# Patient Record
Sex: FEMALE | Race: WHITE
[De-identification: names, ages, dates, MRNs, and addresses within clinical notes are randomized per-mention and may not be internally consistent; named-entity substitution may affect disease eponyms.]

---

## 2018-08-15 ENCOUNTER — HOSPITAL ENCOUNTER (OUTPATIENT)
Dept: HOSPITAL 94 - SSTAY O | Age: 63
Discharge: HOME | End: 2018-08-15
Attending: RADIOLOGY
Payer: MEDICARE

## 2018-08-15 VITALS — BODY MASS INDEX: 28.94 KG/M2 | HEIGHT: 68 IN | WEIGHT: 190.92 LBS

## 2018-08-15 VITALS — SYSTOLIC BLOOD PRESSURE: 154 MMHG | DIASTOLIC BLOOD PRESSURE: 89 MMHG

## 2018-08-15 VITALS — SYSTOLIC BLOOD PRESSURE: 152 MMHG | DIASTOLIC BLOOD PRESSURE: 88 MMHG

## 2018-08-15 VITALS — DIASTOLIC BLOOD PRESSURE: 87 MMHG | SYSTOLIC BLOOD PRESSURE: 151 MMHG
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
## 2018-08-15 DIAGNOSIS — R79.1: ICD-10-CM

## 2018-08-15 DIAGNOSIS — Z23: ICD-10-CM

## 2018-08-15 DIAGNOSIS — Z88.8: ICD-10-CM

## 2018-08-15 DIAGNOSIS — Z88.2: ICD-10-CM

## 2018-08-15 DIAGNOSIS — Z79.4: ICD-10-CM

## 2018-08-15 DIAGNOSIS — N18.9: ICD-10-CM

## 2018-08-15 DIAGNOSIS — Z88.6: ICD-10-CM

## 2018-08-15 DIAGNOSIS — Z88.5: ICD-10-CM
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
## 2018-08-15 DIAGNOSIS — Z79.899: ICD-10-CM

## 2018-08-15 DIAGNOSIS — T82.898A: Primary | ICD-10-CM

## 2018-08-15 LAB
BASOPHILS # BLD AUTO: 0 X10'3 (ref 0–0.2)
BASOPHILS NFR BLD AUTO: 0.2 % (ref 0–1)
EOSINOPHIL # BLD AUTO: 0.1 X10'3 (ref 0–0.9)
EOSINOPHIL NFR BLD AUTO: 1.1 % (ref 0–6)
ERYTHROCYTE [DISTWIDTH] IN BLOOD BY AUTOMATED COUNT: 15.9 % (ref 11.5–14.5)
HCT VFR BLD AUTO: 38 % (ref 35–45)
HGB BLD-MCNC: 13.1 G/DL (ref 12–16)
INR PPP: 0.9 INR
LYMPHOCYTES # BLD AUTO: 1.5 X10'3 (ref 1.1–4.8)
LYMPHOCYTES NFR BLD AUTO: 23 % (ref 21–51)
MCH RBC QN AUTO: 30.8 PG (ref 27–31)
MCHC RBC AUTO-ENTMCNC: 34.6 % (ref 33–36.5)
MCV RBC AUTO: 88.9 FL (ref 78–98)
MONOCYTES # BLD AUTO: 0.4 X10'3 (ref 0–0.9)
MONOCYTES NFR BLD AUTO: 5.9 % (ref 2–12)
NEUTROPHILS # BLD AUTO: 4.5 X10'3 (ref 1.8–7.7)
NEUTROPHILS NFR BLD AUTO: 69.8 % (ref 42–75)
PLATELET # BLD AUTO: 118 X10'3 (ref 140–440)
PMV BLD AUTO: 9.2 FL (ref 7.4–10.4)
PROTHROMBIN TIME: 9.8 SECONDS (ref 9–12)
RBC # BLD AUTO: 4.27 X10'6 (ref 4.2–5.6)
WBC # BLD AUTO: 6.4 X10'3 (ref 4.5–11)

## 2018-08-15 PROCEDURE — 36415 COLL VENOUS BLD VENIPUNCTURE: CPT

## 2018-08-15 PROCEDURE — 90732 PPSV23 VACC 2 YRS+ SUBQ/IM: CPT

## 2018-08-15 PROCEDURE — 85610 PROTHROMBIN TIME: CPT

## 2018-08-15 PROCEDURE — 85025 COMPLETE CBC W/AUTO DIFF WBC: CPT

## 2018-08-15 PROCEDURE — 36581 REPLACE TUNNELED CV CATH: CPT

## 2018-08-15 PROCEDURE — 77001 FLUOROGUIDE FOR VEIN DEVICE: CPT

## 2018-12-13 ENCOUNTER — HOSPITAL ENCOUNTER (OUTPATIENT)
Dept: HOSPITAL 94 - SSTAY O | Age: 63
Discharge: HOME | End: 2018-12-13
Attending: RADIOLOGY
Payer: MEDICARE

## 2018-12-13 VITALS — SYSTOLIC BLOOD PRESSURE: 180 MMHG | DIASTOLIC BLOOD PRESSURE: 89 MMHG

## 2018-12-13 VITALS — SYSTOLIC BLOOD PRESSURE: 177 MMHG | DIASTOLIC BLOOD PRESSURE: 88 MMHG

## 2018-12-13 VITALS — WEIGHT: 189.82 LBS | BODY MASS INDEX: 28.77 KG/M2 | HEIGHT: 68 IN

## 2018-12-13 VITALS — SYSTOLIC BLOOD PRESSURE: 178 MMHG | DIASTOLIC BLOOD PRESSURE: 85 MMHG

## 2018-12-13 VITALS — DIASTOLIC BLOOD PRESSURE: 95 MMHG | SYSTOLIC BLOOD PRESSURE: 123 MMHG

## 2018-12-13 DIAGNOSIS — Z98.890: ICD-10-CM

## 2018-12-13 DIAGNOSIS — Z88.8: ICD-10-CM

## 2018-12-13 DIAGNOSIS — E11.22: ICD-10-CM
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
## 2018-12-13 DIAGNOSIS — Z80.9: ICD-10-CM

## 2018-12-13 DIAGNOSIS — Z79.84: ICD-10-CM

## 2018-12-13 DIAGNOSIS — Z79.82: ICD-10-CM

## 2018-12-13 DIAGNOSIS — H91.8X3: ICD-10-CM

## 2018-12-13 DIAGNOSIS — Z90.710: ICD-10-CM

## 2018-12-13 DIAGNOSIS — Z85.3: ICD-10-CM

## 2018-12-13 DIAGNOSIS — Z88.6: ICD-10-CM

## 2018-12-13 DIAGNOSIS — I25.10: ICD-10-CM

## 2018-12-13 DIAGNOSIS — T82.898A: Primary | ICD-10-CM

## 2018-12-13 DIAGNOSIS — Z79.4: ICD-10-CM

## 2018-12-13 DIAGNOSIS — E78.5: ICD-10-CM

## 2018-12-13 DIAGNOSIS — N18.9: ICD-10-CM

## 2018-12-13 DIAGNOSIS — I12.9: ICD-10-CM

## 2018-12-13 DIAGNOSIS — Z79.899: ICD-10-CM

## 2018-12-13 DIAGNOSIS — M19.90: ICD-10-CM

## 2018-12-13 DIAGNOSIS — K21.9: ICD-10-CM

## 2018-12-13 DIAGNOSIS — Z88.2: ICD-10-CM

## 2018-12-13 DIAGNOSIS — Z79.891: ICD-10-CM

## 2018-12-13 DIAGNOSIS — Z88.5: ICD-10-CM

## 2018-12-13 DIAGNOSIS — Z95.5: ICD-10-CM

## 2018-12-13 DIAGNOSIS — Z83.3: ICD-10-CM

## 2018-12-13 DIAGNOSIS — Z82.49: ICD-10-CM

## 2018-12-13 PROCEDURE — 36581 REPLACE TUNNELED CV CATH: CPT

## 2021-02-18 ENCOUNTER — HOSPITAL ENCOUNTER (OUTPATIENT)
Dept: HOSPITAL 94 - LAB | Age: 66
Discharge: HOME | End: 2021-02-18
Attending: GENERAL ACUTE CARE HOSPITAL
Payer: MEDICARE

## 2021-02-18 DIAGNOSIS — Z00.00: Primary | ICD-10-CM

## 2022-05-05 ENCOUNTER — HOSPITAL ENCOUNTER (INPATIENT)
Facility: MEDICAL CENTER | Age: 67
LOS: 14 days | DRG: 682 | End: 2022-05-19
Attending: EMERGENCY MEDICINE | Admitting: STUDENT IN AN ORGANIZED HEALTH CARE EDUCATION/TRAINING PROGRAM
Payer: MEDICARE

## 2022-05-05 ENCOUNTER — APPOINTMENT (OUTPATIENT)
Dept: RADIOLOGY | Facility: MEDICAL CENTER | Age: 67
DRG: 682 | End: 2022-05-05
Attending: EMERGENCY MEDICINE
Payer: MEDICARE

## 2022-05-05 ENCOUNTER — APPOINTMENT (OUTPATIENT)
Dept: RADIOLOGY | Facility: MEDICAL CENTER | Age: 67
DRG: 682 | End: 2022-05-05
Attending: STUDENT IN AN ORGANIZED HEALTH CARE EDUCATION/TRAINING PROGRAM
Payer: MEDICARE

## 2022-05-05 ENCOUNTER — APPOINTMENT (OUTPATIENT)
Dept: CARDIOLOGY | Facility: MEDICAL CENTER | Age: 67
DRG: 682 | End: 2022-05-05
Attending: STUDENT IN AN ORGANIZED HEALTH CARE EDUCATION/TRAINING PROGRAM
Payer: MEDICARE

## 2022-05-05 DIAGNOSIS — R45.851 DEPRESSION WITH SUICIDAL IDEATION: ICD-10-CM

## 2022-05-05 DIAGNOSIS — I10 PRIMARY HYPERTENSION: ICD-10-CM

## 2022-05-05 DIAGNOSIS — N18.6 ESRD (END STAGE RENAL DISEASE) (HCC): ICD-10-CM

## 2022-05-05 DIAGNOSIS — N18.6 STAGE 5 CHRONIC KIDNEY DISEASE ON CHRONIC DIALYSIS (HCC): ICD-10-CM

## 2022-05-05 DIAGNOSIS — E83.51 HYPOCALCEMIA: ICD-10-CM

## 2022-05-05 DIAGNOSIS — N19 UREMIA: ICD-10-CM

## 2022-05-05 DIAGNOSIS — R53.1 GENERALIZED WEAKNESS: ICD-10-CM

## 2022-05-05 DIAGNOSIS — J96.01 ACUTE RESPIRATORY FAILURE WITH HYPOXIA (HCC): ICD-10-CM

## 2022-05-05 DIAGNOSIS — J90 PLEURAL EFFUSION: ICD-10-CM

## 2022-05-05 DIAGNOSIS — D64.9 ANEMIA, UNSPECIFIED TYPE: Primary | ICD-10-CM

## 2022-05-05 DIAGNOSIS — F32.A DEPRESSION WITH SUICIDAL IDEATION: ICD-10-CM

## 2022-05-05 DIAGNOSIS — Z99.2 STAGE 5 CHRONIC KIDNEY DISEASE ON CHRONIC DIALYSIS (HCC): ICD-10-CM

## 2022-05-05 PROBLEM — J18.9 PNEUMONIA DUE TO INFECTIOUS ORGANISM: Status: ACTIVE | Noted: 2022-05-05

## 2022-05-05 PROBLEM — D61.818 PANCYTOPENIA (HCC): Status: ACTIVE | Noted: 2022-05-05

## 2022-05-05 LAB
ABO + RH BLD: NORMAL
ABO GROUP BLD: NORMAL
ALBUMIN SERPL BCP-MCNC: 2.9 G/DL (ref 3.2–4.9)
ALBUMIN/GLOB SERPL: 1 G/DL
ALP SERPL-CCNC: 59 U/L (ref 30–99)
ALT SERPL-CCNC: 16 U/L (ref 2–50)
ANION GAP SERPL CALC-SCNC: 26 MMOL/L (ref 7–16)
ANISOCYTOSIS BLD QL SMEAR: ABNORMAL
ANISOCYTOSIS BLD QL SMEAR: ABNORMAL
APPEARANCE FLD: CLEAR
APPEARANCE UR: CLEAR
APTT PPP: 30.5 SEC (ref 24.7–36)
AST SERPL-CCNC: 13 U/L (ref 12–45)
BACTERIA #/AREA URNS HPF: NEGATIVE /HPF
BARCODED ABORH UBTYP: 5100
BARCODED ABORH UBTYP: 5100
BARCODED ABORH UBTYP: 9500
BARCODED PRD CODE UBPRD: NORMAL
BARCODED UNIT NUM UBUNT: NORMAL
BASOPHILS # BLD AUTO: 0.3 % (ref 0–1.8)
BASOPHILS # BLD AUTO: 1.8 % (ref 0–1.8)
BASOPHILS # BLD: 0.01 K/UL (ref 0–0.12)
BASOPHILS # BLD: 0.08 K/UL (ref 0–0.12)
BASOPHILS NFR FLD: 1 %
BILIRUB SERPL-MCNC: 0.2 MG/DL (ref 0.1–1.5)
BILIRUB UR QL STRIP.AUTO: NEGATIVE
BLD GP AB SCN SERPL QL: NORMAL
BODY FLD TYPE: NORMAL
BUN SERPL-MCNC: 135 MG/DL (ref 8–22)
BURR CELLS BLD QL SMEAR: NORMAL
CALCIUM SERPL-MCNC: 7.3 MG/DL (ref 8.5–10.5)
CHLORIDE SERPL-SCNC: 106 MMOL/L (ref 96–112)
CK SERPL-CCNC: 127 U/L (ref 0–154)
CO2 SERPL-SCNC: 11 MMOL/L (ref 20–33)
COLOR FLD: YELLOW
COLOR UR: YELLOW
COMMENT 1642: NORMAL
COMPONENT R 8504R: NORMAL
CREAT SERPL-MCNC: 15.93 MG/DL (ref 0.5–1.4)
CYTOLOGY REG CYTOL: NORMAL
EKG IMPRESSION: NORMAL
EOSINOPHIL # BLD AUTO: 0.07 K/UL (ref 0–0.51)
EOSINOPHIL # BLD AUTO: 0.12 K/UL (ref 0–0.51)
EOSINOPHIL NFR BLD: 1.7 % (ref 0–6.9)
EOSINOPHIL NFR BLD: 3.2 % (ref 0–6.9)
EOSINOPHIL NFR FLD: 28 %
EPI CELLS #/AREA URNS HPF: NEGATIVE /HPF
ERYTHROCYTE [DISTWIDTH] IN BLOOD BY AUTOMATED COUNT: 68.8 FL (ref 35.9–50)
ERYTHROCYTE [DISTWIDTH] IN BLOOD BY AUTOMATED COUNT: 75.5 FL (ref 35.9–50)
GFR SERPLBLD CREATININE-BSD FMLA CKD-EPI: 2 ML/MIN/1.73 M 2
GLOBULIN SER CALC-MCNC: 2.9 G/DL (ref 1.9–3.5)
GLUCOSE FLD-MCNC: 123 MG/DL
GLUCOSE SERPL-MCNC: 124 MG/DL (ref 65–99)
GLUCOSE UR STRIP.AUTO-MCNC: 500 MG/DL
GRAM STN SPEC: NORMAL
HAV IGM SERPL QL IA: NORMAL
HBV CORE IGM SER QL: NORMAL
HBV SURFACE AB SERPL IA-ACNC: 392 MIU/ML (ref 0–10)
HBV SURFACE AG SER QL: NORMAL
HCT VFR BLD AUTO: 17.5 % (ref 37–47)
HCT VFR BLD AUTO: 21.7 % (ref 37–47)
HCT VFR BLD AUTO: 24.6 % (ref 37–47)
HCV AB SER QL: NORMAL
HGB BLD-MCNC: 5.2 G/DL (ref 12–16)
HGB BLD-MCNC: 6.6 G/DL (ref 12–16)
HGB BLD-MCNC: 8 G/DL (ref 12–16)
HISTIOCYTES NFR FLD: 20 %
HYALINE CASTS #/AREA URNS LPF: ABNORMAL /LPF
IMM GRANULOCYTES # BLD AUTO: 0.03 K/UL (ref 0–0.11)
IMM GRANULOCYTES NFR BLD AUTO: 0.8 % (ref 0–0.9)
INR PPP: 1.13 (ref 0.87–1.13)
IRON SATN MFR SERPL: 27 % (ref 15–55)
IRON SERPL-MCNC: 42 UG/DL (ref 40–170)
KETONES UR STRIP.AUTO-MCNC: NEGATIVE MG/DL
LACTATE BLD-SCNC: 0.8 MMOL/L (ref 0.5–2)
LDH FLD L TO P-CCNC: 104 U/L
LDH SERPL L TO P-CCNC: 381 U/L (ref 107–266)
LEUKOCYTE ESTERASE UR QL STRIP.AUTO: NEGATIVE
LIPASE SERPL-CCNC: 79 U/L (ref 11–82)
LV EJECT FRACT  99904: 55
LV EJECT FRACT MOD 4C 99902: 58.61
LYMPHOCYTES # BLD AUTO: 0.15 K/UL (ref 1–4.8)
LYMPHOCYTES # BLD AUTO: 0.32 K/UL (ref 1–4.8)
LYMPHOCYTES NFR BLD: 3.5 % (ref 22–41)
LYMPHOCYTES NFR BLD: 8.5 % (ref 22–41)
LYMPHOCYTES NFR FLD: 12 %
MAGNESIUM SERPL-MCNC: 2.3 MG/DL (ref 1.5–2.5)
MANUAL DIFF BLD: NORMAL
MCH RBC QN AUTO: 28.1 PG (ref 27–33)
MCH RBC QN AUTO: 28.7 PG (ref 27–33)
MCHC RBC AUTO-ENTMCNC: 29.7 G/DL (ref 33.6–35)
MCHC RBC AUTO-ENTMCNC: 30.4 G/DL (ref 33.6–35)
MCV RBC AUTO: 94.3 FL (ref 81.4–97.8)
MCV RBC AUTO: 94.6 FL (ref 81.4–97.8)
MESOTHL CELL NFR FLD: 2 %
METAMYELOCYTES NFR BLD MANUAL: 0.9 %
MICRO URNS: ABNORMAL
MICROCYTES BLD QL SMEAR: ABNORMAL
MICROCYTES BLD QL SMEAR: ABNORMAL
MONOCYTES # BLD AUTO: 0.08 K/UL (ref 0–0.85)
MONOCYTES # BLD AUTO: 0.35 K/UL (ref 0–0.85)
MONOCYTES NFR BLD AUTO: 1.8 % (ref 0–13.4)
MONOCYTES NFR BLD AUTO: 9.3 % (ref 0–13.4)
MONONUC CELLS NFR FLD: 33 %
MORPHOLOGY BLD-IMP: NORMAL
MORPHOLOGY BLD-IMP: NORMAL
NEUTROPHILS # BLD AUTO: 2.94 K/UL (ref 2–7.15)
NEUTROPHILS # BLD AUTO: 3.88 K/UL (ref 2–7.15)
NEUTROPHILS NFR BLD: 77.9 % (ref 44–72)
NEUTROPHILS NFR BLD: 89.4 % (ref 44–72)
NEUTROPHILS NFR FLD: 4 %
NEUTS BAND NFR BLD MANUAL: 0.9 % (ref 0–10)
NITRITE UR QL STRIP.AUTO: NEGATIVE
NRBC # BLD AUTO: 0 K/UL
NRBC # BLD AUTO: 0.02 K/UL
NRBC BLD-RTO: 0 /100 WBC
NRBC BLD-RTO: 0.5 /100 WBC
NT-PROBNP SERPL IA-MCNC: ABNORMAL PG/ML (ref 0–125)
OVALOCYTES BLD QL SMEAR: NORMAL
PH FLD: 7 [PH]
PH UR STRIP.AUTO: 5.5 [PH] (ref 5–8)
PHOSPHATE SERPL-MCNC: 11.8 MG/DL (ref 2.5–4.5)
PLATELET # BLD AUTO: 90 K/UL (ref 164–446)
PLATELET # BLD AUTO: 95 K/UL (ref 164–446)
PLATELET BLD QL SMEAR: NORMAL
PLATELET BLD QL SMEAR: NORMAL
PMV BLD AUTO: 10.8 FL (ref 9–12.9)
PMV BLD AUTO: 10.9 FL (ref 9–12.9)
POIKILOCYTOSIS BLD QL SMEAR: NORMAL
POIKILOCYTOSIS BLD QL SMEAR: NORMAL
POTASSIUM SERPL-SCNC: 4.3 MMOL/L (ref 3.6–5.5)
PROCALCITONIN SERPL-MCNC: 0.7 NG/ML
PRODUCT TYPE UPROD: NORMAL
PROT FLD-MCNC: 1.7 G/DL
PROT SERPL-MCNC: 5.8 G/DL (ref 6–8.2)
PROT UR QL STRIP: >=1000 MG/DL
PROTHROMBIN TIME: 14.2 SEC (ref 12–14.6)
RBC # BLD AUTO: 1.85 M/UL (ref 4.2–5.4)
RBC # BLD AUTO: 2.3 M/UL (ref 4.2–5.4)
RBC # FLD: <2000 CELLS/UL
RBC # URNS HPF: ABNORMAL /HPF
RBC BLD AUTO: PRESENT
RBC BLD AUTO: PRESENT
RBC UR QL AUTO: ABNORMAL
RH BLD: NORMAL
SCCMEC + MECA PNL NOSE NAA+PROBE: NEGATIVE
SCHISTOCYTES BLD QL SMEAR: NORMAL
SIGNIFICANT IND 70042: NORMAL
SITE SITE: NORMAL
SODIUM SERPL-SCNC: 143 MMOL/L (ref 135–145)
SOURCE SOURCE: NORMAL
SP GR UR STRIP.AUTO: 1.02
TIBC SERPL-MCNC: 154 UG/DL (ref 250–450)
TROPONIN T SERPL-MCNC: 67 NG/L (ref 6–19)
UIBC SERPL-MCNC: 112 UG/DL (ref 110–370)
UNIT STATUS USTAT: NORMAL
UROBILINOGEN UR STRIP.AUTO-MCNC: 0.2 MG/DL
WBC # BLD AUTO: 3.8 K/UL (ref 4.8–10.8)
WBC # BLD AUTO: 4.3 K/UL (ref 4.8–10.8)
WBC # FLD: 85 CELLS/UL
WBC #/AREA URNS HPF: ABNORMAL /HPF

## 2022-05-05 PROCEDURE — 96374 THER/PROPH/DIAG INJ IV PUSH: CPT

## 2022-05-05 PROCEDURE — 86706 HEP B SURFACE ANTIBODY: CPT

## 2022-05-05 PROCEDURE — 86923 COMPATIBILITY TEST ELECTRIC: CPT | Mod: 91

## 2022-05-05 PROCEDURE — 86900 BLOOD TYPING SEROLOGIC ABO: CPT

## 2022-05-05 PROCEDURE — 94760 N-INVAS EAR/PLS OXIMETRY 1: CPT

## 2022-05-05 PROCEDURE — 82945 GLUCOSE OTHER FLUID: CPT

## 2022-05-05 PROCEDURE — A9270 NON-COVERED ITEM OR SERVICE: HCPCS | Performed by: STUDENT IN AN ORGANIZED HEALTH CARE EDUCATION/TRAINING PROGRAM

## 2022-05-05 PROCEDURE — 86480 TB TEST CELL IMMUN MEASURE: CPT

## 2022-05-05 PROCEDURE — 83540 ASSAY OF IRON: CPT

## 2022-05-05 PROCEDURE — 88112 CYTOPATH CELL ENHANCE TECH: CPT

## 2022-05-05 PROCEDURE — P9016 RBC LEUKOCYTES REDUCED: HCPCS

## 2022-05-05 PROCEDURE — 93306 TTE W/DOPPLER COMPLETE: CPT | Mod: 26 | Performed by: INTERNAL MEDICINE

## 2022-05-05 PROCEDURE — 93005 ELECTROCARDIOGRAM TRACING: CPT

## 2022-05-05 PROCEDURE — 87641 MR-STAPH DNA AMP PROBE: CPT

## 2022-05-05 PROCEDURE — 84100 ASSAY OF PHOSPHORUS: CPT

## 2022-05-05 PROCEDURE — 30233N1 TRANSFUSION OF NONAUTOLOGOUS RED BLOOD CELLS INTO PERIPHERAL VEIN, PERCUTANEOUS APPROACH: ICD-10-PCS | Performed by: EMERGENCY MEDICINE

## 2022-05-05 PROCEDURE — 84157 ASSAY OF PROTEIN OTHER: CPT

## 2022-05-05 PROCEDURE — 80053 COMPREHEN METABOLIC PANEL: CPT

## 2022-05-05 PROCEDURE — 36430 TRANSFUSION BLD/BLD COMPNT: CPT

## 2022-05-05 PROCEDURE — 86850 RBC ANTIBODY SCREEN: CPT

## 2022-05-05 PROCEDURE — 86901 BLOOD TYPING SEROLOGIC RH(D): CPT

## 2022-05-05 PROCEDURE — 85610 PROTHROMBIN TIME: CPT

## 2022-05-05 PROCEDURE — 83690 ASSAY OF LIPASE: CPT

## 2022-05-05 PROCEDURE — 99285 EMERGENCY DEPT VISIT HI MDM: CPT

## 2022-05-05 PROCEDURE — 93005 ELECTROCARDIOGRAM TRACING: CPT | Performed by: EMERGENCY MEDICINE

## 2022-05-05 PROCEDURE — 96375 TX/PRO/DX INJ NEW DRUG ADDON: CPT

## 2022-05-05 PROCEDURE — 87205 SMEAR GRAM STAIN: CPT

## 2022-05-05 PROCEDURE — 83605 ASSAY OF LACTIC ACID: CPT

## 2022-05-05 PROCEDURE — 87070 CULTURE OTHR SPECIMN AEROBIC: CPT

## 2022-05-05 PROCEDURE — 99223 1ST HOSP IP/OBS HIGH 75: CPT | Mod: AI | Performed by: STUDENT IN AN ORGANIZED HEALTH CARE EDUCATION/TRAINING PROGRAM

## 2022-05-05 PROCEDURE — 85730 THROMBOPLASTIN TIME PARTIAL: CPT

## 2022-05-05 PROCEDURE — 88305 TISSUE EXAM BY PATHOLOGIST: CPT

## 2022-05-05 PROCEDURE — 93306 TTE W/DOPPLER COMPLETE: CPT

## 2022-05-05 PROCEDURE — 90935 HEMODIALYSIS ONE EVALUATION: CPT

## 2022-05-05 PROCEDURE — 700111 HCHG RX REV CODE 636 W/ 250 OVERRIDE (IP): Performed by: EMERGENCY MEDICINE

## 2022-05-05 PROCEDURE — 32555 ASPIRATE PLEURA W/ IMAGING: CPT | Mod: RT

## 2022-05-05 PROCEDURE — 84145 PROCALCITONIN (PCT): CPT

## 2022-05-05 PROCEDURE — 80074 ACUTE HEPATITIS PANEL: CPT

## 2022-05-05 PROCEDURE — 770001 HCHG ROOM/CARE - MED/SURG/GYN PRIV*

## 2022-05-05 PROCEDURE — 87040 BLOOD CULTURE FOR BACTERIA: CPT | Mod: 91

## 2022-05-05 PROCEDURE — 85018 HEMOGLOBIN: CPT

## 2022-05-05 PROCEDURE — 36415 COLL VENOUS BLD VENIPUNCTURE: CPT

## 2022-05-05 PROCEDURE — 700102 HCHG RX REV CODE 250 W/ 637 OVERRIDE(OP): Performed by: STUDENT IN AN ORGANIZED HEALTH CARE EDUCATION/TRAINING PROGRAM

## 2022-05-05 PROCEDURE — 71045 X-RAY EXAM CHEST 1 VIEW: CPT

## 2022-05-05 PROCEDURE — 85007 BL SMEAR W/DIFF WBC COUNT: CPT

## 2022-05-05 PROCEDURE — 83735 ASSAY OF MAGNESIUM: CPT

## 2022-05-05 PROCEDURE — 85025 COMPLETE CBC W/AUTO DIFF WBC: CPT

## 2022-05-05 PROCEDURE — 700111 HCHG RX REV CODE 636 W/ 250 OVERRIDE (IP): Performed by: STUDENT IN AN ORGANIZED HEALTH CARE EDUCATION/TRAINING PROGRAM

## 2022-05-05 PROCEDURE — 83880 ASSAY OF NATRIURETIC PEPTIDE: CPT

## 2022-05-05 PROCEDURE — 81001 URINALYSIS AUTO W/SCOPE: CPT

## 2022-05-05 PROCEDURE — 82550 ASSAY OF CK (CPK): CPT

## 2022-05-05 PROCEDURE — 700101 HCHG RX REV CODE 250: Performed by: STUDENT IN AN ORGANIZED HEALTH CARE EDUCATION/TRAINING PROGRAM

## 2022-05-05 PROCEDURE — 84484 ASSAY OF TROPONIN QUANT: CPT

## 2022-05-05 PROCEDURE — 700117 HCHG RX CONTRAST REV CODE 255: Performed by: EMERGENCY MEDICINE

## 2022-05-05 PROCEDURE — 0W993ZZ DRAINAGE OF RIGHT PLEURAL CAVITY, PERCUTANEOUS APPROACH: ICD-10-PCS | Performed by: RADIOLOGY

## 2022-05-05 PROCEDURE — 83550 IRON BINDING TEST: CPT

## 2022-05-05 PROCEDURE — 83986 ASSAY PH BODY FLUID NOS: CPT

## 2022-05-05 PROCEDURE — 71260 CT THORAX DX C+: CPT | Mod: MG

## 2022-05-05 PROCEDURE — 87015 SPECIMEN INFECT AGNT CONCNTJ: CPT

## 2022-05-05 PROCEDURE — 83615 LACTATE (LD) (LDH) ENZYME: CPT | Mod: 91

## 2022-05-05 PROCEDURE — 85014 HEMATOCRIT: CPT

## 2022-05-05 PROCEDURE — 5A1D70Z PERFORMANCE OF URINARY FILTRATION, INTERMITTENT, LESS THAN 6 HOURS PER DAY: ICD-10-PCS | Performed by: STUDENT IN AN ORGANIZED HEALTH CARE EDUCATION/TRAINING PROGRAM

## 2022-05-05 PROCEDURE — 89051 BODY FLUID CELL COUNT: CPT

## 2022-05-05 RX ORDER — CALCIUM ACETATE 667 MG/1
667 TABLET ORAL
Status: DISCONTINUED | OUTPATIENT
Start: 2022-05-05 | End: 2022-05-19 | Stop reason: HOSPADM

## 2022-05-05 RX ORDER — CALCIUM ACETATE 667 MG/1
2001 CAPSULE ORAL EVERY EVENING
Status: ON HOLD | COMMUNITY
End: 2022-06-19

## 2022-05-05 RX ORDER — AMOXICILLIN 250 MG
2 CAPSULE ORAL 2 TIMES DAILY
Status: DISCONTINUED | OUTPATIENT
Start: 2022-05-05 | End: 2022-05-07

## 2022-05-05 RX ORDER — IPRATROPIUM BROMIDE AND ALBUTEROL SULFATE 2.5; .5 MG/3ML; MG/3ML
3 SOLUTION RESPIRATORY (INHALATION)
Status: DISCONTINUED | OUTPATIENT
Start: 2022-05-05 | End: 2022-05-19 | Stop reason: HOSPADM

## 2022-05-05 RX ORDER — INSULIN LISPRO 100 [IU]/ML
14 INJECTION, SOLUTION SUBCUTANEOUS DAILY
COMMUNITY
End: 2022-06-13

## 2022-05-05 RX ORDER — FUROSEMIDE 10 MG/ML
40 INJECTION INTRAMUSCULAR; INTRAVENOUS
Status: DISCONTINUED | OUTPATIENT
Start: 2022-05-05 | End: 2022-05-07

## 2022-05-05 RX ORDER — BISACODYL 10 MG
10 SUPPOSITORY, RECTAL RECTAL
Status: DISCONTINUED | OUTPATIENT
Start: 2022-05-05 | End: 2022-05-07

## 2022-05-05 RX ORDER — PIOGLITAZONEHYDROCHLORIDE 45 MG/1
45 TABLET ORAL DAILY
Status: ON HOLD | COMMUNITY
End: 2022-06-28

## 2022-05-05 RX ORDER — ACETAMINOPHEN 325 MG/1
650 TABLET ORAL EVERY 6 HOURS PRN
Status: DISCONTINUED | OUTPATIENT
Start: 2022-05-05 | End: 2022-05-07

## 2022-05-05 RX ORDER — IBUPROFEN 600 MG/1
600 TABLET ORAL 3 TIMES DAILY PRN
Status: ON HOLD | COMMUNITY
End: 2022-05-14

## 2022-05-05 RX ORDER — SODIUM BICARBONATE 650 MG/1
650 TABLET ORAL 3 TIMES DAILY
Status: DISCONTINUED | OUTPATIENT
Start: 2022-05-05 | End: 2022-05-07

## 2022-05-05 RX ORDER — AMLODIPINE BESYLATE 5 MG/1
5 TABLET ORAL DAILY
Status: ON HOLD | COMMUNITY
End: 2022-05-14 | Stop reason: SDUPTHER

## 2022-05-05 RX ORDER — AZITHROMYCIN 250 MG/1
500 TABLET, FILM COATED ORAL DAILY
Status: DISCONTINUED | OUTPATIENT
Start: 2022-05-05 | End: 2022-05-07

## 2022-05-05 RX ORDER — OMEPRAZOLE 20 MG/1
20 CAPSULE, DELAYED RELEASE ORAL DAILY
COMMUNITY
End: 2022-06-13

## 2022-05-05 RX ORDER — HYDRALAZINE HYDROCHLORIDE 25 MG/1
25 TABLET, FILM COATED ORAL 2 TIMES DAILY
Status: ON HOLD | COMMUNITY
End: 2022-06-28

## 2022-05-05 RX ORDER — METOPROLOL SUCCINATE 100 MG/1
100 TABLET, EXTENDED RELEASE ORAL DAILY
COMMUNITY
End: 2022-06-13

## 2022-05-05 RX ORDER — HYDRALAZINE HYDROCHLORIDE 20 MG/ML
10 INJECTION INTRAMUSCULAR; INTRAVENOUS EVERY 4 HOURS PRN
Status: DISCONTINUED | OUTPATIENT
Start: 2022-05-05 | End: 2022-05-19 | Stop reason: HOSPADM

## 2022-05-05 RX ORDER — ONDANSETRON 2 MG/ML
4 INJECTION INTRAMUSCULAR; INTRAVENOUS EVERY 4 HOURS PRN
Status: DISCONTINUED | OUTPATIENT
Start: 2022-05-05 | End: 2022-05-08

## 2022-05-05 RX ORDER — GABAPENTIN 300 MG/1
300 CAPSULE ORAL EVERY EVENING
COMMUNITY

## 2022-05-05 RX ORDER — CALCIUM GLUCONATE 20 MG/ML
1 INJECTION, SOLUTION INTRAVENOUS ONCE
Status: COMPLETED | OUTPATIENT
Start: 2022-05-05 | End: 2022-05-05

## 2022-05-05 RX ORDER — DILTIAZEM HYDROCHLORIDE 240 MG/1
240 CAPSULE, EXTENDED RELEASE ORAL DAILY
Status: ON HOLD | COMMUNITY
End: 2022-05-19

## 2022-05-05 RX ORDER — POLYETHYLENE GLYCOL 3350 17 G/17G
1 POWDER, FOR SOLUTION ORAL
Status: DISCONTINUED | OUTPATIENT
Start: 2022-05-05 | End: 2022-05-07

## 2022-05-05 RX ORDER — HYDRALAZINE HYDROCHLORIDE 25 MG/1
25 TABLET, FILM COATED ORAL 2 TIMES DAILY
Status: DISCONTINUED | OUTPATIENT
Start: 2022-05-05 | End: 2022-05-10

## 2022-05-05 RX ORDER — AMLODIPINE BESYLATE 5 MG/1
5 TABLET ORAL DAILY
Status: DISCONTINUED | OUTPATIENT
Start: 2022-05-05 | End: 2022-05-06

## 2022-05-05 RX ORDER — ATORVASTATIN CALCIUM 80 MG/1
80 TABLET, FILM COATED ORAL EVERY EVENING
Status: ON HOLD | COMMUNITY
End: 2022-06-28

## 2022-05-05 RX ORDER — ONDANSETRON 4 MG/1
4 TABLET, ORALLY DISINTEGRATING ORAL EVERY 4 HOURS PRN
Status: DISCONTINUED | OUTPATIENT
Start: 2022-05-05 | End: 2022-05-08

## 2022-05-05 RX ORDER — LINAGLIPTIN 5 MG/1
5 TABLET, FILM COATED ORAL DAILY
COMMUNITY
End: 2022-06-13

## 2022-05-05 RX ORDER — SEVELAMER CARBONATE 800 MG/1
800 TABLET, FILM COATED ORAL
Status: DISCONTINUED | OUTPATIENT
Start: 2022-05-05 | End: 2022-05-05

## 2022-05-05 RX ORDER — SEVELAMER HYDROCHLORIDE 800 MG/1
1600 TABLET, FILM COATED ORAL
Status: ON HOLD | COMMUNITY
End: 2022-06-19

## 2022-05-05 RX ORDER — OMEPRAZOLE 20 MG/1
20 CAPSULE, DELAYED RELEASE ORAL DAILY
Status: DISCONTINUED | OUTPATIENT
Start: 2022-05-05 | End: 2022-05-07

## 2022-05-05 RX ORDER — INSULIN GLARGINE 300 U/ML
45 INJECTION, SOLUTION SUBCUTANEOUS
COMMUNITY
End: 2022-06-13

## 2022-05-05 RX ORDER — ATORVASTATIN CALCIUM 80 MG/1
80 TABLET, FILM COATED ORAL DAILY
Status: DISCONTINUED | OUTPATIENT
Start: 2022-05-05 | End: 2022-05-19 | Stop reason: HOSPADM

## 2022-05-05 RX ADMIN — ONDANSETRON 4 MG: 2 INJECTION INTRAMUSCULAR; INTRAVENOUS at 21:56

## 2022-05-05 RX ADMIN — CALCIUM GLUCONATE 1 G: 20 INJECTION, SOLUTION INTRAVENOUS at 02:26

## 2022-05-05 RX ADMIN — SODIUM BICARBONATE 650 MG: 650 TABLET ORAL at 06:19

## 2022-05-05 RX ADMIN — IOHEXOL 100 ML: 350 INJECTION, SOLUTION INTRAVENOUS at 03:20

## 2022-05-05 RX ADMIN — AZITHROMYCIN DIHYDRATE 500 MG: 250 TABLET, FILM COATED ORAL at 06:19

## 2022-05-05 RX ADMIN — HYDRALAZINE HYDROCHLORIDE 10 MG: 20 INJECTION INTRAMUSCULAR; INTRAVENOUS at 19:44

## 2022-05-05 RX ADMIN — CEFTRIAXONE SODIUM 2 G: 10 INJECTION, POWDER, FOR SOLUTION INTRAVENOUS at 06:21

## 2022-05-05 RX ADMIN — FUROSEMIDE 40 MG: 10 INJECTION, SOLUTION INTRAMUSCULAR; INTRAVENOUS at 06:27

## 2022-05-05 RX ADMIN — FUROSEMIDE 40 MG: 10 INJECTION, SOLUTION INTRAMUSCULAR; INTRAVENOUS at 18:05

## 2022-05-05 ASSESSMENT — ENCOUNTER SYMPTOMS
PHOTOPHOBIA: 0
SHORTNESS OF BREATH: 0
SPUTUM PRODUCTION: 0
ORTHOPNEA: 0
FALLS: 1
SPEECH CHANGE: 0
ABDOMINAL PAIN: 0
NERVOUS/ANXIOUS: 0
WEAKNESS: 1
HALLUCINATIONS: 0
CHILLS: 0
VOMITING: 0
GASTROINTESTINAL NEGATIVE: 1
PALPITATIONS: 0
EYE PAIN: 0
FOCAL WEAKNESS: 0
COUGH: 0
NAUSEA: 0
FEVER: 0
SEIZURES: 0
SENSORY CHANGE: 0
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
## 2022-05-05 NOTE — ASSESSMENT & PLAN NOTE
Patient with large right and small left pleural effusions as well as suspected diagnosis of pneumonia.  Thoracentesis performed in the ER, unclear the output however fluid studies correlate with transudate  Oxygen per guidelines, wean as able  RT consult, continuous pulse ox, incentive spirometry, pulmonary toilet  Procalcitonin elevated, this is skewed by patient's ESRD   echo noted LVEF of 55%, mild LVH,mild calcified mitral stenosis with a gradient of 5mmhg, moderate pulmonary hypertension.  CT chest abdomen pelvis with contrast shows large right pleural effusion and small left pleural effusion, bilateral atelectasis  EKG shows normal sinus rhythm with LVH, prolonged QTC, no STEMI or ST depressions  DuoNebs as needed  never smoked tobacco  Lasix ordered initially as she was fluid overloaded, discontinued at this time.  Resolved patient is currently on room air
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
## 2022-05-05 NOTE — ASSESSMENT & PLAN NOTE
Secondary to missing 3 to 4 weeks of dialysis  Moderate to large right pleural effusion and small left pleural effusion, thoracentesis performed  Causing acute respiratory failure -which has resolved

## 2022-05-05 NOTE — ED NOTES
Pt unable to sign transfusion consent form. Pt gave RN verbal understanding of need, but RN unable to consent for her due to orientation status. ERP contacted for consent

## 2022-05-05 NOTE — ASSESSMENT & PLAN NOTE
Unclear if true pneumonia vs compressive atelectasis only, she is pancytopenic and was hypothermic, will cover with antibiotics empirically while checking procalcitonin.  Check MRSA nares  Procalcitonin elevated, this can be skewed by ESRD  On C3 and azithromycin.  See acute respiratory failure problem for additional plan.

## 2022-05-05 NOTE — ASSESSMENT & PLAN NOTE
ESRD last dialyzed over 3 weeks ago, right upper extremity HD access prior complications.  No reports of hemorrhage, no abdominal pain or tenderness.  Received 2 PRBC transfusions for hemoglobin 5.3 in the ED.  Transfuse PRBC if hemoglobin less than 7.  Iron panel performed, start patient on oral iron supplementation

## 2022-05-05 NOTE — ED NOTES
Pt in dialysis for approx 2 more hours per dmitry Waite RN. Attempting to call report to receiving RN.

## 2022-05-05 NOTE — PROGRESS NOTES
4 Eyes Skin Assessment Completed by Jackie RN and Celestino RN.    Head WDL  Ears WDL  Nose WDL  Mouth WDL  Neck WDL  Breast/Chest WDL  Shoulder Blades Bruising  Spine Bruising  (R) Arm/Elbow/Hand Bruising  (L) Arm/Elbow/Hand Bruising  Abdomen Scab  Groin WDL  Scrotum/Coccyx/Buttocks Redness, Discoloration and Scab  (R) Leg Edema  (L) Leg Edema and knot behind the knee  (R) Heel/Foot/Toe Edema  (L) Heel/Foot/Toe Edema          Devices In Places Nasal Cannula      Interventions In Place Pillows    Possible Skin Injury Yes    Pictures Uploaded Into Epic Yes  Wound Consult Placed Yes  RN Wound Prevention Protocol Ordered Yes
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
## 2022-05-05 NOTE — PROGRESS NOTES
Pt arrived on unit form dialysis, very lethargic and not easy to arouse. Skin assessment done and pt cleaned up. On 4L NC with purewick in place. Admission assessment attempted but unable to complete. Will try again later.

## 2022-05-05 NOTE — ED NOTES
Assist RN:  Pt to CT scan via myra with CT Tech at this time. Pt is intermittently agitated and yelling out at staff demanding water and calling CT Tech stupid for not giving it to her.

## 2022-05-05 NOTE — ED NOTES
"Med rec complete list of allergies and medications from [facility].   Pt unable to review anything with me. She said she\" hasn't taken anything in 2 weeks\" and her pharmacy is \"[facility] in [place]\".   Pt unable to tell me what [facility] pharmacy is in.      "
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
## 2022-05-05 NOTE — DISCHARGE PLANNING
Outpatient Dialysis Placement Notification    Tentative Schedule: [facility] 2:45pm     Received notification for outpatient dialysis placement from Dr. Benito.    Met/Spoke with patient and confirmed demographics and insurance information.  Provided patient with dialysis education materials and list of HD centers, referral initiated to:    [facility]    [address] [place] [id number]    Pending medical and financial clearance.    Ching Lees- Senior Dialysis Coordinator # [phone]  [employer]

## 2022-05-06 ENCOUNTER — APPOINTMENT (OUTPATIENT)
Dept: RADIOLOGY | Facility: MEDICAL CENTER | Age: 67
DRG: 682 | End: 2022-05-06
Payer: MEDICARE

## 2022-05-06 PROBLEM — G93.40 ACUTE ENCEPHALOPATHY: Status: ACTIVE | Noted: 2022-05-06

## 2022-05-06 PROBLEM — N19 UREMIA: Status: ACTIVE | Noted: 2022-05-06

## 2022-05-06 PROBLEM — T74.91XA DOMESTIC ABUSE OF ADULT: Status: ACTIVE | Noted: 2022-05-06

## 2022-05-06 LAB
ALBUMIN SERPL BCP-MCNC: 2.5 G/DL (ref 3.2–4.9)
ALBUMIN/GLOB SERPL: 0.8 G/DL
ALP SERPL-CCNC: 59 U/L (ref 30–99)
ALT SERPL-CCNC: 13 U/L (ref 2–50)
ANION GAP SERPL CALC-SCNC: 21 MMOL/L (ref 7–16)
AST SERPL-CCNC: 14 U/L (ref 12–45)
BASOPHILS # BLD AUTO: 0.2 % (ref 0–1.8)
BASOPHILS # BLD: 0.01 K/UL (ref 0–0.12)
BILIRUB SERPL-MCNC: 0.3 MG/DL (ref 0.1–1.5)
BUN SERPL-MCNC: 68 MG/DL (ref 8–22)
CALCIUM SERPL-MCNC: 7.3 MG/DL (ref 8.5–10.5)
CHLORIDE SERPL-SCNC: 101 MMOL/L (ref 96–112)
CO2 SERPL-SCNC: 17 MMOL/L (ref 20–33)
CREAT SERPL-MCNC: 8.93 MG/DL (ref 0.5–1.4)
EOSINOPHIL # BLD AUTO: 0.05 K/UL (ref 0–0.51)
EOSINOPHIL NFR BLD: 1.1 % (ref 0–6.9)
ERYTHROCYTE [DISTWIDTH] IN BLOOD BY AUTOMATED COUNT: 72.1 FL (ref 35.9–50)
GAMMA INTERFERON BACKGROUND BLD IA-ACNC: 0.1 IU/ML
GFR SERPLBLD CREATININE-BSD FMLA CKD-EPI: 4 ML/MIN/1.73 M 2
GLOBULIN SER CALC-MCNC: 3.3 G/DL (ref 1.9–3.5)
GLUCOSE SERPL-MCNC: 109 MG/DL (ref 65–99)
HCT VFR BLD AUTO: 25.1 % (ref 37–47)
HGB BLD-MCNC: 7.7 G/DL (ref 12–16)
IMM GRANULOCYTES # BLD AUTO: 0.05 K/UL (ref 0–0.11)
IMM GRANULOCYTES NFR BLD AUTO: 1.1 % (ref 0–0.9)
LYMPHOCYTES # BLD AUTO: 0.15 K/UL (ref 1–4.8)
LYMPHOCYTES NFR BLD: 3.4 % (ref 22–41)
M TB IFN-G BLD-IMP: NEGATIVE
M TB IFN-G CD4+ BCKGRND COR BLD-ACNC: 0 IU/ML
MAGNESIUM SERPL-MCNC: 1.9 MG/DL (ref 1.5–2.5)
MCH RBC QN AUTO: 29.8 PG (ref 27–33)
MCHC RBC AUTO-ENTMCNC: 30.7 G/DL (ref 33.6–35)
MCV RBC AUTO: 97.3 FL (ref 81.4–97.8)
MITOGEN IGNF BCKGRD COR BLD-ACNC: 4.41 IU/ML
MONOCYTES # BLD AUTO: 0.38 K/UL (ref 0–0.85)
MONOCYTES NFR BLD AUTO: 8.6 % (ref 0–13.4)
NEUTROPHILS # BLD AUTO: 3.76 K/UL (ref 2–7.15)
NEUTROPHILS NFR BLD: 85.6 % (ref 44–72)
NRBC # BLD AUTO: 0 K/UL
NRBC BLD-RTO: 0 /100 WBC
PHOSPHATE SERPL-MCNC: 6.6 MG/DL (ref 2.5–4.5)
PLATELET # BLD AUTO: 101 K/UL (ref 164–446)
PMV BLD AUTO: 10.4 FL (ref 9–12.9)
POTASSIUM SERPL-SCNC: 3.8 MMOL/L (ref 3.6–5.5)
PROT SERPL-MCNC: 5.8 G/DL (ref 6–8.2)
QFT TB2 - NIL TBQ2: 0 IU/ML
RBC # BLD AUTO: 2.58 M/UL (ref 4.2–5.4)
SODIUM SERPL-SCNC: 139 MMOL/L (ref 135–145)
TROPONIN T SERPL-MCNC: 74 NG/L (ref 6–19)
WBC # BLD AUTO: 4.4 K/UL (ref 4.8–10.8)

## 2022-05-06 PROCEDURE — 97162 PT EVAL MOD COMPLEX 30 MIN: CPT

## 2022-05-06 PROCEDURE — A9270 NON-COVERED ITEM OR SERVICE: HCPCS

## 2022-05-06 PROCEDURE — 80053 COMPREHEN METABOLIC PANEL: CPT

## 2022-05-06 PROCEDURE — 700111 HCHG RX REV CODE 636 W/ 250 OVERRIDE (IP): Performed by: STUDENT IN AN ORGANIZED HEALTH CARE EDUCATION/TRAINING PROGRAM

## 2022-05-06 PROCEDURE — A9270 NON-COVERED ITEM OR SERVICE: HCPCS | Performed by: GENERAL PRACTICE

## 2022-05-06 PROCEDURE — 700102 HCHG RX REV CODE 250 W/ 637 OVERRIDE(OP): Performed by: GENERAL PRACTICE

## 2022-05-06 PROCEDURE — 92610 EVALUATE SWALLOWING FUNCTION: CPT

## 2022-05-06 PROCEDURE — 97166 OT EVAL MOD COMPLEX 45 MIN: CPT

## 2022-05-06 PROCEDURE — 700102 HCHG RX REV CODE 250 W/ 637 OVERRIDE(OP): Performed by: INTERNAL MEDICINE

## 2022-05-06 PROCEDURE — 90935 HEMODIALYSIS ONE EVALUATION: CPT

## 2022-05-06 PROCEDURE — 84100 ASSAY OF PHOSPHORUS: CPT

## 2022-05-06 PROCEDURE — 700111 HCHG RX REV CODE 636 W/ 250 OVERRIDE (IP)

## 2022-05-06 PROCEDURE — 770001 HCHG ROOM/CARE - MED/SURG/GYN PRIV*

## 2022-05-06 PROCEDURE — 71045 X-RAY EXAM CHEST 1 VIEW: CPT

## 2022-05-06 PROCEDURE — 36415 COLL VENOUS BLD VENIPUNCTURE: CPT

## 2022-05-06 PROCEDURE — 700102 HCHG RX REV CODE 250 W/ 637 OVERRIDE(OP): Performed by: STUDENT IN AN ORGANIZED HEALTH CARE EDUCATION/TRAINING PROGRAM

## 2022-05-06 PROCEDURE — 83735 ASSAY OF MAGNESIUM: CPT

## 2022-05-06 PROCEDURE — A9270 NON-COVERED ITEM OR SERVICE: HCPCS | Performed by: STUDENT IN AN ORGANIZED HEALTH CARE EDUCATION/TRAINING PROGRAM

## 2022-05-06 PROCEDURE — A9270 NON-COVERED ITEM OR SERVICE: HCPCS | Performed by: INTERNAL MEDICINE

## 2022-05-06 PROCEDURE — 51798 US URINE CAPACITY MEASURE: CPT

## 2022-05-06 PROCEDURE — 99233 SBSQ HOSP IP/OBS HIGH 50: CPT | Performed by: GENERAL PRACTICE

## 2022-05-06 PROCEDURE — 84484 ASSAY OF TROPONIN QUANT: CPT

## 2022-05-06 PROCEDURE — 700102 HCHG RX REV CODE 250 W/ 637 OVERRIDE(OP)

## 2022-05-06 PROCEDURE — 85025 COMPLETE CBC W/AUTO DIFF WBC: CPT

## 2022-05-06 RX ORDER — FERROUS SULFATE 325(65) MG
325 TABLET ORAL
Status: DISCONTINUED | OUTPATIENT
Start: 2022-05-07 | End: 2022-05-13

## 2022-05-06 RX ORDER — CHOLECALCIFEROL (VITAMIN D3) 125 MCG
5 CAPSULE ORAL NIGHTLY
Status: DISCONTINUED | OUTPATIENT
Start: 2022-05-06 | End: 2022-05-19 | Stop reason: HOSPADM

## 2022-05-06 RX ORDER — LORAZEPAM 1 MG/1
1 TABLET ORAL
Status: DISCONTINUED | OUTPATIENT
Start: 2022-05-06 | End: 2022-05-06

## 2022-05-06 RX ORDER — AMLODIPINE BESYLATE 10 MG/1
10 TABLET ORAL DAILY
Status: DISCONTINUED | OUTPATIENT
Start: 2022-05-07 | End: 2022-05-06

## 2022-05-06 RX ORDER — METOPROLOL SUCCINATE 25 MG/1
100 TABLET, EXTENDED RELEASE ORAL DAILY
Status: DISCONTINUED | OUTPATIENT
Start: 2022-05-06 | End: 2022-05-14

## 2022-05-06 RX ORDER — LORAZEPAM 2 MG/ML
0.5 INJECTION INTRAMUSCULAR
Status: COMPLETED | OUTPATIENT
Start: 2022-05-06 | End: 2022-05-06

## 2022-05-06 RX ORDER — AMLODIPINE BESYLATE 5 MG/1
5 TABLET ORAL DAILY
Status: DISCONTINUED | OUTPATIENT
Start: 2022-05-07 | End: 2022-05-09

## 2022-05-06 RX ADMIN — LORAZEPAM 0.5 MG: 2 INJECTION INTRAMUSCULAR; INTRAVENOUS at 05:16

## 2022-05-06 RX ADMIN — HYDRALAZINE HYDROCHLORIDE 10 MG: 20 INJECTION INTRAMUSCULAR; INTRAVENOUS at 08:57

## 2022-05-06 RX ADMIN — CEFTRIAXONE SODIUM 2 G: 10 INJECTION, POWDER, FOR SOLUTION INTRAVENOUS at 04:11

## 2022-05-06 RX ADMIN — FUROSEMIDE 40 MG: 10 INJECTION, SOLUTION INTRAMUSCULAR; INTRAVENOUS at 04:11

## 2022-05-06 RX ADMIN — FUROSEMIDE 40 MG: 10 INJECTION, SOLUTION INTRAMUSCULAR; INTRAVENOUS at 20:21

## 2022-05-06 RX ADMIN — METOPROLOL SUCCINATE 100 MG: 25 TABLET, EXTENDED RELEASE ORAL at 20:22

## 2022-05-06 RX ADMIN — SODIUM BICARBONATE 650 MG: 650 TABLET ORAL at 20:22

## 2022-05-06 RX ADMIN — Medication 5 MG: at 21:38

## 2022-05-06 RX ADMIN — HYDRALAZINE HYDROCHLORIDE 25 MG: 25 TABLET, FILM COATED ORAL at 20:22

## 2022-05-06 RX ADMIN — ACETAMINOPHEN 650 MG: 325 TABLET, FILM COATED ORAL at 21:38

## 2022-05-06 ASSESSMENT — COGNITIVE AND FUNCTIONAL STATUS - GENERAL
DRESSING REGULAR LOWER BODY CLOTHING: A LOT
CLIMB 3 TO 5 STEPS WITH RAILING: TOTAL
SUGGESTED CMS G CODE MODIFIER MOBILITY: CM
CLIMB 3 TO 5 STEPS WITH RAILING: TOTAL
DRESSING REGULAR LOWER BODY CLOTHING: TOTAL
MOBILITY SCORE: 7
DAILY ACTIVITIY SCORE: 9
STANDING UP FROM CHAIR USING ARMS: TOTAL
DRESSING REGULAR UPPER BODY CLOTHING: A LOT
SUGGESTED CMS G CODE MODIFIER DAILY ACTIVITY: CL
MOVING FROM LYING ON BACK TO SITTING ON SIDE OF FLAT BED: UNABLE
EATING MEALS: A LOT
HELP NEEDED FOR BATHING: TOTAL
MOVING FROM LYING ON BACK TO SITTING ON SIDE OF FLAT BED: UNABLE
PERSONAL GROOMING: A LOT
DRESSING REGULAR UPPER BODY CLOTHING: A LOT
DAILY ACTIVITIY SCORE: 12
EATING MEALS: A LOT
TOILETING: TOTAL
SUGGESTED CMS G CODE MODIFIER MOBILITY: CM
HELP NEEDED FOR BATHING: A LOT
SUGGESTED CMS G CODE MODIFIER DAILY ACTIVITY: CL
WALKING IN HOSPITAL ROOM: TOTAL
MOBILITY SCORE: 8
WALKING IN HOSPITAL ROOM: TOTAL
MOVING TO AND FROM BED TO CHAIR: UNABLE
TOILETING: A LOT
STANDING UP FROM CHAIR USING ARMS: TOTAL
MOVING TO AND FROM BED TO CHAIR: A LOT
PERSONAL GROOMING: A LOT
TURNING FROM BACK TO SIDE WHILE IN FLAT BAD: A LOT
TURNING FROM BACK TO SIDE WHILE IN FLAT BAD: A LOT

## 2022-05-06 ASSESSMENT — ACTIVITIES OF DAILY LIVING (ADL): TOILETING: REQUIRES ASSIST

## 2022-05-06 ASSESSMENT — PATIENT HEALTH QUESTIONNAIRE - PHQ9
SUM OF ALL RESPONSES TO PHQ9 QUESTIONS 1 AND 2: 0
1. LITTLE INTEREST OR PLEASURE IN DOING THINGS: NOT AT ALL
2. FEELING DOWN, DEPRESSED, IRRITABLE, OR HOPELESS: NOT AT ALL

## 2022-05-06 ASSESSMENT — FIBROSIS 4 INDEX: FIB4 SCORE: 2.58

## 2022-05-06 ASSESSMENT — PAIN DESCRIPTION - PAIN TYPE: TYPE: ACUTE PAIN

## 2022-05-06 ASSESSMENT — GAIT ASSESSMENTS: GAIT LEVEL OF ASSIST: UNABLE TO PARTICIPATE
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
## 2022-05-06 NOTE — PROGRESS NOTES
"Pt became very restless in room.  This RN went into room to check on pt and pt starting yelling at this RN \"I need to go to the bathroom\".  Educated pt that she will have to use bedpan d/t her lethargy.  Went to go grab a bedpan and when this RN returned, pt start vomiting clear liquid.  Pt's HOB elevated and received oral suctioning.  Pt received n/v medication per MAR.  O2 sats dropped to 86% on RA so pt was placed on 2L nasal cannula and pt returned to 93-94%.   "

## 2022-05-06 NOTE — CARE PLAN
The patient is Watcher - Medium risk of patient condition declining or worsening    Shift Goals  Clinical Goals: monitor BP and mental status  Patient Goals: CARIE  Family Goals: CARIE    Progress made toward(s) clinical / shift goals:    Problem: Skin Integrity  Goal: Skin integrity is maintained or improved  Outcome: Progressing     Problem: Fall Risk  Goal: Patient will remain free from falls  Outcome: Progressing       Patient is not progressing towards the following goals:      Problem: Knowledge Deficit - Standard  Goal: Patient and family/care givers will demonstrate understanding of plan of care, disease process/condition, diagnostic tests and medications  Outcome: Not Progressing  Note: Pt is hypertensive.  Pt is AO to self and place.
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
## 2022-05-06 NOTE — PROGRESS NOTES
Hemodialysis ordered by Dr. Benito. Pt came after thoracentesis. Very lethargic. Unable to sign dialysis consent. Dr. Benito signed and authorized for pt. Treatment started at 1108 and ended at 1408. Pt stable, vss, no c/o post tx. Net UF 1.5 L. Report to ALICIA Donnelly RN. Rt ua avg dsg cdi.

## 2022-05-06 NOTE — ASSESSMENT & PLAN NOTE
Likely secondary to uremia  Refrain from any sedatives  Head CT negative for any acute findings  Ammonia negative  Mentation slightly improving
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
## 2022-05-07 PROBLEM — F32.A DEPRESSION WITH SUICIDAL IDEATION: Status: ACTIVE | Noted: 2022-05-07

## 2022-05-07 PROBLEM — R45.851 DEPRESSION WITH SUICIDAL IDEATION: Status: ACTIVE | Noted: 2022-05-07

## 2022-05-07 LAB
25(OH)D3 SERPL-MCNC: 11 NG/ML (ref 30–100)
ANION GAP SERPL CALC-SCNC: 24 MMOL/L (ref 7–16)
BUN SERPL-MCNC: 29 MG/DL (ref 8–22)
CALCIUM SERPL-MCNC: 8.5 MG/DL (ref 8.5–10.5)
CHLORIDE SERPL-SCNC: 95 MMOL/L (ref 96–112)
CO2 SERPL-SCNC: 19 MMOL/L (ref 20–33)
CREAT SERPL-MCNC: 3.59 MG/DL (ref 0.5–1.4)
ERYTHROCYTE [DISTWIDTH] IN BLOOD BY AUTOMATED COUNT: 65.4 FL (ref 35.9–50)
GFR SERPLBLD CREATININE-BSD FMLA CKD-EPI: 13 ML/MIN/1.73 M 2
GLUCOSE SERPL-MCNC: 230 MG/DL (ref 65–99)
HCT VFR BLD AUTO: 25 % (ref 37–47)
HGB BLD-MCNC: 8 G/DL (ref 12–16)
MCH RBC QN AUTO: 29.3 PG (ref 27–33)
MCHC RBC AUTO-ENTMCNC: 32 G/DL (ref 33.6–35)
MCV RBC AUTO: 91.6 FL (ref 81.4–97.8)
PLATELET # BLD AUTO: 119 K/UL (ref 164–446)
PMV BLD AUTO: 10.4 FL (ref 9–12.9)
POTASSIUM SERPL-SCNC: 4.1 MMOL/L (ref 3.6–5.5)
RBC # BLD AUTO: 2.73 M/UL (ref 4.2–5.4)
SODIUM SERPL-SCNC: 138 MMOL/L (ref 135–145)
WBC # BLD AUTO: 6.3 K/UL (ref 4.8–10.8)

## 2022-05-07 PROCEDURE — 770001 HCHG ROOM/CARE - MED/SURG/GYN PRIV*

## 2022-05-07 PROCEDURE — 700111 HCHG RX REV CODE 636 W/ 250 OVERRIDE (IP): Performed by: STUDENT IN AN ORGANIZED HEALTH CARE EDUCATION/TRAINING PROGRAM

## 2022-05-07 PROCEDURE — 700111 HCHG RX REV CODE 636 W/ 250 OVERRIDE (IP): Performed by: GENERAL PRACTICE

## 2022-05-07 PROCEDURE — A9270 NON-COVERED ITEM OR SERVICE: HCPCS | Performed by: STUDENT IN AN ORGANIZED HEALTH CARE EDUCATION/TRAINING PROGRAM

## 2022-05-07 PROCEDURE — 90935 HEMODIALYSIS ONE EVALUATION: CPT

## 2022-05-07 PROCEDURE — 83970 ASSAY OF PARATHORMONE: CPT

## 2022-05-07 PROCEDURE — A9270 NON-COVERED ITEM OR SERVICE: HCPCS | Performed by: INTERNAL MEDICINE

## 2022-05-07 PROCEDURE — 85027 COMPLETE CBC AUTOMATED: CPT

## 2022-05-07 PROCEDURE — 36415 COLL VENOUS BLD VENIPUNCTURE: CPT

## 2022-05-07 PROCEDURE — 80048 BASIC METABOLIC PNL TOTAL CA: CPT

## 2022-05-07 PROCEDURE — 700102 HCHG RX REV CODE 250 W/ 637 OVERRIDE(OP): Performed by: GENERAL PRACTICE

## 2022-05-07 PROCEDURE — C9113 INJ PANTOPRAZOLE SODIUM, VIA: HCPCS | Performed by: GENERAL PRACTICE

## 2022-05-07 PROCEDURE — 99232 SBSQ HOSP IP/OBS MODERATE 35: CPT | Performed by: GENERAL PRACTICE

## 2022-05-07 PROCEDURE — 700102 HCHG RX REV CODE 250 W/ 637 OVERRIDE(OP): Performed by: STUDENT IN AN ORGANIZED HEALTH CARE EDUCATION/TRAINING PROGRAM

## 2022-05-07 PROCEDURE — A9270 NON-COVERED ITEM OR SERVICE: HCPCS

## 2022-05-07 PROCEDURE — 82306 VITAMIN D 25 HYDROXY: CPT

## 2022-05-07 PROCEDURE — 700102 HCHG RX REV CODE 250 W/ 637 OVERRIDE(OP): Performed by: INTERNAL MEDICINE

## 2022-05-07 PROCEDURE — 700102 HCHG RX REV CODE 250 W/ 637 OVERRIDE(OP)

## 2022-05-07 PROCEDURE — 97602 WOUND(S) CARE NON-SELECTIVE: CPT

## 2022-05-07 PROCEDURE — A9270 NON-COVERED ITEM OR SERVICE: HCPCS | Performed by: GENERAL PRACTICE

## 2022-05-07 RX ORDER — FUROSEMIDE 10 MG/ML
40 INJECTION INTRAMUSCULAR; INTRAVENOUS
Status: DISCONTINUED | OUTPATIENT
Start: 2022-05-08 | End: 2022-05-07

## 2022-05-07 RX ORDER — QUETIAPINE FUMARATE 25 MG/1
25 TABLET, FILM COATED ORAL 2 TIMES DAILY PRN
Status: DISCONTINUED | OUTPATIENT
Start: 2022-05-07 | End: 2022-05-19 | Stop reason: HOSPADM

## 2022-05-07 RX ORDER — POLYETHYLENE GLYCOL 3350 17 G/17G
1 POWDER, FOR SOLUTION ORAL
Status: DISCONTINUED | OUTPATIENT
Start: 2022-05-07 | End: 2022-05-19 | Stop reason: HOSPADM

## 2022-05-07 RX ORDER — PANTOPRAZOLE SODIUM 40 MG/10ML
40 INJECTION, POWDER, LYOPHILIZED, FOR SOLUTION INTRAVENOUS 2 TIMES DAILY
Status: DISCONTINUED | OUTPATIENT
Start: 2022-05-07 | End: 2022-05-09

## 2022-05-07 RX ORDER — ACETAMINOPHEN 325 MG/1
650 TABLET ORAL EVERY 6 HOURS PRN
Status: DISCONTINUED | OUTPATIENT
Start: 2022-05-07 | End: 2022-05-19 | Stop reason: HOSPADM

## 2022-05-07 RX ORDER — AMOXICILLIN 250 MG
2 CAPSULE ORAL 2 TIMES DAILY PRN
Status: DISCONTINUED | OUTPATIENT
Start: 2022-05-07 | End: 2022-05-19 | Stop reason: HOSPADM

## 2022-05-07 RX ORDER — CALCITRIOL 0.25 UG/1
0.25 CAPSULE, LIQUID FILLED ORAL DAILY
Status: DISCONTINUED | OUTPATIENT
Start: 2022-05-07 | End: 2022-05-13

## 2022-05-07 RX ORDER — FUROSEMIDE 40 MG/1
80 TABLET ORAL
Status: DISCONTINUED | OUTPATIENT
Start: 2022-05-07 | End: 2022-05-07

## 2022-05-07 RX ORDER — BISACODYL 10 MG
10 SUPPOSITORY, RECTAL RECTAL
Status: DISCONTINUED | OUTPATIENT
Start: 2022-05-07 | End: 2022-05-19 | Stop reason: HOSPADM

## 2022-05-07 RX ADMIN — PANTOPRAZOLE SODIUM 40 MG: 40 INJECTION, POWDER, FOR SOLUTION INTRAVENOUS at 17:46

## 2022-05-07 RX ADMIN — ONDANSETRON 4 MG: 2 INJECTION INTRAMUSCULAR; INTRAVENOUS at 18:12

## 2022-05-07 RX ADMIN — ONDANSETRON 4 MG: 2 INJECTION INTRAMUSCULAR; INTRAVENOUS at 23:10

## 2022-05-07 RX ADMIN — CEFTRIAXONE SODIUM 2 G: 10 INJECTION, POWDER, FOR SOLUTION INTRAVENOUS at 05:15

## 2022-05-07 RX ADMIN — FUROSEMIDE 40 MG: 10 INJECTION, SOLUTION INTRAMUSCULAR; INTRAVENOUS at 05:15

## 2022-05-07 ASSESSMENT — PAIN DESCRIPTION - PAIN TYPE: TYPE: ACUTE PAIN

## 2022-05-07 NOTE — PROGRESS NOTES
HD tx ordered by Dr. Benito today. HD tx performed between 0838 until 1140. Pt is alert, however confused, restless and kept on screaming the entire tx. Applied restraint on the right arm per MD order due to risk of infiltration. BP elevated, challenged UF by 0.5L, not in acute distress at this time. Seen by Dr. Benito during tx. Pt reported restless legs/ cramping, UF turned off per MD. Net UF: 2L. RUE AVF with good bruit and thrill, no signs of infection at this time and no active bleeding. Pt left the unit stable. Post HD tx report given to primary RN Nessa.
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
## 2022-05-07 NOTE — PROGRESS NOTES
"Hemodialysis treatment ordered today per Dr Benito x 3 hours. Treatment initiated at 1543, ended at 1843.  Pt confused, restless and hypertensive , SBP : 190's , HR :90's O2 Sat :92 on RA.    Patient restless and screaming during HD Tx, SBP of 150-160's,  Hospitalist and primary RN rounded at bedside, carrillo cath inserted by  Primary RN at bedside.Pt denies any pain, constantly stating \" I want to go home, I can't do this today\", Pt repositioned and reassured for comfort. Pt desating to 88% on RA, placed on 2L via NC for O2 support. Primary Rn notified, O2 sat : % on 2L NC , Pt keeps removing pulse ox from her hand. Pt finished 3hr HD, vomited clear watery secretion post Tx., VSS. RN notified. ; see e-flow sheet for details.     Net UF 2,500 mL.     Needles removed from access site. Dressings applied and sites held x 10 minutes; verified no bleeding. Positive bruit/thrill post tx. Staff RN to monitor AVF for breakthrough bleeding. Should breakthrough bleeding occur, staff RN to apply pressure to access sites until bleeding resolved. Notify Dialysis and Nephrologist for follow-up.    Report given to Primary RN.     "
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
## 2022-05-07 NOTE — CARE PLAN
The patient is Watcher - Medium risk of patient condition declining or worsening    Shift Goals  Clinical Goals: monitor BP  Patient Goals: sleep  Family Goals: n/a    Progress made toward(s) clinical / shift goals:    Problem: Skin Integrity  Goal: Skin integrity is maintained or improved  5/7/2022 0307 by Freddie Han R.N.  Outcome: Progressing  5/7/2022 0301 by Freddie Han R.N.  Outcome: Progressing     Problem: Fall Risk  Goal: Patient will remain free from falls  5/7/2022 0307 by Freddie Han R.N.  Outcome: Progressing  5/7/2022 0301 by WAILDA MustafaN.  Outcome: Progressing     Problem: Pain - Standard  Goal: Alleviation of pain or a reduction in pain to the patient’s comfort goal  5/7/2022 0307 by Freddie Han R.N.  Outcome: Progressing  5/7/2022 0301 by Freddie Han R.N.  Outcome: Progressing       Patient is not progressing towards the following goals:

## 2022-05-07 NOTE — ASSESSMENT & PLAN NOTE
Patient's depressed and with tearful affect.  She reports she no longer wants dialysis, she understands if she stops dialysis that she would die, she stated maybe that is what she wants. Nursing staff at bedside during this conversation. Psychiatry was consulted, patient placed on a legal hold.  Currently, legal hold discontinued. Patient denies SI, continue Effexor. Psych signed off

## 2022-05-07 NOTE — PROGRESS NOTES
Pt taken to dialysis around 1500. At 1520, Dialysis nurse called to say pt would not sit still and requested medication to calm her down. MD went down to examine pt and pt very restless unable to urinate again and MD called to request bladder scan and carrillo placement if over 300. Bladder scan showed 310 and carrillo placed down in dialysis. No complications.
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
## 2022-05-08 ENCOUNTER — APPOINTMENT (OUTPATIENT)
Dept: RADIOLOGY | Facility: MEDICAL CENTER | Age: 67
DRG: 682 | End: 2022-05-08
Attending: GENERAL PRACTICE
Payer: MEDICARE

## 2022-05-08 LAB
EKG IMPRESSION: NORMAL
ERYTHROCYTE [DISTWIDTH] IN BLOOD BY AUTOMATED COUNT: 66.5 FL (ref 35.9–50)
HCT VFR BLD AUTO: 22 % (ref 37–47)
HGB BLD-MCNC: 6.9 G/DL (ref 12–16)
MCH RBC QN AUTO: 29.2 PG (ref 27–33)
MCHC RBC AUTO-ENTMCNC: 31.4 G/DL (ref 33.6–35)
MCV RBC AUTO: 93.2 FL (ref 81.4–97.8)
PLATELET # BLD AUTO: 126 K/UL (ref 164–446)
PMV BLD AUTO: 10.8 FL (ref 9–12.9)
PROCALCITONIN SERPL-MCNC: 2.71 NG/ML
RBC # BLD AUTO: 2.36 M/UL (ref 4.2–5.4)
WBC # BLD AUTO: 5.5 K/UL (ref 4.8–10.8)

## 2022-05-08 PROCEDURE — P9016 RBC LEUKOCYTES REDUCED: HCPCS

## 2022-05-08 PROCEDURE — 770001 HCHG ROOM/CARE - MED/SURG/GYN PRIV*

## 2022-05-08 PROCEDURE — 700111 HCHG RX REV CODE 636 W/ 250 OVERRIDE (IP): Performed by: GENERAL PRACTICE

## 2022-05-08 PROCEDURE — 99233 SBSQ HOSP IP/OBS HIGH 50: CPT | Performed by: GENERAL PRACTICE

## 2022-05-08 PROCEDURE — A9270 NON-COVERED ITEM OR SERVICE: HCPCS | Performed by: GENERAL PRACTICE

## 2022-05-08 PROCEDURE — 86923 COMPATIBILITY TEST ELECTRIC: CPT

## 2022-05-08 PROCEDURE — 85027 COMPLETE CBC AUTOMATED: CPT

## 2022-05-08 PROCEDURE — 700102 HCHG RX REV CODE 250 W/ 637 OVERRIDE(OP): Performed by: INTERNAL MEDICINE

## 2022-05-08 PROCEDURE — A9270 NON-COVERED ITEM OR SERVICE: HCPCS | Performed by: INTERNAL MEDICINE

## 2022-05-08 PROCEDURE — 700102 HCHG RX REV CODE 250 W/ 637 OVERRIDE(OP): Performed by: GENERAL PRACTICE

## 2022-05-08 PROCEDURE — 93010 ELECTROCARDIOGRAM REPORT: CPT | Performed by: INTERNAL MEDICINE

## 2022-05-08 PROCEDURE — C9113 INJ PANTOPRAZOLE SODIUM, VIA: HCPCS | Performed by: GENERAL PRACTICE

## 2022-05-08 PROCEDURE — 36430 TRANSFUSION BLD/BLD COMPNT: CPT

## 2022-05-08 PROCEDURE — 84145 PROCALCITONIN (PCT): CPT

## 2022-05-08 PROCEDURE — 36415 COLL VENOUS BLD VENIPUNCTURE: CPT

## 2022-05-08 PROCEDURE — 93005 ELECTROCARDIOGRAM TRACING: CPT

## 2022-05-08 PROCEDURE — 70450 CT HEAD/BRAIN W/O DYE: CPT | Mod: ME

## 2022-05-08 RX ORDER — ERGOCALCIFEROL 1.25 MG/1
50000 CAPSULE ORAL
Status: DISCONTINUED | OUTPATIENT
Start: 2022-05-08 | End: 2022-05-19 | Stop reason: HOSPADM

## 2022-05-08 RX ORDER — PROCHLORPERAZINE EDISYLATE 5 MG/ML
10 INJECTION INTRAMUSCULAR; INTRAVENOUS EVERY 6 HOURS PRN
Status: DISCONTINUED | OUTPATIENT
Start: 2022-05-08 | End: 2022-05-19 | Stop reason: HOSPADM

## 2022-05-08 RX ADMIN — PANTOPRAZOLE SODIUM 40 MG: 40 INJECTION, POWDER, FOR SOLUTION INTRAVENOUS at 04:56

## 2022-05-08 RX ADMIN — PANTOPRAZOLE SODIUM 40 MG: 40 INJECTION, POWDER, FOR SOLUTION INTRAVENOUS at 17:54

## 2022-05-08 RX ADMIN — QUETIAPINE FUMARATE 25 MG: 25 TABLET ORAL at 00:54

## 2022-05-08 RX ADMIN — Medication 667 MG: at 16:33

## 2022-05-08 RX ADMIN — QUETIAPINE FUMARATE 25 MG: 25 TABLET ORAL at 16:33

## 2022-05-08 ASSESSMENT — PAIN DESCRIPTION - PAIN TYPE
TYPE: ACUTE PAIN
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
## 2022-05-08 NOTE — PROGRESS NOTES
Per nursing concern, RN to hold oral medications this AM due to swallowing/ aspiration concerns, pts nausea/ active vomiting, disorientation, and inability to follow commands.     Notified ghanshyam Perez to hold.

## 2022-05-08 NOTE — PROGRESS NOTES
[facility] -  CONSULT NOTE               Author: Jose Benito M.D.    Date & Time: 5/8/2022  11:18 AM   Consult reason: ESRD missed dialysis    HPI:  67 years old female with medical history notable for insulin-dependent type 2 diabetes, hypertension, end-stage renal disease due to diabetes has been on hemodialysis for 4 years, presenting to hospital with generalized weakness, transferred to the [facility] for higher level of care due to anemia requiring transfusions and also significant chemistry abnormalities.  Patient is unable to give history due to significant drowsiness and weakness.  She states that she she has been noncompliant with all of her medications for about 3 weeks also last dialysis about 3 weeks back.  Now she is suffering from nausea, green-brown vomiting and dyspnea.  Denies any chest pain.    In the emergency department patient was hypertensive, 97% on room air, drowsy, cxr pertinent for bilateral pleural effusion, CT thorax abdomen and pelvis with contrast was obtained which showed bilateral pleural effusion without any significant abnormalities otherwise. Creatinine was significant 15, patient still urinates, K normal, corrected ca 8.1, phos 11, bun 130s. Patient underwent thoracentesis which is showing transudate.    Nephrology was consulted due to missed dialysis and ESRD.      REVIEW OF SYSTEMS:    10 point ROS reviewed and is as per HPI or otherwise negative    DAILY NEPHROLOGY PROGRESS:    5/6 - Patient is vomiting at bedside, still volume overloaded. Alert and oriented however does not understand why she is vomiting despite we spoke yesterday about missed dialysis. She states she is urinating, however no IO in chart.  5/7 - Confused.  Complains of leg pain.  Seen on HD  5/8 - Somnolent.  Sleeping in bed.  Had HD yesterday.      PMH/PSH/SH/FH: Reviewed, see HPI  CURRENT MEDICATIONS: Reviewed    VS:  BP (!) 176/59 Comment: rn notified  Pulse 92   Temp 37.1 °C (98.7  "°F) (Temporal)   Resp 16   Ht 1.651 m (5' 5\")   Wt 84.3 kg (185 lb 13.6 oz)   SpO2 92%   BMI 30.93 kg/m²   Physical Exam  Constitutional:       General: She is not in acute distress.     Appearance: She is ill-appearing.   HENT:      Head: Normocephalic and atraumatic.      Nose: No rhinorrhea.      Mouth/Throat:      Pharynx: No posterior oropharyngeal erythema.   Eyes:      General: No scleral icterus.  Cardiovascular:      Rate and Rhythm: Normal rate and regular rhythm.      Heart sounds: No murmur heard.    No friction rub.   Pulmonary:      Effort: No respiratory distress.      Breath sounds: No wheezing, rhonchi or rales.      Comments: Decreased breath sounds  Abdominal:      General: There is no distension.      Tenderness: There is no abdominal tenderness. There is no guarding or rebound.      Comments: Vomiting green-brown fluid at bedside   Musculoskeletal:      Right lower leg: Edema present.      Left lower leg: Edema present.   Skin:     General: Skin is dry.      Coloration: Skin is pale. Skin is not jaundiced.   Neurological:      Mental Status: She is oriented to person, place, and time.      Motor: Weakness (generalized) present.      Comments: asterixis         Fluids:  In: 500 [Dialysis:500]  Out: 2900     LABS:  Recent Labs     05/06/22  0030 05/07/22  1943   SODIUM 139 138   POTASSIUM 3.8 4.1   CHLORIDE 101 95*   CO2 17* 19*   GLUCOSE 109* 230*   BUN 68* 29*   CREATININE 8.93* 3.59*   CALCIUM 7.3* 8.5       IMPRESSION:  · Uremia with pleural effusion and serositis with nausea  · Pleural effusion due to volume overload due to missed dialysis, transudate lights criteria  · Anemia of chronic kidney disease  · Anemia requiring transfusions  · ESRD, due to type 2 diabetes, on HD right upper extremity, last 4 years  · HAGMA due to uremic toxins  · Proteinuria  · Vomiting  · Hyperphosphatemia, severe  · Thrombocytopenia  · Hypertension, due to volume overload and essential " hypertension  · Procalcitonin elevation due to renal failure vs ongoing infection (potential CAP)  · Type 2 diabetes mellitus.  · Hypocalcemia      PLAN:  -HD qTTS  -Can increase to amlodipine 10mg PO daily if persistently hypertensive  -HD placement per SW  -Continue with PhosLo  - Vitamin D 11,   - Calcitriol  - On psych hold    We will continue to follow   Thank you for your consult.

## 2022-05-08 NOTE — CARE PLAN
The patient is Stable - Low risk of patient condition declining or worsening    Shift Goals  Clinical Goals: Safety; Legal hold  Patient Goals: CARIE  Family Goals: N/A    Progress made toward(s) clinical / shift goals:    Problem: Knowledge Deficit - Standard  Goal: Patient and family/care givers will demonstrate understanding of plan of care, disease process/condition, diagnostic tests and medications  Outcome: Progressing     Problem: Skin Integrity  Goal: Skin integrity is maintained or improved  Outcome: Progressing     Problem: Fall Risk  Goal: Patient will remain free from falls  Outcome: Progressing       Patient is not progressing towards the following goals:

## 2022-05-08 NOTE — CONSULTS
BRIEF PSYCHIATRIC CONSULT NOTE:tried twice at two different visits to talk with her in the morning and again in the afternoon. She is very heavily asleep and despite calling her name loudly and tapping her hands and feet, she did not stir. Will try again tommorrow.
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
## 2022-05-08 NOTE — WOUND TEAM
[facility]  Inpatient Services  Wound and Skin Care Brief Evaluation    Admission Date: 5/5/2022     Last order of IP CONSULT TO WOUND CARE was found on 5/5/2022 from Hospital Encounter on 5/4/2022     HPI, PMH, SH: Reviewed    Chief Complaint   Patient presents with   • Weakness     Pt transfer from outside facility with c/o weakness and multiple recent falls. Multiple bruises noted to posterior trunk in different stages of healing   • Abnormal Labs     Pt evaluated at [facility] and found to have hgb 5.3 and hematocrit 16%     Diagnosis: Anemia requiring transfusions [D64.9]    Unit where seen by Wound Team: S141/00     Wound consult placed regarding sacrum & BLE. Chart and images reviewed. This discussed with bedside RN Jackie. This RN in to assess patient. Pt lethargic and vomiting. Sacrum with blanching hyperpigmented discoloration, no open wounds. Barrier paste applied. Noted that there is scattered bruising and healing abrasions along patient's back, sacrum, and R hip. Left knee with squishy mobile mass, resembling lipoma. R calf with firm, non-mobile mass, no open wounds to either. Defer to MD for if further workup for masses is needed. No pressure injuries or advanced wound care needs identified. Wound consult completed. No further follow up unless indicated and consulted.       RSKIN:   CURRENTLY IN PLACE (X), APPLIED THIS VISIT (A), ORDERED (O):   Q shift Pacheco:  X  Q shift pressure point assessments:  X    Surface/Positioning   Pressure redistribution mattress     X       Low Airloss          Bariatric foam      Bariatric JENNIFER     Waffle cushion        Waffle Overlay          Reposition q 2 hours    X  TAPs Turning system     Z Eladio Pillow     Offloading/Redistribution NA  Sacral Mepilex (Silicone dressing)     Heel Mepilex (Silicone dressing)         Heel float boots (Prevalon boot)             Float Heels off Bed with Pillows           Respiratory NA - on RA  Silicone O2 tubing          Gray Foam Ear protectors     Cannula fixation Device (Tender )          High flow offloading Clip    Elastic head band offloading device      Anchorfast                                                         Trach with Optifoam split foam             Containment/Moisture Prevention     Rectal tube or BMS    Purwick/Condom Cath        [place] Catheter    Barrier wipes         X  Barrier paste     A  Antifungal tx      Interdry        Mobilization Not assessed this visit      Up to chair        Ambulate      PT/OT      Nutrition       Dietician        Diabetes Education      PO  X RN reports poor PO intake   TF     TPN     NPO   # days

## 2022-05-08 NOTE — CARE PLAN
The patient is Watcher - Medium risk of patient condition declining or worsening    Shift Goals  Clinical Goals: Safety  Patient Goals: CARIE    Progress made toward(s) clinical / shift goals:      Problem: Knowledge Deficit - Standard  Goal: Patient and family/care givers will demonstrate understanding of plan of care, disease process/condition, diagnostic tests and medications  5/8/2022 0240 by Mary Ellen Rojo RPHILL.  Outcome: Progressing  Note: Educated patient on POC, pain management, medication administration, ambulation, safety, diet, rest, usage of call light, interventions in place to maintain/ improve skin integrity, reorientation, carrillo care, dressing maintenance, telesitter. Will continue to educate patient on POC accordingly.     Problem: Provide Safe Environment  Goal: Suicide environmental safety, protocols, policies, and practices will be implemented  Outcome: Progressing  Flowsheets (Taken 5/8/2022 0249)  Safety Interventions:   Patient Wearing Hospital Clothing   Personal Clothing / Belongings Removed (Comment: Storage Location)   Potentially Dangerous Items Removed from room   Plastic Utensils / Paper Ware Ordered   Provided Safety Education     Problem: Fall Risk  Goal: Patient will remain free from falls  5/8/2022 0249 by Mary Ellen Rojo RPHILL.  Outcome: Progressing  Note: Appropriate fall precautions in place.

## 2022-05-08 NOTE — THERAPY
Missed Therapy     Patient Name: Liberty Bolton  Age:  67 y.o., Sex:  female  Medical Record #: [id number]  Today's Date: 5/8/2022    Discussed missed therapy with nursing.        05/08/22 1035   Treatment Variance   Reason For Missed Therapy Medical - Patient not Able To Participate   Total Treatment Time   SLP Time Spent Yes   Interdisciplinary Plan of Care Collaboration   IDT Collaboration with  Nursing   Collaboration Comments Repeat CSE orders received. Pt with reported nausea/vomiting with any PO overnight. Attempted to see patient this AM without success. 10 minutes spent at bedside trying to wake patient without success. RN notified. SLP to return later if pt is more appropriate and as schedule allows.   Session Information   Date / Session Number 5/8 note only; 5/6: 1 (1/3 - 5/12)   Priority 4  (3x/wk. falls, missing HD, pancytopenia, AMS. PU4/MT2 1:1. trial up as LUIS improves, NEEDS REASSESS/ NPO)

## 2022-05-08 NOTE — THERAPY
Missed Therapy     Patient Name: Liberty Bolton  Age:  67 y.o., Sex:  female  Medical Record #: [id number]  Today's Date: 5/8/2022    Discussed missed therapy with Nursing.        05/08/22 2188   Treatment Variance   Reason For Missed Therapy Non-Medical - Patient Refused   Interdisciplinary Plan of Care Collaboration   IDT Collaboration with  Nursing   Collaboration Comments Second attempt made today to see patient for bedside PO trials to reassess diet toleration vs aspiration risk. Pt now alert, however refusing to participate, moaning and retreating from SLP. Pt not recommended for PO at this time. ST will return later date to reassess PO diet toleration.   Session Information   Date / Session Number 5/8 note only x2; 5/6: 1 (1/3 - 5/12)   Priority 4  (3x/wk. falls, missing HD, pancytopenia, AMS. PU4/MT2 1:1. trial up as LUIS improves, NEEDS REASSESS/ NPO)

## 2022-05-08 NOTE — PROGRESS NOTES
Pt confused this AM, yelling out and trying to get out of bed and refusing meds. Pt then began throwing up multiple times in the afternoon around 1600, Dr made aware and protonix and zofran given. Pt becoming more lethargic around 1700 and on call provider made aware for night shift. Series of events passed on to night shift nurse.

## 2022-05-09 LAB
ALBUMIN SERPL BCP-MCNC: 2.6 G/DL (ref 3.2–4.9)
ALBUMIN/GLOB SERPL: 0.9 G/DL
ALP SERPL-CCNC: 51 U/L (ref 30–99)
ALT SERPL-CCNC: 17 U/L (ref 2–50)
AMMONIA PLAS-SCNC: 15 UMOL/L (ref 11–45)
ANION GAP SERPL CALC-SCNC: 15 MMOL/L (ref 7–16)
AST SERPL-CCNC: 30 U/L (ref 12–45)
BILIRUB SERPL-MCNC: 0.3 MG/DL (ref 0.1–1.5)
BUN SERPL-MCNC: 53 MG/DL (ref 8–22)
CALCIUM SERPL-MCNC: 8.3 MG/DL (ref 8.5–10.5)
CALCIUM SERPL-MCNC: 8.5 MG/DL (ref 8.5–10.5)
CHLORIDE SERPL-SCNC: 100 MMOL/L (ref 96–112)
CO2 SERPL-SCNC: 26 MMOL/L (ref 20–33)
CREAT SERPL-MCNC: 5.61 MG/DL (ref 0.5–1.4)
ERYTHROCYTE [DISTWIDTH] IN BLOOD BY AUTOMATED COUNT: 58.5 FL (ref 35.9–50)
GFR SERPLBLD CREATININE-BSD FMLA CKD-EPI: 8 ML/MIN/1.73 M 2
GLOBULIN SER CALC-MCNC: 2.9 G/DL (ref 1.9–3.5)
GLUCOSE SERPL-MCNC: 101 MG/DL (ref 65–99)
HCT VFR BLD AUTO: 22.1 % (ref 37–47)
HGB BLD-MCNC: 7.3 G/DL (ref 12–16)
MCH RBC QN AUTO: 29.3 PG (ref 27–33)
MCHC RBC AUTO-ENTMCNC: 33 G/DL (ref 33.6–35)
MCV RBC AUTO: 88.8 FL (ref 81.4–97.8)
PLATELET # BLD AUTO: 134 K/UL (ref 164–446)
PMV BLD AUTO: 10.2 FL (ref 9–12.9)
POTASSIUM SERPL-SCNC: 3.3 MMOL/L (ref 3.6–5.5)
PROT SERPL-MCNC: 5.5 G/DL (ref 6–8.2)
PTH-INTACT SERPL-MCNC: 164 PG/ML (ref 14–72)
RBC # BLD AUTO: 2.49 M/UL (ref 4.2–5.4)
SODIUM SERPL-SCNC: 141 MMOL/L (ref 135–145)
WBC # BLD AUTO: 5 K/UL (ref 4.8–10.8)

## 2022-05-09 PROCEDURE — 700102 HCHG RX REV CODE 250 W/ 637 OVERRIDE(OP): Performed by: GENERAL PRACTICE

## 2022-05-09 PROCEDURE — A9270 NON-COVERED ITEM OR SERVICE: HCPCS

## 2022-05-09 PROCEDURE — 92526 ORAL FUNCTION THERAPY: CPT

## 2022-05-09 PROCEDURE — 700111 HCHG RX REV CODE 636 W/ 250 OVERRIDE (IP): Performed by: STUDENT IN AN ORGANIZED HEALTH CARE EDUCATION/TRAINING PROGRAM

## 2022-05-09 PROCEDURE — A9270 NON-COVERED ITEM OR SERVICE: HCPCS | Performed by: STUDENT IN AN ORGANIZED HEALTH CARE EDUCATION/TRAINING PROGRAM

## 2022-05-09 PROCEDURE — A9270 NON-COVERED ITEM OR SERVICE: HCPCS | Performed by: GENERAL PRACTICE

## 2022-05-09 PROCEDURE — 80053 COMPREHEN METABOLIC PANEL: CPT

## 2022-05-09 PROCEDURE — 700111 HCHG RX REV CODE 636 W/ 250 OVERRIDE (IP): Performed by: GENERAL PRACTICE

## 2022-05-09 PROCEDURE — 85027 COMPLETE CBC AUTOMATED: CPT

## 2022-05-09 PROCEDURE — A9270 NON-COVERED ITEM OR SERVICE: HCPCS | Performed by: INTERNAL MEDICINE

## 2022-05-09 PROCEDURE — 82140 ASSAY OF AMMONIA: CPT

## 2022-05-09 PROCEDURE — 700102 HCHG RX REV CODE 250 W/ 637 OVERRIDE(OP)

## 2022-05-09 PROCEDURE — 700102 HCHG RX REV CODE 250 W/ 637 OVERRIDE(OP): Performed by: INTERNAL MEDICINE

## 2022-05-09 PROCEDURE — 700102 HCHG RX REV CODE 250 W/ 637 OVERRIDE(OP): Performed by: STUDENT IN AN ORGANIZED HEALTH CARE EDUCATION/TRAINING PROGRAM

## 2022-05-09 PROCEDURE — 770001 HCHG ROOM/CARE - MED/SURG/GYN PRIV*

## 2022-05-09 PROCEDURE — 99232 SBSQ HOSP IP/OBS MODERATE 35: CPT | Performed by: GENERAL PRACTICE

## 2022-05-09 PROCEDURE — 36415 COLL VENOUS BLD VENIPUNCTURE: CPT

## 2022-05-09 PROCEDURE — C9113 INJ PANTOPRAZOLE SODIUM, VIA: HCPCS | Performed by: GENERAL PRACTICE

## 2022-05-09 RX ORDER — VENLAFAXINE HYDROCHLORIDE 37.5 MG/1
37.5 CAPSULE, EXTENDED RELEASE ORAL
Status: DISCONTINUED | OUTPATIENT
Start: 2022-05-10 | End: 2022-05-14

## 2022-05-09 RX ORDER — AMLODIPINE BESYLATE 5 MG/1
10 TABLET ORAL DAILY
Status: DISCONTINUED | OUTPATIENT
Start: 2022-05-10 | End: 2022-05-19 | Stop reason: HOSPADM

## 2022-05-09 RX ORDER — OMEPRAZOLE 20 MG/1
20 CAPSULE, DELAYED RELEASE ORAL 2 TIMES DAILY
Status: DISCONTINUED | OUTPATIENT
Start: 2022-05-09 | End: 2022-05-19 | Stop reason: HOSPADM

## 2022-05-09 RX ORDER — AMLODIPINE BESYLATE 5 MG/1
5 TABLET ORAL ONCE
Status: COMPLETED | OUTPATIENT
Start: 2022-05-09 | End: 2022-05-09

## 2022-05-09 RX ADMIN — METOPROLOL SUCCINATE 100 MG: 25 TABLET, EXTENDED RELEASE ORAL at 05:30

## 2022-05-09 RX ADMIN — Medication 5 MG: at 20:38

## 2022-05-09 RX ADMIN — FERROUS SULFATE TAB 325 MG (65 MG ELEMENTAL FE) 325 MG: 325 (65 FE) TAB at 08:23

## 2022-05-09 RX ADMIN — Medication 667 MG: at 11:33

## 2022-05-09 RX ADMIN — OMEPRAZOLE 20 MG: 20 CAPSULE, DELAYED RELEASE ORAL at 17:31

## 2022-05-09 RX ADMIN — Medication 667 MG: at 08:23

## 2022-05-09 RX ADMIN — CALCITRIOL CAPSULES 0.25 MCG 0.25 MCG: 0.25 CAPSULE ORAL at 05:30

## 2022-05-09 RX ADMIN — AMLODIPINE BESYLATE 5 MG: 5 TABLET ORAL at 05:30

## 2022-05-09 RX ADMIN — PANTOPRAZOLE SODIUM 40 MG: 40 INJECTION, POWDER, FOR SOLUTION INTRAVENOUS at 05:30

## 2022-05-09 RX ADMIN — ACETAMINOPHEN 650 MG: 325 TABLET, FILM COATED ORAL at 20:38

## 2022-05-09 RX ADMIN — HYDRALAZINE HYDROCHLORIDE 25 MG: 25 TABLET, FILM COATED ORAL at 05:30

## 2022-05-09 RX ADMIN — HYDRALAZINE HYDROCHLORIDE 25 MG: 25 TABLET, FILM COATED ORAL at 17:30

## 2022-05-09 RX ADMIN — AMLODIPINE BESYLATE 5 MG: 5 TABLET ORAL at 08:23

## 2022-05-09 RX ADMIN — ATORVASTATIN CALCIUM 80 MG: 80 TABLET, FILM COATED ORAL at 05:30

## 2022-05-09 RX ADMIN — Medication 667 MG: at 17:30

## 2022-05-09 RX ADMIN — HYDRALAZINE HYDROCHLORIDE 10 MG: 20 INJECTION INTRAMUSCULAR; INTRAVENOUS at 20:38

## 2022-05-09 ASSESSMENT — PAIN DESCRIPTION - PAIN TYPE: TYPE: ACUTE PAIN

## 2022-05-09 NOTE — CARE PLAN
Problem: Skin Integrity  Goal: Skin integrity is maintained or improved  Outcome: Progressing   Pt is moving around in bed independently but not redirectable   Problem: Provide Safe Environment  Goal: Suicide environmental safety, protocols, policies, and practices will be implemented  Outcome: Progressing  Pt has telesitter for safety   The patient is Watcher - Medium risk of patient condition declining or worsening    Shift Goals  Clinical Goals: safety and clarfying LH  Patient Goals: CARIE  Family Goals: Penny communication    Progress made toward(s) clinical / shift goals:  Progressing slowly    Patient is not progressing towards the following goals:

## 2022-05-09 NOTE — CARE PLAN
The patient is Stable - Low risk of patient condition declining or worsening    Shift Goals  Clinical Goals: Safety; Legal hold  Patient Goals: CARIE  Family Goals: N/A    Progress made toward(s) clinical / shift goals:    Problem: Knowledge Deficit - Standard  Goal: Patient and family/care givers will demonstrate understanding of plan of care, disease process/condition, diagnostic tests and medications  Outcome: Progressing     Problem: Skin Integrity  Goal: Skin integrity is maintained or improved  Outcome: Progressing     Problem: Fall Risk  Goal: Patient will remain free from falls  Outcome: Progressing     Problem: Pain - Standard  Goal: Alleviation of pain or a reduction in pain to the patient’s comfort goal  Outcome: Progressing     Problem: Provide Safe Environment  Goal: Suicide environmental safety, protocols, policies, and practices will be implemented  Outcome: Progressing     Problem: Psychosocial  Goal: Patient's ability to identify and develop effective coping behaviors will improve  Outcome: Progressing  Goal: Patient's ability to identify and utilize available support systems will improve  Outcome: Progressing       Patient is not progressing towards the following goals:
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
## 2022-05-09 NOTE — THERAPY
"Speech Language Pathology  Daily Treatment     Patient Name: Liberty Bolton  Age:  67 y.o., Sex:  female  Medical Record #: [id number]  Today's Date: 5/9/2022     Precautions  Precautions: Fall Risk,Swallow Precautions ( See Comments)    Assessment    Patient was seen for a swallowing reassessment this morning after she was made NPO per nursing judgment due to concerns of coughing/choking and vomiting with PO intake over the weekend. Pt is now more alert and cooperative than she was yesterday, though still lethargic and reporting she is not hungry. PO trials of ice chips, thins via tsp/cup, mildly thick liquids via tsp/cup, liquidized with crushed pills (RN present) and pureed solids administered. Reflexive coughing occurred w/ intake of thins via cup. No other s/sx of aspiration occurred with PO intake. Diet re-initiation is appropriate.     Recommendations:  Restart diet of Pureed Solids (PU4) and Mildly Thick Liquids (MT2)   -1:1 feeding/supervision  -Crush pills in applesauce or pudding  -oral care after meals  -Hold PO with any increased lethargy/change in status    Plan    Continue current treatment plan.    Discharge Recommendations: Recommend post-acute placement for additional speech therapy services prior to discharge home       Objective       05/09/22 0837   Dysphagia    Positioning / Behavior Modification Modulate Rate or Bite Size   Other Treatments PO trials ice chips, thins via tsp/cup, MT2 via tsp/cup, LQ3, PU4   Diet / Liquid Recommendation Puree (4);Mildly Thick (2) - (Nectar Thick)   Recommended Route of Medication Administration   Medication Administration  Crush all Medications in Puree   Patient / Family Goals   Patient / Family Goal #1 \"More.\" (water)   Goal #1 Outcome Progressing slower than expected   Short Term Goals   Short Term Goal # 1 Patient will consume meals of pureed solids and mildly thick liquids with no s/sx of aspiration given direct supervision/feeding A.   Goal Outcome # 1 " Progressing slower than expected

## 2022-05-09 NOTE — PROGRESS NOTES
[facility] -  CONSULT NOTE               Author: ASHLY Cardoza    Date & Time: 5/9/2022  10:22 AM   Consult reason: ESRD missed dialysis    HPI:  67 years old female with medical history notable for insulin-dependent type 2 diabetes, hypertension, end-stage renal disease due to diabetes has been on hemodialysis for 4 years, presenting to hospital with generalized weakness, transferred to the [facility] for higher level of care due to anemia requiring transfusions and also significant chemistry abnormalities.  Patient is unable to give history due to significant drowsiness and weakness.  She states that she she has been noncompliant with all of her medications for about 3 weeks also last dialysis about 3 weeks back.  Now she is suffering from nausea, green-brown vomiting and dyspnea.  Denies any chest pain.    In the emergency department patient was hypertensive, 97% on room air, drowsy, cxr pertinent for bilateral pleural effusion, CT thorax abdomen and pelvis with contrast was obtained which showed bilateral pleural effusion without any significant abnormalities otherwise. Creatinine was significant 15, patient still urinates, K normal, corrected ca 8.1, phos 11, bun 130s. Patient underwent thoracentesis which is showing transudate.    Nephrology was consulted due to missed dialysis and ESRD.      REVIEW OF SYSTEMS:    10 point ROS reviewed and is as per HPI or otherwise negative    DAILY NEPHROLOGY PROGRESS:    5/6 - Patient is vomiting at bedside, still volume overloaded. Alert and oriented however does not understand why she is vomiting despite we spoke yesterday about missed dialysis. She states she is urinating, however no IO in chart.  5/7 - Confused.  Complains of leg pain.  Seen on HD  5/8 - Somnolent.  Sleeping in bed.  Had HD yesterday.  5/9 - VSS [id number].  RA.  No complaints. Restless and moaning.       PMH/PSH/SH/FH: Reviewed, see HPI  CURRENT  "MEDICATIONS: Reviewed    VS:  BP (!) 165/57 Comment: RN NOTIFIED   Pulse 85   Temp 36.2 °C (97.2 °F) (Temporal)   Resp 18   Ht 1.651 m (5' 5\")   Wt 84.3 kg (185 lb 13.6 oz)   SpO2 93%   BMI 30.93 kg/m²   Physical Exam  Constitutional:       General: She is not in acute distress.     Appearance: She is ill-appearing.   HENT:      Head: Normocephalic and atraumatic.      Nose: No rhinorrhea.      Mouth/Throat:      Pharynx: No posterior oropharyngeal erythema.   Eyes:      General: No scleral icterus.  Cardiovascular:      Rate and Rhythm: Normal rate and regular rhythm.      Heart sounds: No murmur heard.    No friction rub.   Pulmonary:      Effort: Pulmonary effort is normal. No respiratory distress.      Breath sounds: No wheezing, rhonchi or rales.      Comments: Decreased breath sounds  Abdominal:      General: There is no distension.      Tenderness: There is no abdominal tenderness. There is no guarding or rebound.   Musculoskeletal:      Right lower leg: Edema present.      Left lower leg: Edema present.      Comments: trace   Skin:     General: Skin is dry.      Coloration: Skin is pale. Skin is not jaundiced.   Neurological:      Motor: Weakness (generalized) present.   Psychiatric:      Comments: Withdrawn           Fluids:  In: 393.8 [Blood:393.8]  Out: 750     LABS:  Recent Labs     05/07/22 1943 05/09/22  0053   SODIUM 138 141   POTASSIUM 4.1 3.3*   CHLORIDE 95* 100   CO2 19* 26   GLUCOSE 230* 101*   BUN 29* 53*   CREATININE 3.59* 5.61*   CALCIUM 8.5 8.3*       IMPRESSION:  · Uremia with pleural effusion and serositis with nausea  · Pleural effusion due to volume overload due to missed dialysis, transudate lights criteria  · Anemia of chronic kidney disease  · Anemia requiring transfusions  · ESRD, due to type 2 diabetes, on HD right upper extremity, last 4 years  · HAGMA due to uremic toxins  · Proteinuria  · Vomiting  · Hyperphosphatemia, severe  · Thrombocytopenia  · Hypertension, due to " volume overload and essential hypertension  -Amlodipine increased 5/8  · Procalcitonin elevation due to renal failure vs ongoing infection (potential CAP)  · Type 2 diabetes mellitus.  · Hypocalcemia  - Vitamin D 11,   - Calcitriol      PLAN:  -HD qTTS, next tx (TUE)  -Continue amlodipine 10mg PO daily   -HD placement per SW  -Continue with PhosLo  - On psych hold    We will continue to follow   Thank you for your consult.
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
## 2022-05-09 NOTE — CONSULTS
BRIEF PSYCHIATRIC CONSULT NOTE: patient seen mulitple times and was obtunded and unable to participate.  Notes reflect this. We are continuing to try to interview her. Consult is active, new order dc'd

## 2022-05-10 LAB
ANION GAP SERPL CALC-SCNC: 16 MMOL/L (ref 7–16)
BACTERIA BLD CULT: NORMAL
BACTERIA BLD CULT: NORMAL
BACTERIA FLD AEROBE CULT: NORMAL
BUN SERPL-MCNC: 60 MG/DL (ref 8–22)
CALCIUM SERPL-MCNC: 8.5 MG/DL (ref 8.5–10.5)
CHLORIDE SERPL-SCNC: 101 MMOL/L (ref 96–112)
CO2 SERPL-SCNC: 25 MMOL/L (ref 20–33)
CREAT SERPL-MCNC: 6.92 MG/DL (ref 0.5–1.4)
ERYTHROCYTE [DISTWIDTH] IN BLOOD BY AUTOMATED COUNT: 58.5 FL (ref 35.9–50)
GFR SERPLBLD CREATININE-BSD FMLA CKD-EPI: 6 ML/MIN/1.73 M 2
GLUCOSE SERPL-MCNC: 158 MG/DL (ref 65–99)
GRAM STN SPEC: NORMAL
HCT VFR BLD AUTO: 24.8 % (ref 37–47)
HGB BLD-MCNC: 8.3 G/DL (ref 12–16)
MCH RBC QN AUTO: 30 PG (ref 27–33)
MCHC RBC AUTO-ENTMCNC: 33.5 G/DL (ref 33.6–35)
MCV RBC AUTO: 89.5 FL (ref 81.4–97.8)
PLATELET # BLD AUTO: 153 K/UL (ref 164–446)
PMV BLD AUTO: 10 FL (ref 9–12.9)
POTASSIUM SERPL-SCNC: 3.7 MMOL/L (ref 3.6–5.5)
RBC # BLD AUTO: 2.77 M/UL (ref 4.2–5.4)
SIGNIFICANT IND 70042: NORMAL
SITE SITE: NORMAL
SODIUM SERPL-SCNC: 142 MMOL/L (ref 135–145)
SOURCE SOURCE: NORMAL
WBC # BLD AUTO: 6.2 K/UL (ref 4.8–10.8)

## 2022-05-10 PROCEDURE — 36415 COLL VENOUS BLD VENIPUNCTURE: CPT

## 2022-05-10 PROCEDURE — A9270 NON-COVERED ITEM OR SERVICE: HCPCS

## 2022-05-10 PROCEDURE — 90935 HEMODIALYSIS ONE EVALUATION: CPT

## 2022-05-10 PROCEDURE — 85027 COMPLETE CBC AUTOMATED: CPT

## 2022-05-10 PROCEDURE — 700111 HCHG RX REV CODE 636 W/ 250 OVERRIDE (IP): Performed by: STUDENT IN AN ORGANIZED HEALTH CARE EDUCATION/TRAINING PROGRAM

## 2022-05-10 PROCEDURE — 700102 HCHG RX REV CODE 250 W/ 637 OVERRIDE(OP)

## 2022-05-10 PROCEDURE — A9270 NON-COVERED ITEM OR SERVICE: HCPCS | Performed by: GENERAL PRACTICE

## 2022-05-10 PROCEDURE — 700102 HCHG RX REV CODE 250 W/ 637 OVERRIDE(OP): Performed by: STUDENT IN AN ORGANIZED HEALTH CARE EDUCATION/TRAINING PROGRAM

## 2022-05-10 PROCEDURE — A9270 NON-COVERED ITEM OR SERVICE: HCPCS | Performed by: STUDENT IN AN ORGANIZED HEALTH CARE EDUCATION/TRAINING PROGRAM

## 2022-05-10 PROCEDURE — 99232 SBSQ HOSP IP/OBS MODERATE 35: CPT | Performed by: STUDENT IN AN ORGANIZED HEALTH CARE EDUCATION/TRAINING PROGRAM

## 2022-05-10 PROCEDURE — A9270 NON-COVERED ITEM OR SERVICE: HCPCS | Performed by: INTERNAL MEDICINE

## 2022-05-10 PROCEDURE — 700111 HCHG RX REV CODE 636 W/ 250 OVERRIDE (IP): Performed by: GENERAL PRACTICE

## 2022-05-10 PROCEDURE — 770001 HCHG ROOM/CARE - MED/SURG/GYN PRIV*

## 2022-05-10 PROCEDURE — 700102 HCHG RX REV CODE 250 W/ 637 OVERRIDE(OP): Performed by: GENERAL PRACTICE

## 2022-05-10 PROCEDURE — 700102 HCHG RX REV CODE 250 W/ 637 OVERRIDE(OP): Performed by: INTERNAL MEDICINE

## 2022-05-10 PROCEDURE — 80048 BASIC METABOLIC PNL TOTAL CA: CPT

## 2022-05-10 RX ORDER — BACLOFEN 10 MG/1
10 TABLET ORAL 2 TIMES DAILY PRN
Status: DISCONTINUED | OUTPATIENT
Start: 2022-05-10 | End: 2022-05-19 | Stop reason: HOSPADM

## 2022-05-10 RX ORDER — LOSARTAN POTASSIUM 50 MG/1
25 TABLET ORAL
Status: DISCONTINUED | OUTPATIENT
Start: 2022-05-10 | End: 2022-05-10

## 2022-05-10 RX ORDER — MORPHINE SULFATE 4 MG/ML
2 INJECTION INTRAVENOUS EVERY 4 HOURS PRN
Status: DISCONTINUED | OUTPATIENT
Start: 2022-05-10 | End: 2022-05-11

## 2022-05-10 RX ORDER — HYDRALAZINE HYDROCHLORIDE 25 MG/1
25 TABLET, FILM COATED ORAL EVERY 8 HOURS
Status: DISCONTINUED | OUTPATIENT
Start: 2022-05-10 | End: 2022-05-19 | Stop reason: HOSPADM

## 2022-05-10 RX ADMIN — Medication 667 MG: at 18:22

## 2022-05-10 RX ADMIN — VENLAFAXINE HYDROCHLORIDE 37.5 MG: 37.5 CAPSULE, EXTENDED RELEASE ORAL at 07:39

## 2022-05-10 RX ADMIN — PROCHLORPERAZINE EDISYLATE 10 MG: 5 INJECTION INTRAMUSCULAR; INTRAVENOUS at 19:49

## 2022-05-10 RX ADMIN — FERROUS SULFATE TAB 325 MG (65 MG ELEMENTAL FE) 325 MG: 325 (65 FE) TAB at 07:39

## 2022-05-10 RX ADMIN — MORPHINE SULFATE 2 MG: 4 INJECTION INTRAVENOUS at 23:42

## 2022-05-10 RX ADMIN — Medication 5 MG: at 20:20

## 2022-05-10 RX ADMIN — OMEPRAZOLE 20 MG: 20 CAPSULE, DELAYED RELEASE ORAL at 18:22

## 2022-05-10 RX ADMIN — ACETAMINOPHEN 650 MG: 325 TABLET, FILM COATED ORAL at 04:04

## 2022-05-10 RX ADMIN — HYDRALAZINE HYDROCHLORIDE 10 MG: 20 INJECTION INTRAMUSCULAR; INTRAVENOUS at 08:15

## 2022-05-10 RX ADMIN — QUETIAPINE FUMARATE 25 MG: 25 TABLET ORAL at 04:04

## 2022-05-10 RX ADMIN — Medication 667 MG: at 07:39

## 2022-05-10 RX ADMIN — HYDRALAZINE HYDROCHLORIDE 25 MG: 25 TABLET, FILM COATED ORAL at 20:20

## 2022-05-10 RX ADMIN — MORPHINE SULFATE 2 MG: 4 INJECTION INTRAVENOUS at 12:00

## 2022-05-10 RX ADMIN — BACLOFEN 10 MG: 10 TABLET ORAL at 23:41

## 2022-05-10 RX ADMIN — HYDRALAZINE HYDROCHLORIDE 25 MG: 25 TABLET, FILM COATED ORAL at 15:14

## 2022-05-10 RX ADMIN — MORPHINE SULFATE 2 MG: 4 INJECTION INTRAVENOUS at 19:42

## 2022-05-10 ASSESSMENT — PAIN DESCRIPTION - PAIN TYPE
TYPE: ACUTE PAIN

## 2022-05-10 NOTE — PROGRESS NOTES
[facility] -  CONSULT NOTE               Author: ASHLY Cardoza    Date & Time: 5/10/2022  8:43 AM   Consult reason: ESRD missed dialysis    HPI:  67 years old female with medical history notable for insulin-dependent type 2 diabetes, hypertension, end-stage renal disease due to diabetes has been on hemodialysis for 4 years, presenting to hospital with generalized weakness, transferred to the [facility] for higher level of care due to anemia requiring transfusions and also significant chemistry abnormalities.  Patient is unable to give history due to significant drowsiness and weakness.  She states that she she has been noncompliant with all of her medications for about 3 weeks also last dialysis about 3 weeks back.  Now she is suffering from nausea, green-brown vomiting and dyspnea.  Denies any chest pain.    In the emergency department patient was hypertensive, 97% on room air, drowsy, cxr pertinent for bilateral pleural effusion, CT thorax abdomen and pelvis with contrast was obtained which showed bilateral pleural effusion without any significant abnormalities otherwise. Creatinine was significant 15, patient still urinates, K normal, corrected ca 8.1, phos 11, bun 130s. Patient underwent thoracentesis which is showing transudate.    Nephrology was consulted due to missed dialysis and ESRD.      REVIEW OF SYSTEMS:    10 point ROS reviewed and is as per HPI or otherwise negative    DAILY NEPHROLOGY PROGRESS:    5/6 - Patient is vomiting at bedside, still volume overloaded. Alert and oriented however does not understand why she is vomiting despite we spoke yesterday about missed dialysis. She states she is urinating, however no IO in chart.  5/7 - Confused.  Complains of leg pain.  Seen on HD  5/8 - Somnolent.  Sleeping in bed.  Had HD yesterday.  5/9 - VSS [id number].  RA.  No complaints. Restless and moaning.   5/10 - RA, interactive but confused, restless.  VSS RA BP  "labile, no hypotension.      PMH/PSH/SH/FH: Reviewed, see HPI  CURRENT MEDICATIONS: Reviewed    VS:  BP (!) 184/95 Comment: Rn notified   Pulse 79   Temp 35.8 °C (96.5 °F) (Temporal)   Resp (!) 22   Ht 1.651 m (5' 5\")   Wt 84.3 kg (185 lb 13.6 oz)   SpO2 99%   BMI 30.93 kg/m²   Physical Exam  Constitutional:       General: She is not in acute distress.     Appearance: She is ill-appearing.   HENT:      Head: Normocephalic and atraumatic.      Nose: No rhinorrhea.      Mouth/Throat:      Pharynx: No posterior oropharyngeal erythema.   Eyes:      General: No scleral icterus.  Cardiovascular:      Rate and Rhythm: Normal rate and regular rhythm.      Heart sounds: No murmur heard.    No friction rub.   Pulmonary:      Effort: Pulmonary effort is normal. No respiratory distress.      Breath sounds: No wheezing, rhonchi or rales.      Comments: Decreased breath sounds  Abdominal:      General: There is no distension.      Tenderness: There is no abdominal tenderness. There is no guarding or rebound.   Musculoskeletal:      Right lower leg: Edema present.      Left lower leg: Edema present.      Comments: trace   Skin:     General: Skin is dry.      Coloration: Skin is pale. Skin is not jaundiced.   Neurological:      Motor: Weakness (generalized) present.   Psychiatric:      Comments: Withdrawn           Fluids:  In: -   Out: 200     LABS:  Recent Labs     05/07/22  1943 05/09/22  0053 05/10/22  0033   SODIUM 138 141 142   POTASSIUM 4.1 3.3* 3.7   CHLORIDE 95* 100 101   CO2 19* 26 25   GLUCOSE 230* 101* 158*   BUN 29* 53* 60*   CREATININE 3.59* 5.61* 6.92*   CALCIUM 8.5  8.5 8.3* 8.5       IMPRESSION:  · Uremia with pleural effusion and serositis with nausea  · Pleural effusion due to volume overload due to missed dialysis, transudate lights criteria  · Anemia of chronic kidney disease  · Anemia requiring transfusions  · ESRD, due to type 2 diabetes, on HD right upper extremity, last 4 years  · HAGMA due to " uremic toxins  · Proteinuria  · Vomiting  · Hyperphosphatemia, severe  · Thrombocytopenia  · Hypertension, due to volume overload and essential hypertension  -Amlodipine increased 5/8  · Procalcitonin elevation due to renal failure vs ongoing infection (potential CAP)  · Type 2 diabetes mellitus.  · Hypocalcemia  - Vitamin D 11,   - Calcitriol      PLAN:  -HD qTTS, next tx today (TUE)  -Continue amlodipine 10mg PO daily   -HD placement per SW  -Continue with PhosLo when eating  - On psych hold    We will continue to follow   Thank you for your consult.

## 2022-05-10 NOTE — PROGRESS NOTES
Delayed from transport for 1 hr 15 minutes prior to start. Hd treatment ordered by Dr Zuluaga  started at 1130 and ended at 1302 with net UF of 1.08 Liter and unable to finish treatment due to water issue even with biomed checking it out. Cannulated right upper arm AVF x 2 using 15 gauge needles without problem. Gave report to Primary RN Sheri. See flow sheet for details.

## 2022-05-10 NOTE — CARE PLAN
The patient is Stable - Low risk of patient condition declining or worsening    Shift Goals  Clinical Goals: safety and clarfying LH  Patient Goals: CARIE  Family Goals:     Progress made toward(s) clinical / shift goals:    Problem: Skin Integrity  Goal: Skin integrity is maintained or improved  Outcome: Progressing     Problem: Fall Risk  Goal: Patient will remain free from falls  Outcome: Progressing     Problem: Pain - Standard  Goal: Alleviation of pain or a reduction in pain to the patient’s comfort goal  Outcome: Progressing       Patient is not progressing towards the following goals:      Problem: Knowledge Deficit - Standard  Goal: Patient and family/care givers will demonstrate understanding of plan of care, disease process/condition, diagnostic tests and medications  Outcome: Not Progressing

## 2022-05-10 NOTE — CARE PLAN
The patient is Stable - Low risk of patient condition declining or worsening    Shift Goals  Clinical Goals: safety and clarfying LH  Patient Goals: CARIE  Family Goals: Penny communication    Progress made toward(s) clinical / shift goals:    Problem: Knowledge Deficit - Standard  Goal: Patient and family/care givers will demonstrate understanding of plan of care, disease process/condition, diagnostic tests and medications  Outcome: Progressing     Problem: Skin Integrity  Goal: Skin integrity is maintained or improved  Outcome: Progressing     Problem: Fall Risk  Goal: Patient will remain free from falls  Outcome: Progressing     Problem: Pain - Standard  Goal: Alleviation of pain or a reduction in pain to the patient’s comfort goal  Outcome: Progressing     Problem: Provide Safe Environment  Goal: Suicide environmental safety, protocols, policies, and practices will be implemented  Outcome: Progressing     Problem: Psychosocial  Goal: Patient's ability to identify and develop effective coping behaviors will improve  Outcome: Progressing  Goal: Patient's ability to identify and utilize available support systems will improve  Outcome: Progressing       Patient is not progressing towards the following goals:
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
## 2022-05-11 PROCEDURE — 700111 HCHG RX REV CODE 636 W/ 250 OVERRIDE (IP): Performed by: STUDENT IN AN ORGANIZED HEALTH CARE EDUCATION/TRAINING PROGRAM

## 2022-05-11 PROCEDURE — 770001 HCHG ROOM/CARE - MED/SURG/GYN PRIV*

## 2022-05-11 PROCEDURE — 700102 HCHG RX REV CODE 250 W/ 637 OVERRIDE(OP): Performed by: STUDENT IN AN ORGANIZED HEALTH CARE EDUCATION/TRAINING PROGRAM

## 2022-05-11 PROCEDURE — 90935 HEMODIALYSIS ONE EVALUATION: CPT

## 2022-05-11 PROCEDURE — 700102 HCHG RX REV CODE 250 W/ 637 OVERRIDE(OP): Performed by: GENERAL PRACTICE

## 2022-05-11 PROCEDURE — 99232 SBSQ HOSP IP/OBS MODERATE 35: CPT | Performed by: STUDENT IN AN ORGANIZED HEALTH CARE EDUCATION/TRAINING PROGRAM

## 2022-05-11 PROCEDURE — A9270 NON-COVERED ITEM OR SERVICE: HCPCS | Performed by: GENERAL PRACTICE

## 2022-05-11 PROCEDURE — 51798 US URINE CAPACITY MEASURE: CPT

## 2022-05-11 PROCEDURE — A9270 NON-COVERED ITEM OR SERVICE: HCPCS | Performed by: INTERNAL MEDICINE

## 2022-05-11 PROCEDURE — A9270 NON-COVERED ITEM OR SERVICE: HCPCS

## 2022-05-11 PROCEDURE — 700102 HCHG RX REV CODE 250 W/ 637 OVERRIDE(OP): Performed by: INTERNAL MEDICINE

## 2022-05-11 PROCEDURE — A9270 NON-COVERED ITEM OR SERVICE: HCPCS | Performed by: STUDENT IN AN ORGANIZED HEALTH CARE EDUCATION/TRAINING PROGRAM

## 2022-05-11 PROCEDURE — 700102 HCHG RX REV CODE 250 W/ 637 OVERRIDE(OP)

## 2022-05-11 RX ORDER — OXYCODONE HYDROCHLORIDE 5 MG/1
5 TABLET ORAL EVERY 6 HOURS PRN
Status: DISCONTINUED | OUTPATIENT
Start: 2022-05-11 | End: 2022-05-19 | Stop reason: HOSPADM

## 2022-05-11 RX ORDER — MORPHINE SULFATE 4 MG/ML
2 INJECTION INTRAVENOUS EVERY 6 HOURS PRN
Status: DISCONTINUED | OUTPATIENT
Start: 2022-05-11 | End: 2022-05-19 | Stop reason: HOSPADM

## 2022-05-11 RX ADMIN — Medication 667 MG: at 16:14

## 2022-05-11 RX ADMIN — Medication 5 MG: at 20:43

## 2022-05-11 RX ADMIN — OXYCODONE 5 MG: 5 TABLET ORAL at 20:43

## 2022-05-11 RX ADMIN — CALCITRIOL CAPSULES 0.25 MCG 0.25 MCG: 0.25 CAPSULE ORAL at 05:42

## 2022-05-11 RX ADMIN — Medication 667 MG: at 05:42

## 2022-05-11 RX ADMIN — HYDRALAZINE HYDROCHLORIDE 25 MG: 25 TABLET, FILM COATED ORAL at 20:43

## 2022-05-11 RX ADMIN — MORPHINE SULFATE 2 MG: 4 INJECTION INTRAVENOUS at 13:00

## 2022-05-11 RX ADMIN — FERROUS SULFATE TAB 325 MG (65 MG ELEMENTAL FE) 325 MG: 325 (65 FE) TAB at 07:44

## 2022-05-11 RX ADMIN — AMLODIPINE BESYLATE 10 MG: 5 TABLET ORAL at 05:42

## 2022-05-11 RX ADMIN — OMEPRAZOLE 20 MG: 20 CAPSULE, DELAYED RELEASE ORAL at 05:42

## 2022-05-11 RX ADMIN — METOPROLOL SUCCINATE 100 MG: 25 TABLET, EXTENDED RELEASE ORAL at 05:41

## 2022-05-11 RX ADMIN — ATORVASTATIN CALCIUM 80 MG: 80 TABLET, FILM COATED ORAL at 05:46

## 2022-05-11 RX ADMIN — VENLAFAXINE HYDROCHLORIDE 37.5 MG: 37.5 CAPSULE, EXTENDED RELEASE ORAL at 07:44

## 2022-05-11 RX ADMIN — HYDRALAZINE HYDROCHLORIDE 25 MG: 25 TABLET, FILM COATED ORAL at 16:15

## 2022-05-11 RX ADMIN — HYDRALAZINE HYDROCHLORIDE 25 MG: 25 TABLET, FILM COATED ORAL at 05:42

## 2022-05-11 RX ADMIN — OMEPRAZOLE 20 MG: 20 CAPSULE, DELAYED RELEASE ORAL at 16:14

## 2022-05-11 RX ADMIN — MORPHINE SULFATE 2 MG: 4 INJECTION INTRAVENOUS at 06:10

## 2022-05-11 ASSESSMENT — PAIN DESCRIPTION - PAIN TYPE
TYPE: ACUTE PAIN

## 2022-05-11 ASSESSMENT — PATIENT HEALTH QUESTIONNAIRE - PHQ9
2. FEELING DOWN, DEPRESSED, IRRITABLE, OR HOPELESS: NOT AT ALL
SUM OF ALL RESPONSES TO PHQ9 QUESTIONS 1 AND 2: 0
1. LITTLE INTEREST OR PLEASURE IN DOING THINGS: NOT AT ALL

## 2022-05-11 NOTE — CARE PLAN
The patient is Stable - Low risk of patient condition declining or worsening    Shift Goals  Clinical Goals: pain control, falls prevention, patient safety  Patient Goals: pain control  Family Goals: CARIE      Problem: Pain - Standard  Goal: Alleviation of pain or a reduction in pain to the patient’s comfort goal  Outcome: Not Progressing  Note: Pt back and forth on pain presence, states no pain and severe pain within same conversation, nonverbal language does not match severe pain. Pain med given earlier, too soon for next administration, MD aware.       Problem: Knowledge Deficit - Standard  Goal: Patient and family/care givers will demonstrate understanding of plan of care, disease process/condition, diagnostic tests and medications  Outcome: Progressing  Note: Pt's mentation improving, continual re-oreiantation     Problem: Skin Integrity  Goal: Skin integrity is maintained or improved  Outcome: Progressing  Note: Pt's skin assessed, mepilex, pillows for support of turning, Offloading, Q2 hour turns in place and discussed with interdisciplinary team.        Problem: Fall Risk  Goal: Patient will remain free from falls  Outcome: Progressing  Note: Discussed patient mobility status with interdisciplinary team, fall precautions in place, pt fall prevention education done, pt educated to call for assistance when needed.

## 2022-05-11 NOTE — DISCHARGE PLANNING
Anticipated Discharge Disposition: SNF Placement        Action: CM presented patient Dialysis welcome letter and spoke with patient in regards to SNF placement in which she is okay with but when CM was providing SNF choices, patient fell asleep and woke up but couldn't remember the SNF choices discussed. Patient did state, call my son, he can help you. CM attempted to called Steve via his spouse cell phone to obtain SNF choices, but was only able to leave VM. CM will follow up.        Barriers to Discharge: SNF choice, SNF acceptance        Plan: Hospital Care Management will continue to follow and assist with discharge planning needs.

## 2022-05-11 NOTE — PROGRESS NOTES
[facility] Progress Note     HD today x 3 hours per Dr. MARIANGEL Burnham. Tx initiated at 1053 and ended at 1353    Pt A/O x 2, drowsy. (-) SOB (-) chest pain (-) bleeding (+) bipedal edema +2. With RUE AVF (+) bruit and thrill (+) consent for HD tx      NET UF: 2000 ml    Patient tolerated tx. C/o back pain and leg pain during the HD tx. PCN gave due pain medication. All blood was returned aseptically. HD needles removed from RUE AVF. Dry gauze applied and changed without bleeding issue.(+) Bruit and thrill post tx.See eflow sheets for further details.      Report given to CHRYSTAL Simon RN

## 2022-05-11 NOTE — DISCHARGE PLANNING
Outpatient Dialysis Placement Confirmation    Patient has been placed and confirmed at:    [facility]   [address] [place] [address]    Ph# [phone]    Schedule: Monday, Wednesday, Friday   Time: 2:45pm     Patient can start on 05/13 and needs to be there by 2:00pm  on your first day.    Follow up call made to Damian PORRAS and relayed outpatient dialysis placement confirmation. Follow up message to Dr. Zuluaga to notify of placement. Provided patient dialysis schedule welcome letter faxed to Damian at [phone].    Ching Lees- Senior Dialysis Coordinator Ph# [phone]  [facility]

## 2022-05-11 NOTE — PROGRESS NOTES
Pt c/o feeling as though she needed to void and not being able to do so. Bladder scan result: 232ml. Paging APRN hospitalist on call Makayla Emerson for updates/orders.

## 2022-05-11 NOTE — PROGRESS NOTES
[facility] -  CONSULT NOTE               Author: ASHLY Cardoza    Date & Time: 5/11/2022  9:50 AM   Consult reason: ESRD missed dialysis    HPI:  67 years old female with medical history notable for insulin-dependent type 2 diabetes, hypertension, end-stage renal disease due to diabetes has been on hemodialysis for 4 years, presenting to hospital with generalized weakness, transferred to the [facility] for higher level of care due to anemia requiring transfusions and also significant chemistry abnormalities.  Patient is unable to give history due to significant drowsiness and weakness.  She states that she she has been noncompliant with all of her medications for about 3 weeks also last dialysis about 3 weeks back.  Now she is suffering from nausea, green-brown vomiting and dyspnea.  Denies any chest pain.    In the emergency department patient was hypertensive, 97% on room air, drowsy, cxr pertinent for bilateral pleural effusion, CT thorax abdomen and pelvis with contrast was obtained which showed bilateral pleural effusion without any significant abnormalities otherwise. Creatinine was significant 15, patient still urinates, K normal, corrected ca 8.1, phos 11, bun 130s. Patient underwent thoracentesis which is showing transudate.    Nephrology was consulted due to missed dialysis and ESRD.      REVIEW OF SYSTEMS:    10 point ROS reviewed and is as per HPI or otherwise negative    DAILY NEPHROLOGY PROGRESS:    5/6 - Patient is vomiting at bedside, still volume overloaded. Alert and oriented however does not understand why she is vomiting despite we spoke yesterday about missed dialysis. She states she is urinating, however no IO in chart.  5/7 - Confused.  Complains of leg pain.  Seen on HD  5/8 - Somnolent.  Sleeping in bed.  Had HD yesterday.  5/9 - VSS [id number].  RA.  No complaints. Restless and moaning.   5/10 - RA, interactive but confused, restless.  VSS RA BP  "labile, no hypotension.  5/11 - iHD yesterday, net UF 1.08L.  VSS -160.  RA. No complaints.  Psych at bedside doing eval. More calm and interactive today.         PMH/PSH/SH/FH: Reviewed, see HPI  CURRENT MEDICATIONS: Reviewed    VS:  BP (!) 128/92   Pulse 68   Temp 35.8 °C (96.5 °F) (Temporal)   Resp 18   Ht 1.651 m (5' 5\")   Wt 84.3 kg (185 lb 13.6 oz)   SpO2 91%   BMI 30.93 kg/m²   Physical Exam  Constitutional:       General: She is not in acute distress.     Appearance: She is ill-appearing.   HENT:      Head: Normocephalic and atraumatic.      Nose: No rhinorrhea.      Mouth/Throat:      Pharynx: No posterior oropharyngeal erythema.   Eyes:      General: No scleral icterus.  Cardiovascular:      Rate and Rhythm: Normal rate and regular rhythm.      Heart sounds: No murmur heard.    No friction rub.   Pulmonary:      Effort: Pulmonary effort is normal. No respiratory distress.   Abdominal:      General: There is no distension.      Tenderness: There is no abdominal tenderness. There is no guarding or rebound.   Musculoskeletal:      Right lower leg: Edema present.      Left lower leg: Edema present.      Comments: trace   Skin:     General: Skin is dry.      Coloration: Skin is pale. Skin is not jaundiced.   Neurological:      Motor: Weakness (generalized) present.   Psychiatric:      Comments:            Fluids:  In: 60 [P.O.:60]  Out: -     LABS:  Recent Labs     05/09/22  0053 05/10/22  0033   SODIUM 141 142   POTASSIUM 3.3* 3.7   CHLORIDE 100 101   CO2 26 25   GLUCOSE 101* 158*   BUN 53* 60*   CREATININE 5.61* 6.92*   CALCIUM 8.3* 8.5       IMPRESSION:  · Uremia with pleural effusion and serositis with nausea  · Pleural effusion due to volume overload due to missed dialysis, transudate lights criteria  · Anemia of chronic kidney disease  · Anemia requiring transfusions  · ESRD, due to type 2 diabetes, on HD right upper extremity, last 4 years  · HAGMA due to uremic " toxins  · Proteinuria  · Vomiting  · Hyperphosphatemia, severe  · Thrombocytopenia  · Hypertension, due to volume overload and essential hypertension  -Amlodipine increased 5/8  · Procalcitonin elevation due to renal failure vs ongoing infection (potential CAP)  · Type 2 diabetes mellitus.  · Hypocalcemia  - Vitamin D 11,   - Calcitriol      PLAN:  -HD qTTS, next tx tomorrow (THUR)  -Continue amlodipine 10mg PO daily   -HD placement per SW  -Continue with PhosLo when eating  - On psych hold    We will continue to follow   Thank you for your consult.
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
## 2022-05-11 NOTE — PROGRESS NOTES
Pt claimed to have voided earlier this evening. Per CNA Tonett, pt has not voided nor needed a linen change this shift. Bladder scan = 188ml. Per order, can straight cath pt if result is over 300ml. Monitoring continues.
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
## 2022-05-11 NOTE — CARE PLAN
The patient is Watcher - Medium risk of patient condition declining or worsening    Shift Goals  Clinical Goals: pain control, falls prevention, patient safety, monitor blood pressure  Patient Goals: pain control  Family Goals: CARIE    Progress made toward(s) clinical / shift goals:  Patient is tolerating scheduled blood pressure medication and prn pain medication. Telesitter remains in room for patient safety due to impulsiveness and current level of confusion. Bed alarm in place and active. Patient frequently changes position.    Problem: Knowledge Deficit - Standard  Goal: Patient and family/care givers will demonstrate understanding of plan of care, disease process/condition, diagnostic tests and medications  Outcome: Progressing     Problem: Skin Integrity  Goal: Skin integrity is maintained or improved  Outcome: Progressing     Problem: Fall Risk  Goal: Patient will remain free from falls  Outcome: Progressing     Problem: Pain - Standard  Goal: Alleviation of pain or a reduction in pain to the patient’s comfort goal  Outcome: Progressing       Patient is not progressing towards the following goals:

## 2022-05-11 NOTE — THERAPY
Missed Therapy     Patient Name: Liberty Bolton  Age:  67 y.o., Sex:  female  Medical Record #: [id number]  Today's Date: 5/11/2022 05/11/22 1422   Interdisciplinary Plan of Care Collaboration   Collaboration Comments Attempted therapy follow up session 2x both attempts pt continued to be in dialysis. Will follow up as time allows.

## 2022-05-12 LAB
25(OH)D3 SERPL-MCNC: 11 NG/ML (ref 30–100)
ALBUMIN SERPL BCP-MCNC: 2.9 G/DL (ref 3.2–4.9)
ALBUMIN/GLOB SERPL: 0.8 G/DL
ALP SERPL-CCNC: 66 U/L (ref 30–99)
ALT SERPL-CCNC: 21 U/L (ref 2–50)
ANION GAP SERPL CALC-SCNC: 14 MMOL/L (ref 7–16)
AST SERPL-CCNC: 21 U/L (ref 12–45)
BILIRUB SERPL-MCNC: 0.4 MG/DL (ref 0.1–1.5)
BUN SERPL-MCNC: 26 MG/DL (ref 8–22)
CALCIUM SERPL-MCNC: 8.6 MG/DL (ref 8.5–10.5)
CHLORIDE SERPL-SCNC: 100 MMOL/L (ref 96–112)
CO2 SERPL-SCNC: 25 MMOL/L (ref 20–33)
CREAT SERPL-MCNC: 4.55 MG/DL (ref 0.5–1.4)
ERYTHROCYTE [DISTWIDTH] IN BLOOD BY AUTOMATED COUNT: 59.6 FL (ref 35.9–50)
GFR SERPLBLD CREATININE-BSD FMLA CKD-EPI: 10 ML/MIN/1.73 M 2
GLOBULIN SER CALC-MCNC: 3.8 G/DL (ref 1.9–3.5)
GLUCOSE SERPL-MCNC: 133 MG/DL (ref 65–99)
HCT VFR BLD AUTO: 28.5 % (ref 37–47)
HGB BLD-MCNC: 9.1 G/DL (ref 12–16)
MCH RBC QN AUTO: 29.7 PG (ref 27–33)
MCHC RBC AUTO-ENTMCNC: 31.9 G/DL (ref 33.6–35)
MCV RBC AUTO: 93.1 FL (ref 81.4–97.8)
PLATELET # BLD AUTO: 159 K/UL (ref 164–446)
PMV BLD AUTO: 10.6 FL (ref 9–12.9)
POTASSIUM SERPL-SCNC: 4.2 MMOL/L (ref 3.6–5.5)
PROT SERPL-MCNC: 6.7 G/DL (ref 6–8.2)
RBC # BLD AUTO: 3.06 M/UL (ref 4.2–5.4)
SODIUM SERPL-SCNC: 139 MMOL/L (ref 135–145)
WBC # BLD AUTO: 4.8 K/UL (ref 4.8–10.8)

## 2022-05-12 PROCEDURE — 92526 ORAL FUNCTION THERAPY: CPT

## 2022-05-12 PROCEDURE — 700102 HCHG RX REV CODE 250 W/ 637 OVERRIDE(OP): Performed by: STUDENT IN AN ORGANIZED HEALTH CARE EDUCATION/TRAINING PROGRAM

## 2022-05-12 PROCEDURE — 80053 COMPREHEN METABOLIC PANEL: CPT

## 2022-05-12 PROCEDURE — A9270 NON-COVERED ITEM OR SERVICE: HCPCS

## 2022-05-12 PROCEDURE — 36415 COLL VENOUS BLD VENIPUNCTURE: CPT

## 2022-05-12 PROCEDURE — 82306 VITAMIN D 25 HYDROXY: CPT

## 2022-05-12 PROCEDURE — 97530 THERAPEUTIC ACTIVITIES: CPT

## 2022-05-12 PROCEDURE — 700102 HCHG RX REV CODE 250 W/ 637 OVERRIDE(OP): Performed by: GENERAL PRACTICE

## 2022-05-12 PROCEDURE — A9270 NON-COVERED ITEM OR SERVICE: HCPCS | Performed by: STUDENT IN AN ORGANIZED HEALTH CARE EDUCATION/TRAINING PROGRAM

## 2022-05-12 PROCEDURE — 700102 HCHG RX REV CODE 250 W/ 637 OVERRIDE(OP)

## 2022-05-12 PROCEDURE — 85027 COMPLETE CBC AUTOMATED: CPT

## 2022-05-12 PROCEDURE — 99232 SBSQ HOSP IP/OBS MODERATE 35: CPT | Performed by: STUDENT IN AN ORGANIZED HEALTH CARE EDUCATION/TRAINING PROGRAM

## 2022-05-12 PROCEDURE — 700102 HCHG RX REV CODE 250 W/ 637 OVERRIDE(OP): Performed by: INTERNAL MEDICINE

## 2022-05-12 PROCEDURE — A9270 NON-COVERED ITEM OR SERVICE: HCPCS | Performed by: GENERAL PRACTICE

## 2022-05-12 PROCEDURE — A9270 NON-COVERED ITEM OR SERVICE: HCPCS | Performed by: INTERNAL MEDICINE

## 2022-05-12 PROCEDURE — 90935 HEMODIALYSIS ONE EVALUATION: CPT

## 2022-05-12 PROCEDURE — 770001 HCHG ROOM/CARE - MED/SURG/GYN PRIV*

## 2022-05-12 RX ADMIN — Medication 5 MG: at 19:36

## 2022-05-12 RX ADMIN — Medication 667 MG: at 07:57

## 2022-05-12 RX ADMIN — OMEPRAZOLE 20 MG: 20 CAPSULE, DELAYED RELEASE ORAL at 04:27

## 2022-05-12 RX ADMIN — VENLAFAXINE HYDROCHLORIDE 37.5 MG: 37.5 CAPSULE, EXTENDED RELEASE ORAL at 07:57

## 2022-05-12 RX ADMIN — ATORVASTATIN CALCIUM 80 MG: 80 TABLET, FILM COATED ORAL at 04:27

## 2022-05-12 RX ADMIN — BACLOFEN 10 MG: 10 TABLET ORAL at 19:36

## 2022-05-12 RX ADMIN — HYDRALAZINE HYDROCHLORIDE 25 MG: 25 TABLET, FILM COATED ORAL at 20:02

## 2022-05-12 RX ADMIN — HYDRALAZINE HYDROCHLORIDE 25 MG: 25 TABLET, FILM COATED ORAL at 04:27

## 2022-05-12 RX ADMIN — FERROUS SULFATE TAB 325 MG (65 MG ELEMENTAL FE) 325 MG: 325 (65 FE) TAB at 07:57

## 2022-05-12 RX ADMIN — OXYCODONE 5 MG: 5 TABLET ORAL at 04:27

## 2022-05-12 RX ADMIN — CALCITRIOL CAPSULES 0.25 MCG 0.25 MCG: 0.25 CAPSULE ORAL at 04:27

## 2022-05-12 RX ADMIN — METOPROLOL SUCCINATE 100 MG: 25 TABLET, EXTENDED RELEASE ORAL at 04:27

## 2022-05-12 RX ADMIN — AMLODIPINE BESYLATE 10 MG: 5 TABLET ORAL at 04:27

## 2022-05-12 ASSESSMENT — COGNITIVE AND FUNCTIONAL STATUS - GENERAL
DRESSING REGULAR UPPER BODY CLOTHING: A LOT
SUGGESTED CMS G CODE MODIFIER MOBILITY: CM
SUGGESTED CMS G CODE MODIFIER DAILY ACTIVITY: CK
MOVING FROM LYING ON BACK TO SITTING ON SIDE OF FLAT BED: UNABLE
TURNING FROM BACK TO SIDE WHILE IN FLAT BAD: A LOT
PERSONAL GROOMING: A LITTLE
STANDING UP FROM CHAIR USING ARMS: TOTAL
CLIMB 3 TO 5 STEPS WITH RAILING: TOTAL
WALKING IN HOSPITAL ROOM: TOTAL
DRESSING REGULAR LOWER BODY CLOTHING: A LOT
MOVING TO AND FROM BED TO CHAIR: UNABLE
TOILETING: A LOT
HELP NEEDED FOR BATHING: A LOT
DAILY ACTIVITIY SCORE: 15
MOBILITY SCORE: 7

## 2022-05-12 ASSESSMENT — GAIT ASSESSMENTS
GAIT LEVEL OF ASSIST: MODERATE ASSIST
DISTANCE (FEET): 20

## 2022-05-12 NOTE — DISCHARGE PLANNING
Received Choice form at [phone]  Agency/Facility Name: [facility]  Referral sent per Choice form @ [phone]
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
## 2022-05-12 NOTE — THERAPY
Occupational Therapy  Daily Treatment     Patient Name: Liberty Bolton  Age:  67 y.o., Sex:  female  Medical Record #: [id number]  Today's Date: 5/12/2022     Precautions  Precautions: Fall Risk, Swallow Precautions ( See Comments)    Assessment     Pt currently limited by decreased functional mobility, activity tolerance, strength, AROM, coordination, balance, and pain which are affecting pt's ability to complete ADLs/IADLs at baseline. Pt would benefit from OT services in the acute care setting to maximize functional recovery.      Plan    Continue current treatment plan.       Discharge Recommendations: Recommend post-acute placement for additional occupational therapy services prior to discharge home         05/12/22 1113   Activities of Daily Living   Grooming Minimal Assist   Upper Body Dressing Minimal Assist   Lower Body Dressing Moderate Assist   Toileting Moderate Assist   Functional Mobility   Sit to Stand Moderate Assist   Bed, Chair, Wheelchair Transfer Moderate Assist   Short Term Goals   Short Term Goal # 1 min A with UB dressing

## 2022-05-12 NOTE — DISCHARGE PLANNING
Agency/Facility Name: [facility]  Spoke To: Prince  Outcome: Pending review.    Agency/Facility Name: [facility]  Spoke To: Admission  Outcome: Checking for referral and will call back.    [facility]  Outcome: Left message, awaiting call back.    Agency/Facility Name: [facility]  Spoke To: Beatriz  Outcome: Declined due to no beds for a while.

## 2022-05-12 NOTE — DISCHARGE PLANNING
Anticipated Discharge Disposition:  SNF Placement        Action: CM was able to speak with patients son, Steve @ [phone] who gave consent for SNF blanket referral to determine who may be able to accept her. Patient was staying with son in March ans was walking and alert and oriented, but inform son that she wanted to return with [facility] in April, in which when she started to decline. Steve wants to help his mom but states this is a cycle for her and she keeps going back to [facility]. CM will follow up on SNF referrals.        Barriers to Discharge: SNF acceptance, HD chair in [place] if patient accept at a SNF in [place], medical clearance        Plan: Hospital Care Management will continue to follow and assist with discharge planning needs.

## 2022-05-12 NOTE — CARE PLAN
The patient is Stable - Low risk of patient condition declining or worsening    Shift Goals  Clinical Goals: pain control, falls prevention, patient safety  Patient Goals: pain control  Family Goals: CARIE    Progress made toward(s) clinical / shift goals:    Problem: Knowledge Deficit - Standard  Goal: Patient and family/care givers will demonstrate understanding of plan of care, disease process/condition, diagnostic tests and medications  Outcome: Progressing     Problem: Skin Integrity  Goal: Skin integrity is maintained or improved  Outcome: Progressing     Problem: Fall Risk  Goal: Patient will remain free from falls  Outcome: Progressing     Problem: Pain - Standard  Goal: Alleviation of pain or a reduction in pain to the patient’s comfort goal  Outcome: Progressing     Problem: Provide Safe Environment  Goal: Suicide environmental safety, protocols, policies, and practices will be implemented  Outcome: Progressing     Problem: Psychosocial  Goal: Patient's ability to identify and develop effective coping behaviors will improve  Outcome: Progressing  Goal: Patient's ability to identify and utilize available support systems will improve  Outcome: Progressing       Patient is not progressing towards the following goals:

## 2022-05-12 NOTE — THERAPY
"Speech Language Pathology  Daily Treatment     Patient Name: Liberty Bolton  Age:  67 y.o., Sex:  female  Medical Record #: [id number]  Today's Date: 5/12/2022     Precautions  Precautions: Fall Risk, Swallow Precautions ( See Comments)    Assessment    Patient was seen for dysphagia tx with breakfast meal of pureed solids along with tx trials of thins via cup/straw and soft/bite sized solids. Patient was A&Ox2, confused to month (\"March\") and circumstance. Patient was slow to process and initiate, needing constant verbal/tactile cues to direct attn to task of eating/drinking. Pt dropped a cup while holding it, needing 1:1 A with drinking and eating as she did not initiate picking up a utensil. With max encouragement, pt consumed ~1 oz thins and several bites of food before declining to continue, stating \"I'm too tired for this shit right now.\" No s/sx of aspiration occurred with intake of thins or soft solids, though pt did have one cough following intake of cream of wheat. Mastication was slow but functional for solids. Diet advancement is indicated.    Recommendations:  Upgrade diet to Minced and Moist Solids (MM5) and Thin Liquids (TN0)   -Direct supervision/1:1 feeding A for meal  -Float pills in puree  -Oral care after meals    Plan    Continue current treatment plan.    Discharge Recommendations: Recommend post-acute placement for additional speech therapy services prior to discharge home       Objective       05/12/22 0828   Dysphagia    Positioning / Behavior Modification Modulate Rate or Bite Size;Self Monitoring   Other Treatments meal tx PU4/MT2; tx trials TN0, SB6   Diet / Liquid Recommendation Minced & Moist (5) - (Dysphagia II);Thin (0)   Recommended Route of Medication Administration   Medication Administration  Float Whole with Puree   Patient / Family Goals   Patient / Family Goal #1 \"More.\" (water)   Goal #1 Outcome Progressing as expected   Short Term Goals   Short Term Goal # 1 Patient will consume " meals of pureed solids and mildly thick liquids with no s/sx of aspiration given direct supervision/feeding A.   Goal Outcome # 1 Goal met, new goal added   Short Term Goal # 1 B  Patient will consume meals of minced/moist solids and thin liquids with no s/sx of aspiration given direct S/feeding A.

## 2022-05-12 NOTE — PROGRESS NOTES
Contacted MD at 1530 regarding bladder scan of 340mL, ordered to straight cath pt once and continue bladder scans, if above 350mL next time to place indwelling carrillo. Straight cathed approximately 350mL of urine.

## 2022-05-12 NOTE — PROGRESS NOTES
[facility] -  CONSULT NOTE               Author: ASHLY Cardoza    Date & Time: 5/12/2022  9:06 AM   Consult reason: ESRD missed dialysis    HPI:  67 years old female with medical history notable for insulin-dependent type 2 diabetes, hypertension, end-stage renal disease due to diabetes has been on hemodialysis for 4 years, presenting to hospital with generalized weakness, transferred to the [facility] for higher level of care due to anemia requiring transfusions and also significant chemistry abnormalities.  Patient is unable to give history due to significant drowsiness and weakness.  She states that she she has been noncompliant with all of her medications for about 3 weeks also last dialysis about 3 weeks back.  Now she is suffering from nausea, green-brown vomiting and dyspnea.  Denies any chest pain.    In the emergency department patient was hypertensive, 97% on room air, drowsy, cxr pertinent for bilateral pleural effusion, CT thorax abdomen and pelvis with contrast was obtained which showed bilateral pleural effusion without any significant abnormalities otherwise. Creatinine was significant 15, patient still urinates, K normal, corrected ca 8.1, phos 11, bun 130s. Patient underwent thoracentesis which is showing transudate.    Nephrology was consulted due to missed dialysis and ESRD.      REVIEW OF SYSTEMS:    10 point ROS reviewed and is as per HPI or otherwise negative    DAILY NEPHROLOGY PROGRESS:    5/6 - Patient is vomiting at bedside, still volume overloaded. Alert and oriented however does not understand why she is vomiting despite we spoke yesterday about missed dialysis. She states she is urinating, however no IO in chart.  5/7 - Confused.  Complains of leg pain.  Seen on HD  5/8 - Somnolent.  Sleeping in bed.  Had HD yesterday.  5/9 - VSS [id number].  RA.  No complaints. Restless and moaning.   5/10 - RA, interactive but confused, restless.  VSS RA BP  "labile, no hypotension.  5/11 - iHD yesterday, net UF 1.08L.  VSS -160.  RA. No complaints.  Psych at bedside doing eval. More calm and interactive today.   5/12 - Pt resting quietly.  VSS but BP labile 120-160s.  Psych signed off yesterday.          PMH/PSH/SH/FH: Reviewed, see HPI  CURRENT MEDICATIONS: Reviewed    VS:  /88   Pulse 73   Temp 36.1 °C (96.9 °F) (Temporal)   Resp 16   Ht 1.651 m (5' 5\")   Wt 84.3 kg (185 lb 13.6 oz)   SpO2 95%   BMI 30.93 kg/m²   Physical Exam  Vitals and nursing note reviewed.   Constitutional:       Appearance: Normal appearance.   HENT:      Head: Atraumatic.   Eyes:      General: No scleral icterus.  Cardiovascular:      Rate and Rhythm: Normal rate.      Comments: + edema  Pulmonary:      Effort: Pulmonary effort is normal. No respiratory distress.   Abdominal:      General: There is no distension.   Musculoskeletal:         General: No deformity.   Skin:     Findings: No rash.   Neurological:      Mental Status: She is alert.   Psychiatric:         Behavior: Behavior normal.         Fluids:  In: 500 [Dialysis:500]  Out: 2850     LABS:  Recent Labs     05/10/22  0033 05/12/22  0238   SODIUM 142 139   POTASSIUM 3.7 4.2   CHLORIDE 101 100   CO2 25 25   GLUCOSE 158* 133*   BUN 60* 26*   CREATININE 6.92* 4.55*   CALCIUM 8.5 8.6       IMPRESSION:  · Uremia with pleural effusion and serositis with nausea  · Pleural effusion due to volume overload due to missed dialysis, transudate lights criteria  · Anemia of chronic kidney disease  · Anemia requiring transfusions  · ESRD, due to type 2 diabetes, on HD right upper extremity, last 4 years  · HAGMA due to uremic toxins  · Proteinuria  · Vomiting  · Hyperphosphatemia, severe  · Thrombocytopenia  · Hypertension, due to volume overload and essential hypertension  -Amlodipine increased 5/8  · Procalcitonin elevation due to renal failure vs ongoing infection (potential CAP)  · Type 2 diabetes mellitus.  · Hypocalcemia  - " Vitamin D 11,   - Calcitriol      PLAN:  -HD qTTS, next tx today (THUR)  -Continue amlodipine 10mg PO daily   -HD placement per SW  -Continue with PhosLo     We will continue to follow   Thank you for your consult.

## 2022-05-12 NOTE — DIETARY
"Nutrition services: Day 7 of admit.  Liberty Bolton is a 67 y.o. female with admitting DX of anemia requiring transfusions.    RD screened pt for PO intake upon length of stay.  Pt is currently on a L5/L0 diet (minced and moist solids with thin liquids) with 1:1 feeding assistance.  Per ADL flow sheet, PO highly variable (0-100% of meals).  Visited with pt at bedside. Pt appeared adequately nourished.  Pt reported a good appetite. Discussed snack and supplement options with pt. Pt did not answer as to whether or not she was interested.  Nutrition Rep to continue to visit pt to go over menu options.    Assessment:  Height: 165.1 cm (5' 5\")  Weight: 84.3 kg (185 lb 13.6 oz)  Body mass index is 30.93 kg/m²., BMI classification: obese class I  Diet/Intake: L5/L0    Malnutrition Risk: No new risk identified.    Recommendations/Plan:  1. Supplements/snacks per pt preference.  2. Encourage intake of meals, snacks and supplements.  3. Document intake of all meals, snacks and supplements as % taken in ADLs to provide interdisciplinary communication across all shifts.   4. Monitor weight.  5. Nutrition rep will continue to see patient for ongoing meal and snack preferences.     RD will continue to follow per dept guidelines.          "
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
## 2022-05-13 LAB
ANION GAP SERPL CALC-SCNC: 11 MMOL/L (ref 7–16)
BUN SERPL-MCNC: 16 MG/DL (ref 8–22)
CALCIUM SERPL-MCNC: 8.9 MG/DL (ref 8.5–10.5)
CHLORIDE SERPL-SCNC: 95 MMOL/L (ref 96–112)
CO2 SERPL-SCNC: 28 MMOL/L (ref 20–33)
CREAT SERPL-MCNC: 3.24 MG/DL (ref 0.5–1.4)
GFR SERPLBLD CREATININE-BSD FMLA CKD-EPI: 15 ML/MIN/1.73 M 2
GLUCOSE SERPL-MCNC: 128 MG/DL (ref 65–99)
POTASSIUM SERPL-SCNC: 4 MMOL/L (ref 3.6–5.5)
SODIUM SERPL-SCNC: 134 MMOL/L (ref 135–145)

## 2022-05-13 PROCEDURE — 700102 HCHG RX REV CODE 250 W/ 637 OVERRIDE(OP): Performed by: STUDENT IN AN ORGANIZED HEALTH CARE EDUCATION/TRAINING PROGRAM

## 2022-05-13 PROCEDURE — 700102 HCHG RX REV CODE 250 W/ 637 OVERRIDE(OP): Performed by: GENERAL PRACTICE

## 2022-05-13 PROCEDURE — A9270 NON-COVERED ITEM OR SERVICE: HCPCS | Performed by: INTERNAL MEDICINE

## 2022-05-13 PROCEDURE — 700102 HCHG RX REV CODE 250 W/ 637 OVERRIDE(OP): Performed by: INTERNAL MEDICINE

## 2022-05-13 PROCEDURE — A9270 NON-COVERED ITEM OR SERVICE: HCPCS | Performed by: STUDENT IN AN ORGANIZED HEALTH CARE EDUCATION/TRAINING PROGRAM

## 2022-05-13 PROCEDURE — 36415 COLL VENOUS BLD VENIPUNCTURE: CPT

## 2022-05-13 PROCEDURE — 99232 SBSQ HOSP IP/OBS MODERATE 35: CPT | Performed by: STUDENT IN AN ORGANIZED HEALTH CARE EDUCATION/TRAINING PROGRAM

## 2022-05-13 PROCEDURE — 700102 HCHG RX REV CODE 250 W/ 637 OVERRIDE(OP)

## 2022-05-13 PROCEDURE — 80048 BASIC METABOLIC PNL TOTAL CA: CPT

## 2022-05-13 PROCEDURE — A9270 NON-COVERED ITEM OR SERVICE: HCPCS | Performed by: NURSE PRACTITIONER

## 2022-05-13 PROCEDURE — A9270 NON-COVERED ITEM OR SERVICE: HCPCS | Performed by: GENERAL PRACTICE

## 2022-05-13 PROCEDURE — A9270 NON-COVERED ITEM OR SERVICE: HCPCS

## 2022-05-13 PROCEDURE — 700102 HCHG RX REV CODE 250 W/ 637 OVERRIDE(OP): Performed by: NURSE PRACTITIONER

## 2022-05-13 PROCEDURE — 770001 HCHG ROOM/CARE - MED/SURG/GYN PRIV*

## 2022-05-13 RX ORDER — FERROUS SULFATE 7.5 MG/0.5
60 SYRINGE (EA) ORAL
Status: DISCONTINUED | OUTPATIENT
Start: 2022-05-13 | End: 2022-05-13

## 2022-05-13 RX ORDER — CALCITRIOL 1 UG/ML
0.25 SOLUTION ORAL DAILY
Status: DISCONTINUED | OUTPATIENT
Start: 2022-05-13 | End: 2022-05-19 | Stop reason: HOSPADM

## 2022-05-13 RX ADMIN — HYDRALAZINE HYDROCHLORIDE 25 MG: 25 TABLET, FILM COATED ORAL at 05:12

## 2022-05-13 RX ADMIN — AMLODIPINE BESYLATE 10 MG: 5 TABLET ORAL at 05:12

## 2022-05-13 RX ADMIN — ATORVASTATIN CALCIUM 80 MG: 80 TABLET, FILM COATED ORAL at 05:12

## 2022-05-13 RX ADMIN — CALCITRIOL 0.25 MCG: 1 SOLUTION ORAL at 07:30

## 2022-05-13 RX ADMIN — FERROUS SULFATE TAB 325 MG (65 MG ELEMENTAL FE) 325 MG: 325 (65 FE) TAB at 06:41

## 2022-05-13 RX ADMIN — Medication 667 MG: at 17:23

## 2022-05-13 RX ADMIN — Medication 667 MG: at 06:41

## 2022-05-13 RX ADMIN — Medication 5 MG: at 20:53

## 2022-05-13 RX ADMIN — OMEPRAZOLE 20 MG: 20 CAPSULE, DELAYED RELEASE ORAL at 17:23

## 2022-05-13 RX ADMIN — HYDRALAZINE HYDROCHLORIDE 25 MG: 25 TABLET, FILM COATED ORAL at 14:33

## 2022-05-13 RX ADMIN — HYDRALAZINE HYDROCHLORIDE 25 MG: 25 TABLET, FILM COATED ORAL at 20:53

## 2022-05-13 RX ADMIN — MINERAL SUPPLEMENT IRON 300 MG / 5 ML STRENGTH LIQUID 100 PER BOX UNFLAVORED 300 MG: at 08:55
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
## 2022-05-13 NOTE — PROGRESS NOTES
[facility] -  CONSULT NOTE               Author: ASHLY Moreno, CNN-NP   Date & Time: 5/13/2022  9:47 AM   Consult reason: ESRD missed dialysis    HPI:  67 years old female with medical history notable for insulin-dependent type 2 diabetes, hypertension, end-stage renal disease due to diabetes has been on hemodialysis for 4 years, presenting to hospital with generalized weakness, transferred to the [facility] for higher level of care due to anemia requiring transfusions and also significant chemistry abnormalities.  Patient is unable to give history due to significant drowsiness and weakness.  She states that she she has been noncompliant with all of her medications for about 3 weeks also last dialysis about 3 weeks back.  Now she is suffering from nausea, green-brown vomiting and dyspnea.  Denies any chest pain.    In the emergency department patient was hypertensive, 97% on room air, drowsy, cxr pertinent for bilateral pleural effusion, CT thorax abdomen and pelvis with contrast was obtained which showed bilateral pleural effusion without any significant abnormalities otherwise. Creatinine was significant 15, patient still urinates, K normal, corrected ca 8.1, phos 11, bun 130s. Patient underwent thoracentesis which is showing transudate.    Nephrology was consulted due to missed dialysis and ESRD.      REVIEW OF SYSTEMS:    10 point ROS reviewed and is as per HPI or otherwise negative    DAILY NEPHROLOGY PROGRESS:    5/6 - Patient is vomiting at bedside, still volume overloaded. Alert and oriented however does not understand why she is vomiting despite we spoke yesterday about missed dialysis. She states she is urinating, however no IO in chart.  5/7 - Confused.  Complains of leg pain.  Seen on HD  5/8 - Somnolent.  Sleeping in bed.  Had HD yesterday.  5/9 - VSS [id number].  RA.  No complaints. Restless and moaning.   5/10 - RA, interactive but confused, restless.  VSS RA BP  "labile, no hypotension.  5/11 - iHD yesterday, net UF 1.08L.  VSS -160.  RA. No complaints.  Psych at bedside doing eval. More calm and interactive today.   5/12 - Pt resting quietly.  VSS but BP labile 120-160s.  Psych signed off yesterday.  5/13 - laying in bed, alert and oriented but lethargic, tolerated iHD yesterday with UF: 1.6ML          PMH/PSH/SH/FH: Reviewed, see HPI  CURRENT MEDICATIONS: Reviewed    VS:  BP (!) 159/70 Comment: Rn notified   Pulse 79   Temp 35.8 °C (96.5 °F) (Temporal)   Resp 17   Ht 1.651 m (5' 5\")   Wt 84.3 kg (185 lb 13.6 oz)   SpO2 98%   BMI 30.93 kg/m²   Physical Exam  Vitals and nursing note reviewed.   Constitutional:       Appearance: Normal appearance.   HENT:      Head: Atraumatic.   Eyes:      General: No scleral icterus.  Cardiovascular:      Rate and Rhythm: Normal rate.      Comments: + edema  Pulmonary:      Effort: Pulmonary effort is normal. No respiratory distress.   Abdominal:      General: There is no distension.   Musculoskeletal:         General: No deformity.   Skin:     Findings: No rash.   Neurological:      Mental Status: She is alert.   Psychiatric:         Behavior: Behavior normal.         Fluids:  In: 615 [P.O.:15; Dialysis:600]  Out: 2200     LABS:  Recent Labs     05/12/22  0238 05/13/22  0122   SODIUM 139 134*   POTASSIUM 4.2 4.0   CHLORIDE 100 95*   CO2 25 28   GLUCOSE 133* 128*   BUN 26* 16   CREATININE 4.55* 3.24*   CALCIUM 8.6 8.9       IMPRESSION:  · Uremia with pleural effusion and serositis with nausea  · Pleural effusion due to volume overload due to missed dialysis, transudate lights criteria  · Anemia of chronic kidney disease  · Anemia requiring transfusions  · ESRD, due to type 2 diabetes, on HD right upper extremity, last 4 years  · HAGMA due to uremic toxins  · Proteinuria  · Vomiting  · Hyperphosphatemia, severe  · Thrombocytopenia  · Hypertension, due to volume overload and essential hypertension  -Amlodipine increased " 5/8  · Procalcitonin elevation due to renal failure vs ongoing infection (potential CAP)  · Type 2 diabetes mellitus.  · Hypocalcemia  - Vitamin D 11,   - Calcitriol      PLAN:  -HD qTTS, next tx tomorrow (Sat)  -Continue amlodipine 10mg PO daily   -HD placement per SW  -Continue with PhosLo   -Okay to transition to outpt hemodialysis when medically cleared

## 2022-05-13 NOTE — CARE PLAN
The patient is Stable - Low risk of patient condition declining or worsening    Shift Goals  Clinical Goals: Fall prevention  Patient Goals: Rest  Family Goals: CARIE    Progress made toward(s) clinical / shift goals:    Problem: Knowledge Deficit - Standard  Goal: Patient and family/care givers will demonstrate understanding of plan of care, disease process/condition, diagnostic tests and medications  Outcome: Progressing     Problem: Skin Integrity  Goal: Skin integrity is maintained or improved  Outcome: Progressing     Problem: Fall Risk  Goal: Patient will remain free from falls  Outcome: Progressing     Problem: Pain - Standard  Goal: Alleviation of pain or a reduction in pain to the patient’s comfort goal  Outcome: Progressing     Problem: Provide Safe Environment  Goal: Suicide environmental safety, protocols, policies, and practices will be implemented  Outcome: Progressing     Problem: Psychosocial  Goal: Patient's ability to identify and develop effective coping behaviors will improve  Outcome: Progressing  Goal: Patient's ability to identify and utilize available support systems will improve  Outcome: Progressing       Patient is not progressing towards the following goals:

## 2022-05-13 NOTE — DISCHARGE PLANNING
Agency/Facility Name: [facility]  Spoke To: ARTEM Glover Supervisor  Outcome: Asked for referral status.     @1026-   Agency/Facility Name: [facility]  Spoke To: ARTEM Glover Supervisor  Outcome: Belen asked if pt still wants snf. Per recent notes, updated Belen that pt is currently looking at SNF.

## 2022-05-13 NOTE — PROGRESS NOTES
Unable to swallow pill whole with water or mixed in with apple sauce or pudding. Continuously spit out the pill. Unable to crush certain meds. Will continue to monitor. Makayla MERINO notified.

## 2022-05-13 NOTE — PROGRESS NOTES
[facility] Progress Note    Hemodialysis treatment ordered today per Dr Zuluaga x 3 hours. Treatment initiated at 1508, ended at 1808.   Pt A/Ox1, restless, Pt repositioned for comfort, VSS.    Patient restless, intermittent high arterial/venous alarms due to frequent RUE AVF movements, Pt educated about possible AVF access infiltration but not coherent,monitored closely during tx, BP trended down;SBP : 90's at times, UF goal adjusted as BP tolerates, Pt denies any discomfort. ; see e- flow sheet for details.     Net UF 1,600 mL.     Needles removed from access site. Dressings applied and sites held x 10 minutes; verified no bleeding. Positive bruit/thrill post tx. Staff RN to monitor AVF for breakthrough bleeding. Should breakthrough bleeding occur, staff RN to apply pressure to access sites until bleeding resolved. Notify Dialysis and Nephrologist for follow-up.    Report given to Primary RN.

## 2022-05-14 LAB
ALBUMIN SERPL BCP-MCNC: 3 G/DL (ref 3.2–4.9)
ALBUMIN/GLOB SERPL: 0.9 G/DL
ALP SERPL-CCNC: 66 U/L (ref 30–99)
ALT SERPL-CCNC: 15 U/L (ref 2–50)
ANION GAP SERPL CALC-SCNC: 14 MMOL/L (ref 7–16)
AST SERPL-CCNC: 19 U/L (ref 12–45)
BILIRUB SERPL-MCNC: 0.3 MG/DL (ref 0.1–1.5)
BUN SERPL-MCNC: 28 MG/DL (ref 8–22)
CALCIUM SERPL-MCNC: 8.9 MG/DL (ref 8.5–10.5)
CHLORIDE SERPL-SCNC: 96 MMOL/L (ref 96–112)
CO2 SERPL-SCNC: 25 MMOL/L (ref 20–33)
CREAT SERPL-MCNC: 5.21 MG/DL (ref 0.5–1.4)
GFR SERPLBLD CREATININE-BSD FMLA CKD-EPI: 9 ML/MIN/1.73 M 2
GLOBULIN SER CALC-MCNC: 3.2 G/DL (ref 1.9–3.5)
GLUCOSE SERPL-MCNC: 137 MG/DL (ref 65–99)
POTASSIUM SERPL-SCNC: 4.3 MMOL/L (ref 3.6–5.5)
PROT SERPL-MCNC: 6.2 G/DL (ref 6–8.2)
SODIUM SERPL-SCNC: 135 MMOL/L (ref 135–145)

## 2022-05-14 PROCEDURE — 99232 SBSQ HOSP IP/OBS MODERATE 35: CPT | Performed by: STUDENT IN AN ORGANIZED HEALTH CARE EDUCATION/TRAINING PROGRAM

## 2022-05-14 PROCEDURE — A9270 NON-COVERED ITEM OR SERVICE: HCPCS | Performed by: STUDENT IN AN ORGANIZED HEALTH CARE EDUCATION/TRAINING PROGRAM

## 2022-05-14 PROCEDURE — 770001 HCHG ROOM/CARE - MED/SURG/GYN PRIV*

## 2022-05-14 PROCEDURE — 90935 HEMODIALYSIS ONE EVALUATION: CPT

## 2022-05-14 PROCEDURE — 700102 HCHG RX REV CODE 250 W/ 637 OVERRIDE(OP): Performed by: STUDENT IN AN ORGANIZED HEALTH CARE EDUCATION/TRAINING PROGRAM

## 2022-05-14 PROCEDURE — 80053 COMPREHEN METABOLIC PANEL: CPT

## 2022-05-14 PROCEDURE — 700102 HCHG RX REV CODE 250 W/ 637 OVERRIDE(OP): Performed by: GENERAL PRACTICE

## 2022-05-14 PROCEDURE — A9270 NON-COVERED ITEM OR SERVICE: HCPCS

## 2022-05-14 PROCEDURE — A9270 NON-COVERED ITEM OR SERVICE: HCPCS | Performed by: NURSE PRACTITIONER

## 2022-05-14 PROCEDURE — 700102 HCHG RX REV CODE 250 W/ 637 OVERRIDE(OP)

## 2022-05-14 PROCEDURE — A9270 NON-COVERED ITEM OR SERVICE: HCPCS | Performed by: GENERAL PRACTICE

## 2022-05-14 PROCEDURE — 700102 HCHG RX REV CODE 250 W/ 637 OVERRIDE(OP): Performed by: NURSE PRACTITIONER

## 2022-05-14 PROCEDURE — A9270 NON-COVERED ITEM OR SERVICE: HCPCS | Performed by: INTERNAL MEDICINE

## 2022-05-14 PROCEDURE — 36415 COLL VENOUS BLD VENIPUNCTURE: CPT

## 2022-05-14 PROCEDURE — 700102 HCHG RX REV CODE 250 W/ 637 OVERRIDE(OP): Performed by: INTERNAL MEDICINE

## 2022-05-14 RX ORDER — AMLODIPINE BESYLATE 5 MG/1
10 TABLET ORAL DAILY
Qty: 30 TABLET | Status: SHIPPED
Start: 2022-05-14 | End: 2022-06-13

## 2022-05-14 RX ORDER — VENLAFAXINE 75 MG/1
37.5 TABLET ORAL EVERY MORNING
Status: DISCONTINUED | OUTPATIENT
Start: 2022-05-14 | End: 2022-05-19 | Stop reason: HOSPADM

## 2022-05-14 RX ADMIN — Medication 5 MG: at 20:27

## 2022-05-14 RX ADMIN — CALCITRIOL 0.25 MCG: 1 SOLUTION ORAL at 04:43

## 2022-05-14 RX ADMIN — HYDRALAZINE HYDROCHLORIDE 25 MG: 25 TABLET, FILM COATED ORAL at 20:27

## 2022-05-14 RX ADMIN — MINERAL SUPPLEMENT IRON 300 MG / 5 ML STRENGTH LIQUID 100 PER BOX UNFLAVORED 300 MG: at 04:43

## 2022-05-14 RX ADMIN — OMEPRAZOLE 20 MG: 20 CAPSULE, DELAYED RELEASE ORAL at 04:34

## 2022-05-14 RX ADMIN — AMLODIPINE BESYLATE 10 MG: 5 TABLET ORAL at 04:33

## 2022-05-14 RX ADMIN — OMEPRAZOLE 20 MG: 20 CAPSULE, DELAYED RELEASE ORAL at 17:24

## 2022-05-14 RX ADMIN — HYDRALAZINE HYDROCHLORIDE 25 MG: 25 TABLET, FILM COATED ORAL at 04:34

## 2022-05-14 RX ADMIN — VENLAFAXINE 37.5 MG: 75 TABLET ORAL at 13:14

## 2022-05-14 RX ADMIN — QUETIAPINE FUMARATE 25 MG: 25 TABLET ORAL at 08:46

## 2022-05-14 RX ADMIN — METOPROLOL TARTRATE 50 MG: 25 TABLET, FILM COATED ORAL at 17:24

## 2022-05-14 RX ADMIN — Medication 667 MG: at 13:14

## 2022-05-14 RX ADMIN — HYDRALAZINE HYDROCHLORIDE 25 MG: 25 TABLET, FILM COATED ORAL at 13:14

## 2022-05-14 RX ADMIN — Medication 667 MG: at 17:24

## 2022-05-14 RX ADMIN — Medication 667 MG: at 04:34

## 2022-05-14 RX ADMIN — ATORVASTATIN CALCIUM 80 MG: 80 TABLET, FILM COATED ORAL at 04:33

## 2022-05-14 NOTE — PROGRESS NOTES
[facility] -  CONSULT NOTE               Author: Sandeep Zuluaga M.D.   Date & Time: 5/14/2022  9:25 AM   Consult reason: ESRD missed dialysis    HPI:  67 years old female with medical history notable for insulin-dependent type 2 diabetes, hypertension, end-stage renal disease due to diabetes has been on hemodialysis for 4 years, presenting to hospital with generalized weakness, transferred to the [facility] for higher level of care due to anemia requiring transfusions and also significant chemistry abnormalities.  Patient is unable to give history due to significant drowsiness and weakness.  She states that she she has been noncompliant with all of her medications for about 3 weeks also last dialysis about 3 weeks back.  Now she is suffering from nausea, green-brown vomiting and dyspnea.  Denies any chest pain.    In the emergency department patient was hypertensive, 97% on room air, drowsy, cxr pertinent for bilateral pleural effusion, CT thorax abdomen and pelvis with contrast was obtained which showed bilateral pleural effusion without any significant abnormalities otherwise. Creatinine was significant 15, patient still urinates, K normal, corrected ca 8.1, phos 11, bun 130s. Patient underwent thoracentesis which is showing transudate.    Nephrology was consulted due to missed dialysis and ESRD.      REVIEW OF SYSTEMS:    10 point ROS reviewed and is as per HPI or otherwise negative    DAILY NEPHROLOGY PROGRESS:    5/6 - Patient is vomiting at bedside, still volume overloaded. Alert and oriented however does not understand why she is vomiting despite we spoke yesterday about missed dialysis. She states she is urinating, however no IO in chart.  5/7 - Confused.  Complains of leg pain.  Seen on HD  5/8 - Somnolent.  Sleeping in bed.  Had HD yesterday.  5/9 - VSS [id number].  RA.  No complaints. Restless and moaning.   5/10 - RA, interactive but confused, restless.  VSS RA BP labile, no  "hypotension.  5/11 - iHD yesterday, net UF 1.08L.  VSS -160.  RA. No complaints.  Psych at bedside doing eval. More calm and interactive today.   5/12 - Pt resting quietly.  VSS but BP labile 120-160s.  Psych signed off yesterday.  5/13 - laying in bed, alert and oriented but lethargic, tolerated iHD yesterday with UF: 1.6ML    5/14 - For HD today. Mentation is better. Has some bruising/ecchymosis over RUE AVF. AVF still as good bruit and thrill. Awaiting SNF. Psych signed off.    PMH/PSH/SH/FH: Reviewed, see HPI  CURRENT MEDICATIONS: Reviewed    VS:  /58   Pulse 81   Temp 36.5 °C (97.7 °F) (Temporal)   Resp 18   Ht 1.651 m (5' 5\")   Wt 84.3 kg (185 lb 13.6 oz)   SpO2 96%   BMI 30.93 kg/m²   Physical Exam  Vitals and nursing note reviewed.   Constitutional:       Appearance: Normal appearance.   HENT:      Head: Atraumatic.   Eyes:      General: No scleral icterus.  Cardiovascular:      Rate and Rhythm: Normal rate.      Comments: + edema  Pulmonary:      Effort: Pulmonary effort is normal. No respiratory distress.   Abdominal:      General: There is no distension.   Musculoskeletal:         General: No deformity.   Skin:     Findings: No rash.   Neurological:      Mental Status: She is alert.   Psychiatric:         Behavior: Behavior normal.         Fluids:  In: 100 [P.O.:100]  Out: -     LABS:  Recent Labs     05/12/22  0238 05/13/22  0122 05/14/22  0205   SODIUM 139 134* 135   POTASSIUM 4.2 4.0 4.3   CHLORIDE 100 95* 96   CO2 25 28 25   GLUCOSE 133* 128* 137*   BUN 26* 16 28*   CREATININE 4.55* 3.24* 5.21*   CALCIUM 8.6 8.9 8.9       IMPRESSION:  · Uremia with pleural effusion and serositis with nausea  · Pleural effusion due to volume overload due to missed dialysis, transudate lights criteria  · Anemia of chronic kidney disease  · Anemia requiring transfusions  · ESRD, due to type 2 diabetes, on HD right upper extremity, last 4 years  · HAGMA due to uremic " toxins  · Proteinuria  · Vomiting  · Hyperphosphatemia, severe  · Thrombocytopenia  · Hypertension, due to volume overload and essential hypertension  -Amlodipine increased 5/8  · Procalcitonin elevation due to renal failure vs ongoing infection (potential CAP)  · Type 2 diabetes mellitus.  · Hypocalcemia  - Vitamin D 11,   - Calcitriol      PLAN:  -HD qTTS, HD today (SAT)  -Continue binders   -Okay to transition to outpt hemodialysis when medically cleared
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
## 2022-05-14 NOTE — PROGRESS NOTES
Hemodialysis treatment ordered today per Dr Zuluaga x 3 hours. Treatment initiated at 0906, ended at 1206.  Pt drowsy, medicated for agitation prior to HD to prevent AVF access infiltration, VSS, easily arouses to verbal and tactile stimulation.     Patient was stable for the first hour until BP started to trend down, UF goal adjusted, NS bolus given, Dr Zuluaga notified, Pt restless at times during tx, redirect and educated Pt, repositioned for comfort.; see paper flow sheet for details.     Net  mL.     Needles removed from access site. Dressings applied and sites held x 10 minutes; verified no bleeding. Positive bruit/thrill post tx. Staff RN to monitor AVF for breakthrough bleeding. Should breakthrough bleeding occur, staff RN to apply pressure to access sites until bleeding resolved. Notify Dialysis and Nephrologist for follow-up.    Report given to Primary RN.

## 2022-05-14 NOTE — CARE PLAN
The patient is Stable - Low risk of patient condition declining or worsening    Shift Goals  Clinical Goals: Saftey  Patient Goals: Rest  Family Goals: CARIE    Progress made toward(s) clinical / shift goals:    Problem: Knowledge Deficit - Standard  Goal: Patient and family/care givers will demonstrate understanding of plan of care, disease process/condition, diagnostic tests and medications  Outcome: Progressing     Problem: Skin Integrity  Goal: Skin integrity is maintained or improved  Outcome: Progressing     Problem: Fall Risk  Goal: Patient will remain free from falls  Outcome: Progressing     Problem: Pain - Standard  Goal: Alleviation of pain or a reduction in pain to the patient’s comfort goal  Outcome: Progressing     Problem: Provide Safe Environment  Goal: Suicide environmental safety, protocols, policies, and practices will be implemented  Outcome: Progressing     Problem: Psychosocial  Goal: Patient's ability to identify and develop effective coping behaviors will improve  Outcome: Progressing  Goal: Patient's ability to identify and utilize available support systems will improve  Outcome: Progressing       Patient is not progressing towards the following goals:

## 2022-05-14 NOTE — CARE PLAN
The patient is Stable - Low risk of patient condition declining or worsening    Shift Goals  Clinical Goals: safety, dialysis  Patient Goals: rest  Family Goals: CARIE    Progress made toward(s) clinical / shift goals: dialysis today. Bruise on fistula assessed by provider in dialysis, will continue to monitor. Resting in bed. Telesitter in room. Waiting on placement.

## 2022-05-15 LAB
ANION GAP SERPL CALC-SCNC: 13 MMOL/L (ref 7–16)
BUN SERPL-MCNC: 22 MG/DL (ref 8–22)
CALCIUM SERPL-MCNC: 9.1 MG/DL (ref 8.5–10.5)
CHLORIDE SERPL-SCNC: 96 MMOL/L (ref 96–112)
CO2 SERPL-SCNC: 26 MMOL/L (ref 20–33)
CREAT SERPL-MCNC: 4.7 MG/DL (ref 0.5–1.4)
ERYTHROCYTE [DISTWIDTH] IN BLOOD BY AUTOMATED COUNT: 57.9 FL (ref 35.9–50)
GFR SERPLBLD CREATININE-BSD FMLA CKD-EPI: 10 ML/MIN/1.73 M 2
GLUCOSE SERPL-MCNC: 115 MG/DL (ref 65–99)
HCT VFR BLD AUTO: 32.7 % (ref 37–47)
HGB BLD-MCNC: 10.7 G/DL (ref 12–16)
MCH RBC QN AUTO: 30.2 PG (ref 27–33)
MCHC RBC AUTO-ENTMCNC: 32.7 G/DL (ref 33.6–35)
MCV RBC AUTO: 92.4 FL (ref 81.4–97.8)
PLATELET # BLD AUTO: 163 K/UL (ref 164–446)
PMV BLD AUTO: 10.6 FL (ref 9–12.9)
POTASSIUM SERPL-SCNC: 4.1 MMOL/L (ref 3.6–5.5)
RBC # BLD AUTO: 3.54 M/UL (ref 4.2–5.4)
SODIUM SERPL-SCNC: 135 MMOL/L (ref 135–145)
WBC # BLD AUTO: 6.7 K/UL (ref 4.8–10.8)

## 2022-05-15 PROCEDURE — 36415 COLL VENOUS BLD VENIPUNCTURE: CPT

## 2022-05-15 PROCEDURE — A9270 NON-COVERED ITEM OR SERVICE: HCPCS | Performed by: INTERNAL MEDICINE

## 2022-05-15 PROCEDURE — 99232 SBSQ HOSP IP/OBS MODERATE 35: CPT | Performed by: STUDENT IN AN ORGANIZED HEALTH CARE EDUCATION/TRAINING PROGRAM

## 2022-05-15 PROCEDURE — 700102 HCHG RX REV CODE 250 W/ 637 OVERRIDE(OP): Performed by: STUDENT IN AN ORGANIZED HEALTH CARE EDUCATION/TRAINING PROGRAM

## 2022-05-15 PROCEDURE — A9270 NON-COVERED ITEM OR SERVICE: HCPCS | Performed by: NURSE PRACTITIONER

## 2022-05-15 PROCEDURE — 700102 HCHG RX REV CODE 250 W/ 637 OVERRIDE(OP)

## 2022-05-15 PROCEDURE — 80048 BASIC METABOLIC PNL TOTAL CA: CPT

## 2022-05-15 PROCEDURE — A9270 NON-COVERED ITEM OR SERVICE: HCPCS | Performed by: STUDENT IN AN ORGANIZED HEALTH CARE EDUCATION/TRAINING PROGRAM

## 2022-05-15 PROCEDURE — A9270 NON-COVERED ITEM OR SERVICE: HCPCS | Performed by: GENERAL PRACTICE

## 2022-05-15 PROCEDURE — 700102 HCHG RX REV CODE 250 W/ 637 OVERRIDE(OP): Performed by: GENERAL PRACTICE

## 2022-05-15 PROCEDURE — A9270 NON-COVERED ITEM OR SERVICE: HCPCS

## 2022-05-15 PROCEDURE — 770001 HCHG ROOM/CARE - MED/SURG/GYN PRIV*

## 2022-05-15 PROCEDURE — 700102 HCHG RX REV CODE 250 W/ 637 OVERRIDE(OP): Performed by: INTERNAL MEDICINE

## 2022-05-15 PROCEDURE — 85027 COMPLETE CBC AUTOMATED: CPT

## 2022-05-15 PROCEDURE — 700102 HCHG RX REV CODE 250 W/ 637 OVERRIDE(OP): Performed by: NURSE PRACTITIONER

## 2022-05-15 RX ADMIN — OMEPRAZOLE 20 MG: 20 CAPSULE, DELAYED RELEASE ORAL at 16:28

## 2022-05-15 RX ADMIN — Medication 667 MG: at 04:40

## 2022-05-15 RX ADMIN — METOPROLOL TARTRATE 50 MG: 25 TABLET, FILM COATED ORAL at 16:28

## 2022-05-15 RX ADMIN — VENLAFAXINE 37.5 MG: 75 TABLET ORAL at 04:54

## 2022-05-15 RX ADMIN — BACLOFEN 10 MG: 10 TABLET ORAL at 20:33

## 2022-05-15 RX ADMIN — MINERAL SUPPLEMENT IRON 300 MG / 5 ML STRENGTH LIQUID 100 PER BOX UNFLAVORED 300 MG: at 04:40

## 2022-05-15 RX ADMIN — HYDRALAZINE HYDROCHLORIDE 25 MG: 25 TABLET, FILM COATED ORAL at 13:44

## 2022-05-15 RX ADMIN — ERGOCALCIFEROL 50000 UNITS: 1.25 CAPSULE ORAL at 13:44

## 2022-05-15 RX ADMIN — AMLODIPINE BESYLATE 10 MG: 5 TABLET ORAL at 04:40

## 2022-05-15 RX ADMIN — CALCITRIOL 0.25 MCG: 1 SOLUTION ORAL at 04:39

## 2022-05-15 RX ADMIN — Medication 667 MG: at 11:32

## 2022-05-15 RX ADMIN — Medication 5 MG: at 20:33

## 2022-05-15 RX ADMIN — OMEPRAZOLE 20 MG: 20 CAPSULE, DELAYED RELEASE ORAL at 04:40

## 2022-05-15 RX ADMIN — ATORVASTATIN CALCIUM 80 MG: 80 TABLET, FILM COATED ORAL at 04:40

## 2022-05-15 RX ADMIN — Medication 667 MG: at 16:28

## 2022-05-15 NOTE — CARE PLAN
The patient is Stable - Low risk of patient condition declining or worsening    Shift Goals  Clinical Goals: Saftey, Dialysis  Patient Goals: rest  Family Goals: CARIE    Progress made toward(s) clinical / shift goals:    Problem: Knowledge Deficit - Standard  Goal: Patient and family/care givers will demonstrate understanding of plan of care, disease process/condition, diagnostic tests and medications  Outcome: Progressing     Problem: Fall Risk  Goal: Patient will remain free from falls  Outcome: Progressing       Patient is not progressing towards the following goals:
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
## 2022-05-15 NOTE — PROGRESS NOTES
[facility] -  PROGRESS NOTE               Author: Sandeep Zuluaga M.D. Date & Time: 5/15/2022  11:04 AM     HPI:  67 years old female with medical history notable for insulin-dependent type 2 diabetes, hypertension, end-stage renal disease due to diabetes has been on hemodialysis for 4 years, presenting to hospital with generalized weakness, transferred to the [facility] for higher level of care due to anemia requiring transfusions and also significant chemistry abnormalities.  Patient is unable to give history due to significant drowsiness and weakness.  She states that she she has been noncompliant with all of her medications for about 3 weeks also last dialysis about 3 weeks back.  Now she is suffering from nausea, green-brown vomiting and dyspnea.  Denies any chest pain.     In the emergency department patient was hypertensive, 97% on room air, drowsy, cxr pertinent for bilateral pleural effusion, CT thorax abdomen and pelvis with contrast was obtained which showed bilateral pleural effusion without any significant abnormalities otherwise. Creatinine was significant 15, patient still urinates, K normal, corrected ca 8.1, phos 11, bun 130s. Patient underwent thoracentesis which is showing transudate.     Nephrology was consulted due to missed dialysis and ESRD.    DAILY NEPHROLOGY PROGRESS:    5/6 - Patient is vomiting at bedside, still volume overloaded. Alert and oriented however does not understand why she is vomiting despite we spoke yesterday about missed dialysis. She states she is urinating, however no IO in chart.  5/7 - Confused.  Complains of leg pain.  Seen on HD  5/8 - Somnolent.  Sleeping in bed.  Had HD yesterday.  5/9 - VSS [id number].  RA.  No complaints. Restless and moaning.   5/10 - RA, interactive but confused, restless.  VSS RA BP labile, no hypotension.  5/11 - iHD yesterday, net UF 1.08L.  VSS -160.  RA. No complaints.  Psych at bedside doing eval. More calm  "and interactive today.   5/12 - Pt resting quietly.  VSS but BP labile 120-160s.  Psych signed off yesterday.  5/13 - laying in bed, alert and oriented but lethargic, tolerated iHD yesterday with UF: 1.6ML    5/14 - For HD today. Mentation is better. Has some bruising/ecchymosis over RUE AVF. AVF still as good bruit and thrill. Awaiting SNF. Psych signed off.  5/15 - S/p HD yesterday and tolerated well. Awaiting SNF placement    REVIEW OF SYSTEMS:    10 point ROS reviewed and is as per HPI/daily summary or otherwise negative    PMH/PSH/SH/FH:   Reviewed and unchanged since admission note    CURRENT MEDICATIONS:   Reviewed from admission to present day    VS:  BP (!) 147/68 Comment: Rn notified   Pulse 83   Temp 36.3 °C (97.3 °F) (Temporal)   Resp 17   Ht 1.651 m (5' 5\")   Wt 84.3 kg (185 lb 13.6 oz)   SpO2 99%   BMI 30.93 kg/m²     Physical Exam  Vitals and nursing note reviewed.       Constitutional:       Appearance: Normal appearance.   HENT:      Head: Atraumatic.   Eyes:      General: No scleral icterus.  Cardiovascular:      Rate and Rhythm: Normal rate.      Comments: + edema  Pulmonary:      Effort: Pulmonary effort is normal. No respiratory distress.   Abdominal:      General: There is no distension.   Musculoskeletal:         General: No deformity.   Skin:     Findings: No rash.   Neurological:      Mental Status: She is alert.   Psychiatric:         Behavior: Behavior normal.      Fluids:  In: 700 [Dialysis:700]  Out: 842     LABS:  Recent Labs     05/13/22  0122 05/14/22  0205 05/15/22  0015   SODIUM 134* 135 135   POTASSIUM 4.0 4.3 4.1   CHLORIDE 95* 96 96   CO2 28 25 26   GLUCOSE 128* 137* 115*   BUN 16 28* 22   CREATININE 3.24* 5.21* 4.70*   CALCIUM 8.9 8.9 9.1       IMAGING:   All imaging reviewed from admission to present day    IMPRESSION:  · Uremia with pleural effusion and serositis with nausea  · Pleural effusion due to volume overload due to missed dialysis, transudate lights " criteria  · Anemia of chronic kidney disease  · Anemia requiring transfusions  · ESRD, due to type 2 diabetes, on HD right upper extremity, last 4 years  · HAGMA due to uremic toxins  · Proteinuria  · Vomiting  · Hyperphosphatemia, severe  · Thrombocytopenia  · Hypertension, due to volume overload and essential hypertension  -Amlodipine increased 5/8  · Procalcitonin elevation due to renal failure vs ongoing infection (potential CAP)  · Type 2 diabetes mellitus.  · Hypocalcemia  - Vitamin D 11,   - Calcitriol        PLAN:  -No plans for HD today    -Next HD on Tues per TTS  -Continue binders   -Okay to transition to outpt hemodialysis when medically cleared

## 2022-05-15 NOTE — CARE PLAN
The patient is Stable - Low risk of patient condition declining or worsening    Shift Goals  Clinical Goals: encourage oral intake, safety  Patient Goals: rest  Family Goals: CARIE    Progress made toward(s) clinical / shift goals: pt more engaged this morning, able to converse with family via phone. Crush pills in pudding, unable to swallow pills whole.

## 2022-05-16 LAB
ANION GAP SERPL CALC-SCNC: 12 MMOL/L (ref 7–16)
BUN SERPL-MCNC: 32 MG/DL (ref 8–22)
CALCIUM SERPL-MCNC: 8.8 MG/DL (ref 8.5–10.5)
CHLORIDE SERPL-SCNC: 95 MMOL/L (ref 96–112)
CO2 SERPL-SCNC: 28 MMOL/L (ref 20–33)
CREAT SERPL-MCNC: 6.78 MG/DL (ref 0.5–1.4)
GFR SERPLBLD CREATININE-BSD FMLA CKD-EPI: 6 ML/MIN/1.73 M 2
GLUCOSE SERPL-MCNC: 157 MG/DL (ref 65–99)
POTASSIUM SERPL-SCNC: 4 MMOL/L (ref 3.6–5.5)
SODIUM SERPL-SCNC: 135 MMOL/L (ref 135–145)

## 2022-05-16 PROCEDURE — 99232 SBSQ HOSP IP/OBS MODERATE 35: CPT | Performed by: STUDENT IN AN ORGANIZED HEALTH CARE EDUCATION/TRAINING PROGRAM

## 2022-05-16 PROCEDURE — 700102 HCHG RX REV CODE 250 W/ 637 OVERRIDE(OP)

## 2022-05-16 PROCEDURE — A9270 NON-COVERED ITEM OR SERVICE: HCPCS | Performed by: INTERNAL MEDICINE

## 2022-05-16 PROCEDURE — 36415 COLL VENOUS BLD VENIPUNCTURE: CPT

## 2022-05-16 PROCEDURE — 80048 BASIC METABOLIC PNL TOTAL CA: CPT

## 2022-05-16 PROCEDURE — A9270 NON-COVERED ITEM OR SERVICE: HCPCS | Performed by: NURSE PRACTITIONER

## 2022-05-16 PROCEDURE — 700102 HCHG RX REV CODE 250 W/ 637 OVERRIDE(OP): Performed by: GENERAL PRACTICE

## 2022-05-16 PROCEDURE — A9270 NON-COVERED ITEM OR SERVICE: HCPCS

## 2022-05-16 PROCEDURE — 700102 HCHG RX REV CODE 250 W/ 637 OVERRIDE(OP): Performed by: INTERNAL MEDICINE

## 2022-05-16 PROCEDURE — A9270 NON-COVERED ITEM OR SERVICE: HCPCS | Performed by: GENERAL PRACTICE

## 2022-05-16 PROCEDURE — 700102 HCHG RX REV CODE 250 W/ 637 OVERRIDE(OP): Performed by: STUDENT IN AN ORGANIZED HEALTH CARE EDUCATION/TRAINING PROGRAM

## 2022-05-16 PROCEDURE — 700102 HCHG RX REV CODE 250 W/ 637 OVERRIDE(OP): Performed by: NURSE PRACTITIONER

## 2022-05-16 PROCEDURE — 770001 HCHG ROOM/CARE - MED/SURG/GYN PRIV*

## 2022-05-16 PROCEDURE — A9270 NON-COVERED ITEM OR SERVICE: HCPCS | Performed by: STUDENT IN AN ORGANIZED HEALTH CARE EDUCATION/TRAINING PROGRAM

## 2022-05-16 RX ADMIN — MINERAL SUPPLEMENT IRON 300 MG / 5 ML STRENGTH LIQUID 100 PER BOX UNFLAVORED 300 MG: at 04:50

## 2022-05-16 RX ADMIN — QUETIAPINE FUMARATE 25 MG: 25 TABLET ORAL at 09:23

## 2022-05-16 RX ADMIN — Medication 667 MG: at 04:50

## 2022-05-16 RX ADMIN — HYDRALAZINE HYDROCHLORIDE 25 MG: 25 TABLET, FILM COATED ORAL at 20:16

## 2022-05-16 RX ADMIN — METOPROLOL TARTRATE 50 MG: 25 TABLET, FILM COATED ORAL at 16:57

## 2022-05-16 RX ADMIN — Medication 5 MG: at 20:16

## 2022-05-16 RX ADMIN — OMEPRAZOLE 20 MG: 20 CAPSULE, DELAYED RELEASE ORAL at 04:50

## 2022-05-16 RX ADMIN — Medication 667 MG: at 10:29

## 2022-05-16 RX ADMIN — ATORVASTATIN CALCIUM 80 MG: 80 TABLET, FILM COATED ORAL at 04:50

## 2022-05-16 RX ADMIN — CALCITRIOL 0.25 MCG: 1 SOLUTION ORAL at 04:50

## 2022-05-16 RX ADMIN — HYDRALAZINE HYDROCHLORIDE 25 MG: 25 TABLET, FILM COATED ORAL at 14:30

## 2022-05-16 RX ADMIN — VENLAFAXINE 37.5 MG: 75 TABLET ORAL at 04:50

## 2022-05-16 RX ADMIN — Medication 667 MG: at 16:57

## 2022-05-16 RX ADMIN — OMEPRAZOLE 20 MG: 20 CAPSULE, DELAYED RELEASE ORAL at 16:57

## 2022-05-16 ASSESSMENT — PAIN DESCRIPTION - PAIN TYPE: TYPE: ACUTE PAIN
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
## 2022-05-16 NOTE — DISCHARGE PLANNING
Outpatient Dialysis Placement Confirmation    Patient has been placed and confirmed at:    [facility]    [address] [place] [id number]    Ph# [phone]    Schedule: Tuesday, Thursday, Saturday   Time: 06:30am     Patient can start on 05/19 and needs to be there by 6:00am on your first day.    Follow up call made to Polina MARTINEZ and relayed outpatient dialysis placement confirmation. Follow up message to Dr. Rivas to notify of placement. Provided patient dialysis schedule welcome letter.    Ching Lees- Senior Dialysis Coordinator Ph# [phone]  [facility] [employer]

## 2022-05-16 NOTE — PROGRESS NOTES
[facility] -  PROGRESS NOTE               Author: ASHLY Cardoza Date & Time: 5/16/2022  9:25 AM     HPI:  67 years old female with medical history notable for insulin-dependent type 2 diabetes, hypertension, end-stage renal disease due to diabetes has been on hemodialysis for 4 years, presenting to hospital with generalized weakness, transferred to the [facility] for higher level of care due to anemia requiring transfusions and also significant chemistry abnormalities.  Patient is unable to give history due to significant drowsiness and weakness.  She states that she she has been noncompliant with all of her medications for about 3 weeks also last dialysis about 3 weeks back.  Now she is suffering from nausea, green-brown vomiting and dyspnea.  Denies any chest pain.     In the emergency department patient was hypertensive, 97% on room air, drowsy, cxr pertinent for bilateral pleural effusion, CT thorax abdomen and pelvis with contrast was obtained which showed bilateral pleural effusion without any significant abnormalities otherwise. Creatinine was significant 15, patient still urinates, K normal, corrected ca 8.1, phos 11, bun 130s. Patient underwent thoracentesis which is showing transudate.     Nephrology was consulted due to missed dialysis and ESRD.    DAILY NEPHROLOGY PROGRESS:    5/6 - Patient is vomiting at bedside, still volume overloaded. Alert and oriented however does not understand why she is vomiting despite we spoke yesterday about missed dialysis. She states she is urinating, however no IO in chart.  5/7 - Confused.  Complains of leg pain.  Seen on HD  5/8 - Somnolent.  Sleeping in bed.  Had HD yesterday.  5/9 - VSS [id number].  RA.  No complaints. Restless and moaning.   5/10 - RA, interactive but confused, restless.  VSS RA BP labile, no hypotension.  5/11 - iHD yesterday, net UF 1.08L.  VSS -160.  RA. No complaints.  Psych at bedside doing  "eval. More calm and interactive today.   5/12 - Pt resting quietly.  VSS but BP labile 120-160s.  Psych signed off yesterday.  5/13 - laying in bed, alert and oriented but lethargic, tolerated iHD yesterday with UF: 1.6ML    5/14 - For HD today. Mentation is better. Has some bruising/ecchymosis over RUE AVF. AVF still as good bruit and thrill. Awaiting SNF. Psych signed off.  5/15 - S/p HD yesterday and tolerated well. Awaiting SNF placement  5/16 - VSS, no complaints of CP SOB, somewhat withdrawn.  Ecchymosis improving per RN, +B/T proximal to AC    REVIEW OF SYSTEMS:    10 point ROS reviewed and is as per HPI/daily summary or otherwise negative    PMH/PSH/SH/FH:   Reviewed and unchanged since admission note    CURRENT MEDICATIONS:   Reviewed from admission to present day    VS:  /65   Pulse 74   Temp 36.2 °C (97.1 °F) (Temporal)   Resp 18   Ht 1.651 m (5' 5\")   Wt 84.3 kg (185 lb 13.6 oz)   SpO2 96%   BMI 30.93 kg/m²     Physical Exam  Vitals and nursing note reviewed.       Constitutional:       Appearance: Normal appearance.   HENT:      Head: Atraumatic.   Eyes:      General: No scleral icterus.  Cardiovascular:      Rate and Rhythm: Normal rate.      Comments: + trace edema  Pulmonary:      Effort: Pulmonary effort is normal. No respiratory distress.   Abdominal:      General: There is no distension.   Musculoskeletal:         General: No deformity.   Skin:     Findings: No rash. +ecchymosis +B/T RAVF  Neurological:      Mental Status: She is alert.   Psychiatric:         Behavior: Behavior normal.      Fluids:  In: 100 [P.O.:100]  Out: -     LABS:  Recent Labs     05/14/22  0205 05/15/22  0015 05/16/22  0104   SODIUM 135 135 135   POTASSIUM 4.3 4.1 4.0   CHLORIDE 96 96 95*   CO2 25 26 28   GLUCOSE 137* 115* 157*   BUN 28* 22 32*   CREATININE 5.21* 4.70* 6.78*   CALCIUM 8.9 9.1 8.8       IMAGING:   All imaging reviewed from admission to present day    IMPRESSION:  · Uremia with pleural effusion " and serositis with nausea  · Pleural effusion due to volume overload due to missed dialysis, transudate lights criteria  · Anemia of chronic kidney disease  · Anemia requiring transfusions  · ESRD, due to type 2 diabetes, on HD right upper extremity, last 4 years  · HAGMA due to uremic toxins  · Proteinuria  · Vomiting  · Hyperphosphatemia, severe  · Thrombocytopenia  · Hypertension, due to volume overload and essential hypertension  -Amlodipine increased 5/8  · Procalcitonin elevation due to renal failure vs ongoing infection (potential CAP)  · Type 2 diabetes mellitus.  · Hypocalcemia  - Vitamin D 11,   - Calcitriol  - on weekly Ergo        PLAN:  -No plans for HD today    -Next HD on Tues per TTS  -Continue binders   -Okay to transition to outpt hemodialysis when medically cleared

## 2022-05-16 NOTE — CARE PLAN
The patient is Stable - Low risk of patient condition declining or worsening    Shift Goals  Clinical Goals: Saftey  Patient Goals: Rest  Family Goals: CARIE    Progress made toward(s) clinical / shift goals:    Problem: Pain - Standard  Goal: Alleviation of pain or a reduction in pain to the patient’s comfort goal  Outcome: Progressing     Problem: Fall Risk  Goal: Patient will remain free from falls  Outcome: Progressing       Patient is not progressing towards the following goals:

## 2022-05-16 NOTE — DISCHARGE PLANNING
Agency/Facility Name: [facility]  Spoke To: ARTEM Glover Supervisor  Outcome: Asked for referral status.    @1137-  Agency/Facility Name: [facility]  Spoke To: ARTEM Glover Supervisor  Outcome: Belen inquired if pt would still need dialysis. Confirmed with JUAN Mabry that pt will need Dialysis Monday, Wednesday, Friday.     @1008-  Agency/Facility Name: [facility]  Spoke To: Balbina  Outcome: Per Balbina she does not see the referral on her side. Asked DPA to resend it.

## 2022-05-16 NOTE — CARE PLAN
The patient is Stable - Low risk of patient condition declining or worsening    Shift Goals  Clinical Goals: Safety  Patient Goals: Rest  Family Goals: CARIE    Progress made toward(s) clinical / shift goals:    Problem: Pain - Standard  Goal: Alleviation of pain or a reduction in pain to the patient’s comfort goal  Outcome: Progressing     Problem: Fall Risk  Goal: Patient will remain free from falls  Outcome: Progressing       Patient is not progressing towards the following goals:

## 2022-05-17 LAB
ANION GAP SERPL CALC-SCNC: 15 MMOL/L (ref 7–16)
BUN SERPL-MCNC: 42 MG/DL (ref 8–22)
CALCIUM SERPL-MCNC: 9 MG/DL (ref 8.5–10.5)
CHLORIDE SERPL-SCNC: 96 MMOL/L (ref 96–112)
CO2 SERPL-SCNC: 26 MMOL/L (ref 20–33)
CREAT SERPL-MCNC: 8.48 MG/DL (ref 0.5–1.4)
ERYTHROCYTE [DISTWIDTH] IN BLOOD BY AUTOMATED COUNT: 57.1 FL (ref 35.9–50)
GFR SERPLBLD CREATININE-BSD FMLA CKD-EPI: 5 ML/MIN/1.73 M 2
GLUCOSE SERPL-MCNC: 132 MG/DL (ref 65–99)
HCT VFR BLD AUTO: 28.1 % (ref 37–47)
HGB BLD-MCNC: 8.9 G/DL (ref 12–16)
MCH RBC QN AUTO: 29.7 PG (ref 27–33)
MCHC RBC AUTO-ENTMCNC: 31.7 G/DL (ref 33.6–35)
MCV RBC AUTO: 93.7 FL (ref 81.4–97.8)
PLATELET # BLD AUTO: 159 K/UL (ref 164–446)
PMV BLD AUTO: 11 FL (ref 9–12.9)
POTASSIUM SERPL-SCNC: 4.5 MMOL/L (ref 3.6–5.5)
RBC # BLD AUTO: 3 M/UL (ref 4.2–5.4)
SODIUM SERPL-SCNC: 137 MMOL/L (ref 135–145)
WBC # BLD AUTO: 4.2 K/UL (ref 4.8–10.8)

## 2022-05-17 PROCEDURE — 90935 HEMODIALYSIS ONE EVALUATION: CPT

## 2022-05-17 PROCEDURE — 80048 BASIC METABOLIC PNL TOTAL CA: CPT

## 2022-05-17 PROCEDURE — 770001 HCHG ROOM/CARE - MED/SURG/GYN PRIV*

## 2022-05-17 PROCEDURE — 700102 HCHG RX REV CODE 250 W/ 637 OVERRIDE(OP): Performed by: GENERAL PRACTICE

## 2022-05-17 PROCEDURE — 97530 THERAPEUTIC ACTIVITIES: CPT

## 2022-05-17 PROCEDURE — 700111 HCHG RX REV CODE 636 W/ 250 OVERRIDE (IP): Performed by: STUDENT IN AN ORGANIZED HEALTH CARE EDUCATION/TRAINING PROGRAM

## 2022-05-17 PROCEDURE — 700102 HCHG RX REV CODE 250 W/ 637 OVERRIDE(OP): Performed by: STUDENT IN AN ORGANIZED HEALTH CARE EDUCATION/TRAINING PROGRAM

## 2022-05-17 PROCEDURE — 36415 COLL VENOUS BLD VENIPUNCTURE: CPT

## 2022-05-17 PROCEDURE — A9270 NON-COVERED ITEM OR SERVICE: HCPCS | Performed by: GENERAL PRACTICE

## 2022-05-17 PROCEDURE — A9270 NON-COVERED ITEM OR SERVICE: HCPCS | Performed by: STUDENT IN AN ORGANIZED HEALTH CARE EDUCATION/TRAINING PROGRAM

## 2022-05-17 PROCEDURE — 85027 COMPLETE CBC AUTOMATED: CPT

## 2022-05-17 PROCEDURE — 99232 SBSQ HOSP IP/OBS MODERATE 35: CPT | Performed by: STUDENT IN AN ORGANIZED HEALTH CARE EDUCATION/TRAINING PROGRAM

## 2022-05-17 RX ORDER — LORAZEPAM 2 MG/ML
1 INJECTION INTRAMUSCULAR EVERY 6 HOURS PRN
Status: DISCONTINUED | OUTPATIENT
Start: 2022-05-17 | End: 2022-05-17

## 2022-05-17 RX ADMIN — METOPROLOL TARTRATE 50 MG: 25 TABLET, FILM COATED ORAL at 16:36

## 2022-05-17 RX ADMIN — QUETIAPINE FUMARATE 25 MG: 25 TABLET ORAL at 08:37

## 2022-05-17 RX ADMIN — OMEPRAZOLE 20 MG: 20 CAPSULE, DELAYED RELEASE ORAL at 16:35

## 2022-05-17 RX ADMIN — LORAZEPAM 1 MG: 2 INJECTION INTRAMUSCULAR; INTRAVENOUS at 09:50

## 2022-05-17 ASSESSMENT — COGNITIVE AND FUNCTIONAL STATUS - GENERAL
STANDING UP FROM CHAIR USING ARMS: TOTAL
SUGGESTED CMS G CODE MODIFIER MOBILITY: CN
WALKING IN HOSPITAL ROOM: TOTAL
CLIMB 3 TO 5 STEPS WITH RAILING: TOTAL
MOBILITY SCORE: 6
MOVING FROM LYING ON BACK TO SITTING ON SIDE OF FLAT BED: UNABLE
MOVING TO AND FROM BED TO CHAIR: UNABLE
TURNING FROM BACK TO SIDE WHILE IN FLAT BAD: UNABLE

## 2022-05-17 ASSESSMENT — GAIT ASSESSMENTS: GAIT LEVEL OF ASSIST: UNABLE TO PARTICIPATE

## 2022-05-17 NOTE — THERAPY
"Physical Therapy   Daily Treatment     Patient Name: Liberty Bolton  Age:  67 y.o., Sex:  female  Medical Record #: [id number]  Today's Date: 5/17/2022     Precautions  Precautions: Fall Risk;Swallow Precautions ( See Comments)    Assessment    Pt continues to present with lethargy, impaired cognition, safety awareness, sequencing, initiation, balance, generalized weakness, and decreased activity tolerance. Pt required ModA to come to EOB 2/2 impaired sequencing and weakness; unable to sit EOB without Sushil 2/2 L lean and poor balance. Required MaxA x2 for STS blocking BLE, strong posterior lean. Pt less responsive at EOB, difficulty discerning if 2/2 cognition, participation, or BP (see vitals below). Continue to recommend placement, will follow.     Plan    Continue current treatment plan.    DC Equipment Recommendations: Unable to determine at this time  Discharge Recommendations: Recommend post-acute placement for additional physical therapy services prior to discharge home      Subjective    \"Welcome.\"      Objective     05/17/22 1544   Vitals   O2 Delivery Device None - Room Air   Vitals Comments BP supine 145/70, HR 89; seated 131/67, 88; returned supine 142/74, 93. Pt becamed less responsive when seated EOB, however, difficulty discerning if 2/2 cognition, participation, or BP. Difficulty getting true reading as pt unable to keep L arm still   Pain 0 - 10 Group   Therapist Pain Assessment Post Activity Pain Same as Prior to Activity;Nurse Notified;0   Cognition    Cognition / Consciousness X   Speech/ Communication Delayed Responses   Level of Consciousness Responds to voice   Ability To Follow Commands 1 Step  (50% of time with increased processing time)   Safety Awareness Impaired;Impulsive   New Learning Impaired   Attention Impaired   Sequencing Impaired   Initiation Impaired   Comments Continues to present with lethargy. Required increased processing time and multiple cues to complete 1 step commands "   Active ROM Lower Body    Active ROM Lower Body  WDL   Strength Lower Body   Lower Body Strength  X   Gross Strength Generalized Weakness, Equal Bilaterally   Neuro-Muscular Treatments   Neuro-Muscular Treatments Anterior weight shift;Weight Shift Right;Weight Shift Left;Verbal Cuing;Tactile Cuing;Sequencing;Postural Changes;Postural Facilitation   Other Treatments   Other Treatments Provided orthostatic BP attempted. See vitals above   Balance   Sitting Balance (Static) Poor +   Sitting Balance (Dynamic) Poor +   Standing Balance (Static) Trace   Standing Balance (Dynamic) Dependent   Weight Shift Sitting Poor   Weight Shift Standing Absent   Skilled Intervention Verbal Cuing;Tactile Cuing;Sequencing;Postural Facilitation;Facilitation;Compensatory Strategies   Comments strong posterior lean in standing with B HHA   Gait Analysis   Gait Level Of Assist Unable to Participate   Bed Mobility    Supine to Sit Moderate Assist   Sit to Supine Maximal Assist   Scooting Maximal Assist   Rolling Maximum Assist to Lt.   Skilled Intervention Verbal Cuing;Tactile Cuing;Sequencing;Facilitation;Compensatory Strategies   Functional Mobility   Sit to Stand Maximal Assist  (x2 persons)   Bed, Chair, Wheelchair Transfer Unable to Participate   Mobility STS x2   Skilled Intervention Verbal Cuing;Sequencing;Tactile Cuing   Comments Unable to come to full stand, strong posterior lean blocking BLE   How much difficulty does the patient currently have...   Turning over in bed (including adjusting bedclothes, sheets and blankets)? 1   Sitting down on and standing up from a chair with arms (e.g., wheelchair, bedside commode, etc.) 1   Moving from lying on back to sitting on the side of the bed? 1   How much help from another person does the patient currently need...   Moving to and from a bed to a chair (including a wheelchair)? 1   Need to walk in a hospital room? 1   Climbing 3-5 steps with a railing? 1   6 clicks Mobility Score 6    Activity Tolerance   Comments limited 2/2 lethargy, fatigue, weakness   Short Term Goals    Short Term Goal # 1 Pt will be able to sit on EOB for 10 min under supervision within 6 visits to improve tolerance and balance for static sitting   Goal Outcome # 1 goal not met   Short Term Goal # 2 Pt able to perform sit<>stand with minimal assist within 6 visits to reduce caregiver burden   Goal Outcome # 2 Goal not met   Short Term Goal # 3 Pt will perform squat pivot transfer to /c with min A within 6 visits to ensure independent mobility at home.   Goal Outcome # 3 Goal not met   Education Group   Education Provided Role of Physical Therapist   Role of Physical Therapist Patient Response Patient;Acceptance;Explanation;Verbal Demonstration;No Learning Evidence   Anticipated Discharge Equipment and Recommendations   DC Equipment Recommendations Unable to determine at this time   Discharge Recommendations Recommend post-acute placement for additional physical therapy services prior to discharge home   Interdisciplinary Plan of Care Collaboration   IDT Collaboration with  Nursing;Therapy Tech   Patient Position at End of Therapy In Bed;Bed Alarm On;Call Light within Reach;Tray Table within Reach   Collaboration Comments RN present, updated   Session Information   Date / Session Number  5/17 3 (2/3, 5/18)

## 2022-05-17 NOTE — PROGRESS NOTES
Patient refused to go with transport to hemodialysis. Encouraged a female transport member. Patient continues to refuse, Not alert to situation, place, or time. Spoke with HD nurse with concern for patient compliance with fistula, PO Seroquel given, will attempt HD later today. MD Garvin notified.

## 2022-05-17 NOTE — PROGRESS NOTES
3hr HD started @ 1018 and ended @ 1318, tx was delayed due to agitation this morning.Patient was medicated with Ativan prior to HD,she was sedated but easily arousable upon arrival.Net UF zero,unable to pull any fluid due to hypotension,Dr Rivas notified.VSS post HD,patient starting to wake up,RUAAVF + B/T,cannulated below the hematoma/bruised area.Needle was removed and manual pressure applied for 7 minutes then covered with WOODY,CHASTITY.Report given to Nery Henriquez RN.

## 2022-05-17 NOTE — PROGRESS NOTES
Patient refusing to come down to dialysis.Per primary RN patient is confused and agitated.Dr Rivas notified,will follow up.

## 2022-05-17 NOTE — DISCHARGE PLANNING
"[facility]  Spoke To: ARTEM Glover Supervisor  Outcome: Asked for referral status and clarified, per RN Ching GOMEZ's notes, pt's dialysis schedule is Tuesday, Thursday Saturday. Asked if facility would be willing to support that.     @0909-  Agency/Facility Name: [facility]  Outcome: Left voicemail asking for referral status.     @1020-  [facility]  Spoke To: ARTEM Glover Manager  Outcome: Per Priscilla, \"For [place]ce it’s a no for sure. I have already sent the schedule to [facility] so I am waiting for them to talk to the  to see but we are pretty full on dialysis patients at the moment since we have been the I just place really taking any.\"     @1430-  Agency/Facility Name: [facility]  Outcome: Left voicemail asking for referral status.     "

## 2022-05-17 NOTE — CARE PLAN
The patient is Stable - Low risk of patient condition declining or worsening    Shift Goals  Clinical Goals: safety  Patient Goals: Sleep  Family Goals: CARIE    Progress made toward(s) clinical / shift goals:    Problem: Pain - Standard  Goal: Alleviation of pain or a reduction in pain to the patient’s comfort goal  Outcome: Progressing     Problem: Fall Risk  Goal: Patient will remain free from falls  Outcome: Progressing       Patient is not progressing towards the following goals:

## 2022-05-17 NOTE — PROGRESS NOTES
[facility] -  PROGRESS NOTE               Author: ASHLY Cardoza Date & Time: 5/17/2022  9:45 AM     HPI:  67 years old female with medical history notable for insulin-dependent type 2 diabetes, hypertension, end-stage renal disease due to diabetes has been on hemodialysis for 4 years, presenting to hospital with generalized weakness, transferred to the [facility] for higher level of care due to anemia requiring transfusions and also significant chemistry abnormalities.  Patient is unable to give history due to significant drowsiness and weakness.  She states that she she has been noncompliant with all of her medications for about 3 weeks also last dialysis about 3 weeks back.  Now she is suffering from nausea, green-brown vomiting and dyspnea.  Denies any chest pain.     In the emergency department patient was hypertensive, 97% on room air, drowsy, cxr pertinent for bilateral pleural effusion, CT thorax abdomen and pelvis with contrast was obtained which showed bilateral pleural effusion without any significant abnormalities otherwise. Creatinine was significant 15, patient still urinates, K normal, corrected ca 8.1, phos 11, bun 130s. Patient underwent thoracentesis which is showing transudate.     Nephrology was consulted due to missed dialysis and ESRD.    DAILY NEPHROLOGY PROGRESS:    5/6 - Patient is vomiting at bedside, still volume overloaded. Alert and oriented however does not understand why she is vomiting despite we spoke yesterday about missed dialysis. She states she is urinating, however no IO in chart.  5/7 - Confused.  Complains of leg pain.  Seen on HD  5/8 - Somnolent.  Sleeping in bed.  Had HD yesterday.  5/9 - VSS [id number].  RA.  No complaints. Restless and moaning.   5/10 - RA, interactive but confused, restless.  VSS RA BP labile, no hypotension.  5/11 - iHD yesterday, net UF 1.08L.  VSS -160.  RA. No complaints.  Psych at bedside doing  "eval. More calm and interactive today.   5/12 - Pt resting quietly.  VSS but BP labile 120-160s.  Psych signed off yesterday.  5/13 - laying in bed, alert and oriented but lethargic, tolerated iHD yesterday with UF: 1.6ML    5/14 - For HD today. Mentation is better. Has some bruising/ecchymosis over RUE AVF. AVF still as good bruit and thrill. Awaiting SNF. Psych signed off.  5/15 - S/p HD yesterday and tolerated well. Awaiting SNF placement  5/16 - VSS, no complaints of CP SOB, somewhat withdrawn.  Ecchymosis improving per RN, +B/T proximal to AC  5/17 - VSS, patient withdrawn, says she just \"doesn't feel like dialysis right now\".  Is willing to consider it later.  Declines and CP SOB edema.       REVIEW OF SYSTEMS:    10 point ROS reviewed and is as per HPI/daily summary or otherwise negative    PMH/PSH/SH/FH:   Reviewed and unchanged since admission note    CURRENT MEDICATIONS:   Reviewed from admission to present day    VS:  BP (!) 164/77 Comment: RN notified   Pulse 84   Temp 36 °C (96.8 °F) (Temporal)   Resp 18   Ht 1.651 m (5' 5\")   Wt 84.3 kg (185 lb 13.6 oz)   SpO2 97%   BMI 30.93 kg/m²     Physical Exam  Vitals and nursing note reviewed.   Constitutional:       Appearance: Normal appearance.   Eyes:      General: No scleral icterus.  Cardiovascular:      Rate and Rhythm: Normal rate.      Comments: No edema  Pulmonary:      Effort: Pulmonary effort is normal.   Abdominal:      General: There is no distension.      Palpations: Abdomen is soft.   Musculoskeletal:         General: No deformity.   Skin:     Findings: No rash.      Comments: +ecchymosis +B/T RAVF   Neurological:      General: No focal deficit present.      Mental Status: She is alert.   Psychiatric:         Behavior: Behavior normal.      Comments: withdrawn            Fluids:  No intake/output data recorded.    LABS:  Recent Labs     05/15/22  0015 05/16/22  0104 05/17/22  0040   SODIUM 135 135 137   POTASSIUM 4.1 4.0 4.5   CHLORIDE 96 " 95* 96   CO2 26 28 26   GLUCOSE 115* 157* 132*   BUN 22 32* 42*   CREATININE 4.70* 6.78* 8.48*   CALCIUM 9.1 8.8 9.0       IMAGING:   All imaging reviewed from admission to present day    IMPRESSION:  · Uremia with pleural effusion and serositis with nausea  · Pleural effusion due to volume overload due to missed dialysis, transudate lights criteria  · Anemia of chronic kidney disease  · Anemia requiring transfusions  · ESRD, due to type 2 diabetes, on HD right upper extremity, last 4 years  · HAGMA due to uremic toxins  · Proteinuria  · Vomiting  · Hyperphosphatemia, severe  · Thrombocytopenia  · Hypertension, due to volume overload and essential hypertension  -Amlodipine increased 5/8  · Procalcitonin elevation due to renal failure vs ongoing infection (potential CAP)  · Type 2 diabetes mellitus.  · Hypocalcemia  - Vitamin D 11,   - Calcitriol  - on weekly Ergo        PLAN:  -Discussed health implications of missing dialysis including regression of hospital progress.   Patient willing to consider dialysis later today.    -Next HD today (TUES) and TTS prn  -Continue binders   -Okay to transition to outpt hemodialysis when medically cleared
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
## 2022-05-18 LAB
ANION GAP SERPL CALC-SCNC: 13 MMOL/L (ref 7–16)
BASOPHILS # BLD AUTO: 1.4 % (ref 0–1.8)
BASOPHILS # BLD: 0.06 K/UL (ref 0–0.12)
BUN SERPL-MCNC: 23 MG/DL (ref 8–22)
CALCIUM SERPL-MCNC: 9.2 MG/DL (ref 8.5–10.5)
CHLORIDE SERPL-SCNC: 94 MMOL/L (ref 96–112)
CO2 SERPL-SCNC: 27 MMOL/L (ref 20–33)
CREAT SERPL-MCNC: 5.55 MG/DL (ref 0.5–1.4)
EOSINOPHIL # BLD AUTO: 0.06 K/UL (ref 0–0.51)
EOSINOPHIL NFR BLD: 1.4 % (ref 0–6.9)
ERYTHROCYTE [DISTWIDTH] IN BLOOD BY AUTOMATED COUNT: 57.2 FL (ref 35.9–50)
GFR SERPLBLD CREATININE-BSD FMLA CKD-EPI: 8 ML/MIN/1.73 M 2
GLUCOSE SERPL-MCNC: 101 MG/DL (ref 65–99)
HCT VFR BLD AUTO: 29.1 % (ref 37–47)
HGB BLD-MCNC: 9.2 G/DL (ref 12–16)
IMM GRANULOCYTES # BLD AUTO: 0.02 K/UL (ref 0–0.11)
IMM GRANULOCYTES NFR BLD AUTO: 0.5 % (ref 0–0.9)
LYMPHOCYTES # BLD AUTO: 0.78 K/UL (ref 1–4.8)
LYMPHOCYTES NFR BLD: 17.9 % (ref 22–41)
MCH RBC QN AUTO: 29.6 PG (ref 27–33)
MCHC RBC AUTO-ENTMCNC: 31.6 G/DL (ref 33.6–35)
MCV RBC AUTO: 93.6 FL (ref 81.4–97.8)
MONOCYTES # BLD AUTO: 0.58 K/UL (ref 0–0.85)
MONOCYTES NFR BLD AUTO: 13.3 % (ref 0–13.4)
NEUTROPHILS # BLD AUTO: 2.86 K/UL (ref 2–7.15)
NEUTROPHILS NFR BLD: 65.5 % (ref 44–72)
NRBC # BLD AUTO: 0 K/UL
NRBC BLD-RTO: 0 /100 WBC
PLATELET # BLD AUTO: 168 K/UL (ref 164–446)
PMV BLD AUTO: 11.5 FL (ref 9–12.9)
POTASSIUM SERPL-SCNC: 4.5 MMOL/L (ref 3.6–5.5)
RBC # BLD AUTO: 3.11 M/UL (ref 4.2–5.4)
SODIUM SERPL-SCNC: 134 MMOL/L (ref 135–145)
WBC # BLD AUTO: 4.4 K/UL (ref 4.8–10.8)

## 2022-05-18 PROCEDURE — 80048 BASIC METABOLIC PNL TOTAL CA: CPT

## 2022-05-18 PROCEDURE — 99232 SBSQ HOSP IP/OBS MODERATE 35: CPT | Performed by: HOSPITALIST

## 2022-05-18 PROCEDURE — 770001 HCHG ROOM/CARE - MED/SURG/GYN PRIV*

## 2022-05-18 PROCEDURE — 92526 ORAL FUNCTION THERAPY: CPT

## 2022-05-18 PROCEDURE — 36415 COLL VENOUS BLD VENIPUNCTURE: CPT

## 2022-05-18 PROCEDURE — 85025 COMPLETE CBC W/AUTO DIFF WBC: CPT

## 2022-05-18 ASSESSMENT — PATIENT HEALTH QUESTIONNAIRE - PHQ9
1. LITTLE INTEREST OR PLEASURE IN DOING THINGS: NOT AT ALL
SUM OF ALL RESPONSES TO PHQ9 QUESTIONS 1 AND 2: 0
2. FEELING DOWN, DEPRESSED, IRRITABLE, OR HOPELESS: NOT AT ALL

## 2022-05-18 NOTE — PROGRESS NOTES
[facility] -  PROGRESS NOTE               Author: CYNTHIA Ernandez Date & Time: 5/18/2022  8:52 AM     HPI:  67 years old female with medical history notable for insulin-dependent type 2 diabetes, hypertension, end-stage renal disease due to diabetes has been on hemodialysis for 4 years, presenting to hospital with generalized weakness, transferred to the [facility] for higher level of care due to anemia requiring transfusions and also significant chemistry abnormalities.  Patient is unable to give history due to significant drowsiness and weakness.  She states that she she has been noncompliant with all of her medications for about 3 weeks also last dialysis about 3 weeks back.  Now she is suffering from nausea, green-brown vomiting and dyspnea.  Denies any chest pain.     In the emergency department patient was hypertensive, 97% on room air, drowsy, cxr pertinent for bilateral pleural effusion, CT thorax abdomen and pelvis with contrast was obtained which showed bilateral pleural effusion without any significant abnormalities otherwise. Creatinine was significant 15, patient still urinates, K normal, corrected ca 8.1, phos 11, bun 130s. Patient underwent thoracentesis which is showing transudate.     Nephrology was consulted due to missed dialysis and ESRD.    DAILY NEPHROLOGY PROGRESS:    5/6 - Patient is vomiting at bedside, still volume overloaded. Alert and oriented however does not understand why she is vomiting despite we spoke yesterday about missed dialysis. She states she is urinating, however no IO in chart.  5/7 - Confused.  Complains of leg pain.  Seen on HD  5/8 - Somnolent.  Sleeping in bed.  Had HD yesterday.  5/9 - VSS [id number].  RA.  No complaints. Restless and moaning.   5/10 - RA, interactive but confused, restless.  VSS RA BP labile, no hypotension.  5/11 - iHD yesterday, net UF 1.08L.  VSS -160.  RA. No complaints.  Psych at bedside doing eval. More  "calm and interactive today.   5/12 - Pt resting quietly.  VSS but BP labile 120-160s.  Psych signed off yesterday.  5/13 - laying in bed, alert and oriented but lethargic, tolerated iHD yesterday with UF: 1.6ML    5/14 - For HD today. Mentation is better. Has some bruising/ecchymosis over RUE AVF. AVF still as good bruit and thrill. Awaiting SNF. Psych signed off.  5/15 - S/p HD yesterday and tolerated well. Awaiting SNF placement  5/16 - VSS, no complaints of CP SOB, somewhat withdrawn.  Ecchymosis improving per RN, +B/T proximal to AC  5/17 - VSS, patient withdrawn, says she just \"doesn't feel like dialysis right now\".  Is willing to consider it later.  Declines and CP SOB edema.   5/18 - HD yest, 0 UF due to hypotension. Pt does not want to engage in conversation.      REVIEW OF SYSTEMS:    10 point ROS reviewed and is as per HPI/daily summary or otherwise negative    PMH/PSH/SH/FH:   Reviewed and unchanged since admission note    CURRENT MEDICATIONS:   Reviewed from admission to present day    VS:  BP (!) 159/63 Comment: Rn notified   Pulse 91   Temp 36.2 °C (97.1 °F) (Temporal)   Resp 17   Ht 1.651 m (5' 5\")   Wt 84.3 kg (185 lb 13.6 oz)   SpO2 97%   BMI 30.93 kg/m²     Physical Exam  Vitals and nursing note reviewed.   Constitutional:       Appearance: Normal appearance.   Eyes:      General: No scleral icterus.  Cardiovascular:      Rate and Rhythm: Normal rate.      Comments: No edema  Pulmonary:      Effort: Pulmonary effort is normal.   Abdominal:      General: There is no distension.      Palpations: Abdomen is soft.   Musculoskeletal:         General: No deformity.   Skin:     Findings: No rash.      Comments: +ecchymosis +B/T RAVF   Neurological:      General: No focal deficit present.      Mental Status: She is alert.   Psychiatric:         Behavior: Behavior normal.      Comments: withdrawn            Fluids:  In: 800 [Dialysis:800]  Out: 800     LABS:  Recent Labs     05/16/22  0104 " 05/17/22  0040 05/18/22  0057   SODIUM 135 137 134*   POTASSIUM 4.0 4.5 4.5   CHLORIDE 95* 96 94*   CO2 28 26 27   GLUCOSE 157* 132* 101*   BUN 32* 42* 23*   CREATININE 6.78* 8.48* 5.55*   CALCIUM 8.8 9.0 9.2       IMAGING:   All imaging reviewed from admission to present day    IMPRESSION:  · Uremia with pleural effusion and serositis with nausea  · Pleural effusion due to volume overload due to missed dialysis, transudate lights criteria  · Anemia of chronic kidney disease  · Anemia requiring transfusions  · ESRD, due to type 2 diabetes, on HD right upper extremity, last 4 years  · HAGMA due to uremic toxins  · Proteinuria  · Vomiting  · Hyperphosphatemia, severe  · Thrombocytopenia  · Hypertension, due to volume overload and essential hypertension  -Amlodipine increased 5/8  · Procalcitonin elevation due to renal failure vs ongoing infection (potential CAP)  · Type 2 diabetes mellitus.  · Hypocalcemia  - Vitamin D 11,   - Calcitriol  - on weekly Ergo        Recommendations:  -No need for additional iHD today    -Continue iHD q TTS and PRN   -Continue binders  -No dietary protein restrictions  -Hold BP medications on iHD mornings  -Continue IV iron load  -Once IV iron load complete, consider starting JUDITH to goal hgb 10-11  -Okay to transition to outpt hemodialysis when medically cleared   Thank you,

## 2022-05-18 NOTE — CARE PLAN
The patient is Stable - Low risk of patient condition declining or worsening    Shift Goals  Clinical Goals: Participation in POC  Patient Goals: Sleep  Family Goals: CARIE    Progress made toward(s) clinical / shift goals:  see above     Patient is not progressing towards the following goals:

## 2022-05-18 NOTE — DISCHARGE PLANNING
Agency/Facility Name: [facility]   Outcome: Left a voicemail asking for bed availability and a call back.     [phone]  Agency/Facility Name: [facility]  Spoke To: [facility]  Outcome: Per Balbina, pt is declined due to dialysis schedule.     @[phone]  Agency/Facility Name: [facility]  Outcome: Left a voicemail asking for bed availability and a call back.

## 2022-05-18 NOTE — CARE PLAN
The patient is Watcher - Medium risk of patient condition declining or worsening    Shift Goals  Clinical Goals: Encourage participation/complete HD  Patient Goals: I don't want to do HD  Family Goals: CARIE    Progress made toward(s) clinical / shift goals:  Patient continues to refuse care when asked.     Patient is not progressing towards the following goals:      Problem: Fall Risk  Goal: Patient will remain free from falls  Outcome: Not Progressing

## 2022-05-18 NOTE — CARE PLAN
The patient is Stable - Low risk of patient condition declining or worsening    Shift Goals  Clinical Goals: Participation in POC  Patient Goals: Sleep  Family Goals: CARIE    Progress made toward(s) clinical / shift goals:    Problem: Pain - Standard  Goal: Alleviation of pain or a reduction in pain to the patient’s comfort goal  Outcome: Progressing     Problem: Fall Risk  Goal: Patient will remain free from falls  Outcome: Progressing       Patient is not progressing towards the following goals:

## 2022-05-19 VITALS
DIASTOLIC BLOOD PRESSURE: 54 MMHG | RESPIRATION RATE: 18 BRPM | HEIGHT: 65 IN | BODY MASS INDEX: 30.96 KG/M2 | SYSTOLIC BLOOD PRESSURE: 142 MMHG | TEMPERATURE: 96.8 F | HEART RATE: 95 BPM | WEIGHT: 185.85 LBS | OXYGEN SATURATION: 96 %

## 2022-05-19 LAB
ALBUMIN SERPL BCP-MCNC: 3.2 G/DL (ref 3.2–4.9)
ALBUMIN/GLOB SERPL: 1 G/DL
ALP SERPL-CCNC: 73 U/L (ref 30–99)
ALT SERPL-CCNC: 15 U/L (ref 2–50)
ANION GAP SERPL CALC-SCNC: 14 MMOL/L (ref 7–16)
AST SERPL-CCNC: 22 U/L (ref 12–45)
BILIRUB SERPL-MCNC: 0.3 MG/DL (ref 0.1–1.5)
BUN SERPL-MCNC: 35 MG/DL (ref 8–22)
CALCIUM SERPL-MCNC: 8.8 MG/DL (ref 8.5–10.5)
CHLORIDE SERPL-SCNC: 97 MMOL/L (ref 96–112)
CO2 SERPL-SCNC: 25 MMOL/L (ref 20–33)
CREAT SERPL-MCNC: 7.43 MG/DL (ref 0.5–1.4)
ERYTHROCYTE [DISTWIDTH] IN BLOOD BY AUTOMATED COUNT: 57.9 FL (ref 35.9–50)
GFR SERPLBLD CREATININE-BSD FMLA CKD-EPI: 6 ML/MIN/1.73 M 2
GLOBULIN SER CALC-MCNC: 3.2 G/DL (ref 1.9–3.5)
GLUCOSE SERPL-MCNC: 98 MG/DL (ref 65–99)
HCT VFR BLD AUTO: 27 % (ref 37–47)
HGB BLD-MCNC: 8.6 G/DL (ref 12–16)
MCH RBC QN AUTO: 30.2 PG (ref 27–33)
MCHC RBC AUTO-ENTMCNC: 31.9 G/DL (ref 33.6–35)
MCV RBC AUTO: 94.7 FL (ref 81.4–97.8)
PLATELET # BLD AUTO: 144 K/UL (ref 164–446)
PMV BLD AUTO: 11.2 FL (ref 9–12.9)
POTASSIUM SERPL-SCNC: 4.5 MMOL/L (ref 3.6–5.5)
PROT SERPL-MCNC: 6.4 G/DL (ref 6–8.2)
RBC # BLD AUTO: 2.85 M/UL (ref 4.2–5.4)
SODIUM SERPL-SCNC: 136 MMOL/L (ref 135–145)
WBC # BLD AUTO: 4.2 K/UL (ref 4.8–10.8)

## 2022-05-19 PROCEDURE — 36415 COLL VENOUS BLD VENIPUNCTURE: CPT

## 2022-05-19 PROCEDURE — 80053 COMPREHEN METABOLIC PANEL: CPT

## 2022-05-19 PROCEDURE — 85027 COMPLETE CBC AUTOMATED: CPT

## 2022-05-19 PROCEDURE — 99239 HOSP IP/OBS DSCHRG MGMT >30: CPT | Performed by: INTERNAL MEDICINE

## 2022-05-19 PROCEDURE — 92526 ORAL FUNCTION THERAPY: CPT

## 2022-05-19 RX ORDER — METOPROLOL TARTRATE 50 MG/1
50 TABLET, FILM COATED ORAL 2 TIMES DAILY
Qty: 60 TABLET | Status: ON HOLD
Start: 2022-05-19 | End: 2022-05-25

## 2022-05-19 RX ORDER — VENLAFAXINE 37.5 MG/1
37.5 TABLET ORAL EVERY MORNING
Qty: 90 TABLET | Refills: 3 | Status: SHIPPED
Start: 2022-05-20 | End: 2022-05-21

## 2022-05-19 ASSESSMENT — ENCOUNTER SYMPTOMS
DIZZINESS: 0
ABDOMINAL PAIN: 0
DIARRHEA: 0
SEIZURES: 0
COUGH: 0
CONSTIPATION: 0
BLURRED VISION: 0
ORTHOPNEA: 0
EYE DISCHARGE: 0
SINUS PAIN: 0
VOMITING: 0
BACK PAIN: 0
PALPITATIONS: 0
CHILLS: 0
EYE PAIN: 0
HEADACHES: 0
SPUTUM PRODUCTION: 0
NECK PAIN: 0
WEIGHT LOSS: 0
MYALGIAS: 0
CLAUDICATION: 0
NAUSEA: 0
FOCAL WEAKNESS: 0
EYE REDNESS: 0

## 2022-05-19 NOTE — DIETARY
"Nutrition Services: Update   Day 14 of admit.  Liberty Bolton is a 67 y.o. female with admitting DX of Anemia requiring transfusions    Pt's current diet order is Level 5 - minced/moist, Level 0 - thin liquids, 1:1 supervision by nursing: PO intake <25-50% most meals, x 2 at 50-75% earlier in week.  Wt not updated ~2 weeks. RN responded would update wt.  Labs: BUN 35, crea 7.43, GFR 6. ESRD on HD  MAR: phos-lo, calcitriol, ferrous sulfate, ergocalciferol. Note, pt declining many medications per MAR.  Edema: 2+ pitting to BLEs, generalized to BUEs  Last BM: 5/17 per pt    Current clinical picture and MD notes reviewed. Legal hold discontinued 5/11. Seen by SLP for PO trials; pt requiring encouragement to participate.     RD visited pt briefly at bedside. Pt w/ flat affect, minimal agreement w/ proposed snacks. Pt declined supplements, stating \"fattening\"; did not acknowledge RD explanation of benefits of oral nutrition supplements when intake of meals is poor. Pt did agree to Chobani and Greek yogurts w/ meals, tuna salad w/ lunch.     Malnutrition Risk: At risk w/ ongoing poor PO intake    Recommendations/Plan:  1. Provide snacks w/ all meals.   2. Encourage intake of meals and snacks.  3. Document intake of all meals as % taken in ADL's to provide interdisciplinary communication across all shifts.   4. Monitor weight. Please obtain measured weights at least 1x/week.   5. Nutrition rep will continue to see patient for ongoing meal and snack preferences.  6. Obtain supplement order per RD as needed.    RD following            "

## 2022-05-19 NOTE — THERAPY
Speech Language Pathology  Daily Treatment     Patient Name: Liberty Bolton  Age:  67 y.o., Sex:  female  Medical Record #: [id number]  Today's Date: 5/19/2022       Precautions: Fall Risk, Swallow Precautions ( See Comments)    Assessment  Pt was seen upright in fontenot's position. Pt and  regulated trials of thin liquids by cup, ice cream and diced peaches. Appropriate oral acceptance and sufficient containment. Mildly prolonged mastication of the peaches. Presumed slowed oral manipulation and lingual motion. Complete oral clearance. Cough noted x1 following a bite of the peaches when the pt attempted to talk while eating. No overt signs of aspiration noted otherwise. Voice was dry pre and post oral intake.    Clinical Impressions  The pt presents with suspected baseline oropharyngeal swallow function.  confirmed that pt consumes soft, cut and ground foods at home d/t lack of dentition. Pt appears safe to continue with a modified diet at this time.       Recommendations  1.  Minced & Moist solids (MM5), Thin liquids (TN0)  2.  Swallowing Instructions & Precautions:   Supervision: 1:1 feeding with constant supervision  Positioning: Fully upright and midline during oral intake  Medication: Whole with puree  Strategies: Small bites/sips, Alternate bites and sips, Slow rate of intake, No straws  Oral Care: Q4h  3.  SLP to follow for dysphagia management at a distance given that current consistency is most similar to baseline diet. Therefore, will decreased tx frequency while in house.    Results and recs d/w pt,  and RN. Thank you.      Plan  Treatment plan modified to 1 time per week until therapy goals are met for the following treatments:  Dysphagia Training.  Discharge Recommendations: Recommend post-acute placement for additional speech therapy services prior to discharge home    Subjective  RN cleared the pt for speech tx, no acute changes reported. Pt was received awake and alert. She required  "encouragement to participate, however was more willing once her  arrived. Pt was pleasant and cooperative. Telesitter monitoring in progress.        05/19/22 1153   Charge Group   SLP Swallowing Dysfunction Treatment Swallowing Dysfunction Treatment   Total Treatment Time   SLP Time Spent Yes   SLP Swallowing Dysfunction Treatment (Mins) 21   SLP Total Time Spent 21   Precautions   Precautions Fall Risk;Swallow Precautions ( See Comments)   Vitals   O2 Delivery Device None - Room Air   Pain 0 - 10 Group   Therapist Pain Assessment Post Activity Pain Same as Prior to Activity   Patient / Family Goals   Patient / Family Goal #1 \"More.\" (water)   Goal #1 Outcome Progressing as expected   Short Term Goals   Short Term Goal # 1 B  Patient will consume meals of minced/moist solids and thin liquids with no s/sx of aspiration given direct S/feeding A.   Goal Outcome  # 1 B Progressing as expected     "

## 2022-05-19 NOTE — PROGRESS NOTES
Went over discharge paper work, called report to Shad at [facility] patient discharged via W/C with [facility].

## 2022-05-19 NOTE — PROGRESS NOTES
[facility] Progress Note      Pt scheduled for HD tx,  and good to go but Pt refused tx per primary RN, Marilee Lau, APRN rounded at bedside, Pt was constantly refusing dialysis today, okayed to reschedule HD tx tomorrow 05/20/2022.
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
## 2022-05-19 NOTE — THERAPY
Speech Language Pathology  Daily Treatment     Patient Name: Liberty Bolton  Age:  67 y.o., Sex:  female  Medical Record #: [id number]  Today's Date: 5/18/2022     Precautions  Precautions: Fall Risk, Swallow Precautions ( See Comments)    Assessment  The pt was seen while in semi-fontenot's position (pt did not want HOB inclined). SLP administered trials of thin liquids by cup/straw and diced peaches. Pt required encouragement to accept PO trials. Appropriate oral acceptance and sufficient containment. Mastication time of 20-38 sec of the peaches. Presumed slowed oral manipulation and lingual motion. Complete oral clearance. Coughing noted with thin liquids by straw. Voice was dry pre and post oral intake. Pt declined further PO trials and tx session was terminated.       Recommendations  1.  Minced & Moist solids (MM5), Thin liquids (TN0)  2.  Swallowing Instructions & Precautions:   Supervision: 1:1 feeding with constant supervision  Positioning: Fully upright and midline during oral intake  Medication: Whole with puree, As tolerated  Strategies: Small bites/sips, Alternate bites and sips, Slow rate of intake, No straws  Oral Care: Q6h  3.  SLP to follow for dysphagia management.    Results and recs d/w pt and RN. Thank you.      Plan  Continue current treatment plan.  Discharge Recommendations: Recommend post-acute placement for additional speech therapy services prior to discharge home    Subjective  RN cleared the pt for speech tx, no acute changes reported. RN indicated that pt was refusing most oral intake, as well as some medications. Pt was received awake and alert. She required encouragement to participate. Telesitter monitoring in progress.        05/18/22 8665   Charge Group   SLP Swallowing Dysfunction Treatment Swallowing Dysfunction Treatment   Total Treatment Time   SLP Time Spent Yes   SLP Swallowing Dysfunction Treatment (Mins) 16   SLP Total Time Spent 16   Vitals   O2 Delivery Device None - Room Air  "  Pain 0 - 10 Group   Therapist Pain Assessment Post Activity Pain Same as Prior to Activity;Nurse Notified;0   Patient / Family Goals   Patient / Family Goal #1 \"More.\" (water)   Goal #1 Outcome Progressing as expected   Short Term Goals   Short Term Goal # 1 B  Patient will consume meals of minced/moist solids and thin liquids with no s/sx of aspiration given direct S/feeding A.   Goal Outcome  # 1 B Progressing as expected     "
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
## 2022-05-19 NOTE — DISCHARGE INSTRUCTIONS
Discharge Instructions    Discharged to home by medical transportation with escort. Discharged via wheelchair, hospital escort: Yes.  Special equipment needed: Not Applicable    Be sure to schedule a follow-up appointment with your primary care doctor or any specialists as instructed.     Discharge Plan:        I understand that a diet low in cholesterol, fat, and sodium is recommended for good health. Unless I have been given specific instructions below for another diet, I accept this instruction as my diet prescription.   Other diet:     Special Instructions: None    Is patient discharged on Warfarin / Coumadin?   No     Depression / Suicide Risk    As you are discharged from this [facility] facility, it is important to learn how to keep safe from harming yourself.    Recognize the warning signs:  Abrupt changes in personality, positive or negative- including increase in energy   Giving away possessions  Change in eating patterns- significant weight changes-  positive or negative  Change in sleeping patterns- unable to sleep or sleeping all the time   Unwillingness or inability to communicate  Depression  Unusual sadness, discouragement and loneliness  Talk of wanting to die  Neglect of personal appearance   Rebelliousness- reckless behavior  Withdrawal from people/activities they love  Confusion- inability to concentrate     If you or a loved one observes any of these behaviors or has concerns about self-harm, here's what you can do:  Talk about it- your feelings and reasons for harming yourself  Remove any means that you might use to hurt yourself (examples: pills, rope, extension cords, firearm)  Get professional help from the community (Mental Health, Substance Abuse, psychological counseling)  Do not be alone:Call your Safe Contact- someone whom you trust who will be there for you.  Call your local CRISIS HOTLINE 906-9984 or 072-473-4764  Call your local Children's Mobile Crisis Response Team [place] (881) 620-7978 or www.[employer]  Call the toll free National Suicide Prevention Hotlines   National Suicide Prevention Lifeline 106-969-WZCM (7210)  [employer] 800-SUICIDE (415-4637)
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
## 2022-05-19 NOTE — CARE PLAN
Problem: Nutritional:  Goal: Achieve adequate nutritional intake  Description: Patient will consume >50% of meals.  Outcome: Not Met     See RD note.

## 2022-05-19 NOTE — DISCHARGE PLANNING
Case Management Discharge Planning    Admission Date: 5/5/2022  GMLOS: 4.3  ALOS: 14    6-Clicks ADL Score: 15  6-Clicks Mobility Score: 6  PT and/or OT Eval ordered: yes  Post-acute Referrals Ordered: yes  Post-acute Choice Obtained: yes  Has referral(s) been sent to post-acute provider: yes    Anticipated Discharge Dispo: Discharge Disposition: D/T to SNF with Medicare cert in anticipation of skilled care (03)    DME Needed: None    Action(s) Taken: RN CM received call from Priscilla from [facility]. Pt has been accepted and bed us available. Pt will be picked up at 1500 by [email or web address] via w/c. CM spoke with pt, IMM completed at bedside and faxed to [facility]. No other identified needs at this time.     Escalations Completed: None    Medically Clear: Yes    Next Steps: Pending transport to [facility].    Barriers to Discharge: None     patient up for discharge today.    [id number] CM completed transfer packet, given to JUAN Cohen.

## 2022-05-19 NOTE — CARE PLAN
The patient is Stable - Low risk of patient condition declining or worsening    Shift Goals  Clinical Goals: safty  Patient Goals: rest  Family Goals: CARIE    Progress made toward(s) clinical / shift goals:  see above     Patient is not progressing towards the following goals:
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
## 2022-05-21 ENCOUNTER — HOSPITAL ENCOUNTER (INPATIENT)
Facility: MEDICAL CENTER | Age: 67
LOS: 4 days | DRG: 252 | End: 2022-05-25
Attending: EMERGENCY MEDICINE | Admitting: STUDENT IN AN ORGANIZED HEALTH CARE EDUCATION/TRAINING PROGRAM
Payer: MEDICARE

## 2022-05-21 ENCOUNTER — APPOINTMENT (OUTPATIENT)
Dept: RADIOLOGY | Facility: MEDICAL CENTER | Age: 67
DRG: 252 | End: 2022-05-21
Attending: EMERGENCY MEDICINE
Payer: MEDICARE

## 2022-05-21 ENCOUNTER — APPOINTMENT (OUTPATIENT)
Dept: RADIOLOGY | Facility: MEDICAL CENTER | Age: 67
DRG: 252 | End: 2022-05-21
Attending: SURGERY
Payer: MEDICARE

## 2022-05-21 DIAGNOSIS — E87.5 HYPERKALEMIA: ICD-10-CM

## 2022-05-21 DIAGNOSIS — Z78.9 PROBLEM WITH VASCULAR ACCESS: ICD-10-CM

## 2022-05-21 PROBLEM — I10 PRIMARY HYPERTENSION: Chronic | Status: ACTIVE | Noted: 2022-05-05

## 2022-05-21 PROBLEM — Z79.4 TYPE 2 DIABETES MELLITUS, WITH LONG-TERM CURRENT USE OF INSULIN (HCC): Status: ACTIVE | Noted: 2022-05-21

## 2022-05-21 PROBLEM — E11.9 TYPE 2 DIABETES MELLITUS, WITH LONG-TERM CURRENT USE OF INSULIN (HCC): Status: ACTIVE | Noted: 2022-05-21

## 2022-05-21 PROBLEM — R53.1 GENERALIZED WEAKNESS: Chronic | Status: ACTIVE | Noted: 2022-05-05

## 2022-05-21 PROBLEM — T82.590A DIALYSIS AV FISTULA MALFUNCTION (HCC): Status: ACTIVE | Noted: 2022-05-21

## 2022-05-21 PROBLEM — N18.6 ESRD (END STAGE RENAL DISEASE) (HCC): Chronic | Status: ACTIVE | Noted: 2022-05-05

## 2022-05-21 LAB
ALBUMIN SERPL BCP-MCNC: 3.6 G/DL (ref 3.2–4.9)
ALBUMIN/GLOB SERPL: 1 G/DL
ALP SERPL-CCNC: 83 U/L (ref 30–99)
ALT SERPL-CCNC: 17 U/L (ref 2–50)
ANION GAP SERPL CALC-SCNC: 17 MMOL/L (ref 7–16)
AST SERPL-CCNC: 20 U/L (ref 12–45)
BASOPHILS # BLD AUTO: 0.3 % (ref 0–1.8)
BASOPHILS # BLD: 0.02 K/UL (ref 0–0.12)
BILIRUB SERPL-MCNC: 0.4 MG/DL (ref 0.1–1.5)
BUN SERPL-MCNC: 66 MG/DL (ref 8–22)
CALCIUM SERPL-MCNC: 9.4 MG/DL (ref 8.5–10.5)
CHLORIDE SERPL-SCNC: 97 MMOL/L (ref 96–112)
CO2 SERPL-SCNC: 22 MMOL/L (ref 20–33)
CREAT SERPL-MCNC: 9.97 MG/DL (ref 0.5–1.4)
EKG IMPRESSION: NORMAL
EOSINOPHIL # BLD AUTO: 0.04 K/UL (ref 0–0.51)
EOSINOPHIL NFR BLD: 0.7 % (ref 0–6.9)
ERYTHROCYTE [DISTWIDTH] IN BLOOD BY AUTOMATED COUNT: 58.5 FL (ref 35.9–50)
EST. AVERAGE GLUCOSE BLD GHB EST-MCNC: 100 MG/DL
GFR SERPLBLD CREATININE-BSD FMLA CKD-EPI: 4 ML/MIN/1.73 M 2
GLOBULIN SER CALC-MCNC: 3.7 G/DL (ref 1.9–3.5)
GLUCOSE BLD STRIP.AUTO-MCNC: 108 MG/DL (ref 65–99)
GLUCOSE BLD STRIP.AUTO-MCNC: 138 MG/DL (ref 65–99)
GLUCOSE SERPL-MCNC: 133 MG/DL (ref 65–99)
HBA1C MFR BLD: 5.1 % (ref 4–5.6)
HCT VFR BLD AUTO: 31.6 % (ref 37–47)
HGB BLD-MCNC: 9.9 G/DL (ref 12–16)
IMM GRANULOCYTES # BLD AUTO: 0.03 K/UL (ref 0–0.11)
IMM GRANULOCYTES NFR BLD AUTO: 0.5 % (ref 0–0.9)
LYMPHOCYTES # BLD AUTO: 0.46 K/UL (ref 1–4.8)
LYMPHOCYTES NFR BLD: 7.6 % (ref 22–41)
MCH RBC QN AUTO: 30 PG (ref 27–33)
MCHC RBC AUTO-ENTMCNC: 31.3 G/DL (ref 33.6–35)
MCV RBC AUTO: 95.8 FL (ref 81.4–97.8)
MONOCYTES # BLD AUTO: 0.3 K/UL (ref 0–0.85)
MONOCYTES NFR BLD AUTO: 5 % (ref 0–13.4)
NEUTROPHILS # BLD AUTO: 5.18 K/UL (ref 2–7.15)
NEUTROPHILS NFR BLD: 85.9 % (ref 44–72)
NRBC # BLD AUTO: 0 K/UL
NRBC BLD-RTO: 0 /100 WBC
PLATELET # BLD AUTO: 138 K/UL (ref 164–446)
PMV BLD AUTO: 10.9 FL (ref 9–12.9)
POTASSIUM SERPL-SCNC: 6.2 MMOL/L (ref 3.6–5.5)
PROT SERPL-MCNC: 7.3 G/DL (ref 6–8.2)
RBC # BLD AUTO: 3.3 M/UL (ref 4.2–5.4)
SODIUM SERPL-SCNC: 136 MMOL/L (ref 135–145)
WBC # BLD AUTO: 6 K/UL (ref 4.8–10.8)

## 2022-05-21 PROCEDURE — 36415 COLL VENOUS BLD VENIPUNCTURE: CPT

## 2022-05-21 PROCEDURE — 36556 INSERT NON-TUNNEL CV CATH: CPT | Mod: RT | Performed by: SURGERY

## 2022-05-21 PROCEDURE — 99223 1ST HOSP IP/OBS HIGH 75: CPT | Mod: AI | Performed by: STUDENT IN AN ORGANIZED HEALTH CARE EDUCATION/TRAINING PROGRAM

## 2022-05-21 PROCEDURE — 71045 X-RAY EXAM CHEST 1 VIEW: CPT

## 2022-05-21 PROCEDURE — 85025 COMPLETE CBC W/AUTO DIFF WBC: CPT

## 2022-05-21 PROCEDURE — 99285 EMERGENCY DEPT VISIT HI MDM: CPT

## 2022-05-21 PROCEDURE — 93990 DOPPLER FLOW TESTING: CPT | Mod: 26 | Performed by: INTERNAL MEDICINE

## 2022-05-21 PROCEDURE — 80053 COMPREHEN METABOLIC PANEL: CPT

## 2022-05-21 PROCEDURE — 82962 GLUCOSE BLOOD TEST: CPT | Mod: 91

## 2022-05-21 PROCEDURE — 93010 ELECTROCARDIOGRAM REPORT: CPT | Performed by: INTERNAL MEDICINE

## 2022-05-21 PROCEDURE — 770020 HCHG ROOM/CARE - TELE (206)

## 2022-05-21 PROCEDURE — A9270 NON-COVERED ITEM OR SERVICE: HCPCS | Performed by: STUDENT IN AN ORGANIZED HEALTH CARE EDUCATION/TRAINING PROGRAM

## 2022-05-21 PROCEDURE — 700111 HCHG RX REV CODE 636 W/ 250 OVERRIDE (IP): Performed by: INTERNAL MEDICINE

## 2022-05-21 PROCEDURE — 5A1D70Z PERFORMANCE OF URINARY FILTRATION, INTERMITTENT, LESS THAN 6 HOURS PER DAY: ICD-10-PCS | Performed by: INTERNAL MEDICINE

## 2022-05-21 PROCEDURE — 700102 HCHG RX REV CODE 250 W/ 637 OVERRIDE(OP)

## 2022-05-21 PROCEDURE — 700102 HCHG RX REV CODE 250 W/ 637 OVERRIDE(OP): Performed by: STUDENT IN AN ORGANIZED HEALTH CARE EDUCATION/TRAINING PROGRAM

## 2022-05-21 PROCEDURE — 93005 ELECTROCARDIOGRAM TRACING: CPT | Performed by: EMERGENCY MEDICINE

## 2022-05-21 PROCEDURE — 90935 HEMODIALYSIS ONE EVALUATION: CPT

## 2022-05-21 PROCEDURE — 83036 HEMOGLOBIN GLYCOSYLATED A1C: CPT

## 2022-05-21 PROCEDURE — 02HV33Z INSERTION OF INFUSION DEVICE INTO SUPERIOR VENA CAVA, PERCUTANEOUS APPROACH: ICD-10-PCS | Performed by: SURGERY

## 2022-05-21 PROCEDURE — 99221 1ST HOSP IP/OBS SF/LOW 40: CPT | Mod: 25 | Performed by: SURGERY

## 2022-05-21 PROCEDURE — A9270 NON-COVERED ITEM OR SERVICE: HCPCS

## 2022-05-21 PROCEDURE — 93990 DOPPLER FLOW TESTING: CPT

## 2022-05-21 RX ORDER — OMEPRAZOLE 20 MG/1
20 CAPSULE, DELAYED RELEASE ORAL DAILY
Status: DISCONTINUED | OUTPATIENT
Start: 2022-05-22 | End: 2022-05-25 | Stop reason: HOSPADM

## 2022-05-21 RX ORDER — AMOXICILLIN 250 MG
2 CAPSULE ORAL 2 TIMES DAILY
Status: DISCONTINUED | OUTPATIENT
Start: 2022-05-21 | End: 2022-05-25 | Stop reason: HOSPADM

## 2022-05-21 RX ORDER — HEPARIN SODIUM 1000 [USP'U]/ML
2400 INJECTION, SOLUTION INTRAVENOUS; SUBCUTANEOUS
Status: DISCONTINUED | OUTPATIENT
Start: 2022-05-21 | End: 2022-05-24

## 2022-05-21 RX ORDER — ACETAMINOPHEN 325 MG/1
650 TABLET ORAL EVERY 6 HOURS PRN
Status: DISCONTINUED | OUTPATIENT
Start: 2022-05-21 | End: 2022-05-21

## 2022-05-21 RX ORDER — GABAPENTIN 300 MG/1
300 CAPSULE ORAL DAILY
Status: DISCONTINUED | OUTPATIENT
Start: 2022-05-21 | End: 2022-05-25 | Stop reason: HOSPADM

## 2022-05-21 RX ORDER — POLYETHYLENE GLYCOL 3350 17 G/17G
1 POWDER, FOR SOLUTION ORAL
Status: DISCONTINUED | OUTPATIENT
Start: 2022-05-21 | End: 2022-05-25 | Stop reason: HOSPADM

## 2022-05-21 RX ORDER — DEXTROSE MONOHYDRATE 25 G/50ML
25 INJECTION, SOLUTION INTRAVENOUS
Status: DISCONTINUED | OUTPATIENT
Start: 2022-05-21 | End: 2022-05-25 | Stop reason: HOSPADM

## 2022-05-21 RX ORDER — BISACODYL 10 MG
10 SUPPOSITORY, RECTAL RECTAL
Status: DISCONTINUED | OUTPATIENT
Start: 2022-05-21 | End: 2022-05-25 | Stop reason: HOSPADM

## 2022-05-21 RX ORDER — ATORVASTATIN CALCIUM 80 MG/1
80 TABLET, FILM COATED ORAL EVERY EVENING
Status: DISCONTINUED | OUTPATIENT
Start: 2022-05-21 | End: 2022-05-25 | Stop reason: HOSPADM

## 2022-05-21 RX ADMIN — GABAPENTIN 300 MG: 300 CAPSULE ORAL at 18:47

## 2022-05-21 RX ADMIN — HEPARIN SODIUM 2400 UNITS: 1000 INJECTION, SOLUTION INTRAVENOUS; SUBCUTANEOUS at 18:45

## 2022-05-21 RX ADMIN — ATORVASTATIN CALCIUM 80 MG: 80 TABLET, FILM COATED ORAL at 18:14

## 2022-05-21 ASSESSMENT — ENCOUNTER SYMPTOMS
MYALGIAS: 0
FEVER: 0
PALPITATIONS: 0
FOCAL WEAKNESS: 0
SORE THROAT: 0
DOUBLE VISION: 0
ABDOMINAL PAIN: 0
COUGH: 0
HEADACHES: 0
WHEEZING: 0
BLURRED VISION: 0
NAUSEA: 0
NERVOUS/ANXIOUS: 0
SINUS PAIN: 0
WEAKNESS: 1
DIARRHEA: 0
SPUTUM PRODUCTION: 0
CHILLS: 0
CONSTIPATION: 0
SHORTNESS OF BREATH: 0
DIZZINESS: 0
VOMITING: 0

## 2022-05-21 ASSESSMENT — PAIN DESCRIPTION - PAIN TYPE: TYPE: ACUTE PAIN

## 2022-05-21 ASSESSMENT — FIBROSIS 4 INDEX
FIB4 SCORE: 2.64
FIB4 SCORE: 2.36

## 2022-05-21 NOTE — ASSESSMENT & PLAN NOTE
Likely from hyperkalemia  Hopeful to improve with normalization of K after HD   Will monitor   Fall/aspiration precautions

## 2022-05-21 NOTE — CONSULTS
[facility] -  CONSULTATION NOTE               Author: ASHLY Moreno, CNN-NP Date & Time: 5/21/2022  11:55 AM       REASON FOR CONSULTATION:   Inpatient hemodialysis management.    CHIEF COMPLAINT:   Dialysis access not working    HISTORY OF PRESENT ILLNESS:    67 years old female with medical history notable for insulin-dependent type 2 diabetes, hypertension, end-stage renal disease due to diabetes has been on hemodialysis for 4 years, presenting to hospital, she was at her dialysis clinic this am and her AVF was noted to be thrombosed she was brought to the ER via [facility].  She was recently in the hospital discharged to SNF. She had been refusing hemodialysis intermittently.  Her K+ is elevated today so vascular was consulted for temp dialysis catheter so she will receive hemodialysis today and then vascular will take her to the OR tomorrow for thrombectomy of AVF.        No F/C/N/V/CP/SOB.  No melena, hematochezia, hematemesis.  No HA, visual changes, or abdominal pain.    REVIEW OF SYSTEMS:    10 point ROS was performed and is as per HPI or otherwise negative.    PAST MEDICAL HISTORY:   Past Medical History:   Diagnosis Date   • Diabetes (HCC)    • Renal disorder        PAST SURGICAL HISTORY:   History reviewed. No pertinent surgical history.    FAMILY HISTORY:   History reviewed. No pertinent family history.    SOCIAL HISTORY:   Social History     Tobacco Use   Smoking Status Never Smoker   Smokeless Tobacco Never Used     Social History     Substance and Sexual Activity   Alcohol Use Not Currently     Social History     Substance and Sexual Activity   Drug Use Not Currently       HOME MEDICATIONS:   Reviewed and documented in chart.    LABORATORY STUDIES:   Recent Labs     05/19/22  0201 05/21/22  0843   SODIUM 136 136   POTASSIUM 4.5 6.2*   CHLORIDE 97 97   CO2 25 22   GLUCOSE 98 133*   BUN 35* 66*   CREATININE 7.43* 9.97*   CALCIUM 8.8 9.4       ALLERGIES:  Bactrim  "[sulfamethoxazole-trimethoprim], Dilaudid [hydromorphone], Enalapril, and Vicodin hp [hydrocodone-acetaminophen]    VS:  /59   Pulse 70   Temp 36.3 °C (97.4 °F) (Temporal)   Resp 16   Ht 1.651 m (5' 5\")   Wt 83.9 kg (185 lb)   SpO2 96%   BMI 30.79 kg/m²     Physical Exam  Constitutional:       Appearance: Normal appearance. She is normal weight.   HENT:      Head: Normocephalic and atraumatic.      Nose: Nose normal.      Mouth/Throat:      Mouth: Mucous membranes are moist.      Pharynx: Oropharynx is clear.   Eyes:      Extraocular Movements: Extraocular movements intact.      Conjunctiva/sclera: Conjunctivae normal.      Pupils: Pupils are equal, round, and reactive to light.   Cardiovascular:      Rate and Rhythm: Normal rate and regular rhythm.      Comments: R UE AVF + thrombosed   Pulmonary:      Effort: Pulmonary effort is normal.      Breath sounds: Normal breath sounds.   Abdominal:      General: Abdomen is flat. Bowel sounds are normal.      Palpations: Abdomen is soft.   Musculoskeletal:         General: Normal range of motion.      Cervical back: Normal range of motion and neck supple.   Skin:     General: Skin is warm and dry.   Neurological:      General: No focal deficit present.      Mental Status: She is alert. Mental status is at baseline.   Psychiatric:      Comments: withdrawn            FLUID BALANCE:  No intake/output data recorded.    IMAGING:  All imaging reviewed from admission to present day    IMPRESSION:  # ESRD   - iHD q TThS and prn   - via R AVF (thrombosed)   - will have temp CVC placed and iHD today   # HTN, controlled    - Goal BP < 140/90   - Continue BB   - UF with iHD as tolerated   # Anemia of CKD, below target   - Goal Hgb 10-11   - check iron stores   # CKD-MBD   - PTH pen   - Vit D pen   - Ca: 9.4   - PO4 pen  # Thrombosed AVF   - temp CVC today for hyperk+    - thrombectomy with Dr. Mercer 5/22  #Thrombocytopenia   - no heparin with iHD   - " monitor  Hyperkalemia   - RRT today after temp line placement    - low K+ diet   Dyslipidemia   - statin  DMII   - per primary team         PLAN:  - Temp CVC today and then iHD for hyperk+  - Thrombectomy with Dr. Mercer 5/22  - UF as tolerated  - No dietary protein restrictions  - Dose all meds per ESRD  - Check iron stores, PTH, vit D   - Will continue to follow with further recommendations     Thank you for the consultation!

## 2022-05-21 NOTE — CONSULTS
ACUTE CARE VASCULAR SERVICE  PROGRESS NOTE    -------------------------------------------------------------------------------------------------  Date: 5/21/2022    Referring Provider:  Darshan Marcelo M.d.    Consulting Physician: Royal Mercer M.D. [facility]  -------------------------------------------------------------------------------------------------    Reason for consultation: temporary dialysis catheter placement    HPI: This is a 67 y.o. female with ESRD via a RUE AVF which was thrombosed when she went to dialyze today. Patient sent to ER. Patient will need either thrombectomy or new access however the potassium is currently 6.2, too high for anesthesia.  Temporary catheter will be needed at this time to dialyze prior to access operations. When I came to evaluate the patient she was in no acute distress, she is minimally conversant and cannot provide history, sounds like this may be close to her baseline.    Past Medical History:   Diagnosis Date   • Diabetes (HCC)    • Renal disorder        History reviewed. No pertinent surgical history.    Current Facility-Administered Medications   Medication Dose Route Frequency Provider Last Rate Last Admin   • senna-docusate (PERICOLACE or SENOKOT S) 8.6-50 MG per tablet 2 Tablet  2 Tablet Oral BID Darshan Marcelo M.D.        And   • polyethylene glycol/lytes (MIRALAX) PACKET 1 Packet  1 Packet Oral QDAY PRN Darshan Marcelo M.D.        And   • magnesium hydroxide (MILK OF MAGNESIA) suspension 30 mL  30 mL Oral QDAY PRN Darshan Marcelo M.D.        And   • bisacodyl (DULCOLAX) suppository 10 mg  10 mg Rectal QDAY PRN Darshan Marcelo M.D.       • atorvastatin (LIPITOR) tablet 80 mg  80 mg Oral Q EVENING Darshan Marcelo M.D.       • [START ON 5/22/2022] omeprazole (PRILOSEC) capsule 20 mg  20 mg Oral DAILY Darshan Marcelo M.D.       • metoprolol tartrate (LOPRESSOR) tablet 25 mg  25 mg Oral BID Darshan Marcelo M.D.       • insulin regular (HumuLIN R,NovoLIN R) injection  1-6  Units Subcutaneous 4X/DAY ACHS Darshan Marcelo M.D.        And   • dextrose 50% (D50W) injection 25 g  25 g Intravenous Q15 MIN PRN Darshan Marcelo M.D.         Current Outpatient Medications   Medication Sig Dispense Refill   • metoprolol tartrate (LOPRESSOR) 50 MG Tab Take 1 Tablet by mouth 2 times a day. 60 Tablet    • amLODIPine (NORVASC) 5 MG Tab Take 2 Tablets by mouth every day. 30 Tablet    • gabapentin (NEURONTIN) 300 MG Cap Take 300 mg by mouth every day.     • calcium acetate (PHOS-LO) 667 MG Cap Take 667 mg by mouth 3 times a day before meals.     • atorvastatin (LIPITOR) 80 MG tablet Take 80 mg by mouth every day.     • insulin lispro (INSULIN LISPRO) 100 UNIT/ML Solution Pen-injector Inject 14 Units under the skin every day.     • hydrALAZINE (APRESOLINE) 25 MG Tab Take 25 mg by mouth 2 times a day.     • metoprolol SR (TOPROL XL) 100 MG TABLET SR 24 HR Take 100 mg by mouth every day.     • omeprazole (PRILOSEC) 20 MG delayed-release capsule Take 20 mg by mouth every day.     • pioglitazone (ACTOS) 45 MG Tab Take 45 mg by mouth every day.     • sevelamer (RENAGEL) 800 MG Tab Take 800 mg by mouth 3 times a day with meals.     • Insulin Glargine, 2 Unit Dial, (TOUJEO MAX SOLOSTAR) 300 UNIT/ML Solution Pen-injector Inject 45 Units under the skin at bedtime.     • linagliptin (TRADJENTA) 5 MG Tab tablet Take 5 mg by mouth every day.         Social History     Socioeconomic History   • Marital status:      Spouse name: Not on file   • Number of children: Not on file   • Years of education: Not on file   • Highest education level: Not on file   Occupational History   • Not on file   Tobacco Use   • Smoking status: Never Smoker   • Smokeless tobacco: Never Used   Substance and Sexual Activity   • Alcohol use: Not Currently   • Drug use: Not Currently   • Sexual activity: Not on file   Other Topics Concern   • Not on file   Social History Narrative   • Not on file     Social Determinants of Health  "    Financial Resource Strain: Not on file   Food Insecurity: Not on file   Transportation Needs: Not on file   Physical Activity: Not on file   Stress: Not on file   Social Connections: Not on file   Intimate Partner Violence: Not on file   Housing Stability: Not on file       History reviewed. No pertinent family history.    Allergies:  Bactrim [sulfamethoxazole-trimethoprim], Dilaudid [hydromorphone], Enalapril, and Vicodin hp [hydrocodone-acetaminophen]    Review of Systems:  Unable to obtain as the patient is minimally conversant and doesn't provide history    Physical Exam:  /59   Pulse 72   Temp 36.3 °C (97.4 °F) (Temporal)   Resp 13   Ht 1.651 m (5' 5\")   Wt 83.9 kg (185 lb)   SpO2 93%     Constitutional: Somnolent but arousable, no acute distress  HEENT:  Normocephalic and atraumatic, EOMI  Neck:   Supple, no JVD,   Cardiovascular: Regular rate and rhythm,   Pulmonary:  Good air entry bilaterally,   Abdominal:  Soft, non-tender, non-distended  Musculoskeletal: No edema, no tenderness  Neurological:  CN II-XII grossly intact, no focal deficits  Skin:   Skin is warm and dry. No rash noted.  RUE AVF without thrill or bruit    Labs:  Recent Labs     05/19/22  0201 05/21/22  0843   WBC 4.2* 6.0   RBC 2.85* 3.30*   HEMOGLOBIN 8.6* 9.9*   HEMATOCRIT 27.0* 31.6*   MCV 94.7 95.8   MCH 30.2 30.0   MCHC 31.9* 31.3*   RDW 57.9* 58.5*   PLATELETCT 144* 138*   MPV 11.2 10.9     Recent Labs     05/19/22  0201 05/21/22  0843   SODIUM 136 136   POTASSIUM 4.5 6.2*   CHLORIDE 97 97   CO2 25 22   GLUCOSE 98 133*   BUN 35* 66*   CREATININE 7.43* 9.97*   CALCIUM 8.8 9.4         Recent Labs     05/19/22  0201 05/21/22  0843   ASTSGOT 22 20   ALTSGPT 15 17   TBILIRUBIN 0.3 0.4   ALKPHOSPHAT 73 83   GLOBULIN 3.2 3.7*         Assessment: This is a 67 y.o. female in need of an non-tunneled dialysis catheter. Will place today. This will be done under emergent indications as the patient is too disoriented to sign consent " and there is no family/POA available that we know of.    Once K improves, patient will need either thrombectomy or new access creation, which will probably be Monday.  Also would consider a goals-of-care evaluation prior to surgery    Royal Mercer M.D.  [facility]  [phone]

## 2022-05-21 NOTE — PROGRESS NOTES
ACUTE CARE VASCULAR SERVICE  PROGRESS NOTE      Thrombosed AVF  K 6.2  Will place temp cath and plan fistula surgery tomorrow or Monday depending on schedule availability    Royal Mercer MD  [facility]  Voalte preferred. Otherwise text to cell [phone] or call my office [phone]  __________________________________________________________________  Patient:Liberty Bolton   MRN:[id number]   CSN:[id number]

## 2022-05-21 NOTE — PROGRESS NOTES
ACUTE CARE VASCULAR SERVICE  PROGRESS NOTE      Occluded right upper arm AVF.   Last HD Thursday 5/19  K today is 6.2    Patient will need AVF thrombectomy, or new access creation and TDC placement, however the K is currently too high for non-emergent surgery. Goal is 5.5 or less. If potassium can be lowered medically we can plan surgery tomorrow, if dialysis is necessary she will need a temp cath, will await recommendations from nephrology    Royal Mercer MD  [facility]  Voalte preferred. Otherwise text to cell [phone] or call my office [phone]  __________________________________________________________________  Patient:Liberty Bolton   MRN:[id number]   [facility]:[id number]

## 2022-05-21 NOTE — PROGRESS NOTES
Report received from Trice AYALA RN. Updated on POC.  Assumed care of patient upon arrival to unit. Patient currently A & O x 2, disoriented to time and situation; on RA; up without complaints of acute pain. Pt placed on monitor, monitor room notified, SR 81. Patient oriented to unit and to call light system. Call light within reach. Pt educated to fall risk. Fall precautions in place. Pt provided with personal grooming items. Bed locked and in lowest position. All questions answered. No other needs indicated at this time.

## 2022-05-21 NOTE — ASSESSMENT & PLAN NOTE
"ESRD on HD TTS   HD fistula was working well on last HD on Thursday 5/19   Pt went to [facility] dialysis center on 5/21 & found that HD fistula is not working , unable to proceed with HD .  At ER, pt is lethargic. Labs showed K 6.2   & Cr 9.95.   EKG stat ordered.   ERP talked to Vascular surgery & nephrology.   US UE showed \"  Known brachial-cephalic hemodialysis fistula.    The fistula is occluded.    Acute occluded thrombus demonstrate throughout the fistula extending into    distal subclavian vein. The proximal subclavian vein is patent.    Appropriate flow velocities and waveforms throughout the inflow artery. \"  HD today - nephrology on board   Temp HD cath placement per vascular surgery   ? OR in tomorrow  AM vs Monday for thrombectomy & fistula surgery     "

## 2022-05-21 NOTE — ED NOTES
Med rec completed per pt's MAR   Allergies reviewed    Per pt's MAR pt takes Metoprolol Tartrate and Metoprolol Succinate

## 2022-05-21 NOTE — ASSESSMENT & PLAN NOTE
"ESRD on HD TTS   HD fistula was working well on last HD on Thursday 5/19   Pt went to [facility] dialysis center on 5/21 & found that HD fistula is not working , unable to proceed with HD .  At ER, pt is lethargic. Labs showed K 6.2   & Cr 9.95.   EKG stat ordered.   ERP talked to Vascular surgery & nephrology.   US UE showed \"  Known brachial-cephalic hemodialysis fistula.    The fistula is occluded.    Acute occluded thrombus demonstrate throughout the fistula extending into    distal subclavian vein. The proximal subclavian vein is patent.    Appropriate flow velocities and waveforms throughout the inflow artery. \"  HD today - nephrology on board   Temp HD cath placement per vascular surgery   ? OR in tomorrow  AM vs Monday for thrombectomy & fistula surgery   "

## 2022-05-21 NOTE — H&P
"Hospital Medicine History & Physical Note    Date of Service  5/21/2022    Primary Care Physician  No primary care provider on file.    Consultants  nephrology and vascular surgery    Specialist Names: Dr Mercer ( vascular ) , Dr Hickey ( nephro )    Code Status  Full Code    Chief Complaint  Chief Complaint   Patient presents with   • Vascular Access Problem     Pt KAT MONTANO from [facility] dialysis. Staff stated they were having issues with dialysis fistula to R arm. Pt receives dialysis tu, th, sat. Unable to receive dialysis today. Pt denies any complaints.        History of Presenting Illness  Liberty Bolton is a 67 y.o. female with ESRD on HD TTS, DM, HTN  who presented 5/21/2022 with HD fistula malfunction. Pt is a poor historian, cannot provide much HPI. Pt got her HD fistula many years ago at [facility]. HD fistula was working well on last HD on Thursday 5/19 earlier this week. Questionable compliance with her HD. Pt went to [facility] dialysis center today & found that HD fistula is not working , unable to proceed with HD . Pt was then sent to ER. Pt denied chest pain, SOB, palpitation, but c/o generalized weakness. Later on, pt stated that she just wants to rest & declined to participate in further more HPI.   At ER, pt is lethargic. Labs showed K 6.2   & Cr 9.95.   EKG stat ordered.   ERP talked to Vascular surgery & nephrology.   US UE showed \"  Known brachial-cephalic hemodialysis fistula.    The fistula is occluded.    Acute occluded thrombus demonstrate throughout the fistula extending into    distal subclavian vein. The proximal subclavian vein is patent.    Appropriate flow velocities and waveforms throughout the inflow artery. \"    I discussed the plan of care with patient and ERP.    Review of Systems  Review of Systems   Constitutional: Positive for malaise/fatigue. Negative for chills and fever.   HENT: Negative for congestion, ear discharge, ear pain, sinus pain and sore throat.    Eyes: Negative for " blurred vision and double vision.   Respiratory: Negative for cough, sputum production, shortness of breath and wheezing.    Cardiovascular: Negative for chest pain, palpitations and leg swelling.   Gastrointestinal: Negative for abdominal pain, constipation, diarrhea, nausea and vomiting.   Genitourinary: Negative for dysuria, frequency and urgency.   Musculoskeletal: Negative for myalgias.   Neurological: Positive for weakness. Negative for dizziness, focal weakness and headaches.   Psychiatric/Behavioral: The patient is not nervous/anxious.        Past Medical History   has a past medical history of Diabetes (HCC) and Renal disorder.    Surgical History   has no past surgical history on file.     Family History  family history is not on file.   Family history reviewed with patient. There is no family history that is pertinent to the chief complaint.     Social History   reports that she has never smoked. She has never used smokeless tobacco. She reports previous alcohol use. She reports previous drug use.    Allergies  Allergies   Allergen Reactions   • Bactrim [Sulfamethoxazole-Trimethoprim] Swelling     angioedema   • Dilaudid [Hydromorphone] Unspecified     Per [facility]   • Enalapril Anaphylaxis and Swelling     angioedema   • Vicodin Hp [Hydrocodone-Acetaminophen] Unspecified     Per [facility]       Medications  Prior to Admission Medications   Prescriptions Last Dose Informant Patient Reported? Taking?   Insulin Glargine, 2 Unit Dial, (TOUJEO MAX SOLOSTAR) 300 UNIT/ML Solution Pen-injector  Other Facility Yes No   Sig: Inject 45 Units under the skin at bedtime.   amLODIPine (NORVASC) 5 MG Tab   No No   Sig: Take 2 Tablets by mouth every day.   atorvastatin (LIPITOR) 80 MG tablet  Other Facility Yes No   Sig: Take 80 mg by mouth every day.   calcium acetate (PHOS-LO) 667 MG Cap  Other Facility Yes No   Sig: Take 667 mg by mouth 3 times a day before meals.   epoetin amos (EPOGEN/PROCRIT) 13710  UNIT/ML Solution  Other Facility Yes No   Sig: Inject 20,000 Units under the skin one time.   gabapentin (NEURONTIN) 300 MG Cap  Other Facility Yes No   Sig: Take 300 mg by mouth every day.   hydrALAZINE (APRESOLINE) 25 MG Tab  Other Facility Yes No   Sig: Take 25 mg by mouth 2 times a day.   insulin lispro (INSULIN LISPRO) 100 UNIT/ML Solution Pen-injector  Other Facility Yes No   Sig: Inject 14 Units under the skin every day.   linagliptin (TRADJENTA) 5 MG Tab tablet  Other Facility Yes No   Sig: Take 5 mg by mouth every day.   metoprolol SR (TOPROL XL) 100 MG TABLET SR 24 HR  Other Facility Yes No   Sig: Take 100 mg by mouth every day.   metoprolol tartrate (LOPRESSOR) 50 MG Tab   No No   Sig: Take 1 Tablet by mouth 2 times a day.   omeprazole (PRILOSEC) 20 MG delayed-release capsule  Other Facility Yes No   Sig: Take 20 mg by mouth every day.   pioglitazone (ACTOS) 45 MG Tab  Other Facility Yes No   Sig: Take 45 mg by mouth every day.   sevelamer (RENAGEL) 800 MG Tab  Other Facility Yes No   Sig: Take 800 mg by mouth 3 times a day with meals.   venlafaxine (EFFEXOR) 37.5 MG Tab   No No   Sig: Take 1 Tablet by mouth every morning.      Facility-Administered Medications: None       Physical Exam  Temp:  [36.3 °C (97.4 °F)] 36.3 °C (97.4 °F)  Pulse:  [68-71] 69  Resp:  [16-17] 16  BP: (113-147)/(56-60) 113/60  SpO2:  [94 %-96 %] 96 %  Blood Pressure : 113/60   Temperature: 36.3 °C (97.4 °F)   Pulse: 69   Respiration: 16   Pulse Oximetry: 96 %       Physical Exam  Constitutional:       General: She is not in acute distress.  HENT:      Head: Normocephalic and atraumatic.      Nose: Nose normal.      Mouth/Throat:      Mouth: Mucous membranes are moist.      Pharynx: No posterior oropharyngeal erythema.   Eyes:      General: No scleral icterus.     Extraocular Movements: Extraocular movements intact.      Conjunctiva/sclera: Conjunctivae normal.      Pupils: Pupils are equal, round, and reactive to light.    Cardiovascular:      Rate and Rhythm: Normal rate and regular rhythm.      Pulses: Normal pulses.      Heart sounds: Normal heart sounds. No murmur heard.    No gallop.      Comments: RUE AV Fistula   Pulmonary:      Effort: Pulmonary effort is normal.      Breath sounds: Normal breath sounds. No stridor. No wheezing, rhonchi or rales.   Abdominal:      General: Bowel sounds are normal.      Palpations: Abdomen is soft.   Musculoskeletal:         General: No swelling or tenderness.      Cervical back: Normal range of motion and neck supple. No rigidity.   Skin:     General: Skin is warm.   Neurological:      General: No focal deficit present.      Mental Status: She is alert.   Psychiatric:         Mood and Affect: Mood normal.         Behavior: Behavior normal.         Laboratory:  Recent Labs     05/19/22  0201 05/21/22  0843   WBC 4.2* 6.0   RBC 2.85* 3.30*   HEMOGLOBIN 8.6* 9.9*   HEMATOCRIT 27.0* 31.6*   MCV 94.7 95.8   MCH 30.2 30.0   MCHC 31.9* 31.3*   RDW 57.9* 58.5*   PLATELETCT 144* 138*   MPV 11.2 10.9     Recent Labs     05/19/22  0201 05/21/22  0843   SODIUM 136 136   POTASSIUM 4.5 6.2*   CHLORIDE 97 97   CO2 25 22   GLUCOSE 98 133*   BUN 35* 66*   CREATININE 7.43* 9.97*   CALCIUM 8.8 9.4     Recent Labs     05/19/22  0201 05/21/22  0843   ALTSGPT 15 17   ASTSGOT 22 20   ALKPHOSPHAT 73 83   TBILIRUBIN 0.3 0.4   GLUCOSE 98 133*         No results for input(s): NTPROBNP in the last 72 hours.      No results for input(s): TROPONINT in the last 72 hours.    Imaging:  US-HEMODIALYSIS GRAFT DUPLEX COMP UPPER EXTREMITY   Final Result          EKG:  I have personally reviewed the images and compared with prior images.    Assessment/Plan:  Justification for Admission Status  I anticipate this patient will require at least two midnights for appropriate medical management, necessitating inpatient admission because Hyperkalmeia requiring HD, pt will need vascular surgical procedures such as temp HD cath placement  ", thrombectomy , etc. Also needs tele monitoring for hyperkalmeia     * Hyperkalemia- (present on admission)  Assessment & Plan  Due for HD today   Nephrology consulted  Tele   EKG   Will monitor        ESRD (end stage renal disease) (HCC)- (present on admission)  Assessment & Plan  ESRD on HD TTS   HD fistula was working well on last HD on Thursday 5/19   Pt went to [facility] dialysis center on 5/21 & found that HD fistula is not working , unable to proceed with HD .  At ER, pt is lethargic. Labs showed K 6.2   & Cr 9.95.   EKG stat ordered.   ERP talked to Vascular surgery & nephrology.   US UE showed \"  Known brachial-cephalic hemodialysis fistula.    The fistula is occluded.    Acute occluded thrombus demonstrate throughout the fistula extending into    distal subclavian vein. The proximal subclavian vein is patent.    Appropriate flow velocities and waveforms throughout the inflow artery. \"  HD today - nephrology on board   Temp HD cath placement per vascular surgery   ? OR in tomorrow  AM vs Monday for thrombectomy & fistula surgery     Type 2 diabetes mellitus, with long-term current use of insulin (HCC)  Assessment & Plan  A1C   ISS for now   Will monitor glucose levels & make adjustment for better BP control     Dialysis AV fistula malfunction (HCC)  Assessment & Plan  ESRD on HD TTS   HD fistula was working well on last HD on Thursday 5/19   Pt went to [facility] dialysis center on 5/21 & found that HD fistula is not working , unable to proceed with HD .  At ER, pt is lethargic. Labs showed K 6.2   & Cr 9.95.   EKG stat ordered.   ERP talked to Vascular surgery & nephrology.   US UE showed \"  Known brachial-cephalic hemodialysis fistula.    The fistula is occluded.    Acute occluded thrombus demonstrate throughout the fistula extending into    distal subclavian vein. The proximal subclavian vein is patent.    Appropriate flow velocities and waveforms throughout the inflow artery. \"  HD today - nephrology on board "   Temp HD cath placement per vascular surgery   ? OR in tomorrow  AM vs Monday for thrombectomy & fistula surgery       Generalized weakness- (present on admission)  Assessment & Plan  Likely from hyperkalemia  Hopeful to improve with normalization of K after HD   Will monitor   Fall/aspiration precautions      Primary hypertension- (present on admission)  Assessment & Plan  BP soft on admits   Resume home metoprolol in lower dosage with parameters   Hold home amlodipine & hydralazine   Monitor BP   Re-start home BP meds as needed       VTE prophylaxis: SCDs/TEDs and pharmacologic prophylaxis contraindicated due to Possible surgery in AM

## 2022-05-21 NOTE — PROGRESS NOTES
4 Eyes Skin Assessment Completed by James RN and JUAN Riojas.    Head WDL  Ears Redness and Blanching  Nose WDL  Mouth WDL  Neck WDL  Breast/Chest WDL  Shoulder Blades WDL  Spine WDL  (R) Arm/Elbow/Hand Redness, Blanching and Bruising  (L) Arm/Elbow/Hand Redness, Blanching and Bruising  Abdomen WDL  Groin Redness, Blanching and Excoriation  Scrotum/Coccyx/Buttocks Redness, Non-Blanching, Excoriation and Scar  (R) Leg Bruising  (L) Leg Bruising  (R) Heel/Foot/Toe Redness, Blanching and Boggy  (L) Heel/Foot/Toe Redness, Blanching and Boggy   Right lower extremity lump  Lump behind left knee  Generalized bruising and scratches  Right arm fistula  Right HD catheter    Devices In Places Tele Box, Blood Pressure Cuff and Pulse Ox    Interventions In Place Pillows and Heels Loaded W/Pillows    Possible Skin Injury Yes    Pictures Uploaded Into Epic Yes  Wound Consult Placed Yes  RN Wound Prevention Protocol Ordered Yes

## 2022-05-21 NOTE — OP REPORT
ACUTE CARE VASCULAR SERVICE  OPERATIVE NOTE        Date of Service: 2022    Patient:  Liberty Bolton  :   1955  MRN:   [id number]      Preoperative Diagnosis:  - Thrombosed AVF, need for hemodialysis access    Postoperative Diagnosis  - Thrombosed AVF, need for hemodialysis access    Procedure  53555:  Insertion of right subclavian non-tunneled veno-venous dialysis     Catheter used:  15cm Trialysis    Surgeon: Royal Mercer    Blood loss: minimal    Disposition: Patient tolerated procedure well    A time-out was completed verifying correct patient, procedure, site, positioning, and special equipment if applicable. The patient was prepped and draped in sterile fashion. 1% Lidocaine was used to anesthetize the surrounding skin area. The IJ was found to be occluded. The right subclavian vein was accessed percutaneously and the J-wire passed into the vein with ease.  A small puncture incision was made at the insertion site, the tract was dilated, then the catheter was passed over the wire into the vein. Appropriate blood return was obtained. Each lumen of the catheter was evacuated of air and flushed with sterile saline. The catheter was then sutured in place to the skin and a sterile dressing applied.   The patient tolerated the procedure well and there were no complications.    Royal Mercer MD  General and Vascular Surgery  [facility]  [phone]

## 2022-05-21 NOTE — ASSESSMENT & PLAN NOTE
BP soft on admits   Resume home metoprolol in lower dosage with parameters   Hold home amlodipine & hydralazine   Monitor BP   Re-start home BP meds as needed

## 2022-05-21 NOTE — ED PROVIDER NOTES
ED Provider Note    Scribed for Chato Rahman M.D. by Jose Luna. 5/21/2022, 7:57 AM.    Primary care provider: No primary care provider reported.  Means of arrival: EMS  History obtained from: Patient  History limited by: blunted aspect and delayed response to questioning     CHIEF COMPLAINT  Chief Complaint   Patient presents with   • Vascular Access Problem     Pt KAT MONTANO from [facility] dialysis. Staff stated they were having issues with dialysis fistula to R arm. Pt receives dialysis tu, th, sat. Unable to receive dialysis today. Pt denies any complaints.        HPI  Liberty Bolton is a 67 y.o. female who presents to the Emergency Department for acute difficulty with right arm fistula. Patient reports she has had her fistula for several years and reports it worked during the last session of dialysis. She reports living with her . Due to difficulty with her fistula, EMS was called who presented the patient to the ED. Patient reports no other associated symptoms. There are no alleviating or exacerbating factors. She denies associated abdominal pain, shortness of breath, vomiting, nausea, fever, chest pain or right arm pain.     Patient history limited secondary to blunted aspect and delayed response to questioning.     REVIEW OF SYSTEMS  Pertinent positives include difficulty with access to right arm fistula. Pertinent negatives include no abdominal pain, shortness of breath, vomiting, nausea, fever, chest pain or right arm pain.      Patient history limited secondary to blunted aspect and delayed response to questioning.     PAST MEDICAL HISTORY   has a past medical history of Diabetes (HCC) and Renal disorder.    SURGICAL HISTORY  patient denies any surgical history    SOCIAL HISTORY  Social History     Tobacco Use   • Smoking status: Never Smoker   • Smokeless tobacco: Never Used   Substance Use Topics   • Alcohol use: Not Currently   • Drug use: Not Currently      Social History     Substance and Sexual  "Activity   Drug Use Not Currently       FAMILY HISTORY  History reviewed. No pertinent family history.    CURRENT MEDICATIONS  Home Medications     Reviewed by Trice Jay R.N. (Registered Nurse) on 05/21/22 at 0745  Med List Status: Partial   Medication Last Dose Status   amLODIPine (NORVASC) 5 MG Tab  Active   atorvastatin (LIPITOR) 80 MG tablet  Active   calcium acetate (PHOS-LO) 667 MG Cap  Active   epoetin amos (EPOGEN/PROCRIT) 91245 UNIT/ML Solution  Active   gabapentin (NEURONTIN) 300 MG Cap  Active   hydrALAZINE (APRESOLINE) 25 MG Tab  Active   Insulin Glargine, 2 Unit Dial, (TOUJEO MAX SOLOSTAR) 300 UNIT/ML Solution Pen-injector  Active   insulin lispro (INSULIN LISPRO) 100 UNIT/ML Solution Pen-injector  Active   linagliptin (TRADJENTA) 5 MG Tab tablet  Active   metoprolol SR (TOPROL XL) 100 MG TABLET SR 24 HR  Active   metoprolol tartrate (LOPRESSOR) 50 MG Tab  Active   omeprazole (PRILOSEC) 20 MG delayed-release capsule  Active   pioglitazone (ACTOS) 45 MG Tab  Active   sevelamer (RENAGEL) 800 MG Tab  Active   venlafaxine (EFFEXOR) 37.5 MG Tab  Active                ALLERGIES  Allergies   Allergen Reactions   • Bactrim [Sulfamethoxazole-Trimethoprim] Swelling     angioedema   • Dilaudid [Hydromorphone] Unspecified     Per [facility]   • Enalapril Anaphylaxis and Swelling     angioedema   • Vicodin Hp [Hydrocodone-Acetaminophen] Unspecified     Per [facility]       PHYSICAL EXAM  VITAL SIGNS: /60   Pulse 68   Temp 36.3 °C (97.4 °F) (Temporal)   Resp 17   Ht 1.651 m (5' 5\")   Wt 83.9 kg (185 lb)   SpO2 95%   BMI 30.79 kg/m²     Constitutional: Well developed, Well nourished, mild distress, Non-toxic appearance.   HENT: Normocephalic, Atraumatic, mucous membranes moist, no erythema, exudates, swelling, or masses, nares patent  Eyes: nonicteric  Neck: Supple, no meningismus  Lymphatic: No lymphadenopathy noted.   Cardiovascular: Regular rate and rhythm, no gallops rubs or " murmurs  Lungs: Clear bilaterally   Abdomen: Bowel sounds normal, Soft, No tenderness  Skin: Warm, Dry, no rash  Genitalia: Deferred  Rectal: Deferred  Extremities: No edema. Right upper extremity has pulsations at fistula, no thrill. Distal motor and sensory functions grossly intact no erythema or edema around fistula site.  Neurologic: Alert, appropriate, follows commands, moving all extremities, normal speech   Psychiatric: Affect normal    DIAGNOSTIC STUDIES / PROCEDURES    LABS  Results for orders placed or performed during the hospital encounter of 05/21/22   CBC WITH DIFFERENTIAL   Result Value Ref Range    WBC 6.0 4.8 - 10.8 K/uL    RBC 3.30 (L) 4.20 - 5.40 M/uL    Hemoglobin 9.9 (L) 12.0 - 16.0 g/dL    Hematocrit 31.6 (L) 37.0 - 47.0 %    MCV 95.8 81.4 - 97.8 fL    MCH 30.0 27.0 - 33.0 pg    MCHC 31.3 (L) 33.6 - 35.0 g/dL    RDW 58.5 (H) 35.9 - 50.0 fL    Platelet Count 138 (L) 164 - 446 K/uL    MPV 10.9 9.0 - 12.9 fL    Neutrophils-Polys 85.90 (H) 44.00 - 72.00 %    Lymphocytes 7.60 (L) 22.00 - 41.00 %    Monocytes 5.00 0.00 - 13.40 %    Eosinophils 0.70 0.00 - 6.90 %    Basophils 0.30 0.00 - 1.80 %    Immature Granulocytes 0.50 0.00 - 0.90 %    Nucleated RBC 0.00 /100 WBC    Neutrophils (Absolute) 5.18 2.00 - 7.15 K/uL    Lymphs (Absolute) 0.46 (L) 1.00 - 4.80 K/uL    Monos (Absolute) 0.30 0.00 - 0.85 K/uL    Eos (Absolute) 0.04 0.00 - 0.51 K/uL    Baso (Absolute) 0.02 0.00 - 0.12 K/uL    Immature Granulocytes (abs) 0.03 0.00 - 0.11 K/uL    NRBC (Absolute) 0.00 K/uL   COMP METABOLIC PANEL   Result Value Ref Range    Sodium 136 135 - 145 mmol/L    Potassium 6.2 (H) 3.6 - 5.5 mmol/L    Chloride 97 96 - 112 mmol/L    Co2 22 20 - 33 mmol/L    Anion Gap 17.0 (H) 7.0 - 16.0    Glucose 133 (H) 65 - 99 mg/dL    Bun 66 (H) 8 - 22 mg/dL    Creatinine 9.97 (HH) 0.50 - 1.40 mg/dL    Calcium 9.4 8.5 - 10.5 mg/dL    AST(SGOT) 20 12 - 45 U/L    ALT(SGPT) 17 2 - 50 U/L    Alkaline Phosphatase 83 30 - 99 U/L    Total  Bilirubin 0.4 0.1 - 1.5 mg/dL    Albumin 3.6 3.2 - 4.9 g/dL    Total Protein 7.3 6.0 - 8.2 g/dL    Globulin 3.7 (H) 1.9 - 3.5 g/dL    A-G Ratio 1.0 g/dL   ESTIMATED GFR   Result Value Ref Range    GFR (CKD-EPI) 4 (A) >60 mL/min/1.73 m 2      All labs reviewed by me.      RADIOLOGY  US-HEMODIALYSIS GRAFT DUPLEX COMP UPPER EXTREMITY   Final Result        The radiologist's interpretation of all radiological studies have been reviewed by me.    COURSE & MEDICAL DECISION MAKING  Nursing notes, VS, PMSFHx reviewed in chart.     Review of past medical records show a history of end stage renal disease, hypertension and diabetes. Furthermore, the patient was recently admitted and was discharged on 5/19/22 after going several weeks without performing dialysis.     7:57 AM Patient seen and examined at bedside. Ordered for CBC w/diff, CMP, US-Hemodialysis Graft Duplex Comp Upper Extremity to evaluate.Discussed plan of care with patient. I informed them that labs and imaging will be ordered to evaluate symptoms. Patient is understanding and agreeable with plan.      10:11 AM - Patient was reevaluated at bedside. Patient informed me her fistula was placed in [place] but does not remember who placed it. Patient has seen Dr. Zuluaga (Nephrology) previously.    10:22 AM - I discussed the patient's care and above findings with Dr. Mercer (Vascular Surgery) who states the patient can undergo surgery tomorrow and can do a temporary catheter today. Per recommendation by Dr. Hickey (Nephrology) a temporary catheter will be placed.     10:23 AM - I discussed the patient's case and the above findings with Dr. Hickey (Nephrology) who agrees with plan for admission as well as temporary catheterization.    10:27 AM - Patient was reevaluated at bedside. Informed patient of plans for admission and surgery.    10:33 AM - I discussed the patient's case and the above findings with Dr. Marcelo (hospitalist) who agrees to assess the patient for  hospitalization.     Decision Making:  This is a 67 y.o. year old female who presents with mild hyperkalemia and a malfunctioning fistula.  Patient has clot all the way up to her subclavian on that side.  Case discussed with Dr. Mercer (vascular) as well as Dr. Hickey from nephrology.  Patient will be admitted for temporary catheter placement and dialysis.    DISPOSITION:  Patient will be hospitalized by Dr. Marcelo in guarded condition.     FINAL IMPRESSION  1. Problem with vascular access    2. Hyperkalemia          Jose BRIZUELA (Scribnupur), am scribing for, and in the presence of, Chato Rahman M.D..    Electronically signed by: Jose Luna (Rik), 5/21/2022    Chato BRIZUELA M.D. personally performed the services described in this documentation, as scribed by Jose Luna in my presence, and it is both accurate and complete.    The note accurately reflects work and decisions made by me.  Chato Rahman M.D.  5/21/2022  12:31 PM

## 2022-05-21 NOTE — ED TRIAGE NOTES
Chief Complaint   Patient presents with   • Vascular Access Problem     Pt KAT DUSTY from [facility] dialysis. Staff stated they were having issues with dialysis fistula to R arm. Pt receives dialysis tu, th, sat. Unable to receive dialysis today. Pt denies any complaints.      Pt arrives via EMS for above. Hx ESRD, HTN, DM.     Pt oriented to self and place. Per staff, pt at normal baseline mentation status. Pt lives at [facility].     Pt changed into gown, placed on monitor, chart up for ERP.

## 2022-05-22 LAB
ALBUMIN SERPL BCP-MCNC: 3.1 G/DL (ref 3.2–4.9)
ALBUMIN/GLOB SERPL: 0.9 G/DL
ALP SERPL-CCNC: 77 U/L (ref 30–99)
ALT SERPL-CCNC: 15 U/L (ref 2–50)
ANION GAP SERPL CALC-SCNC: 10 MMOL/L (ref 7–16)
APTT PPP: 27.9 SEC (ref 24.7–36)
AST SERPL-CCNC: 17 U/L (ref 12–45)
BASOPHILS # BLD AUTO: 0.9 % (ref 0–1.8)
BASOPHILS # BLD: 0.04 K/UL (ref 0–0.12)
BILIRUB SERPL-MCNC: 0.3 MG/DL (ref 0.1–1.5)
BUN SERPL-MCNC: 28 MG/DL (ref 8–22)
CALCIUM SERPL-MCNC: 8.9 MG/DL (ref 8.5–10.5)
CHLORIDE SERPL-SCNC: 100 MMOL/L (ref 96–112)
CO2 SERPL-SCNC: 27 MMOL/L (ref 20–33)
CREAT SERPL-MCNC: 5.73 MG/DL (ref 0.5–1.4)
EOSINOPHIL # BLD AUTO: 0.03 K/UL (ref 0–0.51)
EOSINOPHIL NFR BLD: 0.7 % (ref 0–6.9)
ERYTHROCYTE [DISTWIDTH] IN BLOOD BY AUTOMATED COUNT: 57.6 FL (ref 35.9–50)
ERYTHROCYTE [DISTWIDTH] IN BLOOD BY AUTOMATED COUNT: 58.5 FL (ref 35.9–50)
GFR SERPLBLD CREATININE-BSD FMLA CKD-EPI: 8 ML/MIN/1.73 M 2
GLOBULIN SER CALC-MCNC: 3.3 G/DL (ref 1.9–3.5)
GLUCOSE BLD STRIP.AUTO-MCNC: 103 MG/DL (ref 65–99)
GLUCOSE BLD STRIP.AUTO-MCNC: 111 MG/DL (ref 65–99)
GLUCOSE BLD STRIP.AUTO-MCNC: 129 MG/DL (ref 65–99)
GLUCOSE BLD STRIP.AUTO-MCNC: 133 MG/DL (ref 65–99)
GLUCOSE SERPL-MCNC: 144 MG/DL (ref 65–99)
HCT VFR BLD AUTO: 25.3 % (ref 37–47)
HCT VFR BLD AUTO: 25.5 % (ref 37–47)
HGB BLD-MCNC: 7.8 G/DL (ref 12–16)
HGB BLD-MCNC: 8.2 G/DL (ref 12–16)
HGB RETIC QN AUTO: 33.9 PG/CELL (ref 29–35)
IMM GRANULOCYTES # BLD AUTO: 0.02 K/UL (ref 0–0.11)
IMM GRANULOCYTES NFR BLD AUTO: 0.4 % (ref 0–0.9)
IMM RETICS NFR: 25.7 % (ref 9.3–17.4)
INR PPP: 1.09 (ref 0.87–1.13)
IRON SATN MFR SERPL: 25 % (ref 15–55)
IRON SERPL-MCNC: 35 UG/DL (ref 40–170)
LYMPHOCYTES # BLD AUTO: 0.58 K/UL (ref 1–4.8)
LYMPHOCYTES NFR BLD: 12.9 % (ref 22–41)
MCH RBC QN AUTO: 30.1 PG (ref 27–33)
MCH RBC QN AUTO: 30.4 PG (ref 27–33)
MCHC RBC AUTO-ENTMCNC: 30.8 G/DL (ref 33.6–35)
MCHC RBC AUTO-ENTMCNC: 32.2 G/DL (ref 33.6–35)
MCV RBC AUTO: 94.4 FL (ref 81.4–97.8)
MCV RBC AUTO: 97.7 FL (ref 81.4–97.8)
MONOCYTES # BLD AUTO: 0.43 K/UL (ref 0–0.85)
MONOCYTES NFR BLD AUTO: 9.6 % (ref 0–13.4)
NEUTROPHILS # BLD AUTO: 3.39 K/UL (ref 2–7.15)
NEUTROPHILS NFR BLD: 75.5 % (ref 44–72)
NRBC # BLD AUTO: 0 K/UL
NRBC BLD-RTO: 0 /100 WBC
PLATELET # BLD AUTO: 141 K/UL (ref 164–446)
PLATELET # BLD AUTO: 141 K/UL (ref 164–446)
PMV BLD AUTO: 10.9 FL (ref 9–12.9)
PMV BLD AUTO: 11 FL (ref 9–12.9)
POTASSIUM SERPL-SCNC: 4.5 MMOL/L (ref 3.6–5.5)
PROT SERPL-MCNC: 6.4 G/DL (ref 6–8.2)
PROTHROMBIN TIME: 13.7 SEC (ref 12–14.6)
RBC # BLD AUTO: 2.59 M/UL (ref 4.2–5.4)
RBC # BLD AUTO: 2.7 M/UL (ref 4.2–5.4)
RETICS # AUTO: 0.05 M/UL (ref 0.04–0.06)
RETICS/RBC NFR: 1.8 % (ref 0.8–2.1)
SODIUM SERPL-SCNC: 137 MMOL/L (ref 135–145)
TIBC SERPL-MCNC: 142 UG/DL (ref 250–450)
UFH PPP CHRO-ACNC: 0.54 IU/ML (ref 0–9999)
UFH PPP CHRO-ACNC: <0.1 IU/ML (ref 0–9999)
UIBC SERPL-MCNC: 107 UG/DL (ref 110–370)
WBC # BLD AUTO: 4.4 K/UL (ref 4.8–10.8)
WBC # BLD AUTO: 4.5 K/UL (ref 4.8–10.8)

## 2022-05-22 PROCEDURE — A9270 NON-COVERED ITEM OR SERVICE: HCPCS | Performed by: STUDENT IN AN ORGANIZED HEALTH CARE EDUCATION/TRAINING PROGRAM

## 2022-05-22 PROCEDURE — 99232 SBSQ HOSP IP/OBS MODERATE 35: CPT | Mod: GC | Performed by: INTERNAL MEDICINE

## 2022-05-22 PROCEDURE — 85730 THROMBOPLASTIN TIME PARTIAL: CPT

## 2022-05-22 PROCEDURE — 700102 HCHG RX REV CODE 250 W/ 637 OVERRIDE(OP): Performed by: STUDENT IN AN ORGANIZED HEALTH CARE EDUCATION/TRAINING PROGRAM

## 2022-05-22 PROCEDURE — 85027 COMPLETE CBC AUTOMATED: CPT

## 2022-05-22 PROCEDURE — 85046 RETICYTE/HGB CONCENTRATE: CPT

## 2022-05-22 PROCEDURE — 36415 COLL VENOUS BLD VENIPUNCTURE: CPT

## 2022-05-22 PROCEDURE — 85610 PROTHROMBIN TIME: CPT

## 2022-05-22 PROCEDURE — 97602 WOUND(S) CARE NON-SELECTIVE: CPT

## 2022-05-22 PROCEDURE — A9270 NON-COVERED ITEM OR SERVICE: HCPCS

## 2022-05-22 PROCEDURE — 85520 HEPARIN ASSAY: CPT

## 2022-05-22 PROCEDURE — 85025 COMPLETE CBC W/AUTO DIFF WBC: CPT

## 2022-05-22 PROCEDURE — 770020 HCHG ROOM/CARE - TELE (206)

## 2022-05-22 PROCEDURE — 80053 COMPREHEN METABOLIC PANEL: CPT

## 2022-05-22 PROCEDURE — 82962 GLUCOSE BLOOD TEST: CPT | Mod: 91

## 2022-05-22 PROCEDURE — 83550 IRON BINDING TEST: CPT

## 2022-05-22 PROCEDURE — 700102 HCHG RX REV CODE 250 W/ 637 OVERRIDE(OP)

## 2022-05-22 PROCEDURE — 700111 HCHG RX REV CODE 636 W/ 250 OVERRIDE (IP): Performed by: STUDENT IN AN ORGANIZED HEALTH CARE EDUCATION/TRAINING PROGRAM

## 2022-05-22 PROCEDURE — 83540 ASSAY OF IRON: CPT

## 2022-05-22 RX ORDER — HEPARIN SODIUM 5000 [USP'U]/100ML
0-30 INJECTION, SOLUTION INTRAVENOUS CONTINUOUS
Status: DISCONTINUED | OUTPATIENT
Start: 2022-05-22 | End: 2022-05-23

## 2022-05-22 RX ADMIN — METOPROLOL TARTRATE 25 MG: 25 TABLET, FILM COATED ORAL at 17:43

## 2022-05-22 RX ADMIN — OMEPRAZOLE 20 MG: 20 CAPSULE, DELAYED RELEASE ORAL at 05:23

## 2022-05-22 RX ADMIN — GABAPENTIN 300 MG: 300 CAPSULE ORAL at 17:43

## 2022-05-22 RX ADMIN — HEPARIN SODIUM 18 UNITS/KG/HR: 5000 INJECTION, SOLUTION INTRAVENOUS at 12:26

## 2022-05-22 RX ADMIN — METOPROLOL TARTRATE 25 MG: 25 TABLET, FILM COATED ORAL at 05:23

## 2022-05-22 RX ADMIN — SENNOSIDES AND DOCUSATE SODIUM 2 TABLET: 50; 8.6 TABLET ORAL at 17:43

## 2022-05-22 RX ADMIN — ATORVASTATIN CALCIUM 80 MG: 80 TABLET, FILM COATED ORAL at 17:43

## 2022-05-22 ASSESSMENT — COGNITIVE AND FUNCTIONAL STATUS - GENERAL
PERSONAL GROOMING: A LOT
STANDING UP FROM CHAIR USING ARMS: TOTAL
SUGGESTED CMS G CODE MODIFIER MOBILITY: CM
TOILETING: TOTAL
HELP NEEDED FOR BATHING: TOTAL
MOBILITY SCORE: 9
WALKING IN HOSPITAL ROOM: TOTAL
SUGGESTED CMS G CODE MODIFIER DAILY ACTIVITY: CL
TURNING FROM BACK TO SIDE WHILE IN FLAT BAD: A LITTLE
MOVING TO AND FROM BED TO CHAIR: A LOT
CLIMB 3 TO 5 STEPS WITH RAILING: TOTAL
DAILY ACTIVITIY SCORE: 9
EATING MEALS: A LITTLE
MOVING FROM LYING ON BACK TO SITTING ON SIDE OF FLAT BED: UNABLE
DRESSING REGULAR LOWER BODY CLOTHING: TOTAL
DRESSING REGULAR UPPER BODY CLOTHING: TOTAL

## 2022-05-22 ASSESSMENT — ENCOUNTER SYMPTOMS
CHILLS: 0
COUGH: 0
MYALGIAS: 0
NAUSEA: 0
FEVER: 0
VOMITING: 0

## 2022-05-22 ASSESSMENT — PAIN DESCRIPTION - PAIN TYPE: TYPE: ACUTE PAIN

## 2022-05-22 ASSESSMENT — PATIENT HEALTH QUESTIONNAIRE - PHQ9
1. LITTLE INTEREST OR PLEASURE IN DOING THINGS: NOT AT ALL
2. FEELING DOWN, DEPRESSED, IRRITABLE, OR HOPELESS: NOT AT ALL
SUM OF ALL RESPONSES TO PHQ9 QUESTIONS 1 AND 2: 0

## 2022-05-22 NOTE — CARE PLAN
"  Problem: Skin Integrity  Goal: Skin integrity is maintained or improved  Outcome: Progressing  Note: Assess skin and monitor for skin breakdown. Alleviate pressure to bony prominences and provide assistance with turning, repositioning, ROM and mobility as appropriate. Use of barrier cream, mepilex's, and purewick as needed. Wound consult placed. Continue to monitor.        Problem: Fall Risk  Goal: Patient will remain free from falls  Outcome: Progressing  Note: High fall risk precautions in place. Treaded socks and bed/strip alarm on, side rails up x 3. Call light within reach. Pt educated to call for assistance. Reinforce as needed. Continue to monitor.       The patient is Watcher - Medium risk of patient condition declining or worsening    Shift Goals: Dialysis  Clinical Goals: Monitor neurological assessment and labs  Patient Goals: \"I want to go home with my \"    Progress made toward(s) clinical / shift goals:  Dialysis being completed in room    Patient is not progressing towards the following goals: Patient complaining of left lower extremity pain, Dr. Wegener notified, new orders for gabapentin.       "

## 2022-05-22 NOTE — PROGRESS NOTES
Assumed care of patient at bedside report from day RN. Updated on POC. Patient currently A & O x 2; on room air; up max assist; with complaints of acute pain. Assessment completed Call light within reach. Whiteboard updated. Fall precautions in place. Bed locked and in lowest position. All questions answered. No other needs indicated at this time.

## 2022-05-22 NOTE — PROGRESS NOTES
[facility] Services Progress Note       HD today x 3 hours per Dr. Hickey. Tx initiated at 1541 and ended at 1841    Pt oriented x1, disoriented to time, place, and situation. (-) bleeding (-) chest pain (+) bipedal edema +2 (-) SOB. S/P R subclavian cath insertion, placement verified through chest xray. Permission to be used for HD tx was given by Dr. Mercer. R subclavian cath patent, dsg CDI. Also with RUE AVF (+) hematoma (-) bruit and thrill. With K of 6.2 mmol/L needing urgent HD tx (+) consent       NET UF: +254 ml    Tx tolerated. UFG not met d/t hypotension. NS boluses were given PRN. Blood returned, ports flushed with NS. Heparin 1000 units/ml used to lock catheter per designated amount. CVC ports clamped and capped. Aspirate heparin prior to next CVC use. See eflow sheets for further details.        Report given to NATASHA Ruiz RN

## 2022-05-22 NOTE — PROGRESS NOTES
"Daily Progress Note:     Date of Service: 5/22/2022  Primary Team: [facility] Team   Attending: Adeel Wells M.D.   Senior Resident: Dr. Zamora  Intern: Dr. Ríos  Contact:  [phone]    Chief Complaint/ID:   67-year-old female with past medical history of end-stage renal disease on Tuesday Thursday Saturday hemodialysis, diabetes, hypertension.  Seen for evaluation after AV fistula stopped functioning on last hemodialysis attempt outpatient at [facility], patient has been seen by vascular surgery who is planning for repeat AV fistula with possible thrombectomy.  Ultrasound shows thrombus extending to distal subclavian vein.    Subjective:   No acute overnight events.  Patient with no symptomatic complaint at this time.  Patient has questions about management plan.  Explained risks and benefits of heparin drip therapy for her thrombus extending into the distal portion of the subclavian vein, long discussion about bleeding risks, patient has \"is not this the same medication have already been getting.\"  We explained this is a higher dose which increases her risk of bleeding patient agrees with medication plan and accepts risks stating understanding.     Interval events  Discussed case with Dr. Mercer vascular who plans for surgical intervention possibly May 23, does not recommend for or against IV heparin at this time though if started recommends against bolus.      Review of Systems:    Review of Systems   Constitutional: Negative for chills and fever.   Respiratory: Negative for cough.    Cardiovascular: Negative for chest pain.   Gastrointestinal: Negative for nausea and vomiting.   Genitourinary: Negative for dysuria.        Urinates regularly, is at her baseline   Musculoskeletal: Negative for myalgias.   Skin: Negative for rash.       Objective Data:   Physical Exam:   Vitals:   Temp:  [36.4 °C (97.5 °F)-37.1 °C (98.8 °F)] 36.6 °C (97.9 °F)  Pulse:  [68-96] 82  Resp:  [12-20] 16  BP: " "(103-155)/(47-75) 150/61  SpO2:  [91 %-98 %] 95 %     Physical Exam  Constitutional:       General: She is not in acute distress.  HENT:      Head: Normocephalic.   Eyes:      Pupils: Pupils are equal, round, and reactive to light.   Cardiovascular:      Rate and Rhythm: Normal rate and regular rhythm.      Heart sounds: Normal heart sounds. No murmur heard.  Pulmonary:      Effort: No respiratory distress.      Breath sounds: Normal breath sounds.   Abdominal:      General: Abdomen is flat.      Palpations: Abdomen is soft.   Musculoskeletal:      Comments: Patient with palpable well circumscribed rubbery masses on right anterior shin and left popliteal fossa \"been there for a long, long time,\" there are not erythematous, not painful to palpation.    Skin:     General: Skin is warm.      Coloration: Skin is not jaundiced.   Neurological:      General: No focal deficit present.      Mental Status: She is alert. Mental status is at baseline.   Psychiatric:         Mood and Affect: Mood normal.         Thought Content: Thought content normal.           Labs:   Lab Results   Component Value Date/Time    WBC 4.5 (L) 05/22/2022 12:28 AM    RBC 2.70 (L) 05/22/2022 12:28 AM    HEMOGLOBIN 8.2 (L) 05/22/2022 12:28 AM    HEMATOCRIT 25.5 (L) 05/22/2022 12:28 AM    MCV 94.4 05/22/2022 12:28 AM    MCH 30.4 05/22/2022 12:28 AM    MCHC 32.2 (L) 05/22/2022 12:28 AM    MPV 11.0 05/22/2022 12:28 AM    NEUTSPOLYS 75.50 (H) 05/22/2022 12:28 AM    LYMPHOCYTES 12.90 (L) 05/22/2022 12:28 AM    MONOCYTES 9.60 05/22/2022 12:28 AM    EOSINOPHILS 0.70 05/22/2022 12:28 AM    EOSINOPHILS 28 05/05/2022 09:45 AM    BASOPHILS 0.90 05/22/2022 12:28 AM    ANISOCYTOSIS 1+ 05/05/2022 06:30 AM      Lab Results   Component Value Date/Time    SODIUM 137 05/22/2022 12:28 AM    POTASSIUM 4.5 05/22/2022 12:28 AM    CHLORIDE 100 05/22/2022 12:28 AM    CO2 27 05/22/2022 12:28 AM    GLUCOSE 144 (H) 05/22/2022 12:28 AM    BUN 28 (H) 05/22/2022 12:28 AM    " CREATININE 5.73 (HH) 05/22/2022 12:28 AM        Imaging:   US May 21 2022  Vascular Laboratory   Conclusions   No prior study available for comparison.   Known brachial-cephalic hemodialysis fistula.    The fistula is occluded.    Acute occluded thrombus demonstrate throughout the fistula extending into    distal subclavian vein. The proximal subclavian vein is patent.    Appropriate flow velocities and waveforms throughout the inflow artery.    Problem Representation:      ESRD (end stage renal disease) (HCC)  ESRD on HD TTS   HD fistula was working well on last HD on Thursday 5/19   Pt went to [facility] dialysis center on 5/21 & found that HD fistula is not working , unable to proceed with HD .  At ER, pt is lethargic. Labs showed K 6.2   & Cr 9.95.   ERP talked to Vascular surgery & nephrology.   -Last HD May 21st- nephrology on board appreciate recs.  -S/pTemp HD cath placement per vascular surgery May 21.  -Possible OR May 23rd for thrombectomy & fistula surgery. Appreciate Vascular surgery recs     Generalized weakness  Likely from hyperkalemia - improving.  -Will monitor.  -Fall/aspiration precautions      Dialysis AV fistula malfunction (HCC)  ESRD on HD TTS   HD fistula was working well on last HD on Thursday 5/19   Pt went to [facility] dialysis [place] on 5/21 & found that HD fistula is not working , unable to proceed with HD .  -hyperkalemia resolved to wnl currently, continue to monitor and manage per nephro recs.    -Last HD May 21st, nephrology on board appreciate recs.  -S/P Temp HD cath placement May 21st with vascular surgery   -Heparin drip without bolus, discussed with vascular.  -Possible OR May 23rd with vascular surgery for thrombectomy & fistula surgery       Hyperkalemia  Resolving, wnl currently.  S/p HD May 21st, HD per nephrology appreciate recs, TTS schedule previously.   If climbing above 5.8, can consider calcium, insulin, dextrose, would discuss with nephrology.  Telemetry status    Will  monitor        Primary hypertension  BP soft on admission, back to 140 systolic range.  Resume home metoprolol in lower dosage with parameters   Holding home amlodipine & hydralazine currently, can consider restart one or both after surgery.   Monitor BP       Type 2 diabetes mellitus, with long-term current use of insulin (HCC)  A1C   ISS for now   Will monitor glucose levels & make adjustment for better BP control

## 2022-05-22 NOTE — PROGRESS NOTES
"[facility] -  PROGRESS NOTE               Author: ASHLY Moreno Date & Time: 5/22/2022  10:50 AM     HPI:  67 years old female with medical history notable for insulin-dependent type 2 diabetes, hypertension, end-stage renal disease due to diabetes has been on hemodialysis for 4 years, presenting to hospital, she was at her dialysis clinic this am and her AVF was noted to be thrombosed she was brought to the ER via [facility].  She was recently in the hospital discharged to SNF. She had been refusing hemodialysis intermittently.  Her K+ is elevated today so vascular was consulted for temp dialysis catheter so she will receive hemodialysis today and then vascular will take her to the OR tomorrow for thrombectomy of AVF.          No F/C/N/V/CP/SOB.  No melena, hematochezia, hematemesis.  No HA, visual changes, or abdominal pain.     REVIEW OF SYSTEMS:    10 point ROS was performed and is as per HPI or otherwise negative.    DAILY NEPHROLOGY SUMMARY:  5/21: consult done, temp CVC placed, HD performed with no UF due to hypotension  5/22: sitting up in bed, oriented x 2, tolerated iHD UF yesterday with minimal UF due to hypotension, looks euvolemic, for AVF thrombectomy today with Dr. Mercer    REVIEW OF SYSTEMS:    10 point ROS reviewed and is as per HPI/daily summary or otherwise negative    PMH/PSH/SH/FH:   Reviewed and unchanged since admission note    CURRENT MEDICATIONS:   Reviewed from admission to present day    VS:  BP (!) 150/61   Pulse 82   Temp 36.6 °C (97.9 °F) (Temporal)   Resp 16   Ht 1.651 m (5' 5\")   Wt 75.6 kg (166 lb 10.7 oz)   SpO2 95%   BMI 27.73 kg/m²     Physical Exam  Constitutional:       Appearance: Normal appearance. She is normal weight.   HENT:      Head: Normocephalic and atraumatic.      Nose: Nose normal.      Mouth/Throat:      Mouth: Mucous membranes are moist.      Pharynx: Oropharynx is clear.   Eyes:      Extraocular Movements: Extraocular " movements intact.      Conjunctiva/sclera: Conjunctivae normal.      Pupils: Pupils are equal, round, and reactive to light.   Cardiovascular:      Rate and Rhythm: Normal rate and regular rhythm.      Comments: R temp CVC  R AVF - B/T + echymosis  Pulmonary:      Effort: Pulmonary effort is normal.      Breath sounds: Normal breath sounds.   Abdominal:      General: Abdomen is flat. Bowel sounds are normal.      Palpations: Abdomen is soft.   Musculoskeletal:         General: Normal range of motion.      Cervical back: Normal range of motion and neck supple.   Skin:     General: Skin is warm and dry.   Neurological:      General: No focal deficit present.      Mental Status: She is alert and oriented to person, place, and time. Mental status is at baseline.   Psychiatric:         Mood and Affect: Mood normal.         Behavior: Behavior normal.         Thought Content: Thought content normal.         Judgment: Judgment normal.         Fluids:  In: 800 [P.O.:100; Dialysis:700]  Out: 446     LABS:  Recent Labs     05/21/22  0843 05/22/22  0028   SODIUM 136 137   POTASSIUM 6.2* 4.5   CHLORIDE 97 100   CO2 22 27   GLUCOSE 133* 144*   BUN 66* 28*   CREATININE 9.97* 5.73*   CALCIUM 9.4 8.9       IMAGING:   All imaging reviewed from admission to present day    IMPRESSION:  # ESRD             - iHD q TThS and prn              - via R AVF (thrombosed), to OR today for thrombectomy             - temp CVC placed 5/22 Dr. Mercer  # HTN, controlled              - Goal BP < 140/90             - Continue BB             - UF with iHD as tolerated   # Anemia of CKD, below target             - Goal Hgb 10-11             - check iron stores    - start JUDITH with iHD   # CKD-MBD             - PTH pen             - Vit D pen             - Ca: 9.4             - PO4 pen  # Thrombosed AVF             - temp CVC today for hyperk+              - thrombectomy with Dr. Mercer 5/22  #Thrombocytopenia             - no heparin with iHD              - monitor  Hyperkalemia, corrected              - manage with iHD              - low K+ diet   Dyslipidemia             - statin  DMII             - per primary team            PLAN:  - No iHD today (Sun), may need tx tomorrow   - Thrombectomy with Dr. Mercer 5/22  - UF as tolerated  - No dietary protein restrictions  - Dose all meds per ESRD  - Check iron stores, PTH, vit D   - Will continue to follow with further recommendations

## 2022-05-22 NOTE — CARE PLAN
The patient is Stable - Low risk of patient condition declining or worsening    Shift Goals  Clinical Goals: Monitor Kidney function & Hgb  Patient Goals: I want to go home  Family Goals: n/a    Progress made toward(s) clinical / shift goals:  Initiated heparin gtt at 18units/kg/hr 2/2 subclavian vein thrombosis at AV fistula site. Pt remains NPO and is awaiting vascular surgery to regain access to AV fistula and clear clot. Pt is AAO to self and place, on RA, SR-ST on tele.    Problem: Knowledge Deficit - Standard  Goal: Patient and family/care givers will demonstrate understanding of plan of care, disease process/condition, diagnostic tests and medications  Outcome: Progressing     Problem: Skin Integrity  Goal: Skin integrity is maintained or improved  Outcome: Progressing     Problem: Fall Risk  Goal: Patient will remain free from falls  Outcome: Progressing       Patient is not progressing towards the following goals:

## 2022-05-22 NOTE — PROGRESS NOTES
"  ACUTE CARE VASCULAR SERVICE  PROGRESS NOTE    Came to evaluate patient and discuss surgery tomorrow.  She is still somnolent and disoriented. She can't tell me why she has a fistula or where her fistula is, says she has a  but he \"has no glasses\", and she told me she has children but doesn't know if they're alive or not.    She is unable to consent for surgery so I started calling the phone numbers listed in Epic.  [phone] has calling restrictions, I tried from two different numbers and sent a text, turns out that phone was turned off. [phone] is disconnected. 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      I got call back from [phone] which is her cell phone but the  Ruperto is carrying it. Despite her current mental status, the  Ruperto tells me she still lives at home with him and was \"totally coherent\" when he talked to her earlier. Ruperto will consent to surgery to fix her AVF or put in a new AVF/TDC, however he lives in [place] and I recommended he stay put for now and we'll coordinate over the phone so he will not have to drive to [place] for the time being.    Will attempt to schedule surgery tomorrow if there is OR availability otherwise Tuesday  NPO except meds after midnight    Royal Mercer MD  [facility]  Voalte preferred. Otherwise text to cell [phone] or call my office [phone]  __________________________________________________________________  Patient:Liberty Bolton   MRN:[id number]   CSN:[id number]        "

## 2022-05-22 NOTE — CARE PLAN
The patient is Watcher - Medium risk of patient condition declining or worsening    Shift Goals monitor labs and safety  Clinical Goals: monitor labs and for DVT's  Patient Goals: discharge home  Family Goals: n/a    Progress made toward(s) clinical / shift goals:    Problem: Fall Risk  Goal: Patient will remain free from falls  Outcome: Progressing   Trivedi tom fall scale utilized. Bed alarm in place. Pt is a max assist. Educated pt and family to call for appropriate assistance prior to ambulating.     Patient is not progressing towards the following goals:

## 2022-05-23 ENCOUNTER — ANESTHESIA (OUTPATIENT)
Dept: SURGERY | Facility: MEDICAL CENTER | Age: 67
DRG: 252 | End: 2022-05-23
Payer: MEDICARE

## 2022-05-23 ENCOUNTER — ANESTHESIA EVENT (OUTPATIENT)
Dept: SURGERY | Facility: MEDICAL CENTER | Age: 67
DRG: 252 | End: 2022-05-23
Payer: MEDICARE

## 2022-05-23 ENCOUNTER — APPOINTMENT (OUTPATIENT)
Dept: RADIOLOGY | Facility: MEDICAL CENTER | Age: 67
DRG: 252 | End: 2022-05-23
Attending: SURGERY
Payer: MEDICARE

## 2022-05-23 LAB
ALBUMIN SERPL BCP-MCNC: 3.2 G/DL (ref 3.2–4.9)
ALBUMIN/GLOB SERPL: 1 G/DL
ALP SERPL-CCNC: 75 U/L (ref 30–99)
ALT SERPL-CCNC: 16 U/L (ref 2–50)
ANION GAP SERPL CALC-SCNC: 13 MMOL/L (ref 7–16)
AST SERPL-CCNC: 21 U/L (ref 12–45)
BILIRUB SERPL-MCNC: 0.4 MG/DL (ref 0.1–1.5)
BUN SERPL-MCNC: 38 MG/DL (ref 8–22)
CA-I SERPL-SCNC: 1.1 MMOL/L (ref 1.1–1.3)
CALCIUM SERPL-MCNC: 8.8 MG/DL (ref 8.5–10.5)
CHLORIDE SERPL-SCNC: 100 MMOL/L (ref 96–112)
CO2 SERPL-SCNC: 25 MMOL/L (ref 20–33)
CREAT SERPL-MCNC: 8.07 MG/DL (ref 0.5–1.4)
FERRITIN SERPL-MCNC: 1718 NG/ML (ref 10–291)
GFR SERPLBLD CREATININE-BSD FMLA CKD-EPI: 5 ML/MIN/1.73 M 2
GLOBULIN SER CALC-MCNC: 3.2 G/DL (ref 1.9–3.5)
GLUCOSE BLD STRIP.AUTO-MCNC: 109 MG/DL (ref 65–99)
GLUCOSE BLD STRIP.AUTO-MCNC: 120 MG/DL (ref 65–99)
GLUCOSE BLD STRIP.AUTO-MCNC: 155 MG/DL (ref 65–99)
GLUCOSE BLD STRIP.AUTO-MCNC: 173 MG/DL (ref 65–99)
GLUCOSE BLD STRIP.AUTO-MCNC: 182 MG/DL (ref 65–99)
GLUCOSE SERPL-MCNC: 120 MG/DL (ref 65–99)
PHOSPHATE SERPL-MCNC: 4.8 MG/DL (ref 2.5–4.5)
POTASSIUM SERPL-SCNC: 4.3 MMOL/L (ref 3.6–5.5)
PROT SERPL-MCNC: 6.4 G/DL (ref 6–8.2)
PTH-INTACT SERPL-MCNC: 138 PG/ML (ref 14–72)
SODIUM SERPL-SCNC: 138 MMOL/L (ref 135–145)
UFH PPP CHRO-ACNC: 0.71 IU/ML (ref 0–9999)

## 2022-05-23 PROCEDURE — 700117 HCHG RX CONTRAST REV CODE 255: Performed by: SURGERY

## 2022-05-23 PROCEDURE — 700101 HCHG RX REV CODE 250: Performed by: STUDENT IN AN ORGANIZED HEALTH CARE EDUCATION/TRAINING PROGRAM

## 2022-05-23 PROCEDURE — 770020 HCHG ROOM/CARE - TELE (206)

## 2022-05-23 PROCEDURE — 160039 HCHG SURGERY MINUTES - EA ADDL 1 MIN LEVEL 3: Performed by: SURGERY

## 2022-05-23 PROCEDURE — 05CD0ZZ EXTIRPATION OF MATTER FROM RIGHT CEPHALIC VEIN, OPEN APPROACH: ICD-10-PCS | Performed by: SURGERY

## 2022-05-23 PROCEDURE — A9270 NON-COVERED ITEM OR SERVICE: HCPCS

## 2022-05-23 PROCEDURE — A9270 NON-COVERED ITEM OR SERVICE: HCPCS | Performed by: STUDENT IN AN ORGANIZED HEALTH CARE EDUCATION/TRAINING PROGRAM

## 2022-05-23 PROCEDURE — 700111 HCHG RX REV CODE 636 W/ 250 OVERRIDE (IP): Performed by: STUDENT IN AN ORGANIZED HEALTH CARE EDUCATION/TRAINING PROGRAM

## 2022-05-23 PROCEDURE — 700101 HCHG RX REV CODE 250: Performed by: SURGERY

## 2022-05-23 PROCEDURE — 110454 HCHG SHELL REV 250: Performed by: SURGERY

## 2022-05-23 PROCEDURE — 36831 OPEN THROMBECT AV FISTULA: CPT | Performed by: SURGERY

## 2022-05-23 PROCEDURE — 160036 HCHG PACU - EA ADDL 30 MINS PHASE I: Performed by: SURGERY

## 2022-05-23 PROCEDURE — 700102 HCHG RX REV CODE 250 W/ 637 OVERRIDE(OP)

## 2022-05-23 PROCEDURE — 84100 ASSAY OF PHOSPHORUS: CPT

## 2022-05-23 PROCEDURE — 82330 ASSAY OF CALCIUM: CPT

## 2022-05-23 PROCEDURE — 82728 ASSAY OF FERRITIN: CPT

## 2022-05-23 PROCEDURE — 502240 HCHG MISC OR SUPPLY RC 0272: Performed by: SURGERY

## 2022-05-23 PROCEDURE — 501838 HCHG SUTURE GENERAL: Performed by: SURGERY

## 2022-05-23 PROCEDURE — 160028 HCHG SURGERY MINUTES - 1ST 30 MINS LEVEL 3: Performed by: SURGERY

## 2022-05-23 PROCEDURE — 36415 COLL VENOUS BLD VENIPUNCTURE: CPT

## 2022-05-23 PROCEDURE — 82962 GLUCOSE BLOOD TEST: CPT | Mod: 91

## 2022-05-23 PROCEDURE — C1894 INTRO/SHEATH, NON-LASER: HCPCS | Performed by: SURGERY

## 2022-05-23 PROCEDURE — 99232 SBSQ HOSP IP/OBS MODERATE 35: CPT | Mod: GC | Performed by: INTERNAL MEDICINE

## 2022-05-23 PROCEDURE — 160002 HCHG RECOVERY MINUTES (STAT): Performed by: SURGERY

## 2022-05-23 PROCEDURE — 36907 BALO ANGIOP CTR DIALYSIS SEG: CPT | Performed by: SURGERY

## 2022-05-23 PROCEDURE — C1725 CATH, TRANSLUMIN NON-LASER: HCPCS | Performed by: SURGERY

## 2022-05-23 PROCEDURE — 700111 HCHG RX REV CODE 636 W/ 250 OVERRIDE (IP): Performed by: SURGERY

## 2022-05-23 PROCEDURE — C1769 GUIDE WIRE: HCPCS | Performed by: SURGERY

## 2022-05-23 PROCEDURE — 700105 HCHG RX REV CODE 258: Performed by: STUDENT IN AN ORGANIZED HEALTH CARE EDUCATION/TRAINING PROGRAM

## 2022-05-23 PROCEDURE — 700105 HCHG RX REV CODE 258: Performed by: SURGERY

## 2022-05-23 PROCEDURE — 05773ZZ DILATION OF RIGHT AXILLARY VEIN, PERCUTANEOUS APPROACH: ICD-10-PCS | Performed by: SURGERY

## 2022-05-23 PROCEDURE — C1768 GRAFT, VASCULAR: HCPCS | Performed by: SURGERY

## 2022-05-23 PROCEDURE — 160035 HCHG PACU - 1ST 60 MINS PHASE I: Performed by: SURGERY

## 2022-05-23 PROCEDURE — 160009 HCHG ANES TIME/MIN: Performed by: SURGERY

## 2022-05-23 PROCEDURE — 700102 HCHG RX REV CODE 250 W/ 637 OVERRIDE(OP): Performed by: STUDENT IN AN ORGANIZED HEALTH CARE EDUCATION/TRAINING PROGRAM

## 2022-05-23 PROCEDURE — 85520 HEPARIN ASSAY: CPT

## 2022-05-23 PROCEDURE — 82306 VITAMIN D 25 HYDROXY: CPT

## 2022-05-23 PROCEDURE — 02HV33Z INSERTION OF INFUSION DEVICE INTO SUPERIOR VENA CAVA, PERCUTANEOUS APPROACH: ICD-10-PCS | Performed by: SURGERY

## 2022-05-23 PROCEDURE — 83970 ASSAY OF PARATHORMONE: CPT

## 2022-05-23 PROCEDURE — 36581 REPLACE TUNNELED CV CATH: CPT | Mod: LT | Performed by: SURGERY

## 2022-05-23 PROCEDURE — 0JH63XZ INSERTION OF TUNNELED VASCULAR ACCESS DEVICE INTO CHEST SUBCUTANEOUS TISSUE AND FASCIA, PERCUTANEOUS APPROACH: ICD-10-PCS | Performed by: SURGERY

## 2022-05-23 PROCEDURE — 03C70ZZ EXTIRPATION OF MATTER FROM RIGHT BRACHIAL ARTERY, OPEN APPROACH: ICD-10-PCS | Performed by: SURGERY

## 2022-05-23 PROCEDURE — C1750 CATH, HEMODIALYSIS,LONG-TERM: HCPCS | Performed by: SURGERY

## 2022-05-23 PROCEDURE — 01782 ANES VN UPR ARM&ELBW PHLBRPY: CPT | Performed by: STUDENT IN AN ORGANIZED HEALTH CARE EDUCATION/TRAINING PROGRAM

## 2022-05-23 PROCEDURE — 501837 HCHG SUTURE CV: Performed by: SURGERY

## 2022-05-23 PROCEDURE — 160048 HCHG OR STATISTICAL LEVEL 1-5: Performed by: SURGERY

## 2022-05-23 PROCEDURE — 03B70ZZ EXCISION OF RIGHT BRACHIAL ARTERY, OPEN APPROACH: ICD-10-PCS | Performed by: SURGERY

## 2022-05-23 PROCEDURE — 05753ZZ DILATION OF RIGHT SUBCLAVIAN VEIN, PERCUTANEOUS APPROACH: ICD-10-PCS | Performed by: SURGERY

## 2022-05-23 PROCEDURE — C1757 CATH, THROMBECTOMY/EMBOLECT: HCPCS | Performed by: SURGERY

## 2022-05-23 PROCEDURE — 80053 COMPREHEN METABOLIC PANEL: CPT

## 2022-05-23 PROCEDURE — 03170KF BYPASS RIGHT BRACHIAL ARTERY TO LOWER ARM VEIN WITH NONAUTOLOGOUS TISSUE SUBSTITUTE, OPEN APPROACH: ICD-10-PCS | Performed by: SURGERY

## 2022-05-23 DEVICE — CATHETER HEMOSPLIT 27CM (5EA/CA): Type: IMPLANTABLE DEVICE | Site: CHEST | Status: FUNCTIONAL

## 2022-05-23 DEVICE — GRAFT BOVINE ARTEGRAFT 6MM X 40CM: Type: IMPLANTABLE DEVICE | Site: ARM | Status: FUNCTIONAL

## 2022-05-23 RX ORDER — PROTAMINE SULFATE 10 MG/ML
INJECTION, SOLUTION INTRAVENOUS PRN
Status: DISCONTINUED | OUTPATIENT
Start: 2022-05-23 | End: 2022-05-23 | Stop reason: SURG

## 2022-05-23 RX ORDER — BUPIVACAINE HYDROCHLORIDE 5 MG/ML
INJECTION, SOLUTION EPIDURAL; INTRACAUDAL
Status: DISCONTINUED | OUTPATIENT
Start: 2022-05-23 | End: 2022-05-23 | Stop reason: HOSPADM

## 2022-05-23 RX ORDER — SODIUM CHLORIDE 9 MG/ML
INJECTION, SOLUTION INTRAVENOUS
Status: DISCONTINUED | OUTPATIENT
Start: 2022-05-23 | End: 2022-05-23 | Stop reason: SURG

## 2022-05-23 RX ORDER — HEPARIN SODIUM,PORCINE 1000/ML
VIAL (ML) INJECTION PRN
Status: DISCONTINUED | OUTPATIENT
Start: 2022-05-23 | End: 2022-05-23 | Stop reason: SURG

## 2022-05-23 RX ORDER — HALOPERIDOL 5 MG/ML
1 INJECTION INTRAMUSCULAR
Status: COMPLETED | OUTPATIENT
Start: 2022-05-23 | End: 2022-05-23

## 2022-05-23 RX ORDER — CEFAZOLIN SODIUM 1 G/3ML
INJECTION, POWDER, FOR SOLUTION INTRAMUSCULAR; INTRAVENOUS PRN
Status: DISCONTINUED | OUTPATIENT
Start: 2022-05-23 | End: 2022-05-23 | Stop reason: SURG

## 2022-05-23 RX ORDER — LIDOCAINE HYDROCHLORIDE 10 MG/ML
INJECTION, SOLUTION EPIDURAL; INFILTRATION; INTRACAUDAL; PERINEURAL PRN
Status: DISCONTINUED | OUTPATIENT
Start: 2022-05-23 | End: 2022-05-23 | Stop reason: SURG

## 2022-05-23 RX ORDER — PHENYLEPHRINE HCL IN 0.9% NACL 0.5 MG/5ML
SYRINGE (ML) INTRAVENOUS PRN
Status: DISCONTINUED | OUTPATIENT
Start: 2022-05-23 | End: 2022-05-23 | Stop reason: SURG

## 2022-05-23 RX ORDER — ONDANSETRON 2 MG/ML
INJECTION INTRAMUSCULAR; INTRAVENOUS PRN
Status: DISCONTINUED | OUTPATIENT
Start: 2022-05-23 | End: 2022-05-23 | Stop reason: SURG

## 2022-05-23 RX ORDER — HEPARIN SODIUM 5000 [USP'U]/ML
5000 INJECTION, SOLUTION INTRAVENOUS; SUBCUTANEOUS EVERY 8 HOURS
Status: DISCONTINUED | OUTPATIENT
Start: 2022-05-23 | End: 2022-05-25

## 2022-05-23 RX ORDER — ROCURONIUM BROMIDE 10 MG/ML
INJECTION, SOLUTION INTRAVENOUS PRN
Status: DISCONTINUED | OUTPATIENT
Start: 2022-05-23 | End: 2022-05-23 | Stop reason: SURG

## 2022-05-23 RX ORDER — IODIXANOL 270 MG/ML
INJECTION, SOLUTION INTRAVASCULAR
Status: DISCONTINUED | OUTPATIENT
Start: 2022-05-23 | End: 2022-05-23 | Stop reason: HOSPADM

## 2022-05-23 RX ORDER — HEPARIN SODIUM,PORCINE 1000/ML
VIAL (ML) INJECTION
Status: DISCONTINUED | OUTPATIENT
Start: 2022-05-23 | End: 2022-05-23 | Stop reason: HOSPADM

## 2022-05-23 RX ORDER — ONDANSETRON 2 MG/ML
4 INJECTION INTRAMUSCULAR; INTRAVENOUS
Status: COMPLETED | OUTPATIENT
Start: 2022-05-23 | End: 2022-05-23

## 2022-05-23 RX ORDER — DEXAMETHASONE SODIUM PHOSPHATE 4 MG/ML
INJECTION, SOLUTION INTRA-ARTICULAR; INTRALESIONAL; INTRAMUSCULAR; INTRAVENOUS; SOFT TISSUE PRN
Status: DISCONTINUED | OUTPATIENT
Start: 2022-05-23 | End: 2022-05-23 | Stop reason: SURG

## 2022-05-23 RX ADMIN — Medication 100 MCG: at 11:36

## 2022-05-23 RX ADMIN — LIDOCAINE HYDROCHLORIDE 50 MG: 10 INJECTION, SOLUTION EPIDURAL; INFILTRATION; INTRACAUDAL; PERINEURAL at 10:05

## 2022-05-23 RX ADMIN — FENTANYL CITRATE 50 MCG: 50 INJECTION, SOLUTION INTRAMUSCULAR; INTRAVENOUS at 12:07

## 2022-05-23 RX ADMIN — SUGAMMADEX 200 MG: 100 INJECTION, SOLUTION INTRAVENOUS at 13:11

## 2022-05-23 RX ADMIN — Medication 200 MCG: at 10:27

## 2022-05-23 RX ADMIN — Medication 200 MCG: at 12:56

## 2022-05-23 RX ADMIN — Medication 200 MCG: at 11:55

## 2022-05-23 RX ADMIN — Medication 200 MCG: at 10:17

## 2022-05-23 RX ADMIN — ONDANSETRON 4 MG: 2 INJECTION INTRAMUSCULAR; INTRAVENOUS at 14:01

## 2022-05-23 RX ADMIN — Medication 100 MCG: at 12:45

## 2022-05-23 RX ADMIN — Medication 100 MCG: at 11:13

## 2022-05-23 RX ADMIN — Medication 100 MCG: at 10:34

## 2022-05-23 RX ADMIN — HALOPERIDOL LACTATE 1 MG: 5 INJECTION, SOLUTION INTRAMUSCULAR at 14:19

## 2022-05-23 RX ADMIN — DEXAMETHASONE SODIUM PHOSPHATE 4 MG: 4 INJECTION, SOLUTION INTRA-ARTICULAR; INTRALESIONAL; INTRAMUSCULAR; INTRAVENOUS; SOFT TISSUE at 10:22

## 2022-05-23 RX ADMIN — FENTANYL CITRATE 100 MCG: 50 INJECTION, SOLUTION INTRAMUSCULAR; INTRAVENOUS at 10:05

## 2022-05-23 RX ADMIN — ROCURONIUM BROMIDE 10 MG: 10 INJECTION, SOLUTION INTRAVENOUS at 12:45

## 2022-05-23 RX ADMIN — FENTANYL CITRATE 50 MCG: 50 INJECTION, SOLUTION INTRAMUSCULAR; INTRAVENOUS at 10:37

## 2022-05-23 RX ADMIN — PROTAMINE SULFATE 10 MG: 10 INJECTION, SOLUTION INTRAVENOUS at 12:27

## 2022-05-23 RX ADMIN — Medication 200 MCG: at 10:37

## 2022-05-23 RX ADMIN — PROPOFOL 100 MG: 10 INJECTION, EMULSION INTRAVENOUS at 10:05

## 2022-05-23 RX ADMIN — INSULIN HUMAN 1 UNITS: 100 INJECTION, SOLUTION PARENTERAL at 17:33

## 2022-05-23 RX ADMIN — Medication 200 MCG: at 12:22

## 2022-05-23 RX ADMIN — PROTAMINE SULFATE 10 MG: 10 INJECTION, SOLUTION INTRAVENOUS at 12:29

## 2022-05-23 RX ADMIN — GABAPENTIN 300 MG: 300 CAPSULE ORAL at 17:21

## 2022-05-23 RX ADMIN — EPHEDRINE SULFATE 5 MG: 50 INJECTION, SOLUTION INTRAVENOUS at 10:37

## 2022-05-23 RX ADMIN — HEPARIN SODIUM 5000 UNITS: 5000 INJECTION, SOLUTION INTRAVENOUS; SUBCUTANEOUS at 16:33

## 2022-05-23 RX ADMIN — ONDANSETRON 4 MG: 2 INJECTION INTRAMUSCULAR; INTRAVENOUS at 10:22

## 2022-05-23 RX ADMIN — INSULIN HUMAN 1 UNITS: 100 INJECTION, SOLUTION PARENTERAL at 21:32

## 2022-05-23 RX ADMIN — CEFAZOLIN 2 G: 330 INJECTION, POWDER, FOR SOLUTION INTRAMUSCULAR; INTRAVENOUS at 10:05

## 2022-05-23 RX ADMIN — HEPARIN SODIUM 5000 UNITS: 5000 INJECTION, SOLUTION INTRAVENOUS; SUBCUTANEOUS at 21:34

## 2022-05-23 RX ADMIN — HEPARIN SODIUM 5000 UNITS: 1000 INJECTION, SOLUTION INTRAVENOUS; SUBCUTANEOUS at 11:47

## 2022-05-23 RX ADMIN — EPHEDRINE SULFATE 5 MG: 50 INJECTION, SOLUTION INTRAVENOUS at 10:27

## 2022-05-23 RX ADMIN — Medication 100 MCG: at 10:20

## 2022-05-23 RX ADMIN — Medication 100 MCG: at 12:09

## 2022-05-23 RX ADMIN — PROTAMINE SULFATE 10 MG: 10 INJECTION, SOLUTION INTRAVENOUS at 12:22

## 2022-05-23 RX ADMIN — PROTAMINE SULFATE 10 MG: 10 INJECTION, SOLUTION INTRAVENOUS at 12:33

## 2022-05-23 RX ADMIN — ROCURONIUM BROMIDE 10 MG: 10 INJECTION, SOLUTION INTRAVENOUS at 11:55

## 2022-05-23 RX ADMIN — FENTANYL CITRATE 50 MCG: 50 INJECTION, SOLUTION INTRAMUSCULAR; INTRAVENOUS at 12:40

## 2022-05-23 RX ADMIN — SODIUM CHLORIDE: 9 INJECTION, SOLUTION INTRAVENOUS at 09:54

## 2022-05-23 RX ADMIN — HEPARIN SODIUM 5000 UNITS: 1000 INJECTION, SOLUTION INTRAVENOUS; SUBCUTANEOUS at 10:27

## 2022-05-23 RX ADMIN — HALOPERIDOL LACTATE 1 MG: 5 INJECTION, SOLUTION INTRAMUSCULAR at 14:10

## 2022-05-23 RX ADMIN — EPHEDRINE SULFATE 5 MG: 50 INJECTION, SOLUTION INTRAVENOUS at 10:51

## 2022-05-23 RX ADMIN — ATORVASTATIN CALCIUM 80 MG: 80 TABLET, FILM COATED ORAL at 17:21

## 2022-05-23 RX ADMIN — METOPROLOL TARTRATE 25 MG: 25 TABLET, FILM COATED ORAL at 05:29

## 2022-05-23 RX ADMIN — ROCURONIUM BROMIDE 50 MG: 10 INJECTION, SOLUTION INTRAVENOUS at 10:05

## 2022-05-23 RX ADMIN — OMEPRAZOLE 20 MG: 20 CAPSULE, DELAYED RELEASE ORAL at 05:29

## 2022-05-23 RX ADMIN — PHENYLEPHRINE HYDROCHLORIDE 50 MCG/MIN: 10 INJECTION INTRAVENOUS at 10:48

## 2022-05-23 ASSESSMENT — PAIN DESCRIPTION - PAIN TYPE: TYPE: ACUTE PAIN

## 2022-05-23 ASSESSMENT — ENCOUNTER SYMPTOMS
FEVER: 0
COUGH: 0
PALPITATIONS: 0
SHORTNESS OF BREATH: 0
CHILLS: 0

## 2022-05-23 ASSESSMENT — FIBROSIS 4 INDEX: FIB4 SCORE: 2.49

## 2022-05-23 NOTE — ANESTHESIA PROCEDURE NOTES
Airway    Date/Time: 5/23/2022 10:08 AM  Performed by: Delmar Shields M.D.  Authorized by: Delmar Shields M.D.     Location:  OR  Urgency:  Elective  Indications for Airway Management:  Anesthesia      Spontaneous Ventilation: absent    Sedation Level:  Deep  Preoxygenated: Yes    Patient Position:  Sniffing  Mask Difficulty Assessment:  1 - vent by mask  Final Airway Type:  Endotracheal airway  Final Endotracheal Airway:  ETT  Cuffed: Yes    Technique Used for Successful ETT Placement:  Direct laryngoscopy  Devices/Methods Used in Placement:  Intubating stylet    Insertion Site:  Oral  Blade Type:  Alvaro  Laryngoscope Blade/Videolaryngoscope Blade Size:  3  ETT Size (mm):  7.0  Measured from:  Teeth  ETT to Teeth (cm):  21  Placement Verified by: capnometry    Cormack-Lehane Classification:  Grade IIb - view of arytenoids or posterior of glottis only  Number of Attempts at Approach:  1

## 2022-05-23 NOTE — PROGRESS NOTES
Report received from Gustabo PACU RN. Updated on POC.  Assumed care of patient upon arrival to unit. Patient currently resting comfortably; on 2 L O2 silicone nasal cannula; up without complaints of acute pain. Pt placed on monitor, monitor room notified, SR 75. No signs and held orders to release. Post op vitals initiated. Stopped Heparin drip per . Patient oriented to unit and to call light system. Call light within reach. Pt educated to fall risk. Fall precautions in place. Pt provided with personal grooming items. Bed locked and in lowest position. All questions answered. No other needs indicated at this time.

## 2022-05-23 NOTE — WOUND TEAM
Wound team consulted for patient's sacrum.  Patient is incontinent, moisture management interventions placed.  Skin appears intact.  No advance would care needed at this time. Please re-consult if needed.  Thank you        Moisture Associated Skin Damage 05/21/22 (Active)   Wound Image   05/21/22 1446   NEXT Weekly Photo (Inpatient Only) 05/29/22 05/22/22 1858   Drainage Amount None 05/22/22 1858   Periwound Assessment Pink;Red 05/22/22 1858   IAD Cleansing Foam Cleanser/Washcloth 05/22/22 1858   Periwound Protectant Barrier Paste 05/22/22 1858   IAD Containment Device Purewick 05/22/22 1858   WOUND NURSE ONLY - Time Spent with Patient (mins) 25 05/22/22 1858

## 2022-05-23 NOTE — PROGRESS NOTES
Bedside report received from Marixa CELESTE RN. Pt A&Ox2, disoriented to time and situation. No complaints of pain. POC discussed with pt. Pt verbalizes understanding. Call light and belongings within reach. Bed locked and in lowest position. Alarm and fall precautions in place. Heparin infusion verified with NOC RN.

## 2022-05-23 NOTE — OP REPORT
ACUTE CARE VASCULAR SERVICE  OPERATIVE NOTE    -------------------------------------------    Date of Service:          5/23/2022   Patient Name:             Liberty Bolton  Patient MRN:              [id number]    --------------------------------------------------------------------------------------------------    Preoperative Diagnosis:  -  ESRD  -  Thrombosed RUE AVF    Postoperative Diagnosis:  -  ESRD  -  Thrombosed RUE AVF    Procedure:  -  Thrombectomy and revision of RUE AVF using interposition 6mm Artegraft conduit  -  RUE fistulogram  -  10mm plain angioplasty of the right axillary/subclavian vein  -  Insertion of a left IJ 27cm Hemosplit tunneled cuffed dialysis catheter  -Percutaneous access of the left internal jugular vein with ultrasound guidance    -------------------------------------------------------------------------------------------------    Surgeon:                                 Royal Mercer MD    Assistant:   none    Anesthesia:                             General endotracheal anesthesia    EBL:                                        250 cc    Blood Products:                      none    Heparin:                                  Systemically heparinized    Specimen:   none    Findings:   Diffuse aneurysmal formation along the midportion of the fistula, calcification of the proximal 2 cm of the AV fistula, outflow vein stenosis in the right axillary and subclavian vein resolved with angioplasty    Complications:                        none    Disposition:                             Tolerated well, sent to recovery in stable condition    -----------------------------------------------------------------------------------------------------      Procedure Summary:  The patient was properly identified in the preoperative area, taken to the operating room, and placed in a supine position where general endotracheal anesthesia was administered.  Intravenous antibiotics were administered by the  anesthesiologist in the correct time interval.  The patient was prepped and draped in the usual sterile fashion.  Surgical timeout was called to identify the correct patient, procedure, and equipment.  Everyone was in agreement.    Local anesthetic was infiltrated over the distal portion of the AV fistula in the right upper arm and then a longitudinal incision was made and the fistula was dissected out and encircled with a vessel loop.  The fistula was opened with a transverse arteriotomy and fresh thrombus was encountered.  The patient was systemically heparinized.  The thrombus was removed from the proximal and distal portions of the fistula using suction and Shen catheters.  We were able to establish venous backbleeding from the distal portion of the fistula but we could not adequately clear the proximal and midportion of the fistula largely due to significant aneurysmal degeneration of the fistula and large thrombus burden.  We also could not get the Shen catheter to pass through the arterial anastomosis due to tortuosity.  Therefore at this point I made a longitudinal incision overlying the arterial anastomosis near the antecubital fossa and we dissected out the brachial artery and the arterial anastomosis.  The proximal 2 cm of the fistula was calcified but I had a palpable pulse and I was able to control inflow by clamping the fistula itself without having to interrupt the brachial artery.  I clamped the fistula proximally and I opened transversely and encountered fresh thrombus at this level as well.  The clamp was flashed proximally and his arterial inflow indicating good patency of the arterial anastomosis.  I then attempted to clear the midportion of the fistula using Shen catheters and irrigating the fistula with heparinized saline and ultimately we removed all of the thrombus from the midportion of the fistula.  There was a tortuous and stenotic segment between the arterial anastomosis and the  first aneurysm of the fistula and this was resected and then an end-to-end anastomosis was created to restore inflow to the fistula.  The proximal clamp was removed and the anastomosis was found to be hemostatic and widely patent with good flow through the midportion of the fistula.  I then went up to the incision in the upper arm and created an end-to-end anastomosis between the midportion of the fistula bringing the arterial flow in the distal portion of the fistula leading to the outflow vein.  There was a significant diameter difference at this level and the anastomosis was suboptimal.  I therefore accessed the fistula proximally and performed a fistulogram which actually showed that the anastomosis appeared to be adequately patent to support the fistula.  However shortly after performing the fistulogram the fistula lost its thrill indicating poor hemodynamics and suboptimal fistula flow.    At this point I resected both anastomoses that we had just created and I tunneled a 6 mm Artegraft between the 2 incisions creating end-to-end anastomoses proximally and distally using running 5-0 Prolene.  The new conduit replaced the midportion of the fistula which was affecting the hemodynamics.  Before completing the distal anastomosis the graft was flushed and irrigated and then the anastomosis was completed and the anastomosis was pressurized and both were found to be hemostatic.  The distal clamp was removed allowing outflow from the new AV graft and there was a brisk thrill throughout the entire length of the AV graft.    At this point I accessed the new graft and placed a 6 Spanish sheath and performed a central venogram which showed stenosis of the right axillary vein and a separate stenosis of the right subclavian vein near the clavicle.  Both of these areas were treated with 10 mm plain balloon angioplasty with good resolution and excellent flow through the graft, axillary vein, subclavian vein and into the  superior vena cava.    At this point the sheath was removed and the site was closed with a 5-0 Prolene suture.  The graft maintained a brisk thrill throughout the entire length.  The heparin was reversed with protamine.  The wounds were irrigated and topical hemostatic was applied.  Both incisions were closed with multiple layers of absorbable suture and skin glue to protect the incisions.  An Ace wrap was applied from the hand all the way up to the right axilla to help reduce postoperative swelling.    At this point we turned our attention to placing a tunneled dialysis catheter.  Local anesthetic was used for local block.  A stab incision was made over the let IJ vein.  The vein was accessed with ultrasound guidance and the wire passed easily and correct location of the wire was verified with fluoroscopy.  The new catheter was tunneled from a separate stab incision to the wire insertion point.  The venotomy was dilated up to the peel-away sheath.  The catheter was then placed through the peel-away sheath into the superior vena cava and correct position was verified with fluoroscopy.  The skin incision was closed with subcuticular absorbable suture and a dressing was placed. The catheter aspirated and flushed well. It was anchored in place with nylon suture.  It was flushed and then locked with concentrated heparin and then a Bio Patch bandage was applied as well as caps on the catheter.     All sponge, instrument and needle counts were correct at the conclusion of the case.  Patient tolerated the well and was sent to recovery in stable condition.    Postoperative plan:  The new right upper extremity AV graft will require 2 weeks of healing before it can be used for dialysis.  Patient can use her tunneled dialysis catheter anytime for dialysis.  After 2 weeks we will start accessing AV graft and if the patient gets 3 successful consecutive dialysis treatments through her AV graft and then the tunneled catheter can  be removed.    Royal Mercer MD  General and Vascular Surgery  [facility]  [phone]

## 2022-05-23 NOTE — PROGRESS NOTES
Assumed care of patient, bedside report received from JUAN Lamar. Patient A&O x2, RA, and no complaints of pain at this time. Updated on POC, call light within reach and fall precautions in place. Bed locked and in lowest position. Patient instructed to call for assistance before getting out of bed. All questions answered, no other needs at this time.

## 2022-05-23 NOTE — OR NURSING
1323: Pt arrived from OR post Thrombectomy, AV fistula graft, and dialysis catheter placement. Pt is asleep. R upper arm has two incisions that are closed with dermabond; CDI with bruising and swelling to area. Ace wrap in place. L chest sight HD catheter insertion sight is CDI. Cardiac rhythm appears to be SR. Finger stick blood glucose 155.      1405: Pt awakens, reports nausea; medicated per MAR. Denies pain. Oriented to person. Attempted to reprient; pt forgetful.     1430: Updated pt's spouse, Ruperto.     1455: Report to JUAN Morrow. Pt back to room via bed with RN. Pt on a heart monitor for transport.

## 2022-05-23 NOTE — ANESTHESIA TIME REPORT
Anesthesia Start and Stop Event Times     Date Time Event    5/23/2022 0930 Ready for Procedure     0954 Anesthesia Start     1328 Anesthesia Stop        Responsible Staff  05/23/22    Name Role Begin End    Min JÚNIOR Shields M.D. Anesth [id number]        Overtime Reason:  no overtime (within assigned shift)    Comments:

## 2022-05-23 NOTE — PROGRESS NOTES
Transporting patient to pre-op with transport and ACLS RN. Per Dr. Ríos orders to stop Heparin infusion now.

## 2022-05-23 NOTE — PROGRESS NOTES
Daily Progress Note:     Date of Service: 5/23/2022  Primary Team: [facility] Team   Attending: Adeel Wells M.D.   Senior Resident: Dr. Hernandez  Intern: Dr. Ríos  Contact:  [phone]    Chief Complaint/ID:   67-year-old female with past medical history of end-stage renal disease on Tuesday Thursday Saturday hemodialysis, diabetes, hypertension.  Seen for evaluation after AV fistula stopped functioning on last hemodialysis attempt outpatient at [facility], patient has been seen by vascular surgery who is planning for repeat AV fistula with possible thrombectomy.  Ultrasound shows thrombus extending to distal subclavian vein.    Subjective:   Tolerating diet without nausea and vomiting, no feeling of shortness of breath, arm pain or chest pain. Urinating as normal. Patient is eager to get her procedure so that she can get closer to discharge. Patient yesterday and again this morning with waxing and waning orientation, on my exam this morning she was alert and aware, but on nursing and medical student exam later in the morning patient was alert and oriented x1 and x2 respectively, and this matches her occasional waxing and waning status from yesterday.    Review of Systems:    Review of Systems   Constitutional: Negative for chills and fever.   Respiratory: Negative for cough.    Cardiovascular: Negative for chest pain.   Gastrointestinal: Negative for nausea and vomiting.   Genitourinary: Negative for dysuria.        Urinates regularly, is at her baseline   Musculoskeletal: Negative for myalgias.   Skin: Negative for rash.     Objective Data:   Physical Exam:   Vitals:   Temp:  [36.4 °C (97.5 °F)-37.2 °C (99 °F)] 37.2 °C (99 °F)  Pulse:  [82-94] 91  Resp:  [16-18] 18  BP: (123-150)/(55-63) 137/57  SpO2:  [95 %-99 %] 97 %    Physical Exam  Constitutional:       General: She is not in acute distress.  HENT:      Head: Normocephalic.   Eyes:      Pupils: Pupils are equal, round, and reactive to light.  "  Cardiovascular:      Rate and Rhythm: Normal rate and regular rhythm.      Heart sounds: Normal heart sounds. No murmur heard.  Pulmonary:      Effort: No respiratory distress.      Breath sounds: Normal breath sounds.   Abdominal:      General: Abdomen is flat.      Palpations: Abdomen is soft.   Musculoskeletal:      Comments: Patient with palpable well circumscribed rubbery masses on right anterior shin and left popliteal fossa \"been there for a long, long time,\" there are not erythematous, not painful to palpation.    Skin:     General: Skin is warm.      Coloration: Skin is not jaundiced.   Neurological:      General: No focal deficit present.      Mental Status: She is alert. Mental status is at baseline.   Psychiatric:         Mood and Affect: Mood normal.         Thought Content: Thought content normal.     Labs:   Lab Results   Component Value Date/Time    WBC 4.4 (L) 05/22/2022 11:32 AM    RBC 2.59 (L) 05/22/2022 11:32 AM    HEMOGLOBIN 7.8 (L) 05/22/2022 11:32 AM    HEMATOCRIT 25.3 (L) 05/22/2022 11:32 AM    MCV 97.7 05/22/2022 11:32 AM    MCH 30.1 05/22/2022 11:32 AM    MCHC 30.8 (L) 05/22/2022 11:32 AM    MPV 10.9 05/22/2022 11:32 AM    NEUTSPOLYS 75.50 (H) 05/22/2022 12:28 AM    LYMPHOCYTES 12.90 (L) 05/22/2022 12:28 AM    MONOCYTES 9.60 05/22/2022 12:28 AM    EOSINOPHILS 0.70 05/22/2022 12:28 AM    EOSINOPHILS 28 05/05/2022 09:45 AM    BASOPHILS 0.90 05/22/2022 12:28 AM    ANISOCYTOSIS 1+ 05/05/2022 06:30 AM        Lab Results   Component Value Date/Time    SODIUM 138 05/23/2022 04:00 AM    POTASSIUM 4.3 05/23/2022 04:00 AM    CHLORIDE 100 05/23/2022 04:00 AM    CO2 25 05/23/2022 04:00 AM    GLUCOSE 120 (H) 05/23/2022 04:00 AM    BUN 38 (H) 05/23/2022 04:00 AM    CREATININE 8.07 (HH) 05/23/2022 04:00 AM        Imaging:   No new imaging, see imaging tab for previous results.    Problem Representation:      ESRD (end stage renal disease) (HCC)  ESRD on HD TTS   HD fistula was working well on last HD on " Thursday 5/19   Pt went to [facility] dialysis center on 5/21 & found that HD fistula is not working , unable to proceed with HD.   ERP talked to Vascular surgery & nephrology.   -Last HD May 21st- nephrology on board appreciate recs.  -S/pTemp HD cath placement per vascular surgery May 21.  -Possible OR May 23rd for thrombectomy & fistula revision surgery. Appreciate Vascular surgery recs      Generalized weakness  Likely from hyperkalemia - improving.  -Will monitor.  -Fall/aspiration precautions       Dialysis AV fistula malfunction (HCC)  ESRD on HD TTS   HD fistula was working well on last HD on Thursday 5/19   Pt went to [facility] dialysis center on 5/21 & found that HD fistula is not working , unable to proceed with HD .  -hyperkalemia resolved to wnl currently, continue to monitor and manage per nephro recs.    -Last HD May 21st, nephrology on board appreciate recs.  -S/P Temp HD cath placement May 21st with vascular surgery   -Continue Heparin drip without bolus, discontinue before surgical intervention, discussed with vascular.  -Possible OR May 23rd with vascular surgery for thrombectomy & fistula surgery         Hyperkalemia  Resolving, wnl currently.  S/p HD May 21st, HD per nephrology appreciate recs, TTS schedule previously.   If climbing above 5.8, can consider calcium, insulin, dextrose, however would first discuss with nephrology.  Telemetry status    Will monitor          Primary hypertension  BP soft on admission, back to 130s-140s systolic range.  Resume home metoprolol in lower dosage with parameters   Holding home amlodipine & hydralazine currently, can consider restart one or both after surgery.   Monitor BP         Type 2 diabetes mellitus, with long-term current use of insulin ([facility])  A1C   ISS for now -POC has been 120s 130s range.  Will continue to monitor glucose levels, so far without need for additional insulin.

## 2022-05-23 NOTE — CARE PLAN
Problem: Skin Integrity  Goal: Skin integrity is maintained or improved  Outcome: Progressing  Note: Assess skin and monitor for skin breakdown. Alleviate pressure to bony prominences and provide assistance with turning, repositioning, ROM and mobility as appropriate. Use of mepilex's and barrier cream as needed. Continue to monitor.        Problem: Fall Risk  Goal: Patient will remain free from falls  Outcome: Progressing  Note: High fall risk precautions in place. Treaded socks and bed/strip alarm on, side rails up x 3. Call light within reach. Pt educated to call for assistance. Reinforce as needed. Continue to monitor.        The patient is Watcher - Medium risk of patient condition declining or worsening    Shift Goals: Post op vitals   Clinical Goals: Prepare for Pre-op  Patient Goals: Sleep  Family Goals: N/A    Progress made toward(s) clinical / shift goals:  Procedure completed by Dr. Mercer    Patient is not progressing towards the following goals: 1 liter of oxygen

## 2022-05-23 NOTE — PROGRESS NOTES
"[facility] -  PROGRESS NOTE               Author: Long Seay M.D. Date & Time: 5/23/2022  12:51 PM     HPI:  67 years old female with medical history notable for insulin-dependent type 2 diabetes, hypertension, end-stage renal disease due to diabetes has been on hemodialysis for 4 years, presenting to hospital, she was at her dialysis clinic this am and her AVF was noted to be thrombosed she was brought to the ER via [facility].  She was recently in the hospital discharged to SNF. She had been refusing hemodialysis intermittently.  Her K+ was at a high of 6.2 which is now resolved after dialysis with temp cath placement. Vascular scheduled to take patient to OR for thrombectomy of AVF on 5/23.     Unable to assess ROS: patient confused and very somnolent from haldol/anesthesia    DAILY NEPHROLOGY SUMMARY:  5/21: consult done, temp CVC placed, HD performed with no UF due to hypotension  5/22: sitting up in bed, oriented x 2, tolerated iHD UF yesterday with minimal UF due to hypotension, looks euvolemic, for AVF thrombectomy today with Dr. Mercer  5/23: Dr. Mercer took patient to OR to perfrom AVF thrombectomy. Patient tolerated procedure well. Very somnolent on exam.    REVIEW OF SYSTEMS:    10 point ROS reviewed and is as per HPI/daily summary or otherwise negative    PMH/PSH/SH/FH:   Reviewed and unchanged since admission note    CURRENT MEDICATIONS:   Reviewed from admission to present day    VS:  /64   Pulse 83   Temp 36.1 °C (97 °F) (Temporal)   Resp 16   Ht 1.651 m (5' 5\")   Wt 75.6 kg (166 lb 10.7 oz)   SpO2 95%   BMI 27.73 kg/m²     Physical Exam  Constitutional:       Appearance: Normal appearance. She is normal weight.   HENT:      Head: Normocephalic and atraumatic.      Nose: Nose normal.      Mouth/Throat:      Mouth: Mucous membranes are moist.      Pharynx: Oropharynx is clear.   Eyes:      Extraocular Movements: Extraocular movements intact.      " Conjunctiva/sclera: Conjunctivae normal.      Pupils: Pupils are equal, round, and reactive to light.   Cardiovascular:      Rate and Rhythm: Normal rate and regular rhythm.      Comments: R temp CVC  R AVF - B/T + echymosis  Pulmonary:      Effort: Pulmonary effort is normal.      Breath sounds: Normal breath sounds.   Abdominal:      General: Abdomen is flat. Bowel sounds are normal.      Palpations: Abdomen is soft.   Musculoskeletal:         General: Normal range of motion.      Cervical back: Normal range of motion and neck supple.   Skin:     General: Skin is warm and dry.      Comments: L iHD permacath placed, CDI  RUE AVF CDI, bruising present, dermabond in place   Neurological:      General: No focal deficit present.      Mental Status: She is alert. She is disoriented.      Comments: Somnolent from haldol/anesthesia; oriented only to person   Psychiatric:         Mood and Affect: Mood normal.         Behavior: Behavior normal.         Fluids:  No intake/output data recorded.    LABS:  Recent Labs     05/21/22  0843 05/22/22  0028 05/23/22  0400   SODIUM 136 137 138   POTASSIUM 6.2* 4.5 4.3   CHLORIDE 97 100 100   CO2 22 27 25   GLUCOSE 133* 144* 120*   BUN 66* 28* 38*   CREATININE 9.97* 5.73* 8.07*   CALCIUM 9.4 8.9 8.8       IMAGING:   All imaging reviewed from admission to present day    IMPRESSION:  # ESRD             - iHD q TThS and prn              - via R AVF (thrombosed), to OR on 5/23 for thrombectomy             - temp CVC placed 5/22 Dr. Mercer  # HTN, controlled              - Goal BP < 140/90             - Continue BB             - UF with iHD as tolerated   # Anemia of CKD, below target             - Goal Hgb 10-11             - check iron stores    - start JUDITH with iHD   # CKD-MBD             -              - Vit D pen             - Ca: 9.4             - PO4 4.8  # Thrombosed AVF             - temp CVC today for hyper k+              - thrombectomy with Dr. Mercer  5/23  #Thrombocytopenia             - no heparin with iHD             - monitor  #Hyperkalemia, corrected              - manage with iHD              - low K+ diet   #Dyslipidemia             - statin  #T2DM             - per primary team        PLAN:  - Possible iHD today (Mon), otherwise start TThS schedule  - Thrombectomy with Dr. Mercer 5/23  - UF as tolerated  - No dietary protein restrictions  - Dose all meds per ESRD  - Check iron stores, PTH, vit D   - Will continue to follow with further recommendations

## 2022-05-23 NOTE — ANESTHESIA PREPROCEDURE EVALUATION
Case: [id number] Date/Time: 05/23/22 0923    Procedures:       THROMBECTOMY      CREATION, AV FISTULA      INSERTION, CATHETER, WITH FLUORO    Location: [facility] OR 08 / SURGERY [facility]    Surgeons: Royal Mercer M.D.        68 yo F w/ hx of ESRD on HD (TTS; last dialyzed on 5/21/22; K 4.3 today), HTN, DM2 on insulin (A1c 5.1), pulm HTN (RVSP 52 this month) admitted on 5/21 due to her R AVF not functioning. Pt is NPO.    ECHO (5/5/22): EF 55-60%, moderate MR and TR, RVSP 52  Relevant Problems   PULMONARY   (positive) Pneumonia due to infectious organism      CARDIAC   (positive) Dialysis AV fistula malfunction (HCC)   (positive) Primary hypertension         (positive) ESRD (end stage renal disease) (HCC)   (positive) Uremia      ENDO   (positive) Type 2 diabetes mellitus, with long-term current use of insulin (HCC)       Physical Exam    Airway   Mallampati: III  TM distance: >3 FB  Neck ROM: full       Cardiovascular - normal exam  Rhythm: regular  Rate: normal  (-) murmur     Dental   (+) upper dentures, lower dentures           Pulmonary - normal exam  Breath sounds clear to auscultation     Abdominal    Neurological - normal exam                 Anesthesia Plan    ASA 3   ASA physical status 3 criteria: ESRD undergoing regularly scheduled dialysis    Plan - general       Airway plan will be ETT          Induction: intravenous    Postoperative Plan: Postoperative administration of opioids is intended.    Pertinent diagnostic labs and testing reviewed    Informed Consent:    Anesthetic plan and risks discussed with patient.    Use of blood products discussed with: patient whom consented to blood products.

## 2022-05-23 NOTE — DISCHARGE PLANNING
Outpatient Dialysis Note    Confirmed patient is active at:    [facility]   [address] [place] [id number]    Schedule: Tuesday. Thursday, Saturday   Time: 6:00am     Patient is seen by Dr. Luque in HD clinic.    Spoke with Dai at facility who confirmed.    Forwarded records for review.    Ching Lees- Senior Dialysis Coordinator Ph# [phone]  [facility]

## 2022-05-23 NOTE — NON-PROVIDER
Daily Progress Note:     This note is intended for the purposes of medical student education and feedback only.   Please refer to the documentation by this patient's assigned medical practitioner for details of care and plans.    Date of Service: 5/23/2022  Primary Team: [facility]   Attending: Adeel Wells M.D.   Senior Resident: Dr. Hernandez  Intern: Dr. Ríos  Contact:  [phone]    ID:   Liberty is a 67 y.o. female with a PMH significant for ESRD, diabetes, and HTN who presented 5/21/2022 after she was unable to undergo her usual hemodialysis on 5/21. Patient usually gets HD on Tuesday, Thursday, and Saturday.     Chief Complaint:   Unable to access HD catheter.    Interval Events:  No interval events. Vascular surgery with Dr. Mercer is planned for today if the OR can fit her in. Otherwise, it will be planned for tomorrow.     Subjective:  Patient feels well overnight. She denies any arm pain, chest pain, palpitations, shortness of breath, and cough. Patient has not complaints at this time. She feels as though she is thinking clearly.    Review of Systems:    Review of Systems   Constitutional: Negative for chills and fever.   Respiratory: Negative for cough and shortness of breath.    Cardiovascular: Negative for chest pain and palpitations.     Past Medical History  Past Medical History:   Diagnosis Date   • Diabetes (HCC)    • Renal disorder      Medications    Current Facility-Administered Medications:   •  heparin in NS Intra-op, , , Intra-Op Once PRN, Royal Mercer M.D., Given at 05/23/22 1039  •  heparin infusion 25,000 units in 500 mL 0.45% NACL, 0-30 Units/kg/hr (Adjusted), Intravenous, Continuous, Daune Ríos M.D., Paused at 05/23/22 0842  •  [MAR Hold] epoetin (Retacrit) injection (Dialysis Use Only) 4,000 Units, 4,000 Units, Intravenous, TUE+THU+SAT, JAQUAN Moreno.P.R.NGregory  •  [MAR Hold] senna-docusate (PERICOLACE or SENOKOT S) 8.6-50 MG per tablet 2 Tablet, 2 Tablet, Oral,  BID, 2 Tablet at 05/22/22 1743 **AND** [MAR Hold] polyethylene glycol/lytes (MIRALAX) PACKET 1 Packet, 1 Packet, Oral, QDAY PRN **AND** [MAR Hold] magnesium hydroxide (MILK OF MAGNESIA) suspension 30 mL, 30 mL, Oral, QDAY PRN **AND** [MAR Hold] bisacodyl (DULCOLAX) suppository 10 mg, 10 mg, Rectal, QDAY PRN, Darshan Marcelo M.D.  •  [MAR Hold] atorvastatin (LIPITOR) tablet 80 mg, 80 mg, Oral, Q EVENING, Darshan Marcelo M.D., 80 mg at 05/22/22 1743  •  [MAR Hold] omeprazole (PRILOSEC) capsule 20 mg, 20 mg, Oral, DAILY, Darshan Marcelo M.D., 20 mg at 05/23/22 0529  •  [MAR Hold] metoprolol tartrate (LOPRESSOR) tablet 25 mg, 25 mg, Oral, BID, Darshan Marcelo M.D., 25 mg at 05/23/22 0529  •  [MAR Hold] insulin regular (HumuLIN R,NovoLIN R) injection, 1-6 Units, Subcutaneous, 4X/DAY ACHS **AND** POC blood glucose manual result, , , Q AC AND BEDTIME(S) **AND** NOTIFY MD and PharmD, , , Once **AND** Administer 20 grams of glucose (approximately 8 ounces of fruit juice) every 15 minutes PRN FSBG less than 70 mg/dL, , , PRN **AND** [MAR Hold] dextrose 50% (D50W) injection 25 g, 25 g, Intravenous, Q15 MIN PRN, Darshan Marcelo M.D.  •  [MAR Hold] heparin injection 2,400 Units, 2,400 Units, Intracatheter, DIALYSIS PRN, José Hickey M.D., 2,400 Units at 05/21/22 1845  •  [MAR Hold] gabapentin (NEURONTIN) capsule 300 mg, 300 mg, Oral, DAILY, Linsey M Wegener, A.P.R.NGregory, 300 mg at 05/22/22 1743    Facility-Administered Medications Ordered in Other Encounters:   •  dexamethasone (DECADRON) injection, , Intravenous, PRN, Min JÚNIOR Shields M.D., 4 mg at 05/23/22 1022  •  ondansetron (ZOFRAN) syringe/vial injection, , Intravenous, PRN, Min JÚNIOR Shields M.D., 4 mg at 05/23/22 1022  •  phenylephrine (DUANE-SYNEPHRINE) 100 mcg/mL inj (IV Push Syringe), , Intravenous, PRN, Min JÚNIOR Shields M.D., 200 mcg at 05/23/22 1037  •  rocuronium (ZEMURON) injection, , Intravenous, PRN, Min JÚNIOR Shields M.D., 50 mg at 05/23/22 1005  •  propofol (DIPRIVAN) injection, , Intravenous,  PRN, Min JÚNIOR Shields M.D., 100 mg at 05/23/22 1005  •  fentaNYL (SUBLIMAZE) injection, , Intravenous, PRN, Min JÚNIOR Shields M.D., 50 mcg at 05/23/22 1037  •  lidocaine PF (XYLOCAINE-MPF) 1 % injection, , Intravenous, PRN, Min JÚNIOR Shields M.D., 50 mg at 05/23/22 1005  •  ceFAZolin (ANCEF) injection, , Intravenous, PRN, Min JÚNIOR Shields M.D., 2 g at 05/23/22 1005  •  NS infusion, , Intravenous, Intra-Op Continuous, Min JÚNIOR Shields M.D., New Bag at 05/23/22 0954  •  heparin OR USE ONLY, , Intravenous, PRN, Min JÚNIOR Shields M.D., 5,000 Units at 05/23/22 1027  •  ePHEDrine injection, , Intravenous, PRN, Min JÚNIOR Shields M.D., 5 mg at 05/23/22 1037    Objective Data:   Physical Exam:   Vitals:   In ED:  Temp: 36.3  Pulse: 68  Resp: 17  BP: 124/60  SpO2: 95% RA    Past 24 hours:  Temp:  [36.4 °C (97.5 °F)-37.2 °C (99 °F)] 37.2 °C (99 °F)  Pulse:  [84-94] 91  Resp:  [16-18] 18  BP: (123-137)/(55-63) 137/57  SpO2:  [97 %-99 %] 97 %     Physical Exam  Constitutional:       Appearance: She is ill-appearing.   HENT:      Head: Normocephalic and atraumatic.   Cardiovascular:      Rate and Rhythm: Normal rate and regular rhythm.      Pulses:           Dorsalis pedis pulses are 1+ on the right side and 1+ on the left side.      Heart sounds: Normal heart sounds. No murmur heard.    No friction rub. No gallop.      Arteriovenous access: right arteriovenous access is present.     Comments: Subclavian catheter present.  Pulmonary:      Effort: Pulmonary effort is normal.      Breath sounds: Normal breath sounds. No stridor, decreased air movement or transmitted upper airway sounds. No wheezing, rhonchi or rales.   Abdominal:      General: Abdomen is flat. Bowel sounds are normal.      Palpations: Abdomen is soft.      Tenderness: There is no abdominal tenderness. There is no guarding.   Musculoskeletal:      Right lower leg: No edema.      Left lower leg: No edema.   Skin:     General: Skin is warm and dry.      Comments: Dry skin to the skins, possible chronic  venous stasis changes   Neurological:      Mental Status: She is alert. She is confused.      Comments: Patient is oriented to person, but not place or time.   Psychiatric:         Behavior: Behavior is cooperative.           Labs:   Recent Labs     05/21/22  0843 05/22/22  0028 05/22/22  1132   WBC 6.0 4.5* 4.4*   RBC 3.30* 2.70* 2.59*   HEMOGLOBIN 9.9* 8.2* 7.8*   HEMATOCRIT 31.6* 25.5* 25.3*   MCV 95.8 94.4 97.7   MCH 30.0 30.4 30.1   RDW 58.5* 57.6* 58.5*   PLATELETCT 138* 141* 141*   MPV 10.9 11.0 10.9   NEUTSPOLYS 85.90* 75.50*  --    LYMPHOCYTES 7.60* 12.90*  --    MONOCYTES 5.00 9.60  --    EOSINOPHILS 0.70 0.70  --    BASOPHILS 0.30 0.90  --      Recent Labs     05/21/22  0843 05/22/22  0028 05/23/22  0400   SODIUM 136 137 138   POTASSIUM 6.2* 4.5 4.3   CHLORIDE 97 100 100   CO2 22 27 25   GLUCOSE 133* 144* 120*   BUN 66* 28* 38*     Recent Labs     05/21/22  0843 05/22/22  0028 05/23/22  0400   ALBUMIN 3.6 3.1* 3.2   TBILIRUBIN 0.4 0.3 0.4   ALKPHOSPHAT 83 77 75   TOTPROTEIN 7.3 6.4 6.4   ALTSGPT 17 15 16   ASTSGOT 20 17 21   CREATININE 9.97* 5.73* 8.07*       Ferritin 1718  Heparin 0.71    Imaging:   DX-CHEST-LIMITED (1 VIEW)   Final Result         No acute cardiopulmonary abnormalities are identified.      US-HEMODIALYSIS GRAFT DUPLEX COMP UPPER EXTREMITY   Final Result      DX-PORTABLE FLUORO > 1 HOUR    (Results Pending)       Problem Representation: Patient is a 67 year old female with a PMH significant for ESRD, diabetes, and HTN who presented to the ED after being unable to access her HD fistula on 5/21. She was without symptoms at that time. Vascular surgery evaluated her and placed a temporary veno-venous catheter for dialysis and was dialyzed on 5/21. Patient is alert and oriented x1, but per chart,  says that is not her baseline. New HD fistula and mechanical thrombectomy will be performed today or tomorrow, depending on OR schedule.    Assessment/Plan:   #ESRD  Patient has a  history of ESRD and undergoes dialysis every Tuesday, Thursday, and Saturday, but was unable to access her fistula on 5/21 secondary to venous thrombosis up to the distal subclavian vein. Vascular surgery placed a temporary veno-venous catheter, but plans for placement of a new HD fistula today or tomorrow. Sodium, potassium, and anion gap are all normal today.  Heparin level is 0.71. Ferritin is 1718 and PTH is 138.   - Continue to monitor electrolytes.  - Waiting on Dr. Mercer to place new HD fistula.  - Nephrology following.  - Continue heparin drip at 0-38.6 mL/hr    #Confusion  Patient appears to be waxing and waning. This could be delirium given that the  states (per chart) that this is not her baseline. Will continue to monitor.    #Diabetes Mellitus type II  Patient has a history of DM II and is being treated with pre-meal insulin. Also takes atorvastatin.  - Discontinue regular insulin and start on insulin lispro..    #HTN  Patient has a history of HTN and is being treated with metoprolol tartrate 25 mg po bid. Her blood pressure ranges from 123-150 systolic. Will continue to monitor, but may need to increase metoprolol.  - Continue HTN regimen.

## 2022-05-23 NOTE — CARE PLAN
The patient is Watcher - Medium risk of patient condition declining or worsening    Shift Goals  Clinical Goals: Monitor labs  Patient Goals: I want to sleep  Family Goals: N/A    Progress made toward(s) clinical / shift goals:      Problem: Skin Integrity  Goal: Skin integrity is maintained or improved  Outcome: Progressing   Barrier creams, barrier wipes, pillows in place for offloading.    Problem: Fall Risk  Goal: Patient will remain free from falls  Outcome: Progressing   Bed alarm on, bed in lowest position, and call light within reach.    Patient is not progressing towards the following goals:

## 2022-05-24 LAB
25(OH)D3 SERPL-MCNC: 14 NG/ML (ref 30–80)
ABO GROUP BLD: NORMAL
ANION GAP SERPL CALC-SCNC: 14 MMOL/L (ref 7–16)
BARCODED ABORH UBTYP: 9500
BARCODED PRD CODE UBPRD: NORMAL
BARCODED UNIT NUM UBUNT: NORMAL
BASOPHILS # BLD AUTO: 0.4 % (ref 0–1.8)
BASOPHILS # BLD: 0.02 K/UL (ref 0–0.12)
BLD GP AB SCN SERPL QL: NORMAL
BUN SERPL-MCNC: 51 MG/DL (ref 8–22)
CALCIUM SERPL-MCNC: 8.3 MG/DL (ref 8.5–10.5)
CHLORIDE SERPL-SCNC: 101 MMOL/L (ref 96–112)
CO2 SERPL-SCNC: 24 MMOL/L (ref 20–33)
COMPONENT R 8504R: NORMAL
CREAT SERPL-MCNC: 9.5 MG/DL (ref 0.5–1.4)
EOSINOPHIL # BLD AUTO: 0.02 K/UL (ref 0–0.51)
EOSINOPHIL NFR BLD: 0.4 % (ref 0–6.9)
ERYTHROCYTE [DISTWIDTH] IN BLOOD BY AUTOMATED COUNT: 54.8 FL (ref 35.9–50)
ERYTHROCYTE [DISTWIDTH] IN BLOOD BY AUTOMATED COUNT: 58.4 FL (ref 35.9–50)
ERYTHROCYTE [DISTWIDTH] IN BLOOD BY AUTOMATED COUNT: 58.4 FL (ref 35.9–50)
GFR SERPLBLD CREATININE-BSD FMLA CKD-EPI: 4 ML/MIN/1.73 M 2
GLUCOSE BLD STRIP.AUTO-MCNC: 116 MG/DL (ref 65–99)
GLUCOSE BLD STRIP.AUTO-MCNC: 121 MG/DL (ref 65–99)
GLUCOSE BLD STRIP.AUTO-MCNC: 133 MG/DL (ref 65–99)
GLUCOSE SERPL-MCNC: 126 MG/DL (ref 65–99)
HCT VFR BLD AUTO: 20.2 % (ref 37–47)
HCT VFR BLD AUTO: 21.8 % (ref 37–47)
HCT VFR BLD AUTO: 23.7 % (ref 37–47)
HGB BLD-MCNC: 6.3 G/DL (ref 12–16)
HGB BLD-MCNC: 7 G/DL (ref 12–16)
HGB BLD-MCNC: 7.7 G/DL (ref 12–16)
IMM GRANULOCYTES # BLD AUTO: 0.03 K/UL (ref 0–0.11)
IMM GRANULOCYTES NFR BLD AUTO: 0.6 % (ref 0–0.9)
LYMPHOCYTES # BLD AUTO: 0.54 K/UL (ref 1–4.8)
LYMPHOCYTES NFR BLD: 11.2 % (ref 22–41)
MAGNESIUM SERPL-MCNC: 2 MG/DL (ref 1.5–2.5)
MCH RBC QN AUTO: 30 PG (ref 27–33)
MCH RBC QN AUTO: 30.6 PG (ref 27–33)
MCH RBC QN AUTO: 31.1 PG (ref 27–33)
MCHC RBC AUTO-ENTMCNC: 31.2 G/DL (ref 33.6–35)
MCHC RBC AUTO-ENTMCNC: 32.1 G/DL (ref 33.6–35)
MCHC RBC AUTO-ENTMCNC: 32.5 G/DL (ref 33.6–35)
MCV RBC AUTO: 94 FL (ref 81.4–97.8)
MCV RBC AUTO: 96.2 FL (ref 81.4–97.8)
MCV RBC AUTO: 96.9 FL (ref 81.4–97.8)
MONOCYTES # BLD AUTO: 0.49 K/UL (ref 0–0.85)
MONOCYTES NFR BLD AUTO: 10.1 % (ref 0–13.4)
NEUTROPHILS # BLD AUTO: 3.73 K/UL (ref 2–7.15)
NEUTROPHILS NFR BLD: 77.3 % (ref 44–72)
NRBC # BLD AUTO: 0 K/UL
NRBC BLD-RTO: 0 /100 WBC
PHOSPHATE SERPL-MCNC: 5.9 MG/DL (ref 2.5–4.5)
PLATELET # BLD AUTO: 128 K/UL (ref 164–446)
PLATELET # BLD AUTO: 140 K/UL (ref 164–446)
PLATELET # BLD AUTO: 152 K/UL (ref 164–446)
PMV BLD AUTO: 10.7 FL (ref 9–12.9)
PMV BLD AUTO: 11.2 FL (ref 9–12.9)
PMV BLD AUTO: 11.5 FL (ref 9–12.9)
POTASSIUM SERPL-SCNC: 5.7 MMOL/L (ref 3.6–5.5)
PRODUCT TYPE UPROD: NORMAL
RBC # BLD AUTO: 2.1 M/UL (ref 4.2–5.4)
RBC # BLD AUTO: 2.25 M/UL (ref 4.2–5.4)
RBC # BLD AUTO: 2.52 M/UL (ref 4.2–5.4)
RH BLD: NORMAL
SODIUM SERPL-SCNC: 139 MMOL/L (ref 135–145)
UNIT STATUS USTAT: NORMAL
WBC # BLD AUTO: 3.6 K/UL (ref 4.8–10.8)
WBC # BLD AUTO: 4 K/UL (ref 4.8–10.8)
WBC # BLD AUTO: 4.8 K/UL (ref 4.8–10.8)

## 2022-05-24 PROCEDURE — 770001 HCHG ROOM/CARE - MED/SURG/GYN PRIV*

## 2022-05-24 PROCEDURE — 85025 COMPLETE CBC W/AUTO DIFF WBC: CPT

## 2022-05-24 PROCEDURE — 36415 COLL VENOUS BLD VENIPUNCTURE: CPT

## 2022-05-24 PROCEDURE — P9016 RBC LEUKOCYTES REDUCED: HCPCS

## 2022-05-24 PROCEDURE — 30233N1 TRANSFUSION OF NONAUTOLOGOUS RED BLOOD CELLS INTO PERIPHERAL VEIN, PERCUTANEOUS APPROACH: ICD-10-PCS | Performed by: INTERNAL MEDICINE

## 2022-05-24 PROCEDURE — A9270 NON-COVERED ITEM OR SERVICE: HCPCS | Performed by: STUDENT IN AN ORGANIZED HEALTH CARE EDUCATION/TRAINING PROGRAM

## 2022-05-24 PROCEDURE — A9270 NON-COVERED ITEM OR SERVICE: HCPCS

## 2022-05-24 PROCEDURE — 86923 COMPATIBILITY TEST ELECTRIC: CPT

## 2022-05-24 PROCEDURE — 36430 TRANSFUSION BLD/BLD COMPNT: CPT

## 2022-05-24 PROCEDURE — 90935 HEMODIALYSIS ONE EVALUATION: CPT

## 2022-05-24 PROCEDURE — 83735 ASSAY OF MAGNESIUM: CPT

## 2022-05-24 PROCEDURE — 700111 HCHG RX REV CODE 636 W/ 250 OVERRIDE (IP)

## 2022-05-24 PROCEDURE — 85027 COMPLETE CBC AUTOMATED: CPT

## 2022-05-24 PROCEDURE — 86901 BLOOD TYPING SEROLOGIC RH(D): CPT

## 2022-05-24 PROCEDURE — 700102 HCHG RX REV CODE 250 W/ 637 OVERRIDE(OP)

## 2022-05-24 PROCEDURE — 700111 HCHG RX REV CODE 636 W/ 250 OVERRIDE (IP): Performed by: SURGERY

## 2022-05-24 PROCEDURE — 86900 BLOOD TYPING SEROLOGIC ABO: CPT

## 2022-05-24 PROCEDURE — 82962 GLUCOSE BLOOD TEST: CPT | Mod: 91

## 2022-05-24 PROCEDURE — 80048 BASIC METABOLIC PNL TOTAL CA: CPT

## 2022-05-24 PROCEDURE — 86850 RBC ANTIBODY SCREEN: CPT

## 2022-05-24 PROCEDURE — 84100 ASSAY OF PHOSPHORUS: CPT

## 2022-05-24 PROCEDURE — 700102 HCHG RX REV CODE 250 W/ 637 OVERRIDE(OP): Performed by: STUDENT IN AN ORGANIZED HEALTH CARE EDUCATION/TRAINING PROGRAM

## 2022-05-24 PROCEDURE — 99232 SBSQ HOSP IP/OBS MODERATE 35: CPT | Mod: GC | Performed by: INTERNAL MEDICINE

## 2022-05-24 RX ORDER — HEPARIN SODIUM 1000 [USP'U]/ML
3900 INJECTION, SOLUTION INTRAVENOUS; SUBCUTANEOUS
Status: DISCONTINUED | OUTPATIENT
Start: 2022-05-24 | End: 2022-05-25 | Stop reason: HOSPADM

## 2022-05-24 RX ORDER — HEPARIN SODIUM 1000 [USP'U]/ML
INJECTION, SOLUTION INTRAVENOUS; SUBCUTANEOUS
Status: COMPLETED
Start: 2022-05-24 | End: 2022-05-24

## 2022-05-24 RX ADMIN — OMEPRAZOLE 20 MG: 20 CAPSULE, DELAYED RELEASE ORAL at 04:33

## 2022-05-24 RX ADMIN — ATORVASTATIN CALCIUM 80 MG: 80 TABLET, FILM COATED ORAL at 17:54

## 2022-05-24 RX ADMIN — EPOETIN ALFA-EPBX 4000 UNITS: 4000 INJECTION, SOLUTION INTRAVENOUS; SUBCUTANEOUS at 10:14

## 2022-05-24 RX ADMIN — HEPARIN SODIUM 3900 UNITS: 1000 INJECTION, SOLUTION INTRAVENOUS; SUBCUTANEOUS at 12:00

## 2022-05-24 RX ADMIN — HEPARIN SODIUM 5000 UNITS: 5000 INJECTION, SOLUTION INTRAVENOUS; SUBCUTANEOUS at 04:33

## 2022-05-24 RX ADMIN — GABAPENTIN 300 MG: 300 CAPSULE ORAL at 17:54

## 2022-05-24 RX ADMIN — METOPROLOL TARTRATE 25 MG: 25 TABLET, FILM COATED ORAL at 04:33

## 2022-05-24 RX ADMIN — HEPARIN SODIUM 5000 UNITS: 5000 INJECTION, SOLUTION INTRAVENOUS; SUBCUTANEOUS at 13:57

## 2022-05-24 RX ADMIN — METOPROLOL TARTRATE 25 MG: 25 TABLET, FILM COATED ORAL at 17:54

## 2022-05-24 ASSESSMENT — PAIN DESCRIPTION - PAIN TYPE: TYPE: ACUTE PAIN

## 2022-05-24 NOTE — CARE PLAN
Problem: Knowledge Deficit - Standard  Goal: Patient and family/care givers will demonstrate understanding of plan of care, disease process/condition, diagnostic tests and medications  Outcome: Progressing     Problem: Skin Integrity  Goal: Skin integrity is maintained or improved  Outcome: Progressing     Problem: Fall Risk  Goal: Patient will remain free from falls  Outcome: Progressing   The patient is Stable - Low risk of patient condition declining or worsening    Shift Goals  Clinical Goals: Safety, Fall precautions  Patient Goals: Sleep  Family Goals: N/A    Progress made toward(s) clinical / shift goals:  Progressing    Patient is not progressing towards the following goals:

## 2022-05-24 NOTE — PROGRESS NOTES
Bedside report received from Cody CELESTE RN. Pt A&Ox3, disoriented to time. No complaints of pain. Dr. Ríos at bedside, discussed labs with MD. Dialysis called. Bed locked and in lowest position. Alarm and fall precautions in place.

## 2022-05-24 NOTE — PROGRESS NOTES
4 Eyes Skin Assessment Completed by JUAN Morrow and JUAN Rivera.     Head WDL  Ears Redness and Blanching  Nose WDL  Mouth WDL  Neck WDL  Breast/Chest Left dialysis cath  Shoulder Blades WDL  Spine WDL  (R) Arm/Elbow/Hand Site covered with Ace wrap; elbow bruising  (L) Arm/Elbow/Hand Redness, Blanching and Bruising  Abdomen WDL  Groin Redness, Blanching and Excoriation  Scrotum/Coccyx/Buttocks Redness, Non-Blanching, Excoriation and Scar  (R) Leg Bruising  (L) Leg Bruising  (R) Heel/Foot/Toe Redness, Blanching and Boggy  (L) Heel/Foot/Toe Redness, Blanching and Boggy   Right lower extremity lump  Lump behind left knee  Generalized bruising and scratches  Right arm fistula site covered with Ace Wrap  Left HD catheter     Devices In Places Tele Box, Blood Pressure Cuff and Pulse Ox     Interventions In Place Pillows and Heels Loaded W/Pillows     Possible Skin Injury Yes     Pictures Uploaded Into Epic Yes  Wound Consult Placed Yes  RN Wound Prevention Protocol Ordered Yes

## 2022-05-24 NOTE — PROGRESS NOTES
Bedside report received from day RN, pt care assumed, assessment completed. Pt is A&O3, pain 0/10, SR on the monitor. Updated on POC, questions answered. Bed in lowest, locked position, treaded socks on, call light and belongings within reach. Fall precautions in place.

## 2022-05-24 NOTE — PROGRESS NOTES
4 Eyes Skin Assessment Completed by JUAN Morrow and JUAN Scott.     Head WDL  Ears Redness and Blanching  Nose WDL  Mouth WDL  Neck WDL  Breast/Chest Left dialysis cath  Shoulder Blades WDL  Spine WDL  (R) Arm/Elbow/Hand Site covered with Ace wrap; elbow bruising  (L) Arm/Elbow/Hand Redness, Blanching and Bruising  Abdomen WDL  Groin Redness, Blanching and Excoriation  Scrotum/Coccyx/Buttocks Redness, Non-Blanching, Excoriation and Scar  (R) Leg Bruising  (L) Leg Bruising  (R) Heel/Foot/Toe Redness, Blanching and Boggy  (L) Heel/Foot/Toe Redness, Blanching and Boggy   Right lower extremity lump  Lump behind left knee  Generalized bruising and scratches  Right arm fistula site covered with Ace Wrap  Left HD catheter     Devices In Places Tele Box, Blood Pressure Cuff and Pulse Ox     Interventions In Place Pillows,Waffle Mattress and Heels Loaded W/Pillows     Possible Skin Injury Yes     Pictures Uploaded Into Epic Yes  Wound Consult Placed Yes  RN Wound Prevention Protocol Ordered Yes

## 2022-05-24 NOTE — ANESTHESIA POSTPROCEDURE EVALUATION
Patient: Liberty Bolton    Procedure Summary     Date: 05/23/22 Room / Location: [facility] OR 08 / SURGERY [facility]    Anesthesia Start: 0954 Anesthesia Stop: 1328    Procedures:       THROMBECTOMY WITH ARTERIOVENOUS GRAFT (Right Arm Upper)      CREATION, AV FISTULA (Right Arm Upper)      INSERTION, CATHETER, WITH FLUORO (Left Chest)      ANGIOPLASTY, UPPER EXTREMITY; subclavian (Right Arm Upper) Diagnosis: (Arteriovenous Fistula Thrombus; End Stage Renal Disease)    Surgeons: oRyal Mercer M.D. Responsible Provider: Delmar Shields M.D.    Anesthesia Type: general ASA Status: 3          Final Anesthesia Type: general  Last vitals  BP   Blood Pressure : 106/48    Temp   36.4 °C (97.5 °F)    Pulse   75   Resp   18    SpO2   100 %      Anesthesia Post Evaluation    Patient location during evaluation: PACU  Patient participation: complete - patient participated  Level of consciousness: awake and alert    Airway patency: patent  Anesthetic complications: no  Cardiovascular status: hemodynamically stable  Respiratory status: acceptable  Hydration status: euvolemic    PONV: none          No complications documented.     Nurse Pain Score: 0 (NPRS)

## 2022-05-24 NOTE — PROGRESS NOTES
"  ACUTE CARE VASCULAR SERVICE  PROGRESS NOTE    No acute events overnight  More alert today, pain controlled    /57   Pulse 94   Temp 36.7 °C (98 °F) (Temporal)   Resp 16   Ht 1.651 m (5' 5\")   Wt 75.9 kg (167 lb 5.3 oz)   SpO2 95%   BMI 27.85 kg/m²     RUE incisions CDI  Left IJ TDC CDI    Hgb 9.9 -> 7.0 today    A/P)  Doing well  Repeat Hgb later today, may need 1U PRBCs if Hgb less than 7  Possibly home tomorrow    Royal Mercer MD  [facility]  Voalte preferred. Otherwise text to cell [phone] or call my office [phone]  __________________________________________________________________  Patient:Liberty Bolton   MRN:[id number]   CSN:[id number]        "

## 2022-05-24 NOTE — PROGRESS NOTES
Monitor Summary  Rhythm: SR  Rate: 74-85  Ectopy: (R) PVC, first degree heart block   .21 / .10 / .42

## 2022-05-24 NOTE — DISCHARGE PLANNING
Case Management Discharge Planning    Admission Date: 5/21/2022  GMLOS: 5.2  ALOS: 3    6-Clicks ADL Score: 9  6-Clicks Mobility Score: 9  PT and/or OT Eval ordered: No  Post-acute Referrals Ordered: No  Post-acute Choice Obtained: No  Has referral(s) been sent to post-acute provider:  No      Anticipated Discharge Dispo: Discharge Disposition: D/T to SNF with Medicare cert in anticipation of skilled care (03)    DME Needed: No    Action(s) Taken: Was recently discharged to [facility]. Will need choice again to return. Attempted to call , Ruperto, at [phone] but the number is blocked from taking calls. Called Ruperto's mother at [phone]. She will go next door and give him my phone number for call back.     Escalations Completed: No    Medically Clear: No    Next Steps: continue to follow with medical team for discharge needs.     Barriers to Discharge: medical clearance, SNF choice.     Is the patient up for discharge tomorrow: No

## 2022-05-24 NOTE — CARE PLAN
Problem: Skin Integrity  Goal: Skin integrity is maintained or improved  Outcome: Progressing  Note: Assess skin and monitor for skin breakdown. Alleviate pressure to bony prominences and provide assistance with turning, repositioning, ROM and mobility as appropriate. Use of barrier cream, mepilex's as needed. Continue to monitor.        Problem: Fall Risk  Goal: Patient will remain free from falls  Outcome: Progressing  Note: High fall risk precautions. Treaded socks and bed/strip alarm on, side rails up x 3. Call light within reach. Pt educated to call for assistance. Reinforce as needed. Continue to monitor.       The patient is Watcher - Medium risk of patient condition declining or worsening    Shift Goals: Dialysis  Clinical Goals: Monitor labs, dialysis  Patient Goals: Sleep  Family Goals: N/A    Progress made toward(s) clinical / shift goals:  Patient at dialysis    Patient is not progressing towards the following goals: Increasing mobility

## 2022-05-24 NOTE — PROGRESS NOTES
"[facility] -  PROGRESS NOTE               Author: Long Seay M.D. Date & Time: 5/24/2022  8:07 AM     HPI:  67 years old female with medical history notable for insulin-dependent type 2 diabetes, hypertension, end-stage renal disease due to diabetes has been on hemodialysis for 4 years, presenting to hospital, she was at her dialysis clinic this am and her AVF was noted to be thrombosed she was brought to the ER via [facility].  She was recently in the hospital discharged to SNF. She had been refusing hemodialysis intermittently.  Her K+ was at a high of 6.2 which is now resolved after dialysis with temp cath placement. Underwent thrombectomy of RUE AVF, patient tolerated procedure well, had L iHD IJ placed.    Unable to assess ROS: patient confused and very somnolent      DAILY NEPHROLOGY SUMMARY:  5/21: consult done, temp CVC placed, HD performed with no UF due to hypotension  5/22: sitting up in bed, oriented x 2, tolerated iHD UF yesterday with minimal UF due to hypotension, looks euvolemic, for AVF thrombectomy today with Dr. Mercer  5/23: Dr. Mercer took patient to OR to perfrom AVF thrombectomy. Patient tolerated procedure well. Very somnolent on exam.  5/24: Patient tolerated AVF thrombectomy well; still remains somnolent and confused; scheduled to resume TThS HD.    REVIEW OF SYSTEMS:    10 point ROS reviewed and is as per HPI/daily summary or otherwise negative    PMH/PSH/SH/FH:   Reviewed and unchanged since admission note    CURRENT MEDICATIONS:   Reviewed from admission to present day    VS:  /57   Pulse 94   Temp 36.7 °C (98 °F) (Temporal)   Resp 16   Ht 1.651 m (5' 5\")   Wt 75.9 kg (167 lb 5.3 oz)   SpO2 95%   BMI 27.85 kg/m²     Physical Exam  Constitutional:       Appearance: Normal appearance. She is normal weight.   HENT:      Head: Normocephalic and atraumatic.      Nose: Nose normal.      Mouth/Throat:      Mouth: Mucous membranes are moist.      Pharynx: " Oropharynx is clear.   Eyes:      Extraocular Movements: Extraocular movements intact.      Conjunctiva/sclera: Conjunctivae normal.      Pupils: Pupils are equal, round, and reactive to light.   Cardiovascular:      Rate and Rhythm: Normal rate and regular rhythm.      Pulses: Normal pulses.      Heart sounds: Normal heart sounds.      Comments: R temp CVC  R AVF - B/T + echymosis  Pulmonary:      Effort: Pulmonary effort is normal.      Breath sounds: Normal breath sounds.   Abdominal:      General: Abdomen is flat. Bowel sounds are normal.      Palpations: Abdomen is soft.   Musculoskeletal:         General: Normal range of motion.      Cervical back: Normal range of motion and neck supple.   Skin:     General: Skin is warm and dry.      Comments: L iHD permacath placed, CDI  RUE AVF CDI, bruising present, dermabond in place   Neurological:      General: No focal deficit present.      Mental Status: She is alert. She is disoriented.      Comments: Somnolent from haldol/anesthesia; oriented only to person   Psychiatric:         Mood and Affect: Mood normal.         Behavior: Behavior normal.         Fluids:  In: 660 [P.O.:60; I.V.:600]  Out: 250     LABS:  Recent Labs     05/22/22  0028 05/23/22  0400 05/24/22  0140   SODIUM 137 138 139   POTASSIUM 4.5 4.3 5.7*   CHLORIDE 100 100 101   CO2 27 25 24   GLUCOSE 144* 120* 126*   BUN 28* 38* 51*   CREATININE 5.73* 8.07* 9.50*   CALCIUM 8.9 8.8 8.3*       IMAGING:   All imaging reviewed from admission to present day    IMPRESSION:  # ESRD             - iHD q TThS and prn              - via R AVF (thrombosed), to OR on 5/23 for thrombectomy             - temp CVC placed 5/22 Dr. Mercer  # HTN, controlled              - Goal BP < 140/90             - Continue BB             - UF with iHD as tolerated   # Anemia of CKD, below target             - Goal Hgb 10-11             - check iron stores    - start JUDITH with iHD   # CKD-MBD             -              - Vit D  pen             - Ca: 9.4             - PO4 4.8  # Thrombosed AVF             - temp CVC today for hyper k+              - thrombectomy with Dr. Mercer 5/23  #Thrombocytopenia             - no heparin with iHD             - monitor  #Hyperkalemia, corrected              - manage with iHD              - low K+ diet   #Dyslipidemia             - statin  #T2DM             - per primary team        PLAN:  - Continue TThS HD schedule  - continue JUDITH with HD  - UF as tolerated  - No dietary protein restrictions  - Dose all meds per ESRD  - Check iron stores, PTH, vit D   - Will continue to follow with further recommendations

## 2022-05-24 NOTE — PROGRESS NOTES
Daily Progress Note:     Date of Service: 5/24/2022  Primary Team: [facility] Team   Attending: Adeel Wells M.D.   Senior Resident: Dr. Hernandez  Intern: Dr. Ríos  Contact:  [phone]    Chief Complaint/ID:   67-year-old female with past medical history of end-stage renal disease on Tuesday Thursday Saturday hemodialysis, diabetes, hypertension.  Seen for evaluation after AV fistula stopped functioning on last hemodialysis attempt outpatient at [facility], patient has been seen by vascular surgery who is planning for repeat AV fistula with possible thrombectomy.  Ultrasound shows thrombus extending to distal subclavian vein. S/p thrombectomy and AV fistula revision 5/24/2022.    Subjective:       Events: Morning labs with drop in hemoglobin to 7.0, will get repeat today. No evidence active bleeding.    Patient less oriented than previously, she is wondering where her wallet is, struggles somewhat with understanding where she is and what's going on, however no current complaint of symptoms. She appears tired but says she slept well overnight.        Review of Systems:      Constitutional: Negative for chills and fever.   Respiratory: Negative for cough.    Cardiovascular: Negative for chest pain.   Gastrointestinal: Negative for nausea and vomiting.   Genitourinary: Negative for dysuria.        Urinates regularly, is at her baseline   Musculoskeletal: Negative for myalgias.   Skin: Negative for rash.     Objective Data:   Physical Exam:   Vitals:   Temp:  [36.2 °C (97.2 °F)-36.7 °C (98 °F)] 36.7 °C (98 °F)  Pulse:  [74-94] 94  Resp:  [11-87] 16  BP: ()/(48-83) 118/57  SpO2:  [86 %-100 %] 95 %        Constitutional:       General: She is not in acute distress.  HENT:      Head: Normocephalic.   Eyes:      Pupils: Pupils are equal, round, and reactive to light.   Cardiovascular:      Rate and Rhythm: Normal rate and regular rhythm.      Heart sounds: Normal heart sounds. No  "murmur heard.  Pulmonary:      Effort: No respiratory distress.      Breath sounds: Normal breath sounds.   Abdominal:      General: Abdomen is flat.      Palpations: Abdomen is soft.   Musculoskeletal:      Comments: Patient with palpable well circumscribed rubbery masses on right anterior shin and left popliteal fossa \"been there for a long, long time,\" there are not erythematous, not painful to palpation.    Skin:     General: Skin is warm.      Coloration: Skin is not jaundiced.   Neurological:      General: No focal deficit present.      Mental Status: She is alert. Mental status is at baseline.   Psychiatric:         Mood and Affect: Mood normal.         Thought Content: Thought content normal. But patient has difficulties with orientation, her memory is intact able to state where she lives, medical problems, but why she is in the hospital eludes her, and she occasionally forgets the situation. Oriented to self only on formal interview initially, but able to parse out she is in a hospital given time and cues around the room.      Labs:   Lab Results   Component Value Date/Time    WBC 4.8 05/24/2022 01:40 AM    RBC 2.25 (L) 05/24/2022 01:40 AM    HEMOGLOBIN 7.0 (L) 05/24/2022 01:40 AM    HEMATOCRIT 21.8 (L) 05/24/2022 01:40 AM    MCV 96.9 05/24/2022 01:40 AM    MCH 31.1 05/24/2022 01:40 AM    MCHC 32.1 (L) 05/24/2022 01:40 AM    MPV 11.5 05/24/2022 01:40 AM    NEUTSPOLYS 77.30 (H) 05/24/2022 01:40 AM    LYMPHOCYTES 11.20 (L) 05/24/2022 01:40 AM    MONOCYTES 10.10 05/24/2022 01:40 AM    EOSINOPHILS 0.40 05/24/2022 01:40 AM    EOSINOPHILS 28 05/05/2022 09:45 AM    BASOPHILS 0.40 05/24/2022 01:40 AM    ANISOCYTOSIS 1+ 05/05/2022 06:30 AM        Lab Results   Component Value Date/Time    SODIUM 139 05/24/2022 01:40 AM    POTASSIUM 5.7 (H) 05/24/2022 01:40 AM    CHLORIDE 101 05/24/2022 01:40 AM    CO2 24 05/24/2022 01:40 AM    GLUCOSE 126 (H) 05/24/2022 01:40 AM    BUN 51 (H) 05/24/2022 01:40 AM    CREATININE 9.50 " "() 05/24/2022 01:40 AM        Imaging:   No new imaging, see tab for historical imaging studies.    Problem Representation:     No new Assessment & Plan notes have been filed under this hospital service since the last note was generated.  Service: Internal Medicine    Encephalopathy vs. Delirium    Possibly iatrogenic 2/2 anesthesia vs. Postoperative haldol.  -continue to monitor  -routine anti-delirium precautions.  -HD per nephrology  -Some waxing and waning orientation seems to be recent baseline per chart review, will discuss if this is actual baseline with her family members.    ESRD (end stage renal disease) (HCC)  ESRD on HD TTS   HD fistula was working well on last HD on Thursday 5/19   Pt went to [facility] dialysis center on 5/21 & found that HD fistula is not working , unable to proceed with HD .  At ER, pt is lethargic. Labs showed K 6.2   & Cr 9.95.   EKG stat ordered.   ERP talked to Vascular surgery & nephrology.   US UE showed \"  Known brachial-cephalic hemodialysis fistula.        Acute occluded thrombus demonstrate throughout the fistula extending into distal subclavian vein. The proximal subclavian vein is patent. Appropriate flow velocities and waveforms throughout the inflow artery. \"  -HD T/T/S - nephrology on board   -S/p thrombectomy, AV fistula revision and HD cath placement May 23rd - no further DVT treatment required per vascular recommendations.   -Continue routine DVT prophylaxis.      Generalized weakness  Likely from hyperkalemia  Hopeful to improve with normalization of K after HD  -PT/OT - appreciate recs   -Will monitor   -Fall/aspiration precautions      Dialysis AV fistula malfunction (HCC)  ESRD on HD TTS   HD fistula was working well on last HD on Thursday 5/19   Pt went to [facility] dialysis center on 5/21 & found that HD fistula is not working , unable to proceed with HD .  At ER, pt is lethargic. Labs showed K 6.2   & Cr 9.95.   EKG stat ordered.   ERP talked to Vascular surgery & " "nephrology.   US UE showed \"  Known brachial-cephalic hemodialysis fistula.    The fistula is occluded.    Acute occluded thrombus demonstrate throughout the fistula extending into    distal subclavian vein. The proximal subclavian vein is patent.    Appropriate flow velocities and waveforms throughout the inflow artery. \"  HD today - nephrology on board   Temp HD cath placement per vascular surgery   ? OR in tomorrow  AM vs Monday for thrombectomy & fistula surgery     Anemia  Likely 2/2 her ESRD, iron stores low however, but ferritin high. Baseline anemia with some blood loss, postoperative day 1 Hg had dropped to 7.0, will continue to monitor if transfusion indicated.  -Continue to monitor CBC, transfuse <7.0 Hg  -If persistently below goals may need further anemia workup outside of iron studies.    Hyperkalemia  HD T/T/S  Nephrology consulted  Tele  Will monitor        Primary hypertension  BP soft on admission.   Resume home metoprolol in lower dosage with parameters   Hold home amlodipine & hydralazine   Monitor BP   Re-start home BP meds as needed     Type 2 diabetes mellitus, with long-term current use of insulin ([facility])  A1C   ISS for now   Will monitor glucose levels & make adjustment for better BP control     "

## 2022-05-24 NOTE — PROGRESS NOTES
[facility] Progress Note      HD today x 3 hours per Dr. Burnham .   Initiated at 0857 and ended at 1157.     Patient stable, alert and oriented x 2-3. Verbalized back-buttock pain. Repositioned on bed for comfort. O2 sat 92-96% on room air.  SWATI AVF with bruit and thrill. Open to air. Blue sleeve placed to R arm (No IV,  No BP No blood draw sign)    Patient had low BP during treatment. UF  Lowered, no UF. Dr. Burnham aware.     UF Net: 456 mL      L chest catheter intact and patent with good flow during dialysis. No s/sx of infection, no redness nor bleeding noted on the CVC site. CVC dressing clean, dry and intact.   Blood returned and CVC port flushed with NS and Heparin 1000 units/mL used  to lock catheter given per designated amount. CVC port clamped, capped and labeled accordingly.     Dressing changed per protocol.        Report given to Primary JESSIE Grande RN.

## 2022-05-25 VITALS
TEMPERATURE: 97.8 F | RESPIRATION RATE: 16 BRPM | BODY MASS INDEX: 27.88 KG/M2 | HEIGHT: 65 IN | HEART RATE: 68 BPM | SYSTOLIC BLOOD PRESSURE: 113 MMHG | OXYGEN SATURATION: 94 % | DIASTOLIC BLOOD PRESSURE: 44 MMHG | WEIGHT: 167.33 LBS

## 2022-05-25 LAB
ANION GAP SERPL CALC-SCNC: 10 MMOL/L (ref 7–16)
BASOPHILS # BLD AUTO: 1.4 % (ref 0–1.8)
BASOPHILS # BLD: 0.05 K/UL (ref 0–0.12)
BUN SERPL-MCNC: 23 MG/DL (ref 8–22)
CALCIUM SERPL-MCNC: 8.4 MG/DL (ref 8.5–10.5)
CHLORIDE SERPL-SCNC: 99 MMOL/L (ref 96–112)
CO2 SERPL-SCNC: 28 MMOL/L (ref 20–33)
CREAT SERPL-MCNC: 5.39 MG/DL (ref 0.5–1.4)
EOSINOPHIL # BLD AUTO: 0.06 K/UL (ref 0–0.51)
EOSINOPHIL NFR BLD: 1.6 % (ref 0–6.9)
ERYTHROCYTE [DISTWIDTH] IN BLOOD BY AUTOMATED COUNT: 55.6 FL (ref 35.9–50)
GFR SERPLBLD CREATININE-BSD FMLA CKD-EPI: 8 ML/MIN/1.73 M 2
GLUCOSE BLD STRIP.AUTO-MCNC: 104 MG/DL (ref 65–99)
GLUCOSE BLD STRIP.AUTO-MCNC: 132 MG/DL (ref 65–99)
GLUCOSE BLD STRIP.AUTO-MCNC: 138 MG/DL (ref 65–99)
GLUCOSE SERPL-MCNC: 118 MG/DL (ref 65–99)
HCT VFR BLD AUTO: 23.6 % (ref 37–47)
HGB BLD-MCNC: 7.6 G/DL (ref 12–16)
IMM GRANULOCYTES # BLD AUTO: 0.16 K/UL (ref 0–0.11)
IMM GRANULOCYTES NFR BLD AUTO: 4.3 % (ref 0–0.9)
LYMPHOCYTES # BLD AUTO: 0.69 K/UL (ref 1–4.8)
LYMPHOCYTES NFR BLD: 18.8 % (ref 22–41)
MAGNESIUM SERPL-MCNC: 1.8 MG/DL (ref 1.5–2.5)
MCH RBC QN AUTO: 31.1 PG (ref 27–33)
MCHC RBC AUTO-ENTMCNC: 32.2 G/DL (ref 33.6–35)
MCV RBC AUTO: 96.7 FL (ref 81.4–97.8)
MONOCYTES # BLD AUTO: 0.41 K/UL (ref 0–0.85)
MONOCYTES NFR BLD AUTO: 11.1 % (ref 0–13.4)
NEUTROPHILS # BLD AUTO: 2.31 K/UL (ref 2–7.15)
NEUTROPHILS NFR BLD: 62.8 % (ref 44–72)
NRBC # BLD AUTO: 0.02 K/UL
NRBC BLD-RTO: 0.5 /100 WBC
PLATELET # BLD AUTO: 128 K/UL (ref 164–446)
PMV BLD AUTO: 10.8 FL (ref 9–12.9)
POTASSIUM SERPL-SCNC: 4.3 MMOL/L (ref 3.6–5.5)
RBC # BLD AUTO: 2.44 M/UL (ref 4.2–5.4)
SODIUM SERPL-SCNC: 137 MMOL/L (ref 135–145)
WBC # BLD AUTO: 3.7 K/UL (ref 4.8–10.8)

## 2022-05-25 PROCEDURE — 97166 OT EVAL MOD COMPLEX 45 MIN: CPT

## 2022-05-25 PROCEDURE — 97163 PT EVAL HIGH COMPLEX 45 MIN: CPT

## 2022-05-25 PROCEDURE — 82962 GLUCOSE BLOOD TEST: CPT

## 2022-05-25 PROCEDURE — 700102 HCHG RX REV CODE 250 W/ 637 OVERRIDE(OP): Performed by: STUDENT IN AN ORGANIZED HEALTH CARE EDUCATION/TRAINING PROGRAM

## 2022-05-25 PROCEDURE — 80048 BASIC METABOLIC PNL TOTAL CA: CPT

## 2022-05-25 PROCEDURE — 36415 COLL VENOUS BLD VENIPUNCTURE: CPT

## 2022-05-25 PROCEDURE — A9270 NON-COVERED ITEM OR SERVICE: HCPCS | Performed by: STUDENT IN AN ORGANIZED HEALTH CARE EDUCATION/TRAINING PROGRAM

## 2022-05-25 PROCEDURE — 85025 COMPLETE CBC W/AUTO DIFF WBC: CPT

## 2022-05-25 PROCEDURE — 99239 HOSP IP/OBS DSCHRG MGMT >30: CPT | Mod: GC | Performed by: INTERNAL MEDICINE

## 2022-05-25 PROCEDURE — 83735 ASSAY OF MAGNESIUM: CPT

## 2022-05-25 RX ORDER — INSULIN LISPRO 100 [IU]/ML
1-6 INJECTION, SOLUTION INTRAVENOUS; SUBCUTANEOUS
Status: DISCONTINUED | OUTPATIENT
Start: 2022-05-25 | End: 2022-05-25 | Stop reason: HOSPADM

## 2022-05-25 RX ORDER — MAGNESIUM SULFATE HEPTAHYDRATE 40 MG/ML
2 INJECTION, SOLUTION INTRAVENOUS ONCE
Status: DISCONTINUED | OUTPATIENT
Start: 2022-05-25 | End: 2022-05-25

## 2022-05-25 RX ADMIN — OMEPRAZOLE 20 MG: 20 CAPSULE, DELAYED RELEASE ORAL at 04:40

## 2022-05-25 RX ADMIN — METOPROLOL TARTRATE 25 MG: 25 TABLET, FILM COATED ORAL at 04:40

## 2022-05-25 ASSESSMENT — COGNITIVE AND FUNCTIONAL STATUS - GENERAL
PERSONAL GROOMING: A LOT
SUGGESTED CMS G CODE MODIFIER DAILY ACTIVITY: CL
DRESSING REGULAR UPPER BODY CLOTHING: A LOT
MOVING FROM LYING ON BACK TO SITTING ON SIDE OF FLAT BED: A LOT
HELP NEEDED FOR BATHING: A LOT
CLIMB 3 TO 5 STEPS WITH RAILING: TOTAL
MOVING TO AND FROM BED TO CHAIR: A LOT
DAILY ACTIVITIY SCORE: 13
EATING MEALS: A LITTLE
TOILETING: A LOT
DRESSING REGULAR LOWER BODY CLOTHING: A LOT
MOVING FROM LYING ON BACK TO SITTING ON SIDE OF FLAT BED: UNABLE
TURNING FROM BACK TO SIDE WHILE IN FLAT BAD: A LITTLE
EATING MEALS: A LITTLE
MOVING TO AND FROM BED TO CHAIR: UNABLE
PERSONAL GROOMING: A LOT
TOILETING: A LOT
HELP NEEDED FOR BATHING: A LOT
SUGGESTED CMS G CODE MODIFIER MOBILITY: CL
CLIMB 3 TO 5 STEPS WITH RAILING: A LOT
DRESSING REGULAR UPPER BODY CLOTHING: A LOT
SUGGESTED CMS G CODE MODIFIER MOBILITY: CN
DRESSING REGULAR LOWER BODY CLOTHING: A LOT
WALKING IN HOSPITAL ROOM: TOTAL
STANDING UP FROM CHAIR USING ARMS: TOTAL
TURNING FROM BACK TO SIDE WHILE IN FLAT BAD: UNABLE
DAILY ACTIVITIY SCORE: 13
SUGGESTED CMS G CODE MODIFIER DAILY ACTIVITY: CL
MOBILITY SCORE: 13
MOBILITY SCORE: 6
WALKING IN HOSPITAL ROOM: A LOT
STANDING UP FROM CHAIR USING ARMS: A LOT

## 2022-05-25 ASSESSMENT — ACTIVITIES OF DAILY LIVING (ADL): TOILETING: UNABLE TO DETERMINE AT THIS TIME

## 2022-05-25 ASSESSMENT — GAIT ASSESSMENTS: GAIT LEVEL OF ASSIST: UNABLE TO PARTICIPATE

## 2022-05-25 NOTE — PROGRESS NOTES
Assumed care of patient, bedside report received from JUAN Morrow. Patient A&O x2/3, RA, and no complaints of pain at this time. Updated on POC, call light within reach and fall precautions in place. Bed locked and in lowest position. Patient instructed to call for assistance before getting out of bed. All questions answered, no other needs at this time.

## 2022-05-25 NOTE — CARE PLAN
The patient is Stable - Low risk of patient condition declining or worsening    Shift Goals  Clinical Goals: Dialysis, Monitor labs  Patient Goals: Rest  Family Goals: N/A    Progress made toward(s) clinical / shift goals:      Problem: Skin Integrity  Goal: Skin integrity is maintained or improved  Outcome: Progressing   Monitor skin breakdown, pillows in place for offloading, will continue to monitor.     Problem: Fall Risk  Goal: Patient will remain free from falls  Outcome: Progressing   Bed alarm on, call light within reach, and patient instructed to call for assistance.     Patient is not progressing towards the following goals:

## 2022-05-25 NOTE — DISCHARGE INSTRUCTIONS
Discharge Instructions    Discharged to home by ambulance with escort. Discharged via ambulance, hospital escort: Yes.  Special equipment needed: Not Applicable    Be sure to schedule a follow-up appointment with your primary care doctor or any specialists as instructed.     Discharge Plan:   Diet Plan: Discussed  Activity Level: Discussed  Confirmed Follow up Appointment: Patient to Call and Schedule Appointment  Confirmed Symptoms Management: Discussed  Medication Reconciliation Updated: Yes    I understand that a diet low in cholesterol, fat, and sodium is recommended for good health. Unless I have been given specific instructions below for another diet, I accept this instruction as my diet prescription.   Other diet: Regular    Special Instructions: None    Is patient discharged on Warfarin / Coumadin?   No     Depression / Suicide Risk    As you are discharged from this [facility] facility, it is important to learn how to keep safe from harming yourself.    Recognize the warning signs:  Abrupt changes in personality, positive or negative- including increase in energy   Giving away possessions  Change in eating patterns- significant weight changes-  positive or negative  Change in sleeping patterns- unable to sleep or sleeping all the time   Unwillingness or inability to communicate  Depression  Unusual sadness, discouragement and loneliness  Talk of wanting to die  Neglect of personal appearance   Rebelliousness- reckless behavior  Withdrawal from people/activities they love  Confusion- inability to concentrate     If you or a loved one observes any of these behaviors or has concerns about self-harm, here's what you can do:  Talk about it- your feelings and reasons for harming yourself  Remove any means that you might use to hurt yourself (examples: pills, rope, extension cords, firearm)  Get professional help from the community (Mental Health, Substance Abuse, psychological counseling)  Do not be alone:Call  your Safe Contact- someone whom you trust who will be there for you.  Call your local CRISIS HOTLINE 119-4578 or 228-700-9921  Call your local Children's Mobile Crisis Response Team [place] (563) 724-8261 or www.[employer]  Call the toll free National Suicide Prevention Hotlines   National Suicide Prevention Lifeline 898-696-IWXV (9080)  [place] [employer] 800-SUICIDE (952-6931)

## 2022-05-25 NOTE — DOCUMENTATION QUERY
"                                                                         [facility]                                                                       Query Response Note      PATIENT:               GIANLUCA ZALDIVAR  ACCT #:                  [id number]  MRN:                     [id number]  :                      1955  ADMIT DATE:       2022 7:36 AM  DISCH DATE:          RESPONDING  PROVIDER #:        [id number]           QUERY TEXT:    Somnolent and disoriented are documented in the Medical Record.  Can a more specific diagnosis be provided?    NOTE:  If the appropriate response is not listed below, please respond with a new note.    If you have any questions please contact:  Polina Hernandes RN CDI [facility]  [email or web address]  Polina Hernandes [address] [employer]      The patient's Clinical Indicators include:  67 year old female admitted for a AV fistula malfunction.     ED note Marleen \"History limited by : blunted aspect and delayed response to questioning.\"   Sylvie \"She is still somnolent and disoriented'   Khushbu \"Patient yesterday and again this morning with waxing and waning orientation, on my exam this morning she was alert  and aware, but on nursing and medical student exam later in the morning patient was alert and oriented x1 and x2 respectively, and this matches her occasional waxing and waning status from yesterday     Dawood \" unable to assess ROS: patient confused and very somnolent form haldol/anesthesia\"  Risk factors: ESRD, fistula  Treatment: Thrombectomy, labs  Options provided:   -- Acute metabolic encephalopathy   -- Acute toxic encephalopathy due to Haldol/anesthesia   -- Unable to determine      Query created by: Polina Hernandes on 2022 4:37 PM    RESPONSE TEXT:    Unable to determine          Electronically signed by:  BLAYNE ALBERT MD 2022 8:21 AM              "

## 2022-05-25 NOTE — DISCHARGE PLANNING
DC Transport Scheduled    Received request at: 1300    Transport Company Scheduled: [employer]  Spoke with Letitia at [facility] to schedule transport.      Scheduled Date: 05/25/2022  Scheduled Time: 1615    Destination: [facility]     Notified care team of scheduled transport via [employer].     If there are any changes needed to the DC transportation scheduled, please contact [facility] at ext. [phone] between the hours of 2448-5276 Mon-Fri. If outside those hours, contact the ED Case Manager at ext. [phone].

## 2022-05-25 NOTE — PROGRESS NOTES
"[facility] -  PROGRESS NOTE               Author: Long Seay M.D. Date & Time: 5/25/2022  8:20 AM     HPI:  67 years old female with medical history notable for insulin-dependent type 2 diabetes, hypertension, end-stage renal disease due to diabetes has been on hemodialysis for 4 years, presenting to hospital, she was at her dialysis clinic this am and her AVF was noted to be thrombosed she was brought to the ER via [facility].  She was recently in the hospital discharged to SNF. She had been refusing hemodialysis intermittently.  Her K+ was at a high of 6.2 which is now resolved after dialysis with temp cath placement. Underwent thrombectomy of RUE AVF, patient tolerated procedure well, had L iHD IJ placed.      DAILY NEPHROLOGY SUMMARY:  5/21: consult done, temp CVC placed, HD performed with no UF due to hypotension  5/22: sitting up in bed, oriented x 2, tolerated iHD UF yesterday with minimal UF due to hypotension, looks euvolemic, for AVF thrombectomy today with Dr. Mercer  5/23: Dr. Mercer took patient to OR to perfrom AVF thrombectomy. Patient tolerated procedure well. Very somnolent on exam.  5/24: Patient tolerated AVF thrombectomy well; still remains somnolent and confused; scheduled to resume TThS HD.  5/25: No acute events overnight. Patient more awake this AM and is oriented x2-3 with prompting. Able to say her name and she is in a hospital in [place], disoriented to time, but does remember that she came to the hospital because of her AVF.    REVIEW OF SYSTEMS:    10 point ROS reviewed and is as per HPI/daily summary or otherwise negative    PMH/PSH/SH/FH:   Reviewed and unchanged since admission note    CURRENT MEDICATIONS:   Reviewed from admission to present day    VS:  /57   Pulse 91   Temp 37.2 °C (99 °F) (Temporal)   Resp 17   Ht 1.651 m (5' 5\")   Wt 75.9 kg (167 lb 5.3 oz)   SpO2 98%   BMI 27.85 kg/m²     Physical Exam  Constitutional:       Appearance: " Normal appearance. She is normal weight.   HENT:      Head: Normocephalic and atraumatic.      Nose: Nose normal.      Mouth/Throat:      Mouth: Mucous membranes are moist.      Pharynx: Oropharynx is clear.   Eyes:      Extraocular Movements: Extraocular movements intact.      Conjunctiva/sclera: Conjunctivae normal.      Pupils: Pupils are equal, round, and reactive to light.   Cardiovascular:      Rate and Rhythm: Normal rate and regular rhythm.      Pulses: Normal pulses.      Heart sounds: Normal heart sounds.      Comments: R temp CVC  R AVF - B/T + echymosis  Pulmonary:      Effort: Pulmonary effort is normal.      Breath sounds: Normal breath sounds.   Abdominal:      General: Abdomen is flat. Bowel sounds are normal.      Palpations: Abdomen is soft.   Musculoskeletal:         General: Normal range of motion.      Cervical back: Normal range of motion and neck supple.   Skin:     General: Skin is warm and dry.      Comments: L iHD permacath placed, CDI  RUE AVF CDI, bruising present, dermabond in place   Neurological:      General: No focal deficit present.      Mental Status: She is alert. She is disoriented.      Comments: Oriented to person, place and situation with prompting   Psychiatric:         Mood and Affect: Mood normal.         Behavior: Behavior normal.         Fluids:  In: 1460 [P.O.:600; Blood:360; Dialysis:500]  Out: 956     LABS:  Recent Labs     05/24/22  0140 05/24/22  1323 05/24/22 2009 05/25/22  0347   WBC 4.8 3.6* 4.0* 3.7*   RBC 2.25* 2.10* 2.52* 2.44*   HEMOGLOBIN 7.0* 6.3* 7.7* 7.6*   HEMATOCRIT 21.8* 20.2* 23.7* 23.6*   MCV 96.9 96.2 94.0 96.7   MCH 31.1 30.0 30.6 31.1   RDW 58.4* 58.4* 54.8* 55.6*   PLATELETCT 152* 128* 140* 128*   MPV 11.5 10.7 11.2 10.8   NEUTSPOLYS 77.30*  --   --  62.80   LYMPHOCYTES 11.20*  --   --  18.80*   MONOCYTES 10.10  --   --  11.10   EOSINOPHILS 0.40  --   --  1.60   BASOPHILS 0.40  --   --  1.40     Recent Labs     05/23/22  0400 05/24/22  0140  05/25/22  0347   SODIUM 138 139 137   POTASSIUM 4.3 5.7* 4.3   CHLORIDE 100 101 99   CO2 25 24 28   GLUCOSE 120* 126* 118*   BUN 38* 51* 23*   CREATININE 8.07* 9.50* 5.39*   CALCIUM 8.8 8.3* 8.4*     Recent Labs     05/23/22  0400 05/24/22  0140 05/25/22  0347   ALBUMIN 3.2  --   --    TBILIRUBIN 0.4  --   --    ALKPHOSPHAT 75  --   --    TOTPROTEIN 6.4  --   --    ALTSGPT 16  --   --    ASTSGOT 21  --   --    CREATININE 8.07* 9.50* 5.39*       IMAGING:   All imaging reviewed from admission to present day    IMPRESSION:  # ESRD             - iHD q TThS and prn              - via R AVF (thrombosed), to OR on 5/23 for thrombectomy             - temp CVC placed 5/22 Dr. Mercer  # HTN, controlled              - Goal BP < 140/90             - Continue BB             - UF with iHD as tolerated   # Anemia of CKD, below target             - Goal Hgb 10-11             - check iron stores    - start JUDITH with iHD   # CKD-MBD             -              - Vit D pen             - Ca: 9.4             - PO4 4.8  # Thrombosed AVF             - temp CVC today for hyper k+              - thrombectomy with Dr. Mercer 5/23  #Thrombocytopenia             - no heparin with iHD             - monitor  #Hyperkalemia, corrected              - manage with iHD              - low K+ diet   #Dyslipidemia             - statin  #T2DM             - per primary team        PLAN:  - Continue TThS HD schedule  - continue JUDITH with HD  - UF as tolerated  - No dietary protein restrictions  - Dose all meds per ESRD  - Check iron stores, PTH, vit D

## 2022-05-25 NOTE — THERAPY
"Occupational Therapy   Initial Evaluation     Patient Name: Liberty Bolton  Age:  67 y.o., Sex:  female  Medical Record #: [id number]  Today's Date: 5/25/2022     Precautions  Precautions: Fall Risk    Assessment  Patient is 67 y.o. female admitted for vascular access issue, found to have hyperkalemia, thrombocytopenia, anemia, and AV fistula occlusion s/p thrombectomy, HD catheter placement, and AV fistula revision. PMHx ESRD on HD, DMII, CKD, HTN, and recently in the hospital discharged to SNF. Pt cooperative with encouragement, but only follows commands ~50% of the time, otherwise req max v/cs and tactile cues to follow. With new commands, would often continue with previous command regardless of safety (scooting forward to EOB when told to lift leg). At end of session stopped following commands entirely at EOB, rigid and req max A to return to supine. Pt unable to provide PLOF/home setup/assistance at home. Recommend clarification on PLOF, home setup, and assist available w/ family. Currently limited by decreased functional mobility, activity tolerance, cognition, strength, AROM, coordination, balance, and pain which are currently affecting pt's ability to complete ADLs/IADLs at baseline. Will continue to follow.     Plan    Recommend Occupational Therapy 3 times per week until therapy goals are met for the following treatments:  Adaptive Equipment, Neuro Re-Education / Balance, Self Care/Activities of Daily Living, Therapeutic Activities, and Therapeutic Exercises.    DC Equipment Recommendations: Unable to determine at this time  Discharge Recommendations: Recommend post-acute placement for additional occupational therapy services prior to discharge home      Subjective    \"My  keeps telling me I had a stroke.\"    Objective     05/25/22 0825   Prior Living Situation   Prior Services Continuous (24 Hour) Care Giving Per Service  (recently discharged to SNF last week from this facility)   Housing / Facility " "Unable To Determine At This Time   Equipment Owned Unable to Determine At This Time   Lives with - Patient's Self Care Capacity Unable To Determine At This Time;Spouse  (?)   Comments Pt unable to provide PLOF/home setup/assistance at home.   Prior Level of ADL Function   Self Feeding Unable To Determine At This Time   Grooming / Hygiene Unable To Determine At This Time   Bathing Unable To Determine At This Time   Dressing Unable To Determine At This Time   Toileting Unable To Determine At This Time   Prior Level of IADL Function   Medication Management Unable To Determine At This Time   Laundry Unable To Determine At This Time   Kitchen Mobility Unable To Determine At This Time   Finances Unable To Determine At This Time   Home Management Unable To Determine At This Time   Shopping Unable To Determine At This Time   Prior Level Of Mobility Unable to Determine At This Time   Precautions   Precautions Fall Risk   Vitals   O2 Delivery Device None - Room Air   Pain 0 - 10 Group   Therapist Pain Assessment Post Activity Pain Same as Prior to Activity;During Activity;Nurse Notified  (no s/s of pain)   Cognition    Cognition / Consciousness X   Speech/ Communication Delayed Responses   Level of Consciousness Alert  (but lethargic)   Ability To Follow Commands 1 Step  (~50% of the time)   Safety Awareness Impaired   New Learning Impaired   Attention Impaired   Sequencing Impaired   Comments cooperative with encouragement, but only follows commands ~50% of the time, otherwise req max v/cs and tactile cues to follow. With new commands, would often continue with previous command regardless of safety (scooting forward to EOB when told to lift leg). At end of session stopped following commands entirely at EOB, rigid and req max A to return to supine. Pt reports difficulty because \"I had a stroke.\" When corrected said \"My  keeps telling me I had a stroke.\"   Passive ROM Upper Body   Passive ROM Upper Body WDL   Active ROM " Upper Body   Active ROM Upper Body  X   Comments unable to assess d/t cognition; able to bring hand to face   Strength Upper Body   Upper Body Strength  X   Comments unable to fully assess d/t cognition   Neurological Concerns   Neurological Concerns Yes   Sitting Posture During ADL's Lateral Lean Right   Standing Posture During ADL's Posterior Lean   Rt Upper Extremity Functional Use Impaired   Coordination Upper Body   Coordination X   Comments B FM and GM impaired; appears related to cognition. [place] to reach face   Balance Assessment   Sitting Balance (Static) Poor   Sitting Balance (Dynamic) Poor -   Standing Balance (Static) Trace   Standing Balance (Dynamic) Dependent   Weight Shift Sitting Poor   Weight Shift Standing Poor   Comments unable to follow v/cs to widen SERA in sitting; pt with rigidity in BLEs during session.   Bed Mobility    Supine to Sit Moderate Assist   Sit to Supine Maximal Assist   Scooting Moderate Assist   Rolling Total Assist to Rt.;Maximum Assist to Lt.   ADL Assessment   Eating   (Pt too lethargic to safely eat; RN informed and food placed out of reach)   Grooming Minimal Assist;Seated  (wiping face; likely mod A for oral care/sequencing)   Upper Body Dressing Moderate Assist  (gown; sequencing BUEs)   Lower Body Dressing Total Assist   Toileting Maximal Assist  (bedpan and purewick)   Comments limited by cognition and volition   Functional Mobility   Sit to Stand Maximal Assist   Bed, Chair, Wheelchair Transfer Unable to Participate   Toilet Transfers Unable to Participate   Mobility EOB and STS x3   Edema / Skin Assessment   Edema / Skin  Not Assessed   Activity Tolerance   Comments limited by fatigue and cognition   Patient / Family Goals   Patient / Family Goal #1 not able to state   Short Term Goals   Short Term Goal # 1 FB dressing with min A   Short Term Goal # 2 BSC txf with min A   Short Term Goal # 3 seated g/h with SPV   Education Group   Education Provided Role of  Occupational Therapist;Pathology of bedrest   Role of Occupational Therapist Patient Response Patient;Acceptance;Explanation;Reinforcement Needed;No Learning Evidence   Pathology of Bedrest Patient Response Patient;Acceptance;Explanation;Reinforcement Needed;No Learning Evidence   Problem List   Problem List Decreased Active Daily Living Skills;Decreased Upper Extremity Strength Right;Decreased Homemaking Skills;Decreased Upper Extremity Strength Left;Decreased Upper Extremity AROM Right;Decreased Upper Extremity AROM Left;Decreased Functional Mobility;Decreased Activity Tolerance;Safety Awareness Deficits / Cognition;Impaired Posture / Trunk Alignment;Impaired Coordination Right Upper Extremity;Impaired Coordination Left Upper Extremity;Impaired Cognitive Function;Impaired Vision;Impaired Postural Control / Balance

## 2022-05-25 NOTE — PROGRESS NOTES
Patient is scheduled for transfer to [facility]. RN called to give report 3 times, but was unable to get a hold of  at [facility]. Voicemail left.

## 2022-05-25 NOTE — DISCHARGE PLANNING
Case Management Discharge Planning    Admission Date: 5/21/2022  GMLOS: 5.2  ALOS: 4    6-Clicks ADL Score: 13  6-Clicks Mobility Score: 13  PT and/or OT Eval ordered: Yes  Post-acute Referrals Ordered: No  Post-acute Choice Obtained: No  Has referral(s) been sent to post-acute provider:  No      Anticipated Discharge Dispo: Discharge Disposition: D/T to SNF with Medicare cert in anticipation of skilled care (03)    DME Needed: No    Action(s) Taken: called , Ruperto, at [phone] to inquire about return to [facility]. He would like to speak to his wife first and will visit today. Requested JUAN Bronson inform me when  arrives.     1300- Obtained choice for [facility] from  per phone. Requested 1600 transport time from [facility]. Informed JUAN Bronson. Choice form given to MADHU Oconnell.     1400- [employer] packet completed and left on my desk for RN.     Escalations Completed: No    Medically Clear: No    Next Steps: continue to follow with medical team for discharge needs. Follow up with patient and spouse.     Barriers to Discharge: medical clearance, SNF placement.     Is the patient up for discharge tomorrow: No

## 2022-05-25 NOTE — THERAPY
"Physical Therapy   Initial Evaluation     Patient Name: Liberty Bolton  Age:  67 y.o., Sex:  female  Medical Record #: [id number]  Today's Date: 5/25/2022     Precautions: Fall Risk    Assessment  Patient is 67 y.o. female presenting acutely 2/2 vascular access problem now s/p thrombectomy and revision of RUE AVF. Unable to obtain living support and PLOF due to patients current cognitive state. Pt states the year is \"1967\", she belives she is in [place] and that she is here because she had a stroke, states her  told her she had a stroke. Pt requires mod A to negotiate bed mob. Poor command following today only following approximately 50% of commands. She demonstrates poor sitting and standing balance impacting her safety with fxnl mob. Strong posterior lean noted initially in standing and then sitting EOB increasing her risk of falling off the EOB therefore she was transitioned back to supine. Anticipate need for post-acute placement prior to DC home.    Plan    Recommend Physical Therapy 3 times per week until therapy goals are met for the following treatments:  Bed Mobility, Neuro Re-Education / Balance, Therapeutic Activities and Therapeutic Exercises    DC Equipment Recommendations: Unable to determine at this time  Discharge Recommendations: Recommend post-acute placement for additional physical therapy services prior to discharge home     Objective     05/25/22 0845   Prior Living Situation   Prior Services Continuous (24 Hour) Care Giving Per Service (recently discharged to SNF last week from this facility)   Housing / Facility Unable To Determine At This Time   Lives with - Patient's Self Care Capacity Spouse   Comments Unable to obtain prior living situation from pt due to her current cognitive state as she is poor historian. Per last OT note from 1 wk ago pt lived in  with her spouse.   Prior Level of Functional Mobility   Comments Unable to determine PLOF given pts current cognitive state, per " notes from last week she required max/total A for mob.   Cognition    Speech/ Communication Delayed Responses   Orientation Level Not Oriented to Reason;Not Oriented to Place;Not Oriented to Time   Level of Consciousness Responds to voice   Ability To Follow Commands 1 Step (~50% of the time)   Safety Awareness Impaired   Sequencing Impaired   Comments poor follow through with tasks, only initiating some commands, frequently reverting back to previous commands for example when asked to lift legs she initiated scooting forward. A&O to self, poor insight and safety   Passive ROM Lower Body   Comments Some resistance to PROM while in bed, LEs slightly rigid   Active ROM Lower Body    Comments full AROM limited by weakness, unable to fully extend knees EOB   Strength Lower Body   Comments Unable to formally assess 2/2 poor command following, pt lacking adequate strength for full right stance with poor hip and knee extension   Sensation Lower Body   Lower Extremity Sensation   Not Tested   Lower Body Muscle Tone   Comments Some rigidity noted in BLE with passive mvt in bed   Neurological Concerns   Neurological Concerns Yes   Sitting Posture During ADL's Lateral Lean Right   Standing Posture During ADL's Posterior Lean   Comments given cignition, tone in LEs   Coordination Lower Body    Comments bradykinetic, poor sequencing for fxnl mob   Balance Assessment   Sitting Balance (Static) Poor   Sitting Balance (Dynamic) Poor -   Standing Balance (Static) Trace   Standing Balance (Dynamic) Dependent   Comments impacted by R lateral lean and narrow SERA in sitting, strong posterior lean in stand   Gait Analysis   Gait Level Of Assist Unable to Participate   Bed Mobility    Supine to Sit Moderate Assist   Sit to Supine Maximal Assist   Scooting Moderate Assist   Rolling Total Assist to Rt.;Total Assist to Lt.   Functional Mobility   Sit to Stand Maximal Assist   Bed, Chair, Wheelchair Transfer Unable to Participate   Short Term  Goals    Short Term Goal # 1 Pt will perform roll from supine<>side with min A within 6 visits to assist with mobility for pericare.   Short Term Goal # 2 Pt will perform supine<>sit with SBA within 6 visits to initiate OOB activity.   Short Term Goal # 3 Pt will perfrm STS with mod A within 6 visits to initiate transfers/gait.

## 2022-05-25 NOTE — DISCHARGE PLANNING
Agency/Facility Name: [facility]  Outcome: DPA left Vmail requesting update on bed availability. Expecting callback    1205-  Agency/Facility Name: [facility]  Spoke To: Mary Ellen  Outcome: Pt accepted to SNF with bed available today, DPA resent referral for review and will notify RN CHIQUITA regarding 1700 transport request from SNF.    1350-  Agency/Facility Name: [facility]  Outcome: DPA left v-mail to confirm transport time of 1615 with request for callback if there are additional requirements.

## 2022-05-25 NOTE — PROGRESS NOTES
Bedside report received from JUAN Gee.  Pt is A&Ox1, RA, Assessment completed. Call light within reach, Bed locked and in lowest position. Patient instructed to call for assistance before getting out of bed.

## 2022-05-25 NOTE — DISCHARGE SUMMARY
Discharge Summary    CHIEF COMPLAINT ON ADMISSION  Chief Complaint   Patient presents with   • Vascular Access Problem     Pt KAT MONTANO from [facility] dialysis. Staff stated they were having issues with dialysis fistula to R arm. Pt receives dialysis tu, th, sat. Unable to receive dialysis today. Pt denies any complaints.        Reason for Admission  EMS G-38     Admission Date  5/21/2022    CODE STATUS  Full Code    HPI & HOSPITAL COURSE  This is a 67 y.o. with past medical history of end-stage renal disease on Tuesday Thursday Saturday hemodialysis, diabetes, hypertension.  Seen for evaluation after AV fistula stopped functioning on last hemodialysis attempt outpatient at [facility] dialysis, patient has been seen by vascular surgery who is planning for repeat AV fistula with possible thrombectomy.  Ultrasound shows thrombus extending to distal subclavian vein. S/p thrombectomy, HD catheter placement and AV fistula revision 5/24/2022. Patient while admitted has had some waxing and waning disorientation, likely hospital induced delirium but this has not changed throughout hospital course, suspicion is this is consistent with her baseline. PT/OT and speech evaluated patient and agree with SNF placement, patient family aware and agree after discussion of recommendations.    Otherwise patient had anemia s/p fistula revision down to hg of 6.2, no evidence of active bleeding postoperatively with no hemodynamic compromise, anemia which responded appropriately to 1 unit PRBC given May 24th. Consistent with anemia of chronic disease suggested on workup including iron panel and ferritin.     Patient will require continued follow up with vascular surgery, per their recommendations do not use fistula for 3 weeks while this heals, patient had HD catheter placed to use in the meantime for TTS HD.     Plan:   Continue HD T/T/S  Nephrology follow  Continued physical therapy and rehab  Resumed home medications  Decreased home metoprolol  to 25mg from 50mg          No notes on file    Therefore, she is discharged in fair and stable condition to home with close outpatient follow-up.    The patient met 2-midnight criteria for an inpatient stay at the time of discharge.    Discharge Date  May 25th 2022    FOLLOW UP ITEMS POST DISCHARGE  PCP follow up  Nephrology follow up  Vascular surgery follow up    DISCHARGE DIAGNOSES  Principal Problem:    Hyperkalemia POA: Yes  Active Problems:    ESRD (end stage renal disease) (HCC) (Chronic) POA: Yes    Primary hypertension (Chronic) POA: Yes    Generalized weakness (Chronic) POA: Yes    Dialysis AV fistula malfunction (HCC) POA: Unknown    Type 2 diabetes mellitus, with long-term current use of insulin (HCC) POA: Unknown  Resolved Problems:    * No resolved hospital problems. *      FOLLOW UP  No future appointments.  No follow-up provider specified.    MEDICATIONS ON DISCHARGE     Medication List      CHANGE how you take these medications      Instructions   metoprolol tartrate 25 MG Tabs  What changed:   · medication strength  · how much to take  Commonly known as: LOPRESSOR   Take 1 Tablet by mouth 2 times a day.  Dose: 25 mg        CONTINUE taking these medications      Instructions   amLODIPine 5 MG Tabs  Commonly known as: NORVASC   Take 2 Tablets by mouth every day.  Dose: 10 mg     atorvastatin 80 MG tablet  Commonly known as: LIPITOR   Take 80 mg by mouth every day.  Dose: 80 mg     calcium acetate 667 MG Caps  Commonly known as: Phos-Lo   Take 667 mg by mouth 3 times a day before meals.  Dose: 667 mg     gabapentin 300 MG Caps  Commonly known as: NEURONTIN   Take 300 mg by mouth every day.  Dose: 300 mg     hydrALAZINE 25 MG Tabs  Commonly known as: APRESOLINE   Take 25 mg by mouth 2 times a day.  Dose: 25 mg     insulin lispro 100 UNIT/ML Sopn  Generic drug: insulin lispro   Inject 14 Units under the skin every day.  Dose: 14 Units     metoprolol  MG Tb24  Commonly known as: TOPROL XL   Take  100 mg by mouth every day.  Dose: 100 mg     omeprazole 20 MG delayed-release capsule  Commonly known as: PRILOSEC   Take 20 mg by mouth every day.  Dose: 20 mg     pioglitazone 45 MG Tabs  Commonly known as: ACTOS   Take 45 mg by mouth every day.  Dose: 45 mg     sevelamer 800 MG Tabs  Commonly known as: RENAGEL   Take 800 mg by mouth 3 times a day with meals.  Dose: 800 mg     Toujeo Max SoloStar 300 UNIT/ML Sopn  Generic drug: Insulin Glargine (2 Unit Dial)   Inject 45 Units under the skin at bedtime.  Dose: 45 Units     Tradjenta 5 MG Tabs tablet  Generic drug: linagliptin   Take 5 mg by mouth every day.  Dose: 5 mg            Allergies  Allergies   Allergen Reactions   • Bactrim [Sulfamethoxazole-Trimethoprim] Swelling     angioedema   • Dilaudid [Hydromorphone] Unspecified     Per [facility]   • Enalapril Anaphylaxis and Swelling     angioedema   • Vicodin Hp [Hydrocodone-Acetaminophen] Unspecified     Per [facility]       DIET  Orders Placed This Encounter   Procedures   • Diet Order Diet: Renal     Standing Status:   Standing     Number of Occurrences:   1     Order Specific Question:   Diet:     Answer:   Renal [8]       ACTIVITY  As tolerated.  Weight bearing as tolerated    CONSULTATIONS  Nephrology  Vascular surgery      PROCEDURES  May 25th 2022  - Thrombectomy and revision of RUE AVF using interposition 6mm Artegraft conduit  -  RUE fistulogram  -  10mm plain angioplasty of the right axillary/subclavian vein  -  Insertion of a left IJ 27cm Hemosplit tunneled cuffed dialysis catheter  -Percutaneous access of the left internal jugular vein with ultrasound guidance    LABORATORY  Lab Results   Component Value Date    SODIUM 137 05/25/2022    POTASSIUM 4.3 05/25/2022    CHLORIDE 99 05/25/2022    CO2 28 05/25/2022    GLUCOSE 118 (H) 05/25/2022    BUN 23 (H) 05/25/2022    CREATININE 5.39 (HH) 05/25/2022        Lab Results   Component Value Date    WBC 3.7 (L) 05/25/2022    HEMOGLOBIN 7.6 (L)  05/25/2022    HEMATOCRIT 23.6 (L) 05/25/2022    PLATELETCT 128 (L) 05/25/2022        Total time of the discharge process exceeds 39 minutes.

## 2022-05-26 NOTE — PROGRESS NOTES
Patient being discharged. Patient educated on discharge instructions and new prescriptions. Patient verbalized understanding. PIV removed, telemetry monitor checked in. Patient going to [facility] via [facility]. COBRA and facesheet given to [facility]

## 2022-05-26 NOTE — PROGRESS NOTES
"Daily Progress Note:     Date of Service: 5/25/2022  Primary Team: [facility] Team   Attending: Adeel Wells M.D.   Senior Resident: Dr. Hernandez  Intern: Dr. Ríos  Contact:  [phone]    Chief Complaint/ID:   67-year-old female with past medical history of end-stage renal disease on Tuesday Thursday Saturday hemodialysis, diabetes, hypertension.  Seen for evaluation after AV fistula stopped functioning on last hemodialysis attempt outpatient at [facility], patient has been seen by vascular surgery who is planning for repeat AV fistula with possible thrombectomy.  Ultrasound shows thrombus extending to distal subclavian vein. S/p thrombectomy and AV fistula revision 5/24/2022. Medically stable for discharge when safe/appropriate disposition available.    Subjective:   *Patient still disoriented but improvement from previous, currently able to explain she is here \"for her arm\" but still struggles what exactly was wrong. Knows she is in [facility], thinks it's 1966. No current symptomatic complaint other than some feeling of low energy.         Review of Systems:        Constitutional: Negative for chills and fever.   Respiratory: Negative for cough.    Cardiovascular: Negative for chest pain.   Gastrointestinal: Negative for nausea and vomiting.   Genitourinary: Negative for dysuria.        Urinates regularly, is at her baseline   Musculoskeletal: Negative for myalgias.   Skin: Negative for rash.     Objective Data:   Physical Exam:   Vitals:   Temp:  [36.6 °C (97.8 °F)-37.2 °C (99 °F)] 36.6 °C (97.8 °F)  Pulse:  [68-99] 68  Resp:  [16-19] 16  BP: ()/(44-60) 113/44  SpO2:  [92 %-98 %] 94 %    Physical Exam  Constitutional:       General: She is not in acute distress.  HENT:      Head: Normocephalic.   Eyes:      Pupils: Pupils are equal, round, and reactive to light.   Cardiovascular:      Rate and Rhythm: Normal rate and regular rhythm.      Heart sounds: Normal heart sounds. No " "murmur heard.  Pulmonary:      Effort: No respiratory distress.      Breath sounds: Normal breath sounds.   Abdominal:      General: Abdomen is flat.      Palpations: Abdomen is soft.   Musculoskeletal:      Comments: Patient with palpable well circumscribed rubbery masses on right anterior shin and left popliteal fossa \"been there for a long, long time,\" there are not erythematous, not painful to palpation.    Left AV fistula site with some bruising but otherwise no surrounding erythema, no forming hematoma, appropriately tender to palpation (minimal).  Skin:     General: Skin is warm.      Coloration: Skin is not jaundiced.   Neurological:      General: No focal deficit present.      Mental Status: She is alert. Mental status is at baseline.   Psychiatric:         Mood and Affect: Mood normal.         Thought Content: Thought content normal. But patient has difficulties with orientation, her memory is intact able to state where she lives, medical problems, but why she is in the hospital eludes her, I.e. she occasionally forgets the situation.     Labs:   Lab Results   Component Value Date/Time    WBC 3.7 (L) 05/25/2022 03:47 AM    RBC 2.44 (L) 05/25/2022 03:47 AM    HEMOGLOBIN 7.6 (L) 05/25/2022 03:47 AM    HEMATOCRIT 23.6 (L) 05/25/2022 03:47 AM    MCV 96.7 05/25/2022 03:47 AM    MCH 31.1 05/25/2022 03:47 AM    MCHC 32.2 (L) 05/25/2022 03:47 AM    MPV 10.8 05/25/2022 03:47 AM    NEUTSPOLYS 62.80 05/25/2022 03:47 AM    LYMPHOCYTES 18.80 (L) 05/25/2022 03:47 AM    MONOCYTES 11.10 05/25/2022 03:47 AM    EOSINOPHILS 1.60 05/25/2022 03:47 AM    EOSINOPHILS 28 05/05/2022 09:45 AM    BASOPHILS 1.40 05/25/2022 03:47 AM    ANISOCYTOSIS 1+ 05/05/2022 06:30 AM        Lab Results   Component Value Date/Time    SODIUM 137 05/25/2022 03:47 AM    POTASSIUM 4.3 05/25/2022 03:47 AM    CHLORIDE 99 05/25/2022 03:47 AM    CO2 28 05/25/2022 03:47 AM    GLUCOSE 118 (H) 05/25/2022 03:47 AM    BUN 23 (H) 05/25/2022 03:47 AM    " "CREATININE 5.39 (HH) 05/25/2022 03:47 AM          Imaging:   No new imaging.  Problem Representation:         Encephalopathy vs. Delirium     Possibly iatrogenic 2/2 anesthesia vs. Postoperative haldol.  -continue to monitor  -routine anti-delirium precautions.  -HD per nephrology  -Some waxing and waning orientation seems to be recent baseline per chart review, will discuss if this is actual baseline with her family members.     ESRD (end stage renal disease) (HCC)  ESRD on HD TTS   HD fistula was working well on last HD on Thursday 5/19   Pt went to [facility] dialysis [place] on 5/21 & found that HD fistula is not working , unable to proceed with HD .  At ER, pt is lethargic. Labs showed K 6.2   & Cr 9.95.   EKG stat ordered.   ERP talked to Vascular surgery & nephrology.   US UE showed \"  Known brachial-cephalic hemodialysis fistula.        Acute occluded thrombus demonstrate throughout the fistula extending into distal subclavian vein. The proximal subclavian vein is patent. Appropriate flow velocities and waveforms throughout the inflow artery. \"  -HD T/T/S - nephrology on board   -S/p thrombectomy, AV fistula revision and HD cath placement May 23rd - no further DVT treatment required per vascular recommendations.   -Continue routine DVT prophylaxis.        Generalized weakness  Likely from hyperkalemia  Hopeful to improve with normalization of K after HD  -PT/OT - appreciate recs   -Will monitor   -Fall/aspiration precautions       Dialysis AV fistula malfunction (HCC)  ESRD on HD TTS   HD fistula was working well on last HD on Thursday 5/19   Pt went to [facility] dialysis [place] on 5/21 & found that HD fistula is not working , unable to proceed with HD .  At ER, pt is lethargic. Labs showed K 6.2   & Cr 9.95.   EKG stat ordered.   ERP talked to Vascular surgery & nephrology.   US UE showed \"  Known brachial-cephalic hemodialysis fistula.    The fistula is occluded.    Acute occluded thrombus demonstrate throughout " "the fistula extending into    distal subclavian vein. The proximal subclavian vein is patent.    Appropriate flow velocities and waveforms throughout the inflow artery. \"  HD today - nephrology on board   Temp HD cath placement per vascular surgery   ? OR in tomorrow  AM vs Monday for thrombectomy & fistula surgery      Anemia  Likely 2/2 her ESRD, iron stores low however, but ferritin high. Baseline anemia with some blood loss, postoperative day 1 Hg had dropped to 7.0, will continue to monitor if transfusion indicated.  -Continue to monitor CBC, transfuse <7.0 Hg  -If persistently below goals may need further anemia workup outside of iron studies.     Hyperkalemia  HD T/T/S  Nephrology consulted  Tele  Will monitor          Primary hypertension  BP soft on admission.   Resume home metoprolol in lower dosage with parameters   Hold home amlodipine & hydralazine   Monitor BP   Re-start home BP meds as needed      Type 2 diabetes mellitus, with long-term current use of insulin ([facility])  A1C   ISS for now   Will monitor glucose levels & make adjustment for better BP control   "

## 2022-05-27 NOTE — DOCUMENTATION QUERY
"                                                                         [facility]                                                                       Query Response Note      PATIENT:               GIANLUCA ZALDIVAR  ACCT #:                  [id number]  MRN:                     [id number]  :                      1955  ADMIT DATE:       2022 7:36 AM  DISCH DATE:        2022 5:49 PM  RESPONDING  PROVIDER #:        [id number]           QUERY TEXT:    Somnolent and disoriented are documented in the Medical Record.  Can a more specific diagnosis be provided?    NOTE:  If the appropriate response is not listed below, please respond with a new note.    If you have any questions please contact:  Polina Hernandes RN CDI [facility]  [email or web address]  Polina Hernandes [address]      The patient's Clinical Indicators include:  67 year old female admitted for a AV fistula malfunction.     ED note Marleen \"History limited by : blunted aspect and delayed response to questioning.\"   Sylvie \"She is still somnolent and disoriented'   Khushbu \"Patient yesterday and again this morning with waxing and waning orientation, on my exam this morning she was alert  and aware, but on nursing and medical student exam later in the morning patient was alert and oriented x1 and x2 respectively, and this matches her occasional waxing and waning status from yesterday     Dawood \" unable to assess ROS: patient confused and very somnolent form haldol/anesthesia\"   Dawood/ Tej \": Dr. Mercer took patient to OR to perfrom AVF thrombectomy. Patient tolerated procedure well. Very somnolent on exam.  : Patient tolerated AVF thrombectomy well; still remains somnolent and confused; scheduled to resume TThS HD.\"    Risk factors: ESRD, fistula  Treatment: Thrombectomy, labs  Options provided:   -- Acute metabolic encephalopathy   -- Acute toxic encephalopathy due to Haldol/anesthesia   -- Unable to " determine      Query created by: Polina Hernandes on 5/25/2022 12:22 PM    RESPONSE TEXT:    Unable to determine          Electronically signed by:  AVANI GALLEGOS MD 5/27/2022 8:59 AM

## 2022-06-13 ENCOUNTER — APPOINTMENT (OUTPATIENT)
Dept: RADIOLOGY | Facility: MEDICAL CENTER | Age: 67
End: 2022-06-13
Attending: EMERGENCY MEDICINE
Payer: MEDICARE

## 2022-06-13 ENCOUNTER — HOSPITAL ENCOUNTER (OUTPATIENT)
Facility: MEDICAL CENTER | Age: 67
End: 2022-07-12
Attending: EMERGENCY MEDICINE | Admitting: STUDENT IN AN ORGANIZED HEALTH CARE EDUCATION/TRAINING PROGRAM
Payer: MEDICARE

## 2022-06-13 DIAGNOSIS — F33.1 MODERATE EPISODE OF RECURRENT MAJOR DEPRESSIVE DISORDER (HCC): ICD-10-CM

## 2022-06-13 DIAGNOSIS — D64.9 ANEMIA, UNSPECIFIED TYPE: ICD-10-CM

## 2022-06-13 DIAGNOSIS — R53.83 FATIGUE, UNSPECIFIED TYPE: ICD-10-CM

## 2022-06-13 DIAGNOSIS — Z99.2 ESRD (END STAGE RENAL DISEASE) ON DIALYSIS (HCC): ICD-10-CM

## 2022-06-13 DIAGNOSIS — E55.9 VITAMIN D DEFICIENCY: ICD-10-CM

## 2022-06-13 DIAGNOSIS — N18.6 ESRD (END STAGE RENAL DISEASE) ON DIALYSIS (HCC): ICD-10-CM

## 2022-06-13 DIAGNOSIS — E78.2 MIXED HYPERLIPIDEMIA: ICD-10-CM

## 2022-06-13 DIAGNOSIS — G93.49 UREMIC ENCEPHALOPATHY: ICD-10-CM

## 2022-06-13 DIAGNOSIS — N19 UREMIC ENCEPHALOPATHY: ICD-10-CM

## 2022-06-13 DIAGNOSIS — J81.0 ACUTE PULMONARY EDEMA (HCC): ICD-10-CM

## 2022-06-13 PROBLEM — E78.5 HYPERLIPIDEMIA: Status: ACTIVE | Noted: 2022-06-13

## 2022-06-13 PROBLEM — U07.1 COVID: Status: ACTIVE | Noted: 2022-06-13

## 2022-06-13 LAB
ABO GROUP BLD: NORMAL
ALBUMIN SERPL BCP-MCNC: 2.8 G/DL (ref 3.2–4.9)
ALBUMIN/GLOB SERPL: 0.8 G/DL
ALP SERPL-CCNC: 65 U/L (ref 30–99)
ALT SERPL-CCNC: 6 U/L (ref 2–50)
ANION GAP SERPL CALC-SCNC: 17 MMOL/L (ref 7–16)
AST SERPL-CCNC: 17 U/L (ref 12–45)
BARCODED ABORH UBTYP: 5100
BARCODED PRD CODE UBPRD: NORMAL
BARCODED UNIT NUM UBUNT: NORMAL
BASOPHILS # BLD AUTO: 0.2 % (ref 0–1.8)
BASOPHILS # BLD: 0.01 K/UL (ref 0–0.12)
BILIRUB SERPL-MCNC: 0.4 MG/DL (ref 0.1–1.5)
BLD GP AB SCN SERPL QL: NORMAL
BUN SERPL-MCNC: 88 MG/DL (ref 8–22)
CALCIUM SERPL-MCNC: 8.7 MG/DL (ref 8.5–10.5)
CHLORIDE SERPL-SCNC: 98 MMOL/L (ref 96–112)
CO2 SERPL-SCNC: 22 MMOL/L (ref 20–33)
COMPONENT R 8504R: NORMAL
CREAT SERPL-MCNC: 11.77 MG/DL (ref 0.5–1.4)
EOSINOPHIL # BLD AUTO: 0.07 K/UL (ref 0–0.51)
EOSINOPHIL NFR BLD: 1.2 % (ref 0–6.9)
ERYTHROCYTE [DISTWIDTH] IN BLOOD BY AUTOMATED COUNT: 50.9 FL (ref 35.9–50)
FLUAV RNA SPEC QL NAA+PROBE: NEGATIVE
FLUBV RNA SPEC QL NAA+PROBE: NEGATIVE
GFR SERPLBLD CREATININE-BSD FMLA CKD-EPI: 3 ML/MIN/1.73 M 2
GLOBULIN SER CALC-MCNC: 3.4 G/DL (ref 1.9–3.5)
GLUCOSE BLD STRIP.AUTO-MCNC: 86 MG/DL (ref 65–99)
GLUCOSE SERPL-MCNC: 95 MG/DL (ref 65–99)
HCT VFR BLD AUTO: 21.5 % (ref 37–47)
HGB BLD-MCNC: 6.8 G/DL (ref 12–16)
IMM GRANULOCYTES # BLD AUTO: 0.04 K/UL (ref 0–0.11)
IMM GRANULOCYTES NFR BLD AUTO: 0.7 % (ref 0–0.9)
LYMPHOCYTES # BLD AUTO: 0.44 K/UL (ref 1–4.8)
LYMPHOCYTES NFR BLD: 7.4 % (ref 22–41)
MCH RBC QN AUTO: 30.5 PG (ref 27–33)
MCHC RBC AUTO-ENTMCNC: 31.6 G/DL (ref 33.6–35)
MCV RBC AUTO: 96.4 FL (ref 81.4–97.8)
MONOCYTES # BLD AUTO: 0.28 K/UL (ref 0–0.85)
MONOCYTES NFR BLD AUTO: 4.7 % (ref 0–13.4)
NEUTROPHILS # BLD AUTO: 5.12 K/UL (ref 2–7.15)
NEUTROPHILS NFR BLD: 85.8 % (ref 44–72)
NRBC # BLD AUTO: 0 K/UL
NRBC BLD-RTO: 0 /100 WBC
PLATELET # BLD AUTO: 144 K/UL (ref 164–446)
PMV BLD AUTO: 10.5 FL (ref 9–12.9)
POTASSIUM SERPL-SCNC: 5.3 MMOL/L (ref 3.6–5.5)
PRODUCT TYPE UPROD: NORMAL
PROT SERPL-MCNC: 6.2 G/DL (ref 6–8.2)
RBC # BLD AUTO: 2.23 M/UL (ref 4.2–5.4)
RH BLD: NORMAL
RSV RNA SPEC QL NAA+PROBE: NEGATIVE
SARS-COV-2 RNA RESP QL NAA+PROBE: DETECTED
SODIUM SERPL-SCNC: 137 MMOL/L (ref 135–145)
SPECIMEN SOURCE: ABNORMAL
UNIT STATUS USTAT: NORMAL
WBC # BLD AUTO: 6 K/UL (ref 4.8–10.8)

## 2022-06-13 PROCEDURE — 36415 COLL VENOUS BLD VENIPUNCTURE: CPT

## 2022-06-13 PROCEDURE — 99285 EMERGENCY DEPT VISIT HI MDM: CPT

## 2022-06-13 PROCEDURE — 70450 CT HEAD/BRAIN W/O DYE: CPT | Mod: ME

## 2022-06-13 PROCEDURE — 86850 RBC ANTIBODY SCREEN: CPT

## 2022-06-13 PROCEDURE — 86901 BLOOD TYPING SEROLOGIC RH(D): CPT

## 2022-06-13 PROCEDURE — 0241U HCHG SARS-COV-2 COVID-19 NFCT DS RESP RNA 4 TRGT MIC: CPT

## 2022-06-13 PROCEDURE — 85025 COMPLETE CBC W/AUTO DIFF WBC: CPT

## 2022-06-13 PROCEDURE — 86923 COMPATIBILITY TEST ELECTRIC: CPT

## 2022-06-13 PROCEDURE — 96372 THER/PROPH/DIAG INJ SC/IM: CPT | Mod: XU

## 2022-06-13 PROCEDURE — C9803 HOPD COVID-19 SPEC COLLECT: HCPCS | Performed by: EMERGENCY MEDICINE

## 2022-06-13 PROCEDURE — 99220 PR INITIAL OBSERVATION CARE,LEVL III: CPT | Performed by: STUDENT IN AN ORGANIZED HEALTH CARE EDUCATION/TRAINING PROGRAM

## 2022-06-13 PROCEDURE — 700102 HCHG RX REV CODE 250 W/ 637 OVERRIDE(OP): Performed by: STUDENT IN AN ORGANIZED HEALTH CARE EDUCATION/TRAINING PROGRAM

## 2022-06-13 PROCEDURE — 700111 HCHG RX REV CODE 636 W/ 250 OVERRIDE (IP): Performed by: STUDENT IN AN ORGANIZED HEALTH CARE EDUCATION/TRAINING PROGRAM

## 2022-06-13 PROCEDURE — 80053 COMPREHEN METABOLIC PANEL: CPT

## 2022-06-13 PROCEDURE — 96374 THER/PROPH/DIAG INJ IV PUSH: CPT

## 2022-06-13 PROCEDURE — 82962 GLUCOSE BLOOD TEST: CPT

## 2022-06-13 PROCEDURE — A9270 NON-COVERED ITEM OR SERVICE: HCPCS | Performed by: STUDENT IN AN ORGANIZED HEALTH CARE EDUCATION/TRAINING PROGRAM

## 2022-06-13 PROCEDURE — 71045 X-RAY EXAM CHEST 1 VIEW: CPT

## 2022-06-13 PROCEDURE — G0378 HOSPITAL OBSERVATION PER HR: HCPCS

## 2022-06-13 PROCEDURE — P9016 RBC LEUKOCYTES REDUCED: HCPCS

## 2022-06-13 PROCEDURE — 86900 BLOOD TYPING SEROLOGIC ABO: CPT

## 2022-06-13 PROCEDURE — 36430 TRANSFUSION BLD/BLD COMPNT: CPT

## 2022-06-13 RX ORDER — SEVELAMER CARBONATE 800 MG/1
1600 TABLET, FILM COATED ORAL
Status: DISCONTINUED | OUTPATIENT
Start: 2022-06-13 | End: 2022-06-19

## 2022-06-13 RX ORDER — GUAIFENESIN 600 MG/1
600 TABLET, EXTENDED RELEASE ORAL 2 TIMES DAILY PRN
Status: ON HOLD | COMMUNITY
End: 2022-06-19

## 2022-06-13 RX ORDER — AMOXICILLIN 500 MG/1
500 CAPSULE ORAL DAILY
Status: ON HOLD | COMMUNITY
End: 2022-06-19

## 2022-06-13 RX ORDER — METOPROLOL TARTRATE 50 MG/1
50 TABLET, FILM COATED ORAL 2 TIMES DAILY
Status: ON HOLD | COMMUNITY
End: 2022-06-28

## 2022-06-13 RX ORDER — ATORVASTATIN CALCIUM 80 MG/1
80 TABLET, FILM COATED ORAL EVERY EVENING
Status: DISCONTINUED | OUTPATIENT
Start: 2022-06-13 | End: 2022-06-28

## 2022-06-13 RX ORDER — SEVELAMER HYDROCHLORIDE 800 MG/1
1600 TABLET, FILM COATED ORAL
Status: DISCONTINUED | OUTPATIENT
Start: 2022-06-13 | End: 2022-06-13

## 2022-06-13 RX ORDER — GABAPENTIN 300 MG/1
300 CAPSULE ORAL EVERY EVENING
Status: DISCONTINUED | OUTPATIENT
Start: 2022-06-13 | End: 2022-07-12 | Stop reason: HOSPADM

## 2022-06-13 RX ORDER — POLYETHYLENE GLYCOL 3350 17 G/17G
17 POWDER, FOR SOLUTION ORAL
COMMUNITY

## 2022-06-13 RX ORDER — FUROSEMIDE 10 MG/ML
40 INJECTION INTRAMUSCULAR; INTRAVENOUS ONCE
Status: COMPLETED | OUTPATIENT
Start: 2022-06-13 | End: 2022-06-13

## 2022-06-13 RX ORDER — VITAMIN B COMPLEX
1000 TABLET ORAL EVERY EVENING
Status: ON HOLD | COMMUNITY
End: 2022-07-12

## 2022-06-13 RX ORDER — HEPARIN SODIUM 5000 [USP'U]/ML
5000 INJECTION, SOLUTION INTRAVENOUS; SUBCUTANEOUS EVERY 8 HOURS
Status: DISCONTINUED | OUTPATIENT
Start: 2022-06-13 | End: 2022-06-29

## 2022-06-13 RX ORDER — ACETAMINOPHEN 325 MG/1
650 TABLET ORAL EVERY 4 HOURS PRN
COMMUNITY

## 2022-06-13 RX ADMIN — ATORVASTATIN CALCIUM 80 MG: 80 TABLET, FILM COATED ORAL at 19:15

## 2022-06-13 RX ADMIN — HEPARIN SODIUM 5000 UNITS: 5000 INJECTION, SOLUTION INTRAVENOUS; SUBCUTANEOUS at 22:14

## 2022-06-13 RX ADMIN — FUROSEMIDE 40 MG: 10 INJECTION, SOLUTION INTRAMUSCULAR; INTRAVENOUS at 19:41

## 2022-06-13 RX ADMIN — GABAPENTIN 300 MG: 300 CAPSULE ORAL at 19:41

## 2022-06-13 ASSESSMENT — FIBROSIS 4 INDEX: FIB4 SCORE: 2.75

## 2022-06-13 NOTE — ED PROVIDER NOTES
ED Physician Note    Chief Concern:   Altered mental status, lethargy    HPI:  Liberty Bolton is a very pleasant 67-year-old woman who presents to the emergency department for evaluation of lethargy, possible altered mental status, and reportedly missing dialysis.  She is staying at [facility] and it is reported that she has not wanted to go to dialysis due to behavior issues.  She is typically dialyzed Tuesday, Thursday, and Saturday.  She recently had an AV fistula revision, and has a dialysis catheter in place.  Facility reports she has not been to dialysis in 10 days, and seems to be getting increasingly lethargic.  On arrival to the emergency department she asks if I can help her, but cannot verbalize why she needs help.  She states she does not have any headache, no nausea, no vomiting, no chest pain, and no abdominal pain.  She reports that she is not having any shortness of breath nor cough, she reports no lower extremity swelling.  She has no complaints at this time, but does seem very tired.  Facility reported that she is more lethargic than usual.  Further HPI is limited by patient's inability to provide a full history.  History is primarily obtained from transporting paramedics.    Review of Systems:  See HPI for pertinent positives and negatives.  Further ROS is limited by patient's inability to provide a full history.    Past Medical History:   has a past medical history of Diabetes (HCC) and Renal disorder.    Social History:  Social History     Tobacco Use   • Smoking status: Never Smoker   • Smokeless tobacco: Never Used   Substance and Sexual Activity   • Alcohol use: Not Currently   • Drug use: Not Currently   • Sexual activity: Not on file       Surgical History:   has a past surgical history that includes thrombectomy (Right, 5/23/2022); av fistula creation (Right, 5/23/2022); cath placement (Left, 5/23/2022); and angioplasty upper (Right, 5/23/2022).    Current Medications:  Home Medications   "   Reviewed by Fidelina Frost R.N. (Registered Nurse) on 06/13/22 at 1403  Med List Status: Not Addressed   Medication Last Dose Status   amLODIPine (NORVASC) 5 MG Tab  Active   atorvastatin (LIPITOR) 80 MG tablet  Active   calcium acetate (PHOS-LO) 667 MG Cap  Active   gabapentin (NEURONTIN) 300 MG Cap  Active   hydrALAZINE (APRESOLINE) 25 MG Tab  Active   Insulin Glargine, 2 Unit Dial, (TOUJEO MAX SOLOSTAR) 300 UNIT/ML Solution Pen-injector  Active   insulin lispro (INSULIN LISPRO) 100 UNIT/ML Solution Pen-injector  Active   linagliptin (TRADJENTA) 5 MG Tab tablet  Active   metoprolol SR (TOPROL XL) 100 MG TABLET SR 24 HR  Active   metoprolol tartrate (LOPRESSOR) 25 MG Tab  Active   omeprazole (PRILOSEC) 20 MG delayed-release capsule  Active   pioglitazone (ACTOS) 45 MG Tab  Active   sevelamer (RENAGEL) 800 MG Tab  Active                Allergies:  Allergies   Allergen Reactions   • Bactrim [Sulfamethoxazole-Trimethoprim] Swelling     angioedema   • Dilaudid [Hydromorphone] Unspecified     Per [facility]   • Enalapril Anaphylaxis and Swelling     angioedema   • Vicodin Hp [Hydrocodone-Acetaminophen] Unspecified     Per [facility]       Physical Exam:  Vital Signs: BP (!) 174/72   Pulse 82   Temp 36.4 °C (97.6 °F) (Temporal)   Resp 15   Ht 1.651 m (5' 5\")   Wt 75.8 kg (167 lb)   SpO2 95%   BMI 27.79 kg/m²   Constitutional: Alert, no acute distress  HENT: Normocephalic, dry mucous membranes, atraumatic  Eyes: Pupils equal and reactive, pale conjunctiva  Neck: Supple, normal range of motion, no stridor  Cardiovascular: Extremities are warm and well perfused, no murmur appreciated, normal cardiac auscultation  Pulmonary: No respiratory distress, normal work of breathing, no accessory muscule usage, breath sounds clear and equal bilaterally, no basilar crackles  Abdomen: Soft, non-distended, non-tender to palpation, no peritoneal signs  Skin: Warm, dry, no rashes or lesions  Musculoskeletal: " Normal range of motion in all extremities, no swelling or deformity noted  Neurologic: No facial droop, no slurred speech, answers questions briefly though appropriately, no rambling speech, no evidence of hallucinations, moves all 4 extremities equally  Psychiatric: Normal and appropriate mood and affect    Medical records reviewed for continuity of care.  Discharge summary reviewed from 5/25/2022.  Ms. Bolton has a past medical history significant for end-stage renal disease, typically dialyzed Tuesdays, Thursdays, and Saturdays, as well as diabetes and hypertension.  She was seen for evaluation after AV fistula stopped functioning.  She was seen by vascular surgery, thrombectomy was performed with revision of AV fistula.  She was discharged home with vascular surgery as well as nephrology follow-up.    Labs:  Labs Reviewed   CBC WITH DIFFERENTIAL - Abnormal; Notable for the following components:       Result Value    RBC 2.23 (*)     Hemoglobin 6.8 (*)     Hematocrit 21.5 (*)     MCHC 31.6 (*)     RDW 50.9 (*)     Platelet Count 144 (*)     Neutrophils-Polys 85.80 (*)     Lymphocytes 7.40 (*)     Lymphs (Absolute) 0.44 (*)     All other components within normal limits   COMP METABOLIC PANEL - Abnormal; Notable for the following components:    Anion Gap 17.0 (*)     Bun 88 (*)     Creatinine 11.77 (*)     Albumin 2.8 (*)     All other components within normal limits   ESTIMATED GFR - Abnormal; Notable for the following components:    GFR (CKD-EPI) 3 (*)     All other components within normal limits   COV-2, FLU A/B, AND RSV BY PCR (CEPHEID)       Radiology:  CT-HEAD W/O   Final Result      1.  Cerebral atrophy.      2.  White matter lucencies most consistent with small vessel ischemic change versus demyelination or gliosis.      3.  Chronic pansinus disease.      4.  Otherwise, Head CT without contrast with no acute findings. No evidence of acute intracranial hemorrhage or mass lesion.         DX-CHEST-PORTABLE  (1 VIEW)   Final Result      Increasing interstitial markings may represent mild interstitial edema. Atypical infection could have a similar appearance.           ED Medications Administered:  Medications - No data to display    Differential diagnosis:  Electrolyte abnormality, fluid overload, encephalopathy, uremia, Sirs, sepsis, symptomatic anemia intracranial bleed or CVA    MDM:  Ms. Bolton presents to the emergency department today for evaluation of lethargy in the setting of 10 days of missed dialysis.  I did not appreciate any focal neurologic deficits, she does seem very fatigued and does not answer all questions appropriately.  She is not having any active nausea or vomiting, she reports no headache, no neck pain.  She is afebrile on arrival to the emergency department with no tachycardia, no hypotension, no hypoxia, doubt Sirs or sepsis.  She has no upper respiratory symptoms suggestive of influenza or COVID-19.  She has very pale appearing, appearance is suggestive of anemia.  She has no vital sign instability to suggest symptomatic hyperkalemia.    On laboratory evaluation her hemoglobin today is 6.8, which does seem consistent with prior baseline values.  Previous hemoglobin levels have ranged from 6.3-7.7.  Platelet count is 144, also consistent with baseline values.  White blood count is 6.0.  No bands resulted at this time, she does have 85% neutrophil predominance on her differential.  She has no hyperkalemia, potassium is 5.3.  Creatinine is 11.77, consistent with her history of end-stage renal disease on dialysis.  BUN is 88.  COVID-19 testing is pending at this time.     CT head demonstrates no acute condition, chronic pansinus disease is noted.  Given her normal white blood count and lack of fever, do not believe she requires antibiotics at this time.  Cerebral atrophy noted.    Chest x-ray demonstrates increasing interstitial markings, possible mild interstitial edema.    Plan at this time is  for admission to hospitalist service for dialysis, that she is 10 days behind schedule at this time.  Fortunately she does not have any severe electrolyte abnormalities, though she does have clinical evidence of fluid overload including interstitial edema on chest x-ray.  It appears as though her hemoglobin is at baseline, though consideration may be given to transfusion if her symptoms do not improve with dialysis.    Call placed to Dr. Rabago, Nephrologist on call for [facility].  He kindly agrees to have the patient dialyzed first thing tomorrow morning.    Personal protective equipment including N95 surgical respirator, goggles, and gloves were used during this encounter.       Disposition:  Admit to hospitalist in stable condition    Final Impression:  1. Anemia, unspecified type    2. Acute pulmonary edema (HCC)    3. Fatigue, unspecified type        Electronically signed by: Denisse Bragg MD, 6/13/2022 5:59 PM

## 2022-06-13 NOTE — ED TRIAGE NOTES
"Chief Complaint   Patient presents with   • Malaise     Pt biba from [facility] for lethargy. Per EMS pt hasn't received dialysis in 10 days relate to \"behavioral issues\" such as pulling out HD cath. Pt has HD cath on arrival in left upper chest and fistula in right arm. Pt is alert and oriented x 2, oriented to self and place. Per ems this is baseline. Pt also tested positive for covid on 6/6.          BP (!) 162/70   Pulse 81   Resp (!) 23   Ht 1.651 m (5' 5\")   Wt 75.8 kg (167 lb)   SpO2 94%   BMI 27.79 kg/m²       "

## 2022-06-14 LAB
ERYTHROCYTE [DISTWIDTH] IN BLOOD BY AUTOMATED COUNT: 51.5 FL (ref 35.9–50)
EST. AVERAGE GLUCOSE BLD GHB EST-MCNC: 108 MG/DL
GLUCOSE BLD STRIP.AUTO-MCNC: 62 MG/DL (ref 65–99)
GLUCOSE BLD STRIP.AUTO-MCNC: 73 MG/DL (ref 65–99)
GLUCOSE BLD STRIP.AUTO-MCNC: 83 MG/DL (ref 65–99)
HBA1C MFR BLD: 5.4 % (ref 4–5.6)
HCT VFR BLD AUTO: 26.1 % (ref 37–47)
HGB BLD-MCNC: 8.5 G/DL (ref 12–16)
MCH RBC QN AUTO: 31.1 PG (ref 27–33)
MCHC RBC AUTO-ENTMCNC: 32.6 G/DL (ref 33.6–35)
MCV RBC AUTO: 95.6 FL (ref 81.4–97.8)
PLATELET # BLD AUTO: 138 K/UL (ref 164–446)
PMV BLD AUTO: 10.3 FL (ref 9–12.9)
RBC # BLD AUTO: 2.73 M/UL (ref 4.2–5.4)
WBC # BLD AUTO: 5.6 K/UL (ref 4.8–10.8)

## 2022-06-14 PROCEDURE — 700111 HCHG RX REV CODE 636 W/ 250 OVERRIDE (IP): Performed by: STUDENT IN AN ORGANIZED HEALTH CARE EDUCATION/TRAINING PROGRAM

## 2022-06-14 PROCEDURE — A9270 NON-COVERED ITEM OR SERVICE: HCPCS | Performed by: INTERNAL MEDICINE

## 2022-06-14 PROCEDURE — 96375 TX/PRO/DX INJ NEW DRUG ADDON: CPT | Mod: XU

## 2022-06-14 PROCEDURE — 90935 HEMODIALYSIS ONE EVALUATION: CPT

## 2022-06-14 PROCEDURE — 85027 COMPLETE CBC AUTOMATED: CPT

## 2022-06-14 PROCEDURE — 700102 HCHG RX REV CODE 250 W/ 637 OVERRIDE(OP): Performed by: INTERNAL MEDICINE

## 2022-06-14 PROCEDURE — 99226 PR SUBSEQUENT OBSERVATION CARE,LEVEL III: CPT | Performed by: INTERNAL MEDICINE

## 2022-06-14 PROCEDURE — 700111 HCHG RX REV CODE 636 W/ 250 OVERRIDE (IP): Performed by: NURSE PRACTITIONER

## 2022-06-14 PROCEDURE — G0378 HOSPITAL OBSERVATION PER HR: HCPCS

## 2022-06-14 PROCEDURE — 96372 THER/PROPH/DIAG INJ SC/IM: CPT | Mod: XU

## 2022-06-14 PROCEDURE — A9270 NON-COVERED ITEM OR SERVICE: HCPCS | Performed by: STUDENT IN AN ORGANIZED HEALTH CARE EDUCATION/TRAINING PROGRAM

## 2022-06-14 PROCEDURE — 82962 GLUCOSE BLOOD TEST: CPT

## 2022-06-14 PROCEDURE — 700111 HCHG RX REV CODE 636 W/ 250 OVERRIDE (IP): Performed by: INTERNAL MEDICINE

## 2022-06-14 PROCEDURE — 700102 HCHG RX REV CODE 250 W/ 637 OVERRIDE(OP): Performed by: STUDENT IN AN ORGANIZED HEALTH CARE EDUCATION/TRAINING PROGRAM

## 2022-06-14 PROCEDURE — 83036 HEMOGLOBIN GLYCOSYLATED A1C: CPT

## 2022-06-14 RX ORDER — HEPARIN SODIUM 1000 [USP'U]/ML
3900 INJECTION, SOLUTION INTRAVENOUS; SUBCUTANEOUS
Status: DISCONTINUED | OUTPATIENT
Start: 2022-06-14 | End: 2022-07-12 | Stop reason: HOSPADM

## 2022-06-14 RX ORDER — HYDRALAZINE HYDROCHLORIDE 25 MG/1
25 TABLET, FILM COATED ORAL 2 TIMES DAILY
Status: DISCONTINUED | OUTPATIENT
Start: 2022-06-14 | End: 2022-06-27

## 2022-06-14 RX ORDER — HYDRALAZINE HYDROCHLORIDE 20 MG/ML
20 INJECTION INTRAMUSCULAR; INTRAVENOUS EVERY 6 HOURS PRN
Status: DISCONTINUED | OUTPATIENT
Start: 2022-06-14 | End: 2022-07-12 | Stop reason: HOSPADM

## 2022-06-14 RX ORDER — HALOPERIDOL 5 MG/ML
2 INJECTION INTRAMUSCULAR ONCE
Status: COMPLETED | OUTPATIENT
Start: 2022-06-15 | End: 2022-06-14

## 2022-06-14 RX ADMIN — HEPARIN SODIUM 5000 UNITS: 5000 INJECTION, SOLUTION INTRAVENOUS; SUBCUTANEOUS at 05:41

## 2022-06-14 RX ADMIN — HYDRALAZINE HYDROCHLORIDE 25 MG: 25 TABLET, FILM COATED ORAL at 21:54

## 2022-06-14 RX ADMIN — EPOETIN ALFA-EPBX 10000 UNITS: 10000 INJECTION, SOLUTION INTRAVENOUS; SUBCUTANEOUS at 10:36

## 2022-06-14 RX ADMIN — METOPROLOL TARTRATE 50 MG: 25 TABLET, FILM COATED ORAL at 21:55

## 2022-06-14 RX ADMIN — ATORVASTATIN CALCIUM 80 MG: 80 TABLET, FILM COATED ORAL at 21:53

## 2022-06-14 RX ADMIN — HALOPERIDOL LACTATE 2 MG: 5 INJECTION, SOLUTION INTRAMUSCULAR at 23:48

## 2022-06-14 RX ADMIN — HEPARIN SODIUM 3900 UNITS: 1000 INJECTION, SOLUTION INTRAVENOUS; SUBCUTANEOUS at 12:30

## 2022-06-14 RX ADMIN — SEVELAMER CARBONATE 1600 MG: 800 TABLET, FILM COATED ORAL at 21:55

## 2022-06-14 RX ADMIN — HEPARIN SODIUM 5000 UNITS: 5000 INJECTION, SOLUTION INTRAVENOUS; SUBCUTANEOUS at 21:55

## 2022-06-14 RX ADMIN — GABAPENTIN 300 MG: 300 CAPSULE ORAL at 21:56

## 2022-06-14 ASSESSMENT — PAIN DESCRIPTION - PAIN TYPE
TYPE: ACUTE PAIN
TYPE: ACUTE PAIN

## 2022-06-14 NOTE — PROGRESS NOTES
[facility] Progress Note     HD treatment ordered by GELACIO Judge x 3 hours.  Treatment Start time: 0932          End time: 1232       Net UF 2500 ml    Patient tolerated treatment well. -180's pre and during tx but trended down gradually to 's.     All blood was returned. CVC L chest locked with heparin (Red limb-1.9 ml; Blue limb-2.0 ml). CVC Dressing and end cap changed.  See flow sheet for details.     Report given to GENE Antunez RN.

## 2022-06-14 NOTE — ASSESSMENT & PLAN NOTE
Likely chronic from her ESRD as Hgb ranges from 6-8  Patient started on EPO  Transfuse PRBCs as needed for Hgb <7  Started on IV iron, completed

## 2022-06-14 NOTE — H&P
"Hospital Medicine History & Physical Note    Date of Service  6/13/2022    Primary Care Physician  Pcp Pt States None    Consultants  Nephrology    Code Status  Full Code    Chief Complaint  Chief Complaint   Patient presents with   • Malaise     Pt biba from [facility] for lethargy. Per EMS pt hasn't received dialysis in 10 days relate to \"behavioral issues\" such as pulling out HD cath. Pt has HD cath on arrival in left upper chest and fistula in right arm. Pt is alert and oriented x 2, oriented to self and place. Per ems this is baseline. Pt also tested positive for covid on 6/6.        History of Presenting Illness  Liberty Bolton is a 67 y.o. female who presented 6/13/2022 with altered mental status and lethargy from [facility].  Patient has not had dialysis for the last 10 days due to behavioral issues.  Patient tested positive for COVID on June 6.  History limited as patient is currently lethargic at bedside.    In the ED, patient found to have elevated blood pressure.  Found to have BUN 88 creatinine 11.7.  CT head negative. Chest x-ray showing increased interstitial markings consistent with mild interstitial edema.  Nephrology consulted, recommends dialysis in a.m.    I discussed the plan of care with patient.    Review of Systems  ROS  Unable to assess due to acuity of condition    Past Medical History   has a past medical history of Diabetes (HCC) and Renal disorder.    Surgical History   has a past surgical history that includes thrombectomy (Right, 5/23/2022); av fistula creation (Right, 5/23/2022); cath placement (Left, 5/23/2022); and angioplasty upper (Right, 5/23/2022).     Family History   Family history reviewed with patient. There is no family history that is pertinent to the chief complaint.     Social History   reports that she has never smoked. She has never used smokeless tobacco. She reports previous alcohol use. She reports previous drug use.    Allergies  Allergies   Allergen Reactions "   • Bactrim [Sulfamethoxazole-Trimethoprim] Swelling     angioedema   • Dilaudid [Hydromorphone] Unspecified     Per [facility]   • Enalapril Anaphylaxis and Swelling     angioedema   • Vicodin Hp [Hydrocodone-Acetaminophen] Unspecified     Per [facility]       Medications  Prior to Admission Medications   Prescriptions Last Dose Informant Patient Reported? Taking?   acetaminophen (TYLENOL) 325 MG Tab 6/11/2022 at 1353 MAR from Other Facility Yes Yes   Sig: Take 650 mg by mouth every four hours as needed for Mild Pain.   amoxicillin (AMOXIL) 500 MG Cap 6/9/2022 at 1730 MAR from Other Facility Yes Yes   Sig: Take 500 mg by mouth every day. Long term use   atorvastatin (LIPITOR) 80 MG tablet 6/12/2022 at 1633 MAR from Other Facility Yes No   Sig: Take 80 mg by mouth every evening.   calcium acetate (PHOS-LO) 667 MG Cap 6/12/2022 at 1633 MAR from Other Facility Yes No   Sig: Take 2,001 mg by mouth every evening.   gabapentin (NEURONTIN) 300 MG Cap 6/12/2022 at 1633 MAR from Other Facility Yes No   Sig: Take 300 mg by mouth every evening.   guaiFENesin ER (MUCINEX) 600 MG TABLET SR 12 HR unknown at unknown MAR from Other Facility Yes Yes   Sig: Take 600 mg by mouth 2 times a day as needed. Indications: Cough   hydrALAZINE (APRESOLINE) 25 MG Tab 6/13/2022 at 0444 MAR from Other Facility Yes No   Sig: Take 25 mg by mouth 2 times a day.   metoprolol tartrate (LOPRESSOR) 50 MG Tab 6/13/2022 at 0444 MAR from Other Facility Yes Yes   Sig: Take 50 mg by mouth 2 times a day.   pioglitazone (ACTOS) 45 MG Tab 6/13/2022 at 0444 MAR from Other Facility Yes No   Sig: Take 45 mg by mouth every day.   polyethylene glycol/lytes (MIRALAX) 17 g Pack unknown at unknown MAR from Other Facility Yes Yes   Sig: Take 17 g by mouth 1 time a day as needed. Indications: Constipation   sevelamer (RENAGEL) 800 MG Tab 6/13/2022 at 1114 MAR from Other Facility Yes No   Sig: Take 1,600 mg by mouth 3 times a day with meals.   vitamin D3  (CHOLECALCIFEROL) 1000 Unit (25 mcg) Tab 6/12/2022 at 1633 MAR from Other Facility Yes Yes   Sig: Take 1,000 Units by mouth every evening.      Facility-Administered Medications: None       Physical Exam  Temp:  [36.4 °C (97.6 °F)] 36.4 °C (97.6 °F)  Pulse:  [81-82] 82  Resp:  [15-23] 15  BP: (162-174)/(70-72) 174/72  SpO2:  [94 %-95 %] 95 %  Blood Pressure : (!) 174/72   Temperature: 36.4 °C (97.6 °F)   Pulse: 82   Respiration: 15   Pulse Oximetry: 95 %       Physical Exam  Constitutional:       Appearance: Normal appearance.      Comments: Lethargic   HENT:      Head: Normocephalic.      Nose: Nose normal.      Mouth/Throat:      Mouth: Mucous membranes are moist.   Eyes:      Pupils: Pupils are equal, round, and reactive to light.   Cardiovascular:      Rate and Rhythm: Normal rate and regular rhythm.      Pulses: Normal pulses.      Comments: Left-sided tunneled dialysis catheter in place  Pulmonary:      Effort: Pulmonary effort is normal.      Comments: Rales heard at lung bases  Abdominal:      General: Bowel sounds are normal.      Palpations: Abdomen is soft.   Skin:     General: Skin is warm.   Neurological:      Comments: Able to verbalize and give minimal history.  AAO x3         Laboratory:  Recent Labs     06/13/22  1438   WBC 6.0   RBC 2.23*   HEMOGLOBIN 6.8*   HEMATOCRIT 21.5*   MCV 96.4   MCH 30.5   MCHC 31.6*   RDW 50.9*   PLATELETCT 144*   MPV 10.5     Recent Labs     06/13/22  1438   SODIUM 137   POTASSIUM 5.3   CHLORIDE 98   CO2 22   GLUCOSE 95   BUN 88*   CREATININE 11.77*   CALCIUM 8.7     Recent Labs     06/13/22  1438   ALTSGPT 6   ASTSGOT 17   ALKPHOSPHAT 65   TBILIRUBIN 0.4   GLUCOSE 95         No results for input(s): NTPROBNP in the last 72 hours.      No results for input(s): TROPONINT in the last 72 hours.    Imaging:  CT-HEAD W/O   Final Result      1.  Cerebral atrophy.      2.  White matter lucencies most consistent with small vessel ischemic change versus demyelination or gliosis.       3.  Chronic pansinus disease.      4.  Otherwise, Head CT without contrast with no acute findings. No evidence of acute intracranial hemorrhage or mass lesion.         DX-CHEST-PORTABLE (1 VIEW)   Final Result      Increasing interstitial markings may represent mild interstitial edema. Atypical infection could have a similar appearance.          X-Ray:  I have personally reviewed the images and compared with prior images.    Assessment/Plan:  Justification for Admission Status  I anticipate this patient is appropriate for observation status at this time because Observation    * Uremic encephalopathy  Assessment & Plan  Admit patient to telemetry  Likely cause of ams from uremia as patient missed dialysis   Nephrology consulted, dilaysis in AM  CT head negative       COVID  Assessment & Plan  Reportedly tested positive on June 6  At this time not requiring oxygen  COVID precautions    Hyperlipidemia  Assessment & Plan  Continue lipitor    Type 2 diabetes mellitus, with long-term current use of insulin (HCC)- (present on admission)  Assessment & Plan  Sliding scale       Primary hypertension- (present on admission)  Assessment & Plan  Restart as needed    ESRD (end stage renal disease) on dialysis ([facility])  Assessment & Plan  Nephro on board, dialysis tomorrow   Continue selevamer     Anemia  Assessment & Plan  Likely chronic from her ESRD as Hgb ranges from 6-8  Transfuse, give lasix as patient slightly fluid overloaded and able to urinate  Serial CBC       VTE prophylaxis: heparin ppx

## 2022-06-14 NOTE — PROGRESS NOTES
"Hospital Medicine Daily Progress Note    Date of Service  6/14/2022    Chief Complaint  Liberty Bolton is a 67 y.o. female admitted 6/13/2022 with Malaise (Pt biba from [facility] for lethargy. Per EMS pt hasn't received dialysis in 10 days relate to \"behavioral issues\" such as pulling out HD cath. Pt has HD cath on arrival in left upper chest and fistula in right arm. Pt is alert and oriented x 2, oriented to self and place. Per ems this is baseline. Pt also tested positive for covid on 6/6. )    Hospital Course  No notes on file  She is from [facility] and has baseline cognitive deficits. She has a history of end-stage renal disease on Tuesday Thursday Saturday hemodialysis, diabetes, hypertension and recent CoVID (positive June 6) but asymptomatic infection. She presented with worsened confusion and weakness. She was not able to get her dialysis because of \"behavioral issues\", e.g. pulling her HD catheter.   At the ED, she is afebrile but HYPERTENSIVE.  CT head:  1.  Cerebral atrophy.       2.  White matter lucencies most consistent with small vessel ischemic change versus demyelination or gliosis.       3.  Chronic pansinus disease.       4.  Otherwise, Head CT without contrast with no acute findings. No evidence of acute intracranial hemorrhage or mass lesion.   CXR appears to be interstitial edema.  Hb 6.8.  K and bicarb still within normal limits.  BUN/Cr- is 88/11.77  Nephrology consulted for dialysis.    Interval Problem Update  6/14. She is confused but calm, having her dialysis.  Repeat Hb is 8.5.  I have discussed this patient's plan of care and discharge plan at IDT rounds today with Case Management, Nursing, Nursing leadership, and other members of the IDT team.    Consultants/Specialty  nephrology    Code Status  Full Code    Disposition  Patient is not medically cleared for discharge.   Anticipate discharge to to skilled nursing facility.  I have placed the appropriate orders for post-discharge " needs.    Review of Systems  Review of Systems   Unable to perform ROS: Mental acuity        Physical Exam  Temp:  [36.3 °C (97.3 °F)-36.4 °C (97.6 °F)] 36.3 °C (97.3 °F)  Pulse:  [76-82] 81  Resp:  [14-23] 16  BP: (119-174)/(56-81) 138/63  SpO2:  [92 %-98 %] 98 %    Physical Exam  Vitals and nursing note reviewed.   Constitutional:       General: She is not in acute distress.     Appearance: She is ill-appearing.      Comments: Older appearing   HENT:      Head: Normocephalic and atraumatic.      Right Ear: External ear normal.      Left Ear: External ear normal.      Nose: Nose normal.      Mouth/Throat:      Mouth: Mucous membranes are moist.   Eyes:      General: No scleral icterus.     Conjunctiva/sclera: Conjunctivae normal.   Cardiovascular:      Rate and Rhythm: Normal rate and regular rhythm.      Pulses: Normal pulses.      Heart sounds: Murmur heard.     No friction rub. No gallop.   Pulmonary:      Effort: Pulmonary effort is normal. No respiratory distress.      Breath sounds: No stridor. Rales (Bibasilar crackles) present. No wheezing or rhonchi.   Chest:      Chest wall: No tenderness.   Abdominal:      General: Abdomen is flat. Bowel sounds are normal. There is no distension.      Palpations: Abdomen is soft.      Tenderness: There is no abdominal tenderness. There is no guarding or rebound.   Musculoskeletal:         General: No swelling, tenderness or deformity. Normal range of motion.      Cervical back: Normal range of motion and neck supple. No rigidity.   Skin:     General: Skin is warm.      Coloration: Skin is not jaundiced.   Neurological:      General: No focal deficit present.      Mental Status: She is alert.      Comments: Confused   Psychiatric:      Comments: Flat affect         Fluids    Intake/Output Summary (Last 24 hours) at 6/14/2022 1231  Last data filed at 6/13/2022 2219  Gross per 24 hour   Intake 300 ml   Output --   Net 300 ml       Laboratory  Recent Labs     06/13/22  1437  "06/14/22  0344   WBC 6.0 5.6   RBC 2.23* 2.73*   HEMOGLOBIN 6.8* 8.5*   HEMATOCRIT 21.5* 26.1*   MCV 96.4 95.6   MCH 30.5 31.1   MCHC 31.6* 32.6*   RDW 50.9* 51.5*   PLATELETCT 144* 138*   MPV 10.5 10.3     Recent Labs     06/13/22  1438   SODIUM 137   POTASSIUM 5.3   CHLORIDE 98   CO2 22   GLUCOSE 95   BUN 88*   CREATININE 11.77*   CALCIUM 8.7                   Imaging  CT-HEAD W/O   Final Result      1.  Cerebral atrophy.      2.  White matter lucencies most consistent with small vessel ischemic change versus demyelination or gliosis.      3.  Chronic pansinus disease.      4.  Otherwise, Head CT without contrast with no acute findings. No evidence of acute intracranial hemorrhage or mass lesion.         DX-CHEST-PORTABLE (1 VIEW)   Final Result      Increasing interstitial markings may represent mild interstitial edema. Atypical infection could have a similar appearance.           Assessment/Plan  * Uremic encephalopathy, recent CoVID infx  Assessment & Plan  Admit patient to telemetry  Likely cause of ams from uremia as patient missed dialysis   Nephrology consulted, dilaysis in AM  CT head negative\"    Getting dialysis      COVID  Assessment & Plan  Reportedly tested positive on June 6  At this time not requiring oxygen  COVID precautions\"    Currently not symptomatic  Have infection control document clearance     Hyperlipidemia  Assessment & Plan  Continue lipitor    Type 2 diabetes mellitus, with long-term current use of insulin (HCC)- (present on admission)  Assessment & Plan  Sliding scale\"    No SS inuslin however I will hold ordering one; last A1c was 5.1. Ordered A1c.       Primary hypertension- (present on admission)  Assessment & Plan  Restart as needed\"    I restarted BB and hydralazine myself  PRN IV hydralazine    ESRD (end stage renal disease) on dialysis (HCC)  Assessment & Plan  Nephro on board, dialysis tomorrow   Continue selevamer     Anemia  Assessment & Plan  Likely chronic from her ESRD as " "Hgb ranges from 6-8  Transfuse, give lasix as patient slightly fluid overloaded and able to urinate  Serial CBC\"    Recent Labs     06/13/22  1438 06/14/22  0344   HEMOGLOBIN 6.8* 8.5*   HEMATOCRIT 21.5* 26.1*   MCV 96.4 95.6   MCH 30.5 31.1   PLATELETCT 144* 138*            VTE prophylaxis: heparin ppx    I have performed a physical exam and reviewed and updated ROS and Plan today (6/14/2022). In review of yesterday's note (6/13/2022), there are no changes except as documented above.      "

## 2022-06-14 NOTE — PROGRESS NOTES
Assumed care. Pt received one unit of blood yesterday with HH 8.5/26.1. Pt on renal diet. Pt waiting on inpatient bed and dialysis today. In special/droplet/eye for covid.    1421  Report called to S1

## 2022-06-14 NOTE — CONSULTS
"[facility] -  CONSULTATION NOTE               Author: Ashlee Burnham M.D. Date & Time: 6/14/2022  12:45 PM       REASON FOR CONSULTATION:   Inpatient hemodialysis management.    CHIEF COMPLAINT:   \"AMS\"    HISTORY OF PRESENT ILLNESS:     67 y.o. female who presented 6/13/2022 with lethargy from [facility].  Patient has not had dialysis for the last 10 days due to behavioral issues.  Patient tested positive for COVID on June 6.  History limited due to AMS.   In the ED, patient found to be hypertensive. Found to have BUN 88 creatinine 11.7.  CT head negative. Chest x-ray showing increased interstitial markings consistent with mild interstitial edema.  Nephrology consulted, recommends dialysis in a.m.      REVIEW OF SYSTEMS:    10 point ROS was performed and is as per HPI or otherwise negative.    PAST MEDICAL HISTORY:   Past Medical History:   Diagnosis Date   • Diabetes (HCC)    • Renal disorder        PAST SURGICAL HISTORY:   Past Surgical History:   Procedure Laterality Date   • THROMBECTOMY Right 5/23/2022    Procedure: THROMBECTOMY WITH ARTERIOVENOUS GRAFT;  Surgeon: Royal Mercer M.D.;  Location: [facility];  Service: Vascular   • AV FISTULA CREATION Right 5/23/2022    Procedure: CREATION, AV FISTULA;  Surgeon: Royal Mercer M.D.;  Location: [facility];  Service: Vascular   • CATH PLACEMENT Left 5/23/2022    Procedure: INSERTION, CATHETER, WITH FLUORO;  Surgeon: Royal Mercer M.D.;  Location: [facility];  Service: Vascular   • ANGIOPLASTY UPPER Right 5/23/2022    Procedure: ANGIOPLASTY, UPPER EXTREMITY; subclavian;  Surgeon: Royal Mercer M.D.;  Location: [facility];  Service: Vascular       FAMILY HISTORY:   History reviewed. No pertinent family history.    SOCIAL HISTORY:   Social History     Tobacco Use   Smoking Status Never Smoker   Smokeless Tobacco Never Used     Social History     Substance and Sexual Activity   Alcohol " "Use Not Currently     Social History     Substance and Sexual Activity   Drug Use Not Currently       HOME MEDICATIONS:   Reviewed and documented in chart.    LABORATORY STUDIES:   Recent Labs     06/13/22  1438   SODIUM 137   POTASSIUM 5.3   CHLORIDE 98   CO2 22   GLUCOSE 95   BUN 88*   CREATININE 11.77*   CALCIUM 8.7       ALLERGIES:  Bactrim [sulfamethoxazole-trimethoprim], Dilaudid [hydromorphone], Enalapril, and Vicodin hp [hydrocodone-acetaminophen]    VS:  /63   Pulse 81   Temp 36.3 °C (97.3 °F) (Temporal)   Resp 16   Ht 1.651 m (5' 5\")   Wt 75.8 kg (167 lb)   SpO2 98%   BMI 27.79 kg/m²     Physical Exam  Vitals and nursing note reviewed.   Constitutional:       Appearance: She is ill-appearing.   HENT:      Head: Normocephalic and atraumatic.   Eyes:      General: No scleral icterus.  Cardiovascular:      Rate and Rhythm: Normal rate and regular rhythm.      Heart sounds: No murmur heard.    No gallop.      Comments: No edema  Pulmonary:      Effort: Pulmonary effort is normal. No respiratory distress.      Breath sounds: No wheezing or rales.   Abdominal:      General: Abdomen is flat. Bowel sounds are normal. There is no distension.   Musculoskeletal:         General: No swelling or deformity.      Comments: R AVF - B/T +   Skin:     Coloration: Skin is not jaundiced.      Findings: No bruising or rash.   Neurological:      Mental Status: She is alert and oriented to person, place, and time.   Psychiatric:         Behavior: Behavior normal.            FLUID BALANCE:  In: 300 [Blood:300]  Out: -     IMAGING:  All imaging reviewed from admission to present day    IMPRESSION:  # ESRD             - iHD q TThS and prn              - via R AVF ( thrombosed)             - temp CVC placed 5/22 Dr. Mercer  # HTN, controlled              - Goal BP < 140/90             - Continue BB             - UF with iHD as tolerated   # Anemia of CKD, below target             - Goal Hgb 10-11         # " CKD-MBD             - Ca:WNL             - PO4 pen  #Thrombocytopenia, mild  #DMII             - per primary team   # AMS 2/2 missed hd vs COVID infection            PLAN:  - HD today, c/w q TTS   - UF as tolerated  - No dietary protein restrictions  - Dose all meds per ESRD  - covid management per primary  - tim with HD   - phos in am     Thank you for the consultation!

## 2022-06-14 NOTE — ED NOTES
Med rec updated and complete. Allergies reviewed. PER MARS from [facility]. According to MARS and documentation from [facility] pt is on long term use of AMOXICILLIN ( 500 MG) daily. Last dose 06/09/22.

## 2022-06-14 NOTE — ASSESSMENT & PLAN NOTE
Likely cause of ams from uremia as patient missed dialysis   Nephrology consulted for dialysis  CT head negative  A&O x3 (waxing and waning)  Improved, at baseline

## 2022-06-14 NOTE — DISCHARGE PLANNING
Outpatient Dialysis Note    Per HD clinic patient has been at [facility] and can return to her home clinic at [facility] on Thursday 06/16.     Confirmed patient is active at:    [facility]    [address] [place] [id number]    Schedule: Tuesday, Thursday, Saturday   Time: 6:00am     Patient is seen by Dr. Mayers in HD clinic.    Spoke with Rebeca at facility who confirmed.    Forwarded records for review.    Ching Lees- Senior Dialysis Coordinator Ph# [phone]  [facility]

## 2022-06-15 LAB
ALBUMIN SERPL BCP-MCNC: 2.7 G/DL (ref 3.2–4.9)
BUN SERPL-MCNC: 45 MG/DL (ref 8–22)
CALCIUM SERPL-MCNC: 8.4 MG/DL (ref 8.5–10.5)
CHLORIDE SERPL-SCNC: 98 MMOL/L (ref 96–112)
CO2 SERPL-SCNC: 26 MMOL/L (ref 20–33)
CREAT SERPL-MCNC: 7.27 MG/DL (ref 0.5–1.4)
FERRITIN SERPL-MCNC: 1675 NG/ML (ref 10–291)
GFR SERPLBLD CREATININE-BSD FMLA CKD-EPI: 6 ML/MIN/1.73 M 2
GLUCOSE BLD STRIP.AUTO-MCNC: 93 MG/DL (ref 65–99)
GLUCOSE BLD STRIP.AUTO-MCNC: 95 MG/DL (ref 65–99)
GLUCOSE SERPL-MCNC: 83 MG/DL (ref 65–99)
IRON SATN MFR SERPL: 27 % (ref 15–55)
IRON SERPL-MCNC: 33 UG/DL (ref 40–170)
PHOSPHATE SERPL-MCNC: 3.7 MG/DL (ref 2.5–4.5)
POTASSIUM SERPL-SCNC: 3.9 MMOL/L (ref 3.6–5.5)
SODIUM SERPL-SCNC: 136 MMOL/L (ref 135–145)
TIBC SERPL-MCNC: 122 UG/DL (ref 250–450)
UIBC SERPL-MCNC: 89 UG/DL (ref 110–370)

## 2022-06-15 PROCEDURE — 83540 ASSAY OF IRON: CPT

## 2022-06-15 PROCEDURE — 82962 GLUCOSE BLOOD TEST: CPT | Mod: 91

## 2022-06-15 PROCEDURE — 36415 COLL VENOUS BLD VENIPUNCTURE: CPT

## 2022-06-15 PROCEDURE — 96372 THER/PROPH/DIAG INJ SC/IM: CPT

## 2022-06-15 PROCEDURE — 82728 ASSAY OF FERRITIN: CPT

## 2022-06-15 PROCEDURE — G0378 HOSPITAL OBSERVATION PER HR: HCPCS

## 2022-06-15 PROCEDURE — 80069 RENAL FUNCTION PANEL: CPT

## 2022-06-15 PROCEDURE — 83550 IRON BINDING TEST: CPT

## 2022-06-15 PROCEDURE — 700102 HCHG RX REV CODE 250 W/ 637 OVERRIDE(OP): Performed by: STUDENT IN AN ORGANIZED HEALTH CARE EDUCATION/TRAINING PROGRAM

## 2022-06-15 PROCEDURE — 99225 PR SUBSEQUENT OBSERVATION CARE,LEVEL II: CPT | Mod: CS | Performed by: HOSPITALIST

## 2022-06-15 PROCEDURE — A9270 NON-COVERED ITEM OR SERVICE: HCPCS | Performed by: STUDENT IN AN ORGANIZED HEALTH CARE EDUCATION/TRAINING PROGRAM

## 2022-06-15 PROCEDURE — 700111 HCHG RX REV CODE 636 W/ 250 OVERRIDE (IP): Performed by: STUDENT IN AN ORGANIZED HEALTH CARE EDUCATION/TRAINING PROGRAM

## 2022-06-15 RX ADMIN — SEVELAMER CARBONATE 1600 MG: 800 TABLET, FILM COATED ORAL at 09:39

## 2022-06-15 RX ADMIN — GABAPENTIN 300 MG: 300 CAPSULE ORAL at 17:34

## 2022-06-15 RX ADMIN — HEPARIN SODIUM 5000 UNITS: 5000 INJECTION, SOLUTION INTRAVENOUS; SUBCUTANEOUS at 17:34

## 2022-06-15 RX ADMIN — HEPARIN SODIUM 5000 UNITS: 5000 INJECTION, SOLUTION INTRAVENOUS; SUBCUTANEOUS at 05:55
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
## 2022-06-15 NOTE — PROGRESS NOTES
1501: Patient admitted to [facility], resting bed comfortbaly. VSS.     1845: Patient ate dinner and tolerated well but stated that she could not swallow her pills whole with apple sauce and spit them out, night shift RN aware and will attempt to crush meds with apple sauce. Night shift RN aware that patient also pulled out her IV and this RN attempted and was unsuccessful to replace a new one.
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
## 2022-06-15 NOTE — CARE PLAN
Problem: Knowledge Deficit - Standard  Goal: Patient and family/care givers will demonstrate understanding of plan of care, disease process/condition, diagnostic tests and medications  Outcome: Not Met     Problem: Skin Integrity  Goal: Skin integrity is maintained or improved  Outcome: Not Met     Problem: Fall Risk  Goal: Patient will remain free from falls  Outcome: Not Met   The patient is Stable - Low risk of patient condition declining or worsening    Shift Goals  Clinical Goals: Safety  Patient Goals: Safety, Rest.    Progress made toward(s) clinical / shift goals: Patient VSS, O2 sats good and patient did not attempt to get out of bed on her own.     Patient is not progressing towards the following goals: Patient is still confused and does not use call light, bed alarm in place.       Problem: Knowledge Deficit - Standard  Goal: Patient and family/care givers will demonstrate understanding of plan of care, disease process/condition, diagnostic tests and medications  Outcome: Not Met     Problem: Skin Integrity  Goal: Skin integrity is maintained or improved  Outcome: Not Met     Problem: Fall Risk  Goal: Patient will remain free from falls  Outcome: Not Met

## 2022-06-15 NOTE — PROGRESS NOTES
4 Eyes Skin Assessment Completed by JUAN Gasca and JUAN Corcoran.    Head WDL  Ears WDL  Nose WDL  Mouth WDL  Neck WDL  Breast/Chest WDL  Shoulder Blades WDL  Spine WDL  (R) Arm/Elbow/Hand WDL  (L) Arm/Elbow/Hand Dryness  Abdomen WDL  Groin Scab  Scrotum/Coccyx/Buttocks Scar  (R) Leg WDL  (L) Leg WDL  (R) Heel/Foot/Toe WDL  (L) Heel/Foot/Toe WDL          Devices In Places Central Line -HD catheter      Interventions In Place N/A    Possible Skin Injury No    Pictures Uploaded Into Epic N/A  Wound Consult Placed N/A  RN Wound Prevention Protocol Ordered No

## 2022-06-15 NOTE — CARE PLAN
The patient is Watcher - Medium risk of patient condition declining or worsening    Shift Goals  Clinical Goals: decrease confusion, comfort, education  Patient Goals:  (CARIE pt not very conversive and is deppressed)    Progress made toward(s) clinical / shift goals:    Problem: Knowledge Deficit - Standard  Goal: Patient and family/care givers will demonstrate understanding of plan of care, disease process/condition, diagnostic tests and medications  Outcome: Progressing     Problem: Skin Integrity  Goal: Skin integrity is maintained or improved  Outcome: Progressing     Problem: Fall Risk  Goal: Patient will remain free from falls  Outcome: Progressing     Pt educated regarding plan of care and medications. All questions answered.     Pt turned and positioned every two hours. Incontinence care provided and barrier paste applied as needed.

## 2022-06-15 NOTE — PROGRESS NOTES
Hospital Medicine Daily Progress Note    Date of Service  6/15/2022    Chief Complaint  Liberty Bolton is a 67 y.o. female admitted 6/13/2022 with AMS  Hospital Course  67-year-old female with past medical history of ESRD on HD TTS, diabetes, hypertension, recently tested positive for COVID on June 6 sent from [facility] with worsening confusion.  Her CT head was negative for any acute pathology.  She was found to be anemic with 6.8 hemoglobin received 1 unit PRBC.  She was noted to have BUN of 88 and had recently not received her dialysis because of behavioral issues.  Nephrology consulted and patient receiving dialysis.      Interval Problem Update  No acute events overnight  Her mental status today seems clear however pt is irritated and refusing to answer questions as she just wants to sleep    I have discussed this patient's plan of care and discharge plan at IDT rounds today with Case Management, Nursing, Nursing leadership, and other members of the IDT team.    Consultants/Specialty  nephrology    Code Status  Full Code    Disposition  Patient is not medically cleared for discharge.   Anticipate discharge to to skilled nursing facility.  I have placed the appropriate orders for post-discharge needs.    Review of Systems  Review of Systems   Unable to perform ROS: Other (pt refusing to answer questions)        Physical Exam  Temp:  [36.4 °C (97.6 °F)-37.8 °C (100 °F)] 36.4 °C (97.6 °F)  Pulse:  [76-96] 76  Resp:  [18-20] 18  BP: (142-161)/(55-73) 151/62  SpO2:  [1 %-96 %] 96 %    Physical Exam  Vitals and nursing note reviewed.   Constitutional:       Appearance: Normal appearance.   Pulmonary:      Effort: Pulmonary effort is normal. No respiratory distress.   Neurological:      Mental Status: She is alert.         Fluids    Intake/Output Summary (Last 24 hours) at 6/15/2022 1318  Last data filed at 6/15/2022 1100  Gross per 24 hour   Intake 0 ml   Output --   Net 0 ml       Laboratory  Recent Labs  "    06/13/22  1438 06/14/22  0344   WBC 6.0 5.6   RBC 2.23* 2.73*   HEMOGLOBIN 6.8* 8.5*   HEMATOCRIT 21.5* 26.1*   MCV 96.4 95.6   MCH 30.5 31.1   MCHC 31.6* 32.6*   RDW 50.9* 51.5*   PLATELETCT 144* 138*   MPV 10.5 10.3     Recent Labs     06/13/22  1438 06/15/22  0352   SODIUM 137 136   POTASSIUM 5.3 3.9   CHLORIDE 98 98   CO2 22 26   GLUCOSE 95 83   BUN 88* 45*   CREATININE 11.77* 7.27*   CALCIUM 8.7 8.4*                   Imaging  CT-HEAD W/O   Final Result      1.  Cerebral atrophy.      2.  White matter lucencies most consistent with small vessel ischemic change versus demyelination or gliosis.      3.  Chronic pansinus disease.      4.  Otherwise, Head CT without contrast with no acute findings. No evidence of acute intracranial hemorrhage or mass lesion.         DX-CHEST-PORTABLE (1 VIEW)   Final Result      Increasing interstitial markings may represent mild interstitial edema. Atypical infection could have a similar appearance.           Assessment/Plan  * Uremic encephalopathy, recent CoVID infx  Assessment & Plan  Likely cause of ams from uremia as patient missed dialysis   Nephrology consulted for dialysis  CT head negative  Unclear if improving as pt refusing to answer questions        COVID  Assessment & Plan  Reportedly tested positive on June 6  At this time not requiring oxygen  COVID precautions\"    Currently not symptomatic  Have infection control document clearance     Hyperlipidemia  Assessment & Plan  Continue lipitor    Type 2 diabetes mellitus, with long-term current use of insulin (HCC)- (present on admission)  Assessment & Plan  Sliding scale\"    No SS inuslin however I will hold ordering one; last A1c was 5.1. Ordered A1c.       Primary hypertension- (present on admission)  Assessment & Plan  Restart as needed\"    I restarted BB and hydralazine myself  PRN IV hydralazine    ESRD (end stage renal disease) on dialysis ([facility])  Assessment & Plan  Nephro on board, dialysis tomorrow   Continue " "selevamer     Anemia  Assessment & Plan  Likely chronic from her ESRD as Hgb ranges from 6-8  Transfuse, give lasix as patient slightly fluid overloaded and able to urinate  Serial CBC\"    Recent Labs     06/13/22  1438 06/14/22  0344   HEMOGLOBIN 6.8* 8.5*   HEMATOCRIT 21.5* 26.1*   MCV 96.4 95.6   MCH 30.5 31.1   PLATELETCT 144* 138*            VTE prophylaxis: heparin ppx    I have performed a physical exam and reviewed and updated ROS and Plan today (6/15/2022). In review of yesterday's note (6/14/2022), there are no changes except as documented above.      "

## 2022-06-16 LAB
ANION GAP SERPL CALC-SCNC: 14 MMOL/L (ref 7–16)
BUN SERPL-MCNC: 51 MG/DL (ref 8–22)
CALCIUM SERPL-MCNC: 8.8 MG/DL (ref 8.5–10.5)
CHLORIDE SERPL-SCNC: 101 MMOL/L (ref 96–112)
CO2 SERPL-SCNC: 25 MMOL/L (ref 20–33)
CREAT SERPL-MCNC: 8.68 MG/DL (ref 0.5–1.4)
ERYTHROCYTE [DISTWIDTH] IN BLOOD BY AUTOMATED COUNT: 51.2 FL (ref 35.9–50)
GFR SERPLBLD CREATININE-BSD FMLA CKD-EPI: 5 ML/MIN/1.73 M 2
GLUCOSE BLD STRIP.AUTO-MCNC: 96 MG/DL (ref 65–99)
GLUCOSE SERPL-MCNC: 85 MG/DL (ref 65–99)
HCT VFR BLD AUTO: 26.9 % (ref 37–47)
HGB BLD-MCNC: 8.6 G/DL (ref 12–16)
MCH RBC QN AUTO: 30.8 PG (ref 27–33)
MCHC RBC AUTO-ENTMCNC: 32 G/DL (ref 33.6–35)
MCV RBC AUTO: 96.4 FL (ref 81.4–97.8)
PLATELET # BLD AUTO: 176 K/UL (ref 164–446)
PMV BLD AUTO: 10.2 FL (ref 9–12.9)
POTASSIUM SERPL-SCNC: 4.1 MMOL/L (ref 3.6–5.5)
RBC # BLD AUTO: 2.79 M/UL (ref 4.2–5.4)
SODIUM SERPL-SCNC: 140 MMOL/L (ref 135–145)
WBC # BLD AUTO: 4 K/UL (ref 4.8–10.8)

## 2022-06-16 PROCEDURE — 36415 COLL VENOUS BLD VENIPUNCTURE: CPT

## 2022-06-16 PROCEDURE — 700111 HCHG RX REV CODE 636 W/ 250 OVERRIDE (IP): Performed by: GENERAL PRACTICE

## 2022-06-16 PROCEDURE — 96372 THER/PROPH/DIAG INJ SC/IM: CPT | Mod: XU

## 2022-06-16 PROCEDURE — 700111 HCHG RX REV CODE 636 W/ 250 OVERRIDE (IP): Performed by: STUDENT IN AN ORGANIZED HEALTH CARE EDUCATION/TRAINING PROGRAM

## 2022-06-16 PROCEDURE — A9270 NON-COVERED ITEM OR SERVICE: HCPCS | Performed by: INTERNAL MEDICINE

## 2022-06-16 PROCEDURE — 700102 HCHG RX REV CODE 250 W/ 637 OVERRIDE(OP): Performed by: INTERNAL MEDICINE

## 2022-06-16 PROCEDURE — 90935 HEMODIALYSIS ONE EVALUATION: CPT

## 2022-06-16 PROCEDURE — 99226 PR SUBSEQUENT OBSERVATION CARE,LEVEL III: CPT | Performed by: GENERAL PRACTICE

## 2022-06-16 PROCEDURE — 700102 HCHG RX REV CODE 250 W/ 637 OVERRIDE(OP): Performed by: STUDENT IN AN ORGANIZED HEALTH CARE EDUCATION/TRAINING PROGRAM

## 2022-06-16 PROCEDURE — 85027 COMPLETE CBC AUTOMATED: CPT

## 2022-06-16 PROCEDURE — 80048 BASIC METABOLIC PNL TOTAL CA: CPT

## 2022-06-16 PROCEDURE — 700105 HCHG RX REV CODE 258: Performed by: GENERAL PRACTICE

## 2022-06-16 PROCEDURE — 96365 THER/PROPH/DIAG IV INF INIT: CPT | Mod: XU

## 2022-06-16 PROCEDURE — A9270 NON-COVERED ITEM OR SERVICE: HCPCS | Performed by: STUDENT IN AN ORGANIZED HEALTH CARE EDUCATION/TRAINING PROGRAM

## 2022-06-16 PROCEDURE — G0378 HOSPITAL OBSERVATION PER HR: HCPCS

## 2022-06-16 RX ORDER — AMOXICILLIN 250 MG
2 CAPSULE ORAL 2 TIMES DAILY
Status: DISCONTINUED | OUTPATIENT
Start: 2022-06-16 | End: 2022-07-11

## 2022-06-16 RX ORDER — BISACODYL 10 MG
10 SUPPOSITORY, RECTAL RECTAL
Status: DISCONTINUED | OUTPATIENT
Start: 2022-06-16 | End: 2022-07-11

## 2022-06-16 RX ORDER — POLYETHYLENE GLYCOL 3350 17 G/17G
1 POWDER, FOR SOLUTION ORAL
Status: DISCONTINUED | OUTPATIENT
Start: 2022-06-16 | End: 2022-07-11

## 2022-06-16 RX ADMIN — METOPROLOL TARTRATE 50 MG: 25 TABLET, FILM COATED ORAL at 04:59

## 2022-06-16 RX ADMIN — HYDRALAZINE HYDROCHLORIDE 25 MG: 25 TABLET, FILM COATED ORAL at 04:59

## 2022-06-16 RX ADMIN — SEVELAMER CARBONATE 1600 MG: 800 TABLET, FILM COATED ORAL at 12:21

## 2022-06-16 RX ADMIN — HEPARIN SODIUM 5000 UNITS: 5000 INJECTION, SOLUTION INTRAVENOUS; SUBCUTANEOUS at 04:59

## 2022-06-16 RX ADMIN — SODIUM CHLORIDE 125 MG: 9 INJECTION, SOLUTION INTRAVENOUS at 23:01

## 2022-06-16 ASSESSMENT — COGNITIVE AND FUNCTIONAL STATUS - GENERAL
EATING MEALS: TOTAL
WALKING IN HOSPITAL ROOM: TOTAL
PERSONAL GROOMING: TOTAL
MOVING FROM LYING ON BACK TO SITTING ON SIDE OF FLAT BED: UNABLE
DRESSING REGULAR UPPER BODY CLOTHING: TOTAL
SUGGESTED CMS G CODE MODIFIER DAILY ACTIVITY: CN
MOVING TO AND FROM BED TO CHAIR: A LOT
SUGGESTED CMS G CODE MODIFIER MOBILITY: CM
MOBILITY SCORE: 8
TURNING FROM BACK TO SIDE WHILE IN FLAT BAD: A LOT
STANDING UP FROM CHAIR USING ARMS: TOTAL
DRESSING REGULAR LOWER BODY CLOTHING: TOTAL
TOILETING: TOTAL
DAILY ACTIVITIY SCORE: 6
HELP NEEDED FOR BATHING: TOTAL
CLIMB 3 TO 5 STEPS WITH RAILING: TOTAL

## 2022-06-16 NOTE — DISCHARGE PLANNING
Agency/Facility Name: [facility]   Spoke To: Priscilla  Outcome: DPA left voicemail regarding patients referral    Agency/Facility Name: [facility]   Spoke To: Priscilla  Outcome: DPA left voicemail regarding patients referral

## 2022-06-16 NOTE — PROGRESS NOTES
"[facility] -  PROGRESS NOTE               Author: Ashlee Burnham M.D. Date & Time: 6/16/2022  12:33 PM     HPI:   67 y.o. female who presented 6/13/2022 with lethargy from [facility].  Patient has not had dialysis for the last 10 days due to behavioral issues.  Patient tested positive for COVID on June 6.  History limited due to AMS.   In the ED, patient found to be hypertensive. Found to have BUN 88 creatinine 11.7.  CT head negative. Chest x-ray showing increased interstitial markings consistent with mild interstitial edema.  Nephrology consulted, recommends dialysis in a.m.     DAILY NEPHROLOGY SUMMARY:  6/15: got hd yesterday. naoe  6/16: NAOE, irritated     REVIEW OF SYSTEMS:    Unable to perform     PMH/PSH/SH/FH:   Reviewed and unchanged since admission note    CURRENT MEDICATIONS:   Reviewed from admission to present day    VS:  BP (!) 145/48   Pulse 70   Temp 36.2 °C (97.2 °F) (Temporal)   Resp 18   Ht 1.651 m (5' 5\")   Wt 75.8 kg (167 lb)   SpO2 96%   BMI 27.79 kg/m²     Physical Exam  Deferred due to COVID precautions   Fluids:  No intake/output data recorded.    LABS:  Recent Labs     06/13/22  1438 06/15/22  0352 06/16/22  0129   SODIUM 137 136 140   POTASSIUM 5.3 3.9 4.1   CHLORIDE 98 98 101   CO2 22 26 25   GLUCOSE 95 83 85   BUN 88* 45* 51*   CREATININE 11.77* 7.27* 8.68*   CALCIUM 8.7 8.4* 8.8       IMAGING:   All imaging reviewed from admission to present day    IMPRESSION:  # ESRD             - iHD q TThS and prn              - via R AVF ( thrombosed)             - temp CVC placed 5/22 Dr. Mercer  # HTN, controlled              - Goal BP < 140/90             - Continue BB             - UF with iHD as tolerated   # Anemia of CKD, below target             - Goal Hgb 10-11         # CKD-MBD             - Ca:WNL             - PO4 3.7 on sevelamer 1300 mg tid   #Thrombocytopenia, mild  #DMII             - per primary team   # AMS , improved   - 2/2 missed hd vs COVID " infection            PLAN:  - c/w q TTS   - UF as tolerated  - No dietary protein restrictions  - Dose all meds per ESRD  - covid management per primary  - tim with HD

## 2022-06-16 NOTE — CARE PLAN
The patient is Watcher - Medium risk of patient condition declining or worsening    Shift Goals  Clinical Goals: monitor o2 saturations and maintain above 90%  Patient Goals: rest  Family Goals: CARIE    Progress made toward(s) clinical / shift goals:     Patient is not progressing towards the following goals:      Problem: Knowledge Deficit - Standard  Goal: Patient and family/care givers will demonstrate understanding of plan of care, disease process/condition, diagnostic tests and medications  Outcome: Not Met
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
## 2022-06-16 NOTE — PROGRESS NOTES
[facility] Progress Note     09:15am: This RN visited pt for her scheduled HD treatment but the room's  faucet was deformed and the water line adaptor cannot fit. This RN cannot find other water sources for the HD treatment and have asked the primary RN if they can just transfer the patient to another room that has a better faucet. Primary RN said that they will be transferring the patient to  S141. HD machine and HD supplies were pulled out from the patient's room. Primary RN was advised to notify the HD nurse once the patient has been transferred.

## 2022-06-16 NOTE — DISCHARGE PLANNING
Case Management Discharge Planning    Admission Date: 6/13/2022  GMLOS:    ALOS: 0    6-Clicks ADL Score: 6  6-Clicks Mobility Score: 8  PT and/or OT Eval ordered: No  Post-acute Referrals Ordered: No  Post-acute Choice Obtained: No  Has referral(s) been sent to post-acute provider:  No      Anticipated Discharge Dispo:  [facility]    DME Needed: No    Action(s) Taken: Updated Provider/Nurse on Discharge Plan   Discussed pt in IDR. Pt came from [facility].   Per DCPA, spoke with Priscilla with SNF and they will accept the pt back whenever is medically cleared.     Escalations Completed: None    Medically Clear: No    Next Steps: cont to f/u with medical team for any DC needs. F/u with the pt if OK to go back to [facility].     Barriers to Discharge: Medical clearance    Is the patient up for discharge tomorrow: No

## 2022-06-16 NOTE — CARE PLAN
Problem: Knowledge Deficit - Standard  Goal: Patient and family/care givers will demonstrate understanding of plan of care, disease process/condition, diagnostic tests and medications  Outcome: Progressing     Problem: Skin Integrity  Goal: Skin integrity is maintained or improved  Outcome: Progressing     Problem: Fall Risk  Goal: Patient will remain free from falls  Outcome: Progressing   The patient is Watcher - Medium risk of patient condition declining or worsening    Shift Goals  Clinical Goals: monitor o2 saturations and maintain above 90%  Patient Goals: rest  Family Goals: CARIE    Progress made toward(s) clinical / shift goals:  progressing      Patient is not progressing towards the following goals:

## 2022-06-17 PROCEDURE — 700102 HCHG RX REV CODE 250 W/ 637 OVERRIDE(OP): Performed by: STUDENT IN AN ORGANIZED HEALTH CARE EDUCATION/TRAINING PROGRAM

## 2022-06-17 PROCEDURE — G0378 HOSPITAL OBSERVATION PER HR: HCPCS

## 2022-06-17 PROCEDURE — A9270 NON-COVERED ITEM OR SERVICE: HCPCS | Performed by: INTERNAL MEDICINE

## 2022-06-17 PROCEDURE — 97163 PT EVAL HIGH COMPLEX 45 MIN: CPT

## 2022-06-17 PROCEDURE — 700111 HCHG RX REV CODE 636 W/ 250 OVERRIDE (IP): Performed by: INTERNAL MEDICINE

## 2022-06-17 PROCEDURE — 96372 THER/PROPH/DIAG INJ SC/IM: CPT

## 2022-06-17 PROCEDURE — 700102 HCHG RX REV CODE 250 W/ 637 OVERRIDE(OP): Performed by: INTERNAL MEDICINE

## 2022-06-17 PROCEDURE — 700111 HCHG RX REV CODE 636 W/ 250 OVERRIDE (IP): Performed by: STUDENT IN AN ORGANIZED HEALTH CARE EDUCATION/TRAINING PROGRAM

## 2022-06-17 PROCEDURE — A9270 NON-COVERED ITEM OR SERVICE: HCPCS | Performed by: GENERAL PRACTICE

## 2022-06-17 PROCEDURE — A9270 NON-COVERED ITEM OR SERVICE: HCPCS | Performed by: STUDENT IN AN ORGANIZED HEALTH CARE EDUCATION/TRAINING PROGRAM

## 2022-06-17 PROCEDURE — 700102 HCHG RX REV CODE 250 W/ 637 OVERRIDE(OP): Performed by: GENERAL PRACTICE

## 2022-06-17 PROCEDURE — 96375 TX/PRO/DX INJ NEW DRUG ADDON: CPT

## 2022-06-17 PROCEDURE — 97166 OT EVAL MOD COMPLEX 45 MIN: CPT

## 2022-06-17 PROCEDURE — 99225 PR SUBSEQUENT OBSERVATION CARE,LEVEL II: CPT | Performed by: GENERAL PRACTICE

## 2022-06-17 RX ADMIN — DOCUSATE SODIUM 50 MG AND SENNOSIDES 8.6 MG 2 TABLET: 8.6; 5 TABLET, FILM COATED ORAL at 05:51

## 2022-06-17 RX ADMIN — SEVELAMER CARBONATE 1600 MG: 800 TABLET, FILM COATED ORAL at 08:53

## 2022-06-17 RX ADMIN — HYDRALAZINE HYDROCHLORIDE 25 MG: 25 TABLET, FILM COATED ORAL at 17:27

## 2022-06-17 RX ADMIN — HYDRALAZINE HYDROCHLORIDE 25 MG: 25 TABLET, FILM COATED ORAL at 05:52

## 2022-06-17 RX ADMIN — METOPROLOL TARTRATE 50 MG: 25 TABLET, FILM COATED ORAL at 05:52

## 2022-06-17 RX ADMIN — METOPROLOL TARTRATE 50 MG: 25 TABLET, FILM COATED ORAL at 17:28

## 2022-06-17 RX ADMIN — ATORVASTATIN CALCIUM 80 MG: 80 TABLET, FILM COATED ORAL at 17:28

## 2022-06-17 RX ADMIN — SEVELAMER CARBONATE 1600 MG: 800 TABLET, FILM COATED ORAL at 17:27

## 2022-06-17 RX ADMIN — SEVELAMER CARBONATE 1600 MG: 800 TABLET, FILM COATED ORAL at 11:52

## 2022-06-17 RX ADMIN — HYDRALAZINE HYDROCHLORIDE 20 MG: 20 INJECTION INTRAMUSCULAR; INTRAVENOUS at 08:53

## 2022-06-17 RX ADMIN — HEPARIN SODIUM 5000 UNITS: 5000 INJECTION, SOLUTION INTRAVENOUS; SUBCUTANEOUS at 15:56

## 2022-06-17 RX ADMIN — DOCUSATE SODIUM 50 MG AND SENNOSIDES 8.6 MG 2 TABLET: 8.6; 5 TABLET, FILM COATED ORAL at 17:28

## 2022-06-17 RX ADMIN — GABAPENTIN 300 MG: 300 CAPSULE ORAL at 17:27

## 2022-06-17 ASSESSMENT — COGNITIVE AND FUNCTIONAL STATUS - GENERAL
TOILETING: TOTAL
CLIMB 3 TO 5 STEPS WITH RAILING: TOTAL
TURNING FROM BACK TO SIDE WHILE IN FLAT BAD: A LOT
WALKING IN HOSPITAL ROOM: TOTAL
DRESSING REGULAR LOWER BODY CLOTHING: TOTAL
MOBILITY SCORE: 8
SUGGESTED CMS G CODE MODIFIER DAILY ACTIVITY: CN
HELP NEEDED FOR BATHING: TOTAL
PERSONAL GROOMING: TOTAL
DRESSING REGULAR UPPER BODY CLOTHING: TOTAL
STANDING UP FROM CHAIR USING ARMS: TOTAL
SUGGESTED CMS G CODE MODIFIER MOBILITY: CM
MOVING TO AND FROM BED TO CHAIR: A LOT
EATING MEALS: TOTAL
DAILY ACTIVITIY SCORE: 6
MOVING FROM LYING ON BACK TO SITTING ON SIDE OF FLAT BED: UNABLE

## 2022-06-17 ASSESSMENT — PATIENT HEALTH QUESTIONNAIRE - PHQ9
SUM OF ALL RESPONSES TO PHQ9 QUESTIONS 1 AND 2: 0
2. FEELING DOWN, DEPRESSED, IRRITABLE, OR HOPELESS: NOT AT ALL
1. LITTLE INTEREST OR PLEASURE IN DOING THINGS: NOT AT ALL

## 2022-06-17 ASSESSMENT — ACTIVITIES OF DAILY LIVING (ADL): TOILETING: REQUIRES ASSIST

## 2022-06-17 ASSESSMENT — GAIT ASSESSMENTS: GAIT LEVEL OF ASSIST: UNABLE TO PARTICIPATE

## 2022-06-17 NOTE — CARE PLAN
The patient is Watcher - Medium risk of patient condition declining or worsening    Shift Goals  Clinical Goals: Monitor O2 saturations  Patient Goals: Rest  Family Goals: CARIE    Progress made toward(s) clinical / shift goals:     Patient is not progressing towards the following goals:      Problem: Knowledge Deficit - Standard  Goal: Patient and family/care givers will demonstrate understanding of plan of care, disease process/condition, diagnostic tests and medications  Outcome: Not Met

## 2022-06-17 NOTE — PROGRESS NOTES
"[facility] -  PROGRESS NOTE               Author: Ashlee Burnham M.D. Date & Time: 6/17/2022  12:14 PM     HPI:   67 y.o. female who presented 6/13/2022 with lethargy from [facility].  Patient has not had dialysis for the last 10 days due to behavioral issues.  Patient tested positive for COVID on June 6.  History limited due to AMS.   In the ED, patient found to be hypertensive. Found to have BUN 88 creatinine 11.7.  CT head negative. Chest x-ray showing increased interstitial markings consistent with mild interstitial edema.  Nephrology consulted, recommends dialysis in a.m.     DAILY NEPHROLOGY SUMMARY:  6/15: got hd yesterday. naoe  6/16: NAOE, irritated   6/17: calm, resting in the bed, no new issues, On O2 supplementation.     REVIEW OF SYSTEMS:    10 point ROS reviewed and is as per HPI/daily summary or otherwise negative    PMH/PSH/SH/FH:   Reviewed and unchanged since admission note    CURRENT MEDICATIONS:   Reviewed from admission to present day    VS:  BP (!) 163/58   Pulse 85   Temp 37.1 °C (98.8 °F) (Temporal)   Resp 19   Ht 1.651 m (5' 5\")   Wt 75.8 kg (167 lb)   SpO2 96%   BMI 27.79 kg/m²     Physical Exam  Constitutional:       Appearance: She is ill-appearing.   HENT:      Head: Normocephalic and atraumatic.   Eyes:      General: No scleral icterus.  Cardiovascular:      Rate and Rhythm: Normal rate and regular rhythm.      Heart sounds: No murmur heard.    No friction rub. No gallop.   Pulmonary:      Effort: Pulmonary effort is normal. No respiratory distress.      Breath sounds: No wheezing, rhonchi or rales.   Abdominal:      General: Abdomen is flat. Bowel sounds are normal. There is no distension.      Palpations: Abdomen is soft. There is no mass.      Tenderness: There is no abdominal tenderness. There is no guarding.      Hernia: No hernia is present.   Musculoskeletal:         General: No swelling or deformity.      Comments: TDC +   Skin:     Coloration: " Skin is not jaundiced.      Findings: No bruising or lesion.   Neurological:      Mental Status: She is alert and oriented to person, place, and time. Mental status is at baseline.   Psychiatric:         Behavior: Behavior normal.         Fluids:  In: 700 [Dialysis:700]  Out: 2475     LABS:  Recent Labs     06/15/22  0352 06/16/22  0129   SODIUM 136 140   POTASSIUM 3.9 4.1   CHLORIDE 98 101   CO2 26 25   GLUCOSE 83 85   BUN 45* 51*   CREATININE 7.27* 8.68*   CALCIUM 8.4* 8.8       IMAGING:   All imaging reviewed from admission to present day    IMPRESSION:  # ESRD             - iHD q TThS and prn              - via R AVF ( thrombosed)             - temp CVC placed 5/22 Dr. Mercer  # HTN, controlled              - Goal BP < 140/90             - Continue BB             - UF with iHD as tolerated   # Anemia of CKD, below target             - Goal Hgb 10-11       - ferritin 1675, isat 11%  # CKD-MBD             - Ca:WNL             - PO4 3.7 on sevelamer 1300 mg tid   #Thrombocytopenia, mild  #DMII             - per primary team   # AMS , improved   - 2/2 missed hd vs COVID infection            PLAN:  - c/w q TTS   - UF as tolerated  - IV iron is not indicated when Ferritin is that high, stop.   - No dietary protein restrictions  - Dose all meds per ESRD  - covid management per primary  - tim with HD   - phos level tomorrow     Thank you
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
## 2022-06-17 NOTE — PROGRESS NOTES
[facility] Progress Note     HD treatment ordered by GELACIO Judge x 3 hours.  Treatment Start time: 1753          End time: 2053       Net UF 1775 ml    Patient tolerated treatment, except SBP sudden dropped to 90's and pt started to get irritated. UF off and NS bolus given. Pt symptoms and VS improved.    All blood was returned. 15 g HD needle removed from RUE AVF/G. Dry gauze dressing applied and changed without bleeding issue. + Bruit/Thrill pre-post Treatment. See flow sheet for details.     Report given to NATASHA Jenkins RN.

## 2022-06-17 NOTE — THERAPY
Occupational Therapy   Initial Evaluation     Patient Name: Liberty Bolton  Age:  67 y.o., Sex:  female  Medical Record #: [id number]  Today's Date: 6/17/2022     Precautions  Precautions: Fall Risk    Assessment  Patient is 67 y.o. female with a diagnosis of Malaise.   Pt currently limited by decreased functional mobility, activity tolerance, cognition, sensation, strength, AROM, coordination, balance, and pain which are affecting pt's ability to complete ADLs/IADLs at baseline. Pt would benefit from OT services in the acute care setting to maximize functional recovery.       Plan    Recommend Occupational Therapy 2 for the following treatments:  Self Care/Activities of Daily Living and Therapeutic Activities.       Discharge Recommendations: (P) Recommend post-acute placement for additional occupational therapy services prior to discharge home (may need long term placement if  unable to care for pt.)          06/17/22 1025   Prior Living Situation   Prior Services Unable To Determine At This Time   Housing / Facility 1 Story House   Equipment Owned Wheelchair   Lives with - Patient's Self Care Capacity Spouse   Comments  assists with all ADL and mobility per chart   Prior Level of ADL Function   Grooming / Hygiene Requires Assist   Bathing Requires Assist   Dressing Requires Assist   Toileting Requires Assist   ADL Assessment   Grooming Total Assist   Upper Body Dressing Total Assist   Lower Body Dressing Total Assist   Toileting Total Assist   Functional Mobility   Sit to Stand Total Assist  (x2 able to bear some wt in LE, unable to stand fully erect)   Short Term Goals   Short Term Goal # 1 min A withe UB dressing   Short Term Goal # 2 mod A with LB dressing   Short Term Goal # 3 mod A with ADL txfs   Anticipated Discharge Equipment and Recommendations   Discharge Recommendations Recommend post-acute placement for additional occupational therapy services prior to discharge home  (may need long term  placement if  unable to care for pt.)

## 2022-06-17 NOTE — THERAPY
Physical Therapy   Initial Evaluation     Patient Name: Liberty Bolton  Age:  67 y.o., Sex:  female  Medical Record #: [id number]  Today's Date: 6/17/2022     Precautions  Precautions: Fall Risk    Assessment  Patient is 67 y.o. female with a diagnosis of uremic encephalopathy and AMS.  Factors influencing patient progress includes cognitive deficits, generalized fatigue/weakness, decreased tolerance for all activity, and impaired mobility. Pt lethargic and disinterested in participating with therapy and answering questions upon entering room. Required total assist with all bed mobility and STS from EOB due to lack of effort and lethargy. Given current functional and cognitive, highly recommend post-acute placement to improve mobility and tolerance for activity. Will follow.     Plan    Recommend Physical Therapy 3 times per week until therapy goals are met for the following treatments:  Bed Mobility, Gait Training, Stair Training, Therapeutic Activities, and Therapeutic Exercises    DC Equipment Recommendations: Unable to determine at this time  Discharge Recommendations: Recommend post-acute placement for additional physical therapy services prior to discharge home        Objective       06/17/22 1019   Prior Living Situation   Prior Services Unable To Determine At This Time   Housing / Facility 1 Story House   Equipment Owned Wheelchair   Lives with - Patient's Self Care Capacity Spouse   Comments pt lethargic and disinterested with therapy, difficulty providing social information   Prior Level of Functional Mobility   Bed Mobility Unable To Determine At This Time   History of Falls   History of Falls   (unable to determine due to pt being lethargic and disinterested)   Cognition    Cognition / Consciousness X   Level of Consciousness Responds to voice   Comments presents with lethargy and unwillingness to respond to questions or commands   Balance Assessment   Sitting Balance (Static) Poor +   Sitting Balance  (Dynamic) Poor +   Standing Balance (Static) Trace   Standing Balance (Dynamic) Dependent   Weight Shift Sitting Poor   Weight Shift Standing Absent   Comments required support from therapist to maintain EOB seated balance   Gait Analysis   Gait Level Of Assist Unable to Participate   Bed Mobility    Supine to Sit Total Assist   Sit to Supine Total Assist   Scooting Total Assist   Comments due to overall lethargy   Functional Mobility   Sit to Stand Total Assist   Mobility bed mobility, STS

## 2022-06-18 LAB
ANION GAP SERPL CALC-SCNC: 13 MMOL/L (ref 7–16)
BUN SERPL-MCNC: 47 MG/DL (ref 8–22)
CALCIUM SERPL-MCNC: 9 MG/DL (ref 8.5–10.5)
CHLORIDE SERPL-SCNC: 98 MMOL/L (ref 96–112)
CO2 SERPL-SCNC: 27 MMOL/L (ref 20–33)
CREAT SERPL-MCNC: 8.17 MG/DL (ref 0.5–1.4)
ERYTHROCYTE [DISTWIDTH] IN BLOOD BY AUTOMATED COUNT: 49.1 FL (ref 35.9–50)
GFR SERPLBLD CREATININE-BSD FMLA CKD-EPI: 5 ML/MIN/1.73 M 2
GLUCOSE SERPL-MCNC: 110 MG/DL (ref 65–99)
HCT VFR BLD AUTO: 26.4 % (ref 37–47)
HGB BLD-MCNC: 8.4 G/DL (ref 12–16)
MCH RBC QN AUTO: 30.7 PG (ref 27–33)
MCHC RBC AUTO-ENTMCNC: 31.8 G/DL (ref 33.6–35)
MCV RBC AUTO: 96.4 FL (ref 81.4–97.8)
PHOSPHATE SERPL-MCNC: 4.4 MG/DL (ref 2.5–4.5)
PLATELET # BLD AUTO: 209 K/UL (ref 164–446)
PMV BLD AUTO: 10 FL (ref 9–12.9)
POTASSIUM SERPL-SCNC: 4 MMOL/L (ref 3.6–5.5)
RBC # BLD AUTO: 2.74 M/UL (ref 4.2–5.4)
SODIUM SERPL-SCNC: 138 MMOL/L (ref 135–145)
WBC # BLD AUTO: 5.4 K/UL (ref 4.8–10.8)

## 2022-06-18 PROCEDURE — 700111 HCHG RX REV CODE 636 W/ 250 OVERRIDE (IP): Performed by: STUDENT IN AN ORGANIZED HEALTH CARE EDUCATION/TRAINING PROGRAM

## 2022-06-18 PROCEDURE — 700102 HCHG RX REV CODE 250 W/ 637 OVERRIDE(OP): Performed by: STUDENT IN AN ORGANIZED HEALTH CARE EDUCATION/TRAINING PROGRAM

## 2022-06-18 PROCEDURE — 85027 COMPLETE CBC AUTOMATED: CPT

## 2022-06-18 PROCEDURE — 96372 THER/PROPH/DIAG INJ SC/IM: CPT | Mod: XU

## 2022-06-18 PROCEDURE — 96376 TX/PRO/DX INJ SAME DRUG ADON: CPT | Mod: XU

## 2022-06-18 PROCEDURE — A9270 NON-COVERED ITEM OR SERVICE: HCPCS | Performed by: INTERNAL MEDICINE

## 2022-06-18 PROCEDURE — 700102 HCHG RX REV CODE 250 W/ 637 OVERRIDE(OP): Performed by: INTERNAL MEDICINE

## 2022-06-18 PROCEDURE — 99225 PR SUBSEQUENT OBSERVATION CARE,LEVEL II: CPT | Performed by: GENERAL PRACTICE

## 2022-06-18 PROCEDURE — A9270 NON-COVERED ITEM OR SERVICE: HCPCS | Performed by: GENERAL PRACTICE

## 2022-06-18 PROCEDURE — A9270 NON-COVERED ITEM OR SERVICE: HCPCS | Performed by: STUDENT IN AN ORGANIZED HEALTH CARE EDUCATION/TRAINING PROGRAM

## 2022-06-18 PROCEDURE — 80048 BASIC METABOLIC PNL TOTAL CA: CPT

## 2022-06-18 PROCEDURE — G0378 HOSPITAL OBSERVATION PER HR: HCPCS

## 2022-06-18 PROCEDURE — 90935 HEMODIALYSIS ONE EVALUATION: CPT

## 2022-06-18 PROCEDURE — 700111 HCHG RX REV CODE 636 W/ 250 OVERRIDE (IP): Performed by: INTERNAL MEDICINE

## 2022-06-18 PROCEDURE — 84100 ASSAY OF PHOSPHORUS: CPT

## 2022-06-18 PROCEDURE — 700102 HCHG RX REV CODE 250 W/ 637 OVERRIDE(OP): Performed by: GENERAL PRACTICE

## 2022-06-18 PROCEDURE — 700101 HCHG RX REV CODE 250: Performed by: GENERAL PRACTICE

## 2022-06-18 RX ORDER — LIDOCAINE 50 MG/G
1 PATCH TOPICAL EVERY 24 HOURS
Status: DISCONTINUED | OUTPATIENT
Start: 2022-06-18 | End: 2022-07-12 | Stop reason: HOSPADM

## 2022-06-18 RX ORDER — ACETAMINOPHEN 325 MG/1
650 TABLET ORAL EVERY 6 HOURS PRN
Status: DISCONTINUED | OUTPATIENT
Start: 2022-06-18 | End: 2022-07-12 | Stop reason: HOSPADM

## 2022-06-18 RX ORDER — HEPARIN SODIUM 1000 [USP'U]/ML
2500 INJECTION, SOLUTION INTRAVENOUS; SUBCUTANEOUS PRN
Status: DISCONTINUED | OUTPATIENT
Start: 2022-06-19 | End: 2022-06-21

## 2022-06-18 RX ADMIN — METOPROLOL TARTRATE 50 MG: 25 TABLET, FILM COATED ORAL at 06:31

## 2022-06-18 RX ADMIN — ATORVASTATIN CALCIUM 80 MG: 80 TABLET, FILM COATED ORAL at 17:37

## 2022-06-18 RX ADMIN — DICLOFENAC SODIUM 2 G: 10 GEL TOPICAL at 11:24

## 2022-06-18 RX ADMIN — HEPARIN SODIUM 5000 UNITS: 5000 INJECTION, SOLUTION INTRAVENOUS; SUBCUTANEOUS at 21:04

## 2022-06-18 RX ADMIN — POLYETHYLENE GLYCOL 3350 1 PACKET: 17 POWDER, FOR SOLUTION ORAL at 17:37

## 2022-06-18 RX ADMIN — ACETAMINOPHEN 650 MG: 325 TABLET ORAL at 09:54

## 2022-06-18 RX ADMIN — EPOETIN ALFA-EPBX 10000 UNITS: 10000 INJECTION, SOLUTION INTRAVENOUS; SUBCUTANEOUS at 09:44

## 2022-06-18 RX ADMIN — GABAPENTIN 300 MG: 300 CAPSULE ORAL at 17:37

## 2022-06-18 RX ADMIN — SEVELAMER CARBONATE 1600 MG: 800 TABLET, FILM COATED ORAL at 12:36

## 2022-06-18 RX ADMIN — SEVELAMER CARBONATE 1600 MG: 800 TABLET, FILM COATED ORAL at 17:36

## 2022-06-18 RX ADMIN — HEPARIN SODIUM 5000 UNITS: 5000 INJECTION, SOLUTION INTRAVENOUS; SUBCUTANEOUS at 15:03

## 2022-06-18 RX ADMIN — HYDRALAZINE HYDROCHLORIDE 25 MG: 25 TABLET, FILM COATED ORAL at 17:37

## 2022-06-18 RX ADMIN — DOCUSATE SODIUM 50 MG AND SENNOSIDES 8.6 MG 2 TABLET: 8.6; 5 TABLET, FILM COATED ORAL at 17:36

## 2022-06-18 RX ADMIN — LIDOCAINE 1 PATCH: 50 PATCH CUTANEOUS at 09:55

## 2022-06-18 RX ADMIN — DOCUSATE SODIUM 50 MG AND SENNOSIDES 8.6 MG 2 TABLET: 8.6; 5 TABLET, FILM COATED ORAL at 06:31

## 2022-06-18 RX ADMIN — HYDRALAZINE HYDROCHLORIDE 25 MG: 25 TABLET, FILM COATED ORAL at 06:31

## 2022-06-18 RX ADMIN — SEVELAMER CARBONATE 1600 MG: 800 TABLET, FILM COATED ORAL at 08:25

## 2022-06-18 RX ADMIN — HEPARIN SODIUM 3900 UNITS: 1000 INJECTION, SOLUTION INTRAVENOUS; SUBCUTANEOUS at 11:09

## 2022-06-18 RX ADMIN — HEPARIN SODIUM 5000 UNITS: 5000 INJECTION, SOLUTION INTRAVENOUS; SUBCUTANEOUS at 06:31

## 2022-06-18 RX ADMIN — METOPROLOL TARTRATE 50 MG: 25 TABLET, FILM COATED ORAL at 17:37

## 2022-06-18 ASSESSMENT — PAIN DESCRIPTION - PAIN TYPE: TYPE: ACUTE PAIN

## 2022-06-18 ASSESSMENT — PATIENT HEALTH QUESTIONNAIRE - PHQ9
2. FEELING DOWN, DEPRESSED, IRRITABLE, OR HOPELESS: NOT AT ALL
1. LITTLE INTEREST OR PLEASURE IN DOING THINGS: NOT AT ALL
SUM OF ALL RESPONSES TO PHQ9 QUESTIONS 1 AND 2: 0

## 2022-06-18 NOTE — PROGRESS NOTES
"[facility] -  PROGRESS NOTE               Author: CYNTHIA Ernandez Date & Time: 6/18/2022  2:46 PM     HPI:   67 y.o. female who presented 6/13/2022 with lethargy from [facility].  Patient has not had dialysis for the last 10 days due to behavioral issues.  Patient tested positive for COVID on June 6.  History limited due to AMS.   In the ED, patient found to be hypertensive. Found to have BUN 88 creatinine 11.7.  CT head negative. Chest x-ray showing increased interstitial markings consistent with mild interstitial edema.  Nephrology consulted, recommends dialysis in a.m.     DAILY NEPHROLOGY SUMMARY:  6/15: got hd yesterday. naoe  6/16: NAOE, irritated   6/17: calm, resting in the bed, no new issues, On O2 supplementation.   6/18: HD done today. UF off due to hypotension. Dialyzer clotted, able to return blood. Completed dialysis treatment with heparin.    REVIEW OF SYSTEMS:    Deferred due to COVID precautions    PMH/PSH/SH/FH:   Reviewed and unchanged since admission note    CURRENT MEDICATIONS:   Reviewed from admission to present day    VS:  /49   Pulse 67   Temp 36.4 °C (97.5 °F) (Temporal)   Resp 20   Ht 1.651 m (5' 5\")   Wt 75.8 kg (167 lb)   SpO2 99%   BMI 27.79 kg/m²     Physical Exam  Defer due to COVID precautions   Fluids:  In: 480 [P.O.:480]  Out: -     LABS:  Recent Labs     06/16/22  0129 06/18/22  0815   SODIUM 140 138   POTASSIUM 4.1 4.0   CHLORIDE 101 98   CO2 25 27   GLUCOSE 85 110*   BUN 51* 47*   CREATININE 8.68* 8.17*   CALCIUM 8.8 9.0       IMAGING:   All imaging reviewed from admission to present day    IMPRESSION:  # ESRD             - iHD q TThS and prn              - via R AVF ( thrombosed)             - temp CVC placed 5/22 Dr. Mercer  # HTN, controlled              - Goal BP < 140/90             - Continue BB             - UF with iHD as tolerated   # Anemia of CKD, below target             - Goal Hgb 10-11        - Ferritin 1675, " isat 11%  # CKD-MBD             - Ca:WNL             - PO4 3.7 on sevelamer 1300 mg tid   #Thrombocytopenia, mild  #DMII             - per primary team   # AMS , improved   - 2/2 missed hd vs COVID infection            PLAN:  - iHD today  - Continue iHD qTTS   - UF as tolerated  - IV iron is high for IV iron    - No dietary protein restrictions  - Dose all meds per ESRD  - Covid management per primary  - JUDITH with HD   - Labs with further recommendations to follow     Thank you

## 2022-06-18 NOTE — PROGRESS NOTES
HD tx ordered by Dr. Talia Hoyt today. HD tx performed at the bedside between 0818 until 1135. RUE AVF with good bruit and thrill, no signs of infection noted. Cannulated aseptically using G15 needles without issues. Pt has episodes of hypotension, UF off, flushed saline and lowered UF goal. Pt has clotted lines and dialyzer in the middle of dialysis. Able to fully return blood and changed new circuit. Dr. Hoyt informed with order to have heparin 500 units bolus and 500 units hourly during dialysis next tx. Epoetin 10,000 units IV given with dialysis. Pt reported butt and leg pain. Primary RN notified and pain management done. Unable to reach prescribed UF goal due to hypotension. Net UF: 1L. Dr. Hoyt informed. Left chest catheter changed dressing and heparin lock aseptically. CVC with clean, dry and intact dressing. RUE AVF with good bruit and thrill, no active bleeding. Post HD tx report given to primary RN NATASHA Jenkins. CNA came and assisted pt to eat her lunch. Pt denied discomfort post HD.

## 2022-06-18 NOTE — CARE PLAN
Problem: Knowledge Deficit - Standard  Goal: Patient and family/care givers will demonstrate understanding of plan of care, disease process/condition, diagnostic tests and medications  Outcome: Progressing     Problem: Skin Integrity  Goal: Skin integrity is maintained or improved  Outcome: Progressing     Problem: Fall Risk  Goal: Patient will remain free from falls  Outcome: Progressing     Problem: Psychosocial  Goal: Patient's level of anxiety will decrease  Outcome: Progressing  Goal: Patient's ability to verbalize feelings about condition will improve  Outcome: Progressing  Goal: Patient's ability to re-evaluate and adapt role responsibilities will improve  Outcome: Progressing  Goal: Patient and family will demonstrate ability to cope with life altering diagnosis and/or procedure  Outcome: Progressing  Flowsheets (Taken 6/18/2022 0826)  Patient Behaviors:  • Confused  • Forgetful  • Depressed  • Drowsy  Goal: Spiritual and cultural needs incorporated into hospitalization  Outcome: Progressing     Problem: Communication  Goal: The ability to communicate needs accurately and effectively will improve  Outcome: Progressing     Problem: Discharge Barriers/Planning  Goal: Patient's continuum of care needs are met  Outcome: Progressing     Problem: Hemodynamics  Goal: Patient's hemodynamics, fluid balance and neurologic status will be stable or improve  Outcome: Progressing     Problem: Respiratory  Goal: Patient will achieve/maintain optimum respiratory ventilation and gas exchange  Outcome: Progressing  Flowsheets (Taken 6/18/2022 0800 by Fuentes Schafer, C.N.A.)  O2 Delivery Device: None - Room Air     Problem: Risk for Aspiration  Goal: Patient's risk for aspiration will be absent or decrease  Outcome: Progressing     Problem: Nutrition  Goal: Patient's nutritional and fluid intake will be adequate or improve  Outcome: Progressing  Flowsheets (Taken 6/18/2022 1056)  Oral Nutrition:  • Breakfast  • Between  % Consumed  Goal: Enteral nutrition will be maintained or improve  Outcome: Progressing  Goal: Enteral nutrition will be maintained or improve  Outcome: Progressing  Note: Pt intake improved after changing diet to soft and bite sized.     Problem: Bowel Elimination  Goal: Establish and maintain regular bowel function  Outcome: Progressing     Problem: Mobility  Goal: Patient's capacity to carry out activities will improve  Outcome: Progressing  Flowsheets  Taken 6/18/2022 1056 by Matthew Centeno RGregoryNGregory  Mobility:  • Administered pain management to allow progressive mobilization  • Monitored for signs of activity intolerance  Level of Mobility: Level IV  Assistance: Assistance of Two or More  Taken 6/17/2022 2135 by Brenna Jenkins RSPEEDY  Activity Performed: Sitting up in bed   The patient is Watcher - Medium risk of patient condition declining or worsening    Shift Goals  Clinical Goals: HD, monitor O2 sats, q2 turns, decreased s/sx of encephalopathy  Patient Goals: rest  Family Goals: CARIE    Progress made toward(s) clinical / shift goals:  HD completed- pt tolerated well with AVF use. Pt more alert than yesterday and is responding in conversation, though she is still only oriented to person and place.

## 2022-06-18 NOTE — CARE PLAN
The patient is Watcher - Medium risk of patient condition declining or worsening    Shift Goals  Clinical Goals: Monitor O2 sats  Patient Goals: Rest  Family Goals: CARIE    Progress made toward(s) clinical / shift goals:      Patient is not progressing towards the following goals:      Problem: Knowledge Deficit - Standard  Goal: Patient and family/care givers will demonstrate understanding of plan of care, disease process/condition, diagnostic tests and medications  Outcome: Not Progressing
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
## 2022-06-18 NOTE — CARE PLAN
Problem: Knowledge Deficit - Standard  Goal: Patient and family/care givers will demonstrate understanding of plan of care, disease process/condition, diagnostic tests and medications  6/17/2022 1807 by Matthew Centeno R.N.  Outcome: Progressing  6/17/2022 1807 by Matthew Centeno R.N.  Outcome: Progressing     Problem: Skin Integrity  Goal: Skin integrity is maintained or improved  6/17/2022 1807 by Matthew Centeno R.N.  Outcome: Progressing  6/17/2022 1807 by Matthew Centeno R.N.  Outcome: Progressing     Problem: Fall Risk  Goal: Patient will remain free from falls  6/17/2022 1807 by Matthew Centeno R.N.  Outcome: Progressing  6/17/2022 1807 by Matthew Centeno R.N.  Outcome: Progressing   The patient is Stable - Low risk of patient condition declining or worsening    Shift Goals  Clinical Goals: Monitor O2 saturations, improve encephalopathy, q2 turns  Patient Goals: Rest  Family Goals: CARIE    Progress made toward(s) clinical / shift goals:     Q2 turns continued. O2 saturations maintained during shift and VSS. Pt required one dose of hydralazine for SBP of 163. Last O2 100% on 2 L. Pt now on 1L.       Patient is not progressing towards the following goals:     No noted improvement made to encephalopathic symptoms.

## 2022-06-19 LAB
ALBUMIN SERPL BCP-MCNC: 2.7 G/DL (ref 3.2–4.9)
BUN SERPL-MCNC: 38 MG/DL (ref 8–22)
CALCIUM SERPL-MCNC: 8.8 MG/DL (ref 8.5–10.5)
CHLORIDE SERPL-SCNC: 97 MMOL/L (ref 96–112)
CO2 SERPL-SCNC: 28 MMOL/L (ref 20–33)
CREAT SERPL-MCNC: 5.62 MG/DL (ref 0.5–1.4)
GFR SERPLBLD CREATININE-BSD FMLA CKD-EPI: 8 ML/MIN/1.73 M 2
GLUCOSE SERPL-MCNC: 115 MG/DL (ref 65–99)
PHOSPHATE SERPL-MCNC: 2 MG/DL (ref 2.5–4.5)
POTASSIUM SERPL-SCNC: 4.3 MMOL/L (ref 3.6–5.5)
SODIUM SERPL-SCNC: 135 MMOL/L (ref 135–145)

## 2022-06-19 PROCEDURE — 700101 HCHG RX REV CODE 250: Performed by: GENERAL PRACTICE

## 2022-06-19 PROCEDURE — A9270 NON-COVERED ITEM OR SERVICE: HCPCS | Performed by: GENERAL PRACTICE

## 2022-06-19 PROCEDURE — A9270 NON-COVERED ITEM OR SERVICE: HCPCS | Performed by: STUDENT IN AN ORGANIZED HEALTH CARE EDUCATION/TRAINING PROGRAM

## 2022-06-19 PROCEDURE — 700102 HCHG RX REV CODE 250 W/ 637 OVERRIDE(OP): Performed by: GENERAL PRACTICE

## 2022-06-19 PROCEDURE — 99225 PR SUBSEQUENT OBSERVATION CARE,LEVEL II: CPT | Performed by: GENERAL PRACTICE

## 2022-06-19 PROCEDURE — G0378 HOSPITAL OBSERVATION PER HR: HCPCS

## 2022-06-19 PROCEDURE — A9270 NON-COVERED ITEM OR SERVICE: HCPCS | Performed by: INTERNAL MEDICINE

## 2022-06-19 PROCEDURE — 700102 HCHG RX REV CODE 250 W/ 637 OVERRIDE(OP): Performed by: STUDENT IN AN ORGANIZED HEALTH CARE EDUCATION/TRAINING PROGRAM

## 2022-06-19 PROCEDURE — 96372 THER/PROPH/DIAG INJ SC/IM: CPT

## 2022-06-19 PROCEDURE — 80069 RENAL FUNCTION PANEL: CPT

## 2022-06-19 PROCEDURE — 700111 HCHG RX REV CODE 636 W/ 250 OVERRIDE (IP): Performed by: STUDENT IN AN ORGANIZED HEALTH CARE EDUCATION/TRAINING PROGRAM

## 2022-06-19 PROCEDURE — 700102 HCHG RX REV CODE 250 W/ 637 OVERRIDE(OP): Performed by: INTERNAL MEDICINE

## 2022-06-19 RX ORDER — SEVELAMER CARBONATE 800 MG/1
800 TABLET, FILM COATED ORAL
Status: DISCONTINUED | OUTPATIENT
Start: 2022-06-20 | End: 2022-06-20

## 2022-06-19 RX ORDER — SEVELAMER CARBONATE 800 MG/1
800 TABLET, FILM COATED ORAL
Qty: 270 TABLET | Status: SHIPPED
Start: 2022-06-20 | End: 2022-07-12 | Stop reason: SDUPTHER

## 2022-06-19 RX ADMIN — SEVELAMER CARBONATE 1600 MG: 800 TABLET, FILM COATED ORAL at 08:27

## 2022-06-19 RX ADMIN — HEPARIN SODIUM 5000 UNITS: 5000 INJECTION, SOLUTION INTRAVENOUS; SUBCUTANEOUS at 05:34

## 2022-06-19 RX ADMIN — LIDOCAINE 1 PATCH: 50 PATCH CUTANEOUS at 10:50

## 2022-06-19 RX ADMIN — HYDRALAZINE HYDROCHLORIDE 25 MG: 25 TABLET, FILM COATED ORAL at 05:34

## 2022-06-19 RX ADMIN — METOPROLOL TARTRATE 50 MG: 25 TABLET, FILM COATED ORAL at 05:33

## 2022-06-19 RX ADMIN — METOPROLOL TARTRATE 50 MG: 25 TABLET, FILM COATED ORAL at 17:42

## 2022-06-19 RX ADMIN — DOCUSATE SODIUM 50 MG AND SENNOSIDES 8.6 MG 2 TABLET: 8.6; 5 TABLET, FILM COATED ORAL at 05:34

## 2022-06-19 RX ADMIN — HYDRALAZINE HYDROCHLORIDE 25 MG: 25 TABLET, FILM COATED ORAL at 17:42

## 2022-06-19 RX ADMIN — HEPARIN SODIUM 5000 UNITS: 5000 INJECTION, SOLUTION INTRAVENOUS; SUBCUTANEOUS at 13:54

## 2022-06-19 RX ADMIN — GABAPENTIN 300 MG: 300 CAPSULE ORAL at 17:42

## 2022-06-19 RX ADMIN — ACETAMINOPHEN 650 MG: 325 TABLET ORAL at 17:42

## 2022-06-19 RX ADMIN — DOCUSATE SODIUM 50 MG AND SENNOSIDES 8.6 MG 2 TABLET: 8.6; 5 TABLET, FILM COATED ORAL at 17:43

## 2022-06-19 RX ADMIN — ATORVASTATIN CALCIUM 80 MG: 80 TABLET, FILM COATED ORAL at 17:42

## 2022-06-19 RX ADMIN — HEPARIN SODIUM 5000 UNITS: 5000 INJECTION, SOLUTION INTRAVENOUS; SUBCUTANEOUS at 21:31

## 2022-06-19 ASSESSMENT — ENCOUNTER SYMPTOMS
SHORTNESS OF BREATH: 0
DIARRHEA: 0
HEADACHES: 0
COUGH: 0
FEVER: 0
DIZZINESS: 0
CHILLS: 0
ABDOMINAL PAIN: 0
NAUSEA: 0
PALPITATIONS: 0
VOMITING: 0

## 2022-06-19 ASSESSMENT — PAIN DESCRIPTION - PAIN TYPE: TYPE: ACUTE PAIN

## 2022-06-19 ASSESSMENT — FIBROSIS 4 INDEX: FIB4 SCORE: 2.22
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
## 2022-06-19 NOTE — CARE PLAN
Problem: Knowledge Deficit - Standard  Goal: Patient and family/care givers will demonstrate understanding of plan of care, disease process/condition, diagnostic tests and medications  Outcome: Progressing     Problem: Skin Integrity  Goal: Skin integrity is maintained or improved  Outcome: Progressing     Problem: Fall Risk  Goal: Patient will remain free from falls  Outcome: Progressing     Problem: Psychosocial  Goal: Patient's level of anxiety will decrease  Outcome: Progressing  Goal: Patient's ability to verbalize feelings about condition will improve  Outcome: Progressing  Goal: Patient's ability to re-evaluate and adapt role responsibilities will improve  Outcome: Progressing  Goal: Patient and family will demonstrate ability to cope with life altering diagnosis and/or procedure  Outcome: Progressing  Goal: Spiritual and cultural needs incorporated into hospitalization  Outcome: Progressing     Problem: Communication  Goal: The ability to communicate needs accurately and effectively will improve  Outcome: Progressing     Problem: Discharge Barriers/Planning  Goal: Patient's continuum of care needs are met  Outcome: Progressing     Problem: Hemodynamics  Goal: Patient's hemodynamics, fluid balance and neurologic status will be stable or improve  Outcome: Progressing     Problem: Respiratory  Goal: Patient will achieve/maintain optimum respiratory ventilation and gas exchange  Outcome: Progressing     Problem: Risk for Aspiration  Goal: Patient's risk for aspiration will be absent or decrease  Outcome: Progressing     Problem: Nutrition  Goal: Patient's nutritional and fluid intake will be adequate or improve  Outcome: Progressing  Goal: Enteral nutrition will be maintained or improve  Outcome: Progressing  Goal: Enteral nutrition will be maintained or improve  Outcome: Progressing     Problem: Bowel Elimination  Goal: Establish and maintain regular bowel function  Outcome: Progressing     Problem:  Mobility  Goal: Patient's capacity to carry out activities will improve  Outcome: Progressing     Problem: Pain - Standard  Goal: Alleviation of pain or a reduction in pain to the patient’s comfort goal  Outcome: Progressing  Flowsheets (Taken 6/19/2022 0827)  Pain Rating Scale (NPRS): 0   The patient is Stable - Low risk of patient condition declining or worsening    Shift Goals  Clinical Goals: VSS, q2 turn, improve mentation  Patient Goals: rest  Family Goals: CARIE    Progress made toward(s) clinical / shift goals:  VSS throughout the day. Pt A&Ox2 and is slightly more alert than yesterday, though she still rests with eyes closed frequently. Q2 turns continued. Pt has had multiple small bowel movements over the last 18 hours after miralax administration. Plan for HD Tuesday via AVF.

## 2022-06-19 NOTE — PROGRESS NOTES
"[facility] -  PROGRESS NOTE               Author: Talia Hoyt D.O. Date & Time: 6/19/2022  9:32 AM     HPI:   67 y.o. female who presented 6/13/2022 with lethargy from [facility].  Patient has not had dialysis for the last 10 days due to behavioral issues.  Patient tested positive for COVID on June 6.  History limited due to AMS.   In the ED, patient found to be hypertensive. Found to have BUN 88 creatinine 11.7.  CT head negative. Chest x-ray showing increased interstitial markings consistent with mild interstitial edema.  Nephrology consulted, recommends dialysis in a.m.     DAILY NEPHROLOGY SUMMARY:  6/15: got hd yesterday. naoe  6/16: NAOE, irritated   6/17: calm, resting in the bed, no new issues, On O2 supplementation.   6/18: HD done today. UF off due to hypotension. Dialyzer clotted, able to return blood. Completed dialysis treatment with heparin.  6/19: SBP 100s-120s, phos 2.2, no new c/o    REVIEW OF SYSTEMS:    Review of Systems   Constitutional: Negative for chills and fever.   Respiratory: Negative for cough and shortness of breath.    Cardiovascular: Negative for chest pain and palpitations.   Gastrointestinal: Negative for abdominal pain, diarrhea, nausea and vomiting.   Skin: Negative for itching and rash.   Neurological: Negative for dizziness and headaches.         PMH/PSH/SH/FH:   Reviewed and unchanged since admission note    CURRENT MEDICATIONS:   Reviewed from admission to present day    VS:  /44   Pulse 87   Temp 36.3 °C (97.4 °F) (Temporal)   Resp 18   Ht 1.651 m (5' 5\")   Wt 75.8 kg (167 lb)   SpO2 95%   BMI 27.79 kg/m²     Physical Exam  Vitals and nursing note reviewed.   Constitutional:       General: She is not in acute distress.  HENT:      Head: Normocephalic and atraumatic.      Right Ear: External ear normal.      Left Ear: External ear normal.      Nose: Nose normal.      Mouth/Throat:      Mouth: Mucous membranes are moist.      " Pharynx: Oropharynx is clear.   Eyes:      General: No scleral icterus.     Extraocular Movements: Extraocular movements intact.      Conjunctiva/sclera: Conjunctivae normal.   Cardiovascular:      Rate and Rhythm: Normal rate and regular rhythm.      Comments: SWATI AVF +thrill/bruit  Pulmonary:      Effort: Pulmonary effort is normal.      Breath sounds: No wheezing.   Abdominal:      General: There is no distension.      Palpations: Abdomen is soft.   Musculoskeletal:      Right lower leg: Edema present.      Left lower leg: Edema present.   Skin:     General: Skin is warm and dry.      Coloration: Skin is not jaundiced.   Neurological:      General: No focal deficit present.      Mental Status: She is alert and oriented to person, place, and time.      Cranial Nerves: No cranial nerve deficit.   Psychiatric:         Mood and Affect: Mood normal.         Behavior: Behavior normal.         Fluids:  In: 1580 [P.O.:480; Dialysis:1100]  Out: 2100     LABS:  Recent Labs     06/18/22  0815 06/19/22  0600   SODIUM 138 135   POTASSIUM 4.0 4.3   CHLORIDE 98 97   CO2 27 28   GLUCOSE 110* 115*   BUN 47* 38*   CREATININE 8.17* 5.62*   CALCIUM 9.0 8.8       IMAGING:   All imaging reviewed from admission to present day    IMPRESSION:  # ESRD             - iHD q TThS and prn              - via R AVF (s/p thrombectomy and temp line)               # HTN, controlled              - Goal BP < 140/90             - Continue BB             - UF with iHD as tolerated     # Edema  # Anemia of CKD, below target             - Goal Hgb 10-11        - Ferritin 1675, isat 11%  # CKD-MBD             - Ca:WNL             - PO4 low  #Thrombocytopenia, mild  #DMII             - per primary team   # AMS , improved   - 2/2 missed hd vs COVID infection            PLAN:  - iHD  qTTS   - UF as tolerated, dialysate temp changed to 35C, will consider med adjustment to allow for improved UF, if needed  - hold phos binders today, resume tomorrow at 800mg  TID with meals  - No dietary protein restrictions  - Dose all meds per ESRD  - Covid management per primary  - JUDITH with HD   - Labs with further recommendations to follow     Other management per primary service    Thank you
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
## 2022-06-19 NOTE — CARE PLAN
The patient is Stable - Low risk of patient condition declining or worsening    Shift Goals  Clinical Goals: VSS;Q2 turns; Improve mentation  Patient Goals: CARIE  Family Goals: CARIE    Progress made toward(s) clinical / shift goals:  VSS    Patient is not progressing towards the following goals:      Problem: Knowledge Deficit - Standard  Goal: Patient and family/care givers will demonstrate understanding of plan of care, disease process/condition, diagnostic tests and medications  Outcome: Not Progressing     Problem: Psychosocial  Goal: Patient's level of anxiety will decrease  Outcome: Not Progressing  Goal: Patient's ability to verbalize feelings about condition will improve  Outcome: Not Progressing  Goal: Patient's ability to re-evaluate and adapt role responsibilities will improve  Outcome: Not Progressing  Goal: Patient and family will demonstrate ability to cope with life altering diagnosis and/or procedure  Outcome: Not Progressing  Goal: Spiritual and cultural needs incorporated into hospitalization  Outcome: Not Progressing     Problem: Communication  Goal: The ability to communicate needs accurately and effectively will improve  Outcome: Not Progressing     Problem: Hemodynamics  Goal: Patient's hemodynamics, fluid balance and neurologic status will be stable or improve  Outcome: Not Progressing

## 2022-06-20 LAB — GLUCOSE BLD STRIP.AUTO-MCNC: 116 MG/DL (ref 65–99)

## 2022-06-20 PROCEDURE — 700102 HCHG RX REV CODE 250 W/ 637 OVERRIDE(OP): Performed by: STUDENT IN AN ORGANIZED HEALTH CARE EDUCATION/TRAINING PROGRAM

## 2022-06-20 PROCEDURE — 82962 GLUCOSE BLOOD TEST: CPT

## 2022-06-20 PROCEDURE — G0378 HOSPITAL OBSERVATION PER HR: HCPCS

## 2022-06-20 PROCEDURE — 700102 HCHG RX REV CODE 250 W/ 637 OVERRIDE(OP): Performed by: INTERNAL MEDICINE

## 2022-06-20 PROCEDURE — 96372 THER/PROPH/DIAG INJ SC/IM: CPT

## 2022-06-20 PROCEDURE — 97530 THERAPEUTIC ACTIVITIES: CPT

## 2022-06-20 PROCEDURE — 700111 HCHG RX REV CODE 636 W/ 250 OVERRIDE (IP): Performed by: STUDENT IN AN ORGANIZED HEALTH CARE EDUCATION/TRAINING PROGRAM

## 2022-06-20 PROCEDURE — A9270 NON-COVERED ITEM OR SERVICE: HCPCS | Performed by: STUDENT IN AN ORGANIZED HEALTH CARE EDUCATION/TRAINING PROGRAM

## 2022-06-20 PROCEDURE — 99225 PR SUBSEQUENT OBSERVATION CARE,LEVEL II: CPT | Performed by: GENERAL PRACTICE

## 2022-06-20 PROCEDURE — 700101 HCHG RX REV CODE 250: Performed by: GENERAL PRACTICE

## 2022-06-20 PROCEDURE — A9270 NON-COVERED ITEM OR SERVICE: HCPCS | Performed by: INTERNAL MEDICINE

## 2022-06-20 RX ADMIN — LIDOCAINE 1 PATCH: 50 PATCH CUTANEOUS at 13:21

## 2022-06-20 RX ADMIN — HEPARIN SODIUM 5000 UNITS: 5000 INJECTION, SOLUTION INTRAVENOUS; SUBCUTANEOUS at 05:29

## 2022-06-20 RX ADMIN — METOPROLOL TARTRATE 50 MG: 25 TABLET, FILM COATED ORAL at 17:31

## 2022-06-20 RX ADMIN — ATORVASTATIN CALCIUM 80 MG: 80 TABLET, FILM COATED ORAL at 17:32

## 2022-06-20 RX ADMIN — HYDRALAZINE HYDROCHLORIDE 25 MG: 25 TABLET, FILM COATED ORAL at 05:30

## 2022-06-20 RX ADMIN — GABAPENTIN 300 MG: 300 CAPSULE ORAL at 17:32

## 2022-06-20 RX ADMIN — HYDRALAZINE HYDROCHLORIDE 25 MG: 25 TABLET, FILM COATED ORAL at 17:32

## 2022-06-20 RX ADMIN — METOPROLOL TARTRATE 50 MG: 25 TABLET, FILM COATED ORAL at 05:30

## 2022-06-20 RX ADMIN — HEPARIN SODIUM 5000 UNITS: 5000 INJECTION, SOLUTION INTRAVENOUS; SUBCUTANEOUS at 20:31

## 2022-06-20 ASSESSMENT — COGNITIVE AND FUNCTIONAL STATUS - GENERAL
SUGGESTED CMS G CODE MODIFIER MOBILITY: CN
WALKING IN HOSPITAL ROOM: TOTAL
MOVING TO AND FROM BED TO CHAIR: UNABLE
TURNING FROM BACK TO SIDE WHILE IN FLAT BAD: UNABLE
STANDING UP FROM CHAIR USING ARMS: TOTAL
MOVING FROM LYING ON BACK TO SITTING ON SIDE OF FLAT BED: UNABLE
MOBILITY SCORE: 6
CLIMB 3 TO 5 STEPS WITH RAILING: TOTAL

## 2022-06-20 ASSESSMENT — PAIN DESCRIPTION - PAIN TYPE: TYPE: ACUTE PAIN

## 2022-06-20 ASSESSMENT — GAIT ASSESSMENTS: GAIT LEVEL OF ASSIST: UNABLE TO PARTICIPATE

## 2022-06-20 NOTE — THERAPY
Physical Therapy   Daily Treatment     Patient Name: Liberty Bolton  Age:  67 y.o., Sex:  female  Medical Record #: [id number]  Today's Date: 6/20/2022     Precautions  Precautions: Fall Risk    Assessment    Rec'd pt alert in bed, agreeable to work w/ PT.  She is able to follow intermittent single step cues.  Mobility is at a total assist level to sit and stand.  Unable to ambulate.  Little to no participation.      Plan    Continue current treatment plan.    DC Equipment Recommendations: Unable to determine at this time  Discharge Recommendations: Recommend post-acute placement for additional physical therapy services prior to discharge home      Objective       06/20/22 0727   Balance   Sitting Balance (Static) Dependent   Sitting Balance (Dynamic) Dependent   Standing Balance (Static) Dependent   Standing Balance (Dynamic) Dependent   Weight Shift Sitting Poor   Weight Shift Standing Absent   Skilled Intervention Verbal Cuing;Tactile Cuing;Facilitation   Gait Analysis   Gait Level Of Assist Unable to Participate   Bed Mobility    Supine to Sit Total Assist   Sit to Supine Total Assist   Scooting Total Assist   Skilled Intervention Verbal Cuing;Tactile Cuing;Facilitation   Functional Mobility   Sit to Stand Total Assist   Short Term Goals    Short Term Goal # 1 Pt will be able to perform STS with moderate assist w/FWW within 6 visits to increase functional mobility   Goal Outcome # 1 goal not met   Short Term Goal # 2 Pt able to perform supine<> sit with moderate assist within 6 visits to imporve in bed mobility   Goal Outcome # 2 Goal not met   Short Term Goal # 3 Pt able to sit EOB for 10 min under SPV within 6 visits to improve tolerance for static sitting   Goal Outcome # 3 Goal not met   Short Term Goal # 4 Pt will self propel w/c x 150ft with B UE/LE support wihtin 6 visits to ensure household mobility   Goal Outcome # 4 Goal not met   Short Term Goal # 5 Pt will ambulate x 50ft with FWW and min A within 6  visits to ensure independent mobility at home.   Goal Outcome # 5 Goal not met   Anticipated Discharge Equipment and Recommendations   DC Equipment Recommendations Unable to determine at this time   Discharge Recommendations Recommend post-acute placement for additional physical therapy services prior to discharge home
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
## 2022-06-20 NOTE — DISCHARGE PLANNING
Agency/Facility Name: [facility]   Spoke To: Priscilla  Outcome: No answers pavel if she can take anyone back right now due to a covid outbreak. Will keep in touch.

## 2022-06-20 NOTE — CARE PLAN
The patient is Stable - Low risk of patient condition declining or worsening    Shift Goals  Clinical Goals: improved affect  Patient Goals: sleep  Family Goals: CARIE    Progress made toward(s) clinical / shift goals:    Problem: Skin Integrity  Goal: Skin integrity is maintained or improved  Outcome: Progressing     Problem: Fall Risk  Goal: Patient will remain free from falls  Outcome: Progressing       Patient is not progressing towards the following goals:      Problem: Psychosocial  Goal: Patient's level of anxiety will decrease  Outcome: Not Met  Pt withdrawn/crying

## 2022-06-20 NOTE — DISCHARGE PLANNING
SEVERO sent a message to the DPA to inquire about availability at [facility]. Shortly after, this writer received a message stating that [facility] is not currently accepting patients due to a covid outbreak. It is unknown when they will be accepting Pt's. CM to follow up Tuesday.

## 2022-06-20 NOTE — CARE PLAN
Problem: Knowledge Deficit - Standard  Goal: Patient and family/care givers will demonstrate understanding of plan of care, disease process/condition, diagnostic tests and medications  Outcome: Progressing     Problem: Skin Integrity  Goal: Skin integrity is maintained or improved  Outcome: Progressing     Problem: Fall Risk  Goal: Patient will remain free from falls  Outcome: Progressing     Problem: Psychosocial  Goal: Patient's level of anxiety will decrease  Outcome: Progressing  Goal: Patient's ability to verbalize feelings about condition will improve  Outcome: Progressing  Goal: Patient's ability to re-evaluate and adapt role responsibilities will improve  Outcome: Progressing  Goal: Patient and family will demonstrate ability to cope with life altering diagnosis and/or procedure  Outcome: Progressing  Goal: Spiritual and cultural needs incorporated into hospitalization  Outcome: Progressing     Problem: Communication  Goal: The ability to communicate needs accurately and effectively will improve  Outcome: Progressing     Problem: Discharge Barriers/Planning  Goal: Patient's continuum of care needs are met  Outcome: Progressing     Problem: Hemodynamics  Goal: Patient's hemodynamics, fluid balance and neurologic status will be stable or improve  Outcome: Progressing     Problem: Respiratory  Goal: Patient will achieve/maintain optimum respiratory ventilation and gas exchange  Outcome: Progressing     Problem: Risk for Aspiration  Goal: Patient's risk for aspiration will be absent or decrease  Outcome: Progressing     Problem: Nutrition  Goal: Patient's nutritional and fluid intake will be adequate or improve  Outcome: Progressing  Goal: Enteral nutrition will be maintained or improve  Outcome: Progressing  Goal: Enteral nutrition will be maintained or improve  Outcome: Progressing     Problem: Bowel Elimination  Goal: Establish and maintain regular bowel function  Outcome: Progressing     Problem:  Mobility  Goal: Patient's capacity to carry out activities will improve  Outcome: Progressing     Problem: Pain - Standard  Goal: Alleviation of pain or a reduction in pain to the patient’s comfort goal  Outcome: Progressing   The patient is Stable - Low risk of patient condition declining or worsening    Shift Goals  Clinical Goals: Safety, VSS  Patient Goals: Safety, VSS  Family Goals: CARIE    Progress made toward(s) clinical / shift goals:  in progress     Patient is not progressing towards the following goals:

## 2022-06-20 NOTE — PROGRESS NOTES
"[facility] -  PROGRESS NOTE               Author: Keyla Mccauley M.D. Date & Time: 6/20/2022  9:44 AM     HPI:   67 y.o. female who presented 6/13/2022 with lethargy from [facility].  Patient has not had dialysis for the last 10 days due to behavioral issues.  Patient tested positive for COVID on June 6.  History limited due to AMS.   In the ED, patient found to be hypertensive. Found to have BUN 88 creatinine 11.7.  CT head negative. Chest x-ray showing increased interstitial markings consistent with mild interstitial edema.  Nephrology consulted, recommends dialysis in a.m.     DAILY NEPHROLOGY SUMMARY:  6/15: got hd yesterday. naoe  6/16: NAOE, irritated   6/17: calm, resting in the bed, no new issues, On O2 supplementation.   6/18: HD done today. UF off due to hypotension. Dialyzer clotted, able to return blood. Completed dialysis treatment with heparin.  6/19: SBP 100s-120s, phos 2.2, no new c/o  6/20: no events, drowsy but arousable, denies any CP/SOB/abd pain, BP stable    REVIEW OF SYSTEMS:    10 point ROS reviewed and is as per HPI/daily summary or otherwise negative    PMH/PSH/SH/FH:   Reviewed and unchanged since admission note    CURRENT MEDICATIONS:   Reviewed from admission to present day    VS:  /57   Pulse 67   Temp 36.6 °C (97.9 °F) (Temporal)   Resp 18   Ht 1.651 m (5' 5\")   Wt 71.1 kg (156 lb 12 oz)   SpO2 93%   BMI 26.08 kg/m²     Physical Exam  Vitals and nursing note reviewed.   Constitutional:       General: She is not in acute distress.     Appearance: Normal appearance.      Comments: Drowsy   HENT:      Head: Normocephalic and atraumatic.   Eyes:      General: No scleral icterus.  Cardiovascular:      Rate and Rhythm: Normal rate and regular rhythm.      Comments: +L chest PC  Pulmonary:      Effort: Pulmonary effort is normal. No respiratory distress.      Breath sounds: Examination of the right-lower field reveals decreased breath sounds. " Examination of the left-lower field reveals decreased breath sounds. Decreased breath sounds present.   Abdominal:      General: Bowel sounds are normal. There is no distension.      Palpations: Abdomen is soft.   Musculoskeletal:         General: No deformity.      Right lower leg: No edema.      Left lower leg: No edema.   Skin:     General: Skin is warm and dry.      Findings: No rash.   Neurological:      General: No focal deficit present.      Mental Status: She is oriented to person, place, and time.   Psychiatric:         Behavior: Behavior is cooperative.      Comments: Drowsy but arousable         Fluids:  In: 150 [P.O.:150]  Out: -     LABS:  Recent Labs     06/18/22  0815 06/19/22  0600   SODIUM 138 135   POTASSIUM 4.0 4.3   CHLORIDE 98 97   CO2 27 28   GLUCOSE 110* 115*   BUN 47* 38*   CREATININE 8.17* 5.62*   CALCIUM 9.0 8.8       IMAGING:   All imaging reviewed from admission to present day    IMPRESSION:  # ESRD             - iHD q TThS and prn              - via R AVF (s/p thrombectomy and temp line)             - Follows with Dr. Mayers outpt               # HTN, controlled              - Goal BP < 140/90             - Continue BB             - UF with iHD as tolerated      # Edema  # Anemia of CKD, below target             - Goal Hgb 10-11             - Ferritin 1675, isat 11%  # CKD-MBD             - Ca:WNL             - PO4 low  #Thrombocytopenia, mild  #DMII             - per primary team   # AMS , improved   - 2/2 missed hd vs COVID infection            PLAN:  - No HD today ([facility])  - Continue iHD  qTTS   - UF as tolerated, dialysate temp changed to 35C, will consider med adjustment to allow for improved UF, if needed  - Hold phos binder for now  - No dietary protein restrictions  - Dose all meds per ESRD  - Covid management per primary  - JUDITH with HD   - Labs with further recommendations to follow    **Pt to resume care with her regular nephrologist Dr. Mayers upon discharge

## 2022-06-20 NOTE — PROGRESS NOTES
0700: Assumed care of pt. Pt observed to be sleeping at this time. No acute distress. Call light within reach.     0730: Pt unable to work with PT d/t drowsiness, inability to follow simple commmands    0830: Pt arousalable but drowsy, refusing breakfast, refusing AM Renvela. RR WDL, pt appears comfortable. No acute distress. Safety measures maintained per unit protocol--bed in lowest position, bed alarm on, call light and personal items within reach.

## 2022-06-20 NOTE — PROGRESS NOTES
"Assumed care of pt at 1900. Pt alert and oriented to self and time.  Pt withdrawn and crying.  States \"I want my mom & dad\".  Provided empathetic listening, gentle questioning and distraction.  Denies pain or discomfort.  Bed low & locked, alarm on.  Reviewed plan for evening.  Hourly rounding continues.  "

## 2022-06-21 LAB
ANION GAP SERPL CALC-SCNC: 14 MMOL/L (ref 7–16)
BUN SERPL-MCNC: 65 MG/DL (ref 8–22)
CALCIUM SERPL-MCNC: 9.3 MG/DL (ref 8.5–10.5)
CHLORIDE SERPL-SCNC: 95 MMOL/L (ref 96–112)
CO2 SERPL-SCNC: 26 MMOL/L (ref 20–33)
CREAT SERPL-MCNC: 7.75 MG/DL (ref 0.5–1.4)
ERYTHROCYTE [DISTWIDTH] IN BLOOD BY AUTOMATED COUNT: 48.5 FL (ref 35.9–50)
GFR SERPLBLD CREATININE-BSD FMLA CKD-EPI: 5 ML/MIN/1.73 M 2
GLUCOSE SERPL-MCNC: 116 MG/DL (ref 65–99)
HCT VFR BLD AUTO: 28.2 % (ref 37–47)
HGB BLD-MCNC: 9.1 G/DL (ref 12–16)
MCH RBC QN AUTO: 31.3 PG (ref 27–33)
MCHC RBC AUTO-ENTMCNC: 32.3 G/DL (ref 33.6–35)
MCV RBC AUTO: 96.9 FL (ref 81.4–97.8)
PHOSPHATE SERPL-MCNC: 3.2 MG/DL (ref 2.5–4.5)
PLATELET # BLD AUTO: 193 K/UL (ref 164–446)
PMV BLD AUTO: 9.8 FL (ref 9–12.9)
POTASSIUM SERPL-SCNC: 5.5 MMOL/L (ref 3.6–5.5)
RBC # BLD AUTO: 2.91 M/UL (ref 4.2–5.4)
SODIUM SERPL-SCNC: 135 MMOL/L (ref 135–145)
WBC # BLD AUTO: 6.3 K/UL (ref 4.8–10.8)

## 2022-06-21 PROCEDURE — 700111 HCHG RX REV CODE 636 W/ 250 OVERRIDE (IP): Performed by: INTERNAL MEDICINE

## 2022-06-21 PROCEDURE — 700102 HCHG RX REV CODE 250 W/ 637 OVERRIDE(OP): Performed by: INTERNAL MEDICINE

## 2022-06-21 PROCEDURE — 96376 TX/PRO/DX INJ SAME DRUG ADON: CPT | Mod: XU

## 2022-06-21 PROCEDURE — 700102 HCHG RX REV CODE 250 W/ 637 OVERRIDE(OP): Performed by: GENERAL PRACTICE

## 2022-06-21 PROCEDURE — 99225 PR SUBSEQUENT OBSERVATION CARE,LEVEL II: CPT | Performed by: INTERNAL MEDICINE

## 2022-06-21 PROCEDURE — 700101 HCHG RX REV CODE 250: Performed by: GENERAL PRACTICE

## 2022-06-21 PROCEDURE — 700111 HCHG RX REV CODE 636 W/ 250 OVERRIDE (IP): Performed by: STUDENT IN AN ORGANIZED HEALTH CARE EDUCATION/TRAINING PROGRAM

## 2022-06-21 PROCEDURE — A9270 NON-COVERED ITEM OR SERVICE: HCPCS | Performed by: STUDENT IN AN ORGANIZED HEALTH CARE EDUCATION/TRAINING PROGRAM

## 2022-06-21 PROCEDURE — G0378 HOSPITAL OBSERVATION PER HR: HCPCS

## 2022-06-21 PROCEDURE — 96372 THER/PROPH/DIAG INJ SC/IM: CPT | Mod: XU

## 2022-06-21 PROCEDURE — 80048 BASIC METABOLIC PNL TOTAL CA: CPT

## 2022-06-21 PROCEDURE — 85027 COMPLETE CBC AUTOMATED: CPT

## 2022-06-21 PROCEDURE — A9270 NON-COVERED ITEM OR SERVICE: HCPCS | Performed by: GENERAL PRACTICE

## 2022-06-21 PROCEDURE — 84100 ASSAY OF PHOSPHORUS: CPT

## 2022-06-21 PROCEDURE — A9270 NON-COVERED ITEM OR SERVICE: HCPCS | Performed by: INTERNAL MEDICINE

## 2022-06-21 PROCEDURE — 90935 HEMODIALYSIS ONE EVALUATION: CPT

## 2022-06-21 PROCEDURE — 700102 HCHG RX REV CODE 250 W/ 637 OVERRIDE(OP): Performed by: STUDENT IN AN ORGANIZED HEALTH CARE EDUCATION/TRAINING PROGRAM

## 2022-06-21 RX ORDER — HEPARIN SODIUM 1000 [USP'U]/ML
1500 INJECTION, SOLUTION INTRAVENOUS; SUBCUTANEOUS PRN
Status: DISCONTINUED | OUTPATIENT
Start: 2022-06-21 | End: 2022-07-12 | Stop reason: HOSPADM

## 2022-06-21 RX ADMIN — ATORVASTATIN CALCIUM 80 MG: 80 TABLET, FILM COATED ORAL at 18:19

## 2022-06-21 RX ADMIN — GABAPENTIN 300 MG: 300 CAPSULE ORAL at 18:19

## 2022-06-21 RX ADMIN — LIDOCAINE 1 PATCH: 50 PATCH CUTANEOUS at 10:00

## 2022-06-21 RX ADMIN — DOCUSATE SODIUM 50 MG AND SENNOSIDES 8.6 MG 2 TABLET: 8.6; 5 TABLET, FILM COATED ORAL at 05:28

## 2022-06-21 RX ADMIN — HEPARIN SODIUM 5000 UNITS: 5000 INJECTION, SOLUTION INTRAVENOUS; SUBCUTANEOUS at 05:28

## 2022-06-21 RX ADMIN — EPOETIN ALFA-EPBX 10000 UNITS: 10000 INJECTION, SOLUTION INTRAVENOUS; SUBCUTANEOUS at 13:38

## 2022-06-21 RX ADMIN — HEPARIN SODIUM 1500 UNITS: 1000 INJECTION, SOLUTION INTRAVENOUS; SUBCUTANEOUS at 14:42

## 2022-06-21 RX ADMIN — METOPROLOL TARTRATE 50 MG: 25 TABLET, FILM COATED ORAL at 05:29

## 2022-06-21 RX ADMIN — HYDRALAZINE HYDROCHLORIDE 25 MG: 25 TABLET, FILM COATED ORAL at 05:29

## 2022-06-21 RX ADMIN — HEPARIN SODIUM 5000 UNITS: 5000 INJECTION, SOLUTION INTRAVENOUS; SUBCUTANEOUS at 20:56

## 2022-06-21 ASSESSMENT — ENCOUNTER SYMPTOMS
FALLS: 0
BACK PAIN: 0
BLURRED VISION: 0
ABDOMINAL PAIN: 0
VOMITING: 0
NAUSEA: 0
COUGH: 0
SHORTNESS OF BREATH: 0
CHILLS: 0
FEVER: 0
DIARRHEA: 0
DOUBLE VISION: 0

## 2022-06-21 ASSESSMENT — PAIN DESCRIPTION - PAIN TYPE: TYPE: ACUTE PAIN

## 2022-06-21 NOTE — PROGRESS NOTES
[facility] Progress Note      HD today x 3 hours per Dr. Mccauley.   Initiated at 1323 and ended at 1623.     Patient assessed prior tx.  Alert and oriented x 2-3. On room air, no SOB. o2 sat 94-97%. VSS pre HD. /50 HR 67 O2 sat 95% SWATI AVF with bruit and thrill pre HD.       UF Net: 2,000 mL    Patient had low BP episodes, 90's systolic at the last hour of treatment. Patient denies lightheadedness. Patient able to finished treatment. VSS post HD.       Blood returned. Applied gauze and held SWATI AVF site for 12 minutes. Verified no bleeding post treatment. Bruit and thrill present post dialysis.   Instructions given to Primary RN that if bleeding occurs on the AVF site, change dressing and held the site with pressure.       Report given to Primary CHRYSTAL Magana RN.

## 2022-06-21 NOTE — PROGRESS NOTES
Report received, assumed care of patient. Patient observed to be sleeping at this time. RR WDL, no acute distress. Safety precautions maintained---Bed in lowest position, call light and personal items within reach. No needs at this time
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
## 2022-06-21 NOTE — CARE PLAN
The patient is Stable - Low risk of patient condition declining or worsening    Shift Goals  Clinical Goals: Safety, VSS  Patient Goals: Safety, VSS  Family Goals: CARIE    Progress made toward(s) clinical / shift goals:      Problem: Skin Integrity  Goal: Skin integrity is maintained or improved  Outcome: Progressing     Problem: Fall Risk  Goal: Patient will remain free from falls  Outcome: Progressing     Problem: Discharge Barriers/Planning  Goal: Patient's continuum of care needs are met  Outcome: Progressing     Problem: Hemodynamics  Goal: Patient's hemodynamics, fluid balance and neurologic status will be stable or improve  Outcome: Progressing     Problem: Respiratory  Goal: Patient will achieve/maintain optimum respiratory ventilation and gas exchange  Outcome: Progressing       Patient is not progressing towards the following goals:

## 2022-06-21 NOTE — PROGRESS NOTES
"[facility] -  PROGRESS NOTE               Author: Keyla Mccauley M.D. Date & Time: 6/21/2022  10:01 AM     HPI:   67 y.o. female who presented 6/13/2022 with lethargy from [facility].  Patient has not had dialysis for the last 10 days due to behavioral issues.  Patient tested positive for COVID on June 6.  History limited due to AMS.   In the ED, patient found to be hypertensive. Found to have BUN 88 creatinine 11.7.  CT head negative. Chest x-ray showing increased interstitial markings consistent with mild interstitial edema.  Nephrology consulted, recommends dialysis in a.m.     DAILY NEPHROLOGY SUMMARY:  6/15: got hd yesterday. naoe  6/16: NAOE, irritated   6/17: calm, resting in the bed, no new issues, On O2 supplementation.   6/18: HD done today. UF off due to hypotension. Dialyzer clotted, able to return blood. Completed dialysis treatment with heparin.  6/19: SBP 100s-120s, phos 2.2, no new c/o  6/20: no events, drowsy but arousable, denies any CP/SOB/abd pain, BP stable  6/21: No events, pt upset and unwilling to answer questions this am, BP stable    REVIEW OF SYSTEMS:    10 point ROS reviewed and is as per HPI/daily summary or otherwise negative    PMH/PSH/SH/FH:   Reviewed and unchanged since admission note    CURRENT MEDICATIONS:   Reviewed from admission to present day    VS:  BP (!) 151/65   Pulse 75   Temp 36.1 °C (96.9 °F) (Temporal)   Resp 18   Ht 1.651 m (5' 5\")   Wt 71.1 kg (156 lb 12 oz)   SpO2 96%   BMI 26.08 kg/m²     Physical Exam  Vitals and nursing note reviewed.   Constitutional:       General: She is not in acute distress.     Appearance: Normal appearance.   HENT:      Head: Normocephalic and atraumatic.   Eyes:      General: No scleral icterus.  Cardiovascular:      Rate and Rhythm: Normal rate and regular rhythm.      Comments: +L chest PC  Pulmonary:      Effort: Pulmonary effort is normal. No respiratory distress.      Breath sounds: Examination " of the right-lower field reveals decreased breath sounds. Examination of the left-lower field reveals decreased breath sounds. Decreased breath sounds present.   Abdominal:      General: Bowel sounds are normal. There is no distension.      Palpations: Abdomen is soft.   Musculoskeletal:         General: No deformity.      Right lower leg: No edema.      Left lower leg: No edema.   Skin:     General: Skin is warm and dry.      Findings: No rash.   Neurological:      General: No focal deficit present.      Mental Status: She is oriented to person, place, and time.   Psychiatric:         Mood and Affect: Affect is tearful.         Behavior: Behavior is uncooperative.         Fluids:  No intake/output data recorded.    LABS:  Recent Labs     06/19/22  0600   SODIUM 135   POTASSIUM 4.3   CHLORIDE 97   CO2 28   GLUCOSE 115*   BUN 38*   CREATININE 5.62*   CALCIUM 8.8       IMAGING:   All imaging reviewed from admission to present day    IMPRESSION:  # ESRD             - iHD q TThS and prn              - via R AVF (s/p thrombectomy and temp line)             - Follows with Dr. Mayers outpt               # HTN, controlled              - Goal BP < 140/90             - Continue BB             - UF with iHD as tolerated      # Edema  # Anemia of CKD, below target             - Goal Hgb 10-11             - Ferritin 1675, isat 11%  # CKD-MBD             - Ca:WNL             - PO4 low  #Thrombocytopenia, mild  #DMII             - per primary team   # AMS , improved   - 2/2 missed hd vs COVID infection            PLAN:  - HD today (TUES)  - Continue iHD  qTTS   - UF as tolerated, dialysate temp changed to 35C, will consider med adjustment to allow for improved UF, if needed  - Hold phos binder for now  - No dietary protein restrictions  - Dose all meds per ESRD  - Covid management per primary  - JUDITH with HD   - Check labs    **Pt to resume care with her regular nephrologist Dr. Mayers upon discharge

## 2022-06-22 ENCOUNTER — APPOINTMENT (OUTPATIENT)
Dept: RADIOLOGY | Facility: MEDICAL CENTER | Age: 67
End: 2022-06-22
Attending: INTERNAL MEDICINE
Payer: MEDICARE

## 2022-06-22 LAB
ALBUMIN SERPL BCP-MCNC: 3.1 G/DL (ref 3.2–4.9)
BUN SERPL-MCNC: 41 MG/DL (ref 8–22)
CALCIUM SERPL-MCNC: 8.8 MG/DL (ref 8.5–10.5)
CHLORIDE SERPL-SCNC: 97 MMOL/L (ref 96–112)
CO2 SERPL-SCNC: 29 MMOL/L (ref 20–33)
CREAT SERPL-MCNC: 6.14 MG/DL (ref 0.5–1.4)
GFR SERPLBLD CREATININE-BSD FMLA CKD-EPI: 7 ML/MIN/1.73 M 2
GLUCOSE SERPL-MCNC: 159 MG/DL (ref 65–99)
MAGNESIUM SERPL-MCNC: 1.9 MG/DL (ref 1.5–2.5)
PHOSPHATE SERPL-MCNC: 2.8 MG/DL (ref 2.5–4.5)
POTASSIUM SERPL-SCNC: 4.8 MMOL/L (ref 3.6–5.5)
SODIUM SERPL-SCNC: 139 MMOL/L (ref 135–145)

## 2022-06-22 PROCEDURE — 99225 PR SUBSEQUENT OBSERVATION CARE,LEVEL II: CPT | Performed by: INTERNAL MEDICINE

## 2022-06-22 PROCEDURE — G0378 HOSPITAL OBSERVATION PER HR: HCPCS

## 2022-06-22 PROCEDURE — 97530 THERAPEUTIC ACTIVITIES: CPT

## 2022-06-22 PROCEDURE — 71045 X-RAY EXAM CHEST 1 VIEW: CPT

## 2022-06-22 PROCEDURE — 700102 HCHG RX REV CODE 250 W/ 637 OVERRIDE(OP): Performed by: STUDENT IN AN ORGANIZED HEALTH CARE EDUCATION/TRAINING PROGRAM

## 2022-06-22 PROCEDURE — A9270 NON-COVERED ITEM OR SERVICE: HCPCS | Performed by: INTERNAL MEDICINE

## 2022-06-22 PROCEDURE — 700101 HCHG RX REV CODE 250: Performed by: GENERAL PRACTICE

## 2022-06-22 PROCEDURE — A9270 NON-COVERED ITEM OR SERVICE: HCPCS | Performed by: STUDENT IN AN ORGANIZED HEALTH CARE EDUCATION/TRAINING PROGRAM

## 2022-06-22 PROCEDURE — 92610 EVALUATE SWALLOWING FUNCTION: CPT

## 2022-06-22 PROCEDURE — 36415 COLL VENOUS BLD VENIPUNCTURE: CPT

## 2022-06-22 PROCEDURE — 700111 HCHG RX REV CODE 636 W/ 250 OVERRIDE (IP): Performed by: STUDENT IN AN ORGANIZED HEALTH CARE EDUCATION/TRAINING PROGRAM

## 2022-06-22 PROCEDURE — 96372 THER/PROPH/DIAG INJ SC/IM: CPT

## 2022-06-22 PROCEDURE — 700102 HCHG RX REV CODE 250 W/ 637 OVERRIDE(OP): Performed by: INTERNAL MEDICINE

## 2022-06-22 PROCEDURE — 83735 ASSAY OF MAGNESIUM: CPT

## 2022-06-22 PROCEDURE — 80069 RENAL FUNCTION PANEL: CPT

## 2022-06-22 RX ADMIN — ATORVASTATIN CALCIUM 80 MG: 80 TABLET, FILM COATED ORAL at 17:15

## 2022-06-22 RX ADMIN — METOPROLOL TARTRATE 50 MG: 25 TABLET, FILM COATED ORAL at 17:15

## 2022-06-22 RX ADMIN — LIDOCAINE 1 PATCH: 50 PATCH CUTANEOUS at 09:55

## 2022-06-22 RX ADMIN — HEPARIN SODIUM 5000 UNITS: 5000 INJECTION, SOLUTION INTRAVENOUS; SUBCUTANEOUS at 21:06

## 2022-06-22 RX ADMIN — HYDRALAZINE HYDROCHLORIDE 25 MG: 25 TABLET, FILM COATED ORAL at 04:40

## 2022-06-22 RX ADMIN — HYDRALAZINE HYDROCHLORIDE 25 MG: 25 TABLET, FILM COATED ORAL at 17:15

## 2022-06-22 RX ADMIN — METOPROLOL TARTRATE 50 MG: 25 TABLET, FILM COATED ORAL at 04:40

## 2022-06-22 RX ADMIN — HEPARIN SODIUM 5000 UNITS: 5000 INJECTION, SOLUTION INTRAVENOUS; SUBCUTANEOUS at 04:40

## 2022-06-22 RX ADMIN — GABAPENTIN 300 MG: 300 CAPSULE ORAL at 17:15

## 2022-06-22 RX ADMIN — HEPARIN SODIUM 5000 UNITS: 5000 INJECTION, SOLUTION INTRAVENOUS; SUBCUTANEOUS at 14:10

## 2022-06-22 RX ADMIN — DICLOFENAC SODIUM 2 G: 10 GEL TOPICAL at 10:04

## 2022-06-22 ASSESSMENT — COGNITIVE AND FUNCTIONAL STATUS - GENERAL
TOILETING: TOTAL
EATING MEALS: TOTAL
SUGGESTED CMS G CODE MODIFIER DAILY ACTIVITY: CN
MOVING TO AND FROM BED TO CHAIR: UNABLE
MOBILITY SCORE: 6
SUGGESTED CMS G CODE MODIFIER MOBILITY: CN
DAILY ACTIVITIY SCORE: 6
MOVING FROM LYING ON BACK TO SITTING ON SIDE OF FLAT BED: UNABLE
TURNING FROM BACK TO SIDE WHILE IN FLAT BAD: UNABLE
STANDING UP FROM CHAIR USING ARMS: TOTAL
DRESSING REGULAR UPPER BODY CLOTHING: TOTAL
CLIMB 3 TO 5 STEPS WITH RAILING: TOTAL
WALKING IN HOSPITAL ROOM: TOTAL
PERSONAL GROOMING: TOTAL
HELP NEEDED FOR BATHING: TOTAL
DRESSING REGULAR LOWER BODY CLOTHING: TOTAL

## 2022-06-22 ASSESSMENT — ENCOUNTER SYMPTOMS
VOMITING: 0
BACK PAIN: 0
COUGH: 0
CHILLS: 0
FEVER: 0
DOUBLE VISION: 0
SHORTNESS OF BREATH: 0
DIARRHEA: 0
FALLS: 0
ABDOMINAL PAIN: 0
NAUSEA: 0
BLURRED VISION: 0

## 2022-06-22 ASSESSMENT — PAIN SCALES - PAIN ASSESSMENT IN ADVANCED DEMENTIA (PAINAD)
TOTALSCORE: 4
FACIALEXPRESSION: SAD, FRIGHTENED, FROWN
BODYLANGUAGE: TENSE, DISTRESSED PACING, FIDGETING
BREATHING: NORMAL
NEGVOCALIZATION: OCCASIONAL MOAN/GROAN, LOW SPEECH, NEGATIVE/DISAPPROVING QUALITY
CONSOLABILITY: DISTRACTED OR REASSURED BY VOICE/TOUCH

## 2022-06-22 ASSESSMENT — PAIN DESCRIPTION - PAIN TYPE: TYPE: ACUTE PAIN

## 2022-06-22 ASSESSMENT — GAIT ASSESSMENTS: GAIT LEVEL OF ASSIST: UNABLE TO PARTICIPATE

## 2022-06-22 NOTE — PROGRESS NOTES
"[facility] -  PROGRESS NOTE               Author: Keyla Mccauley M.D. Date & Time: 6/22/2022  9:02 AM     HPI:   67 y.o. female who presented 6/13/2022 with lethargy from [facility].  Patient has not had dialysis for the last 10 days due to behavioral issues.  Patient tested positive for COVID on June 6.  History limited due to AMS.   In the ED, patient found to be hypertensive. Found to have BUN 88 creatinine 11.7.  CT head negative. Chest x-ray showing increased interstitial markings consistent with mild interstitial edema.  Nephrology consulted, recommends dialysis in a.m.     DAILY NEPHROLOGY SUMMARY:  6/15: got hd yesterday. naoe  6/16: NAOE, irritated   6/17: calm, resting in the bed, no new issues, On O2 supplementation.   6/18: HD done today. UF off due to hypotension. Dialyzer clotted, able to return blood. Completed dialysis treatment with heparin.  6/19: SBP 100s-120s, phos 2.2, no new c/o  6/20: no events, drowsy but arousable, denies any CP/SOB/abd pain, BP stable  6/21: No events, pt upset and unwilling to answer questions this am, BP stable  6/22: No events, tolerated HD yest with 2L UF, confused overnight, tearful and oriented x2 this am, denies any CP/SOB/LE edema/Abd pain, BP variable    REVIEW OF SYSTEMS:    10 point ROS reviewed and is as per HPI/daily summary or otherwise negative    PMH/PSH/SH/FH:   Reviewed and unchanged since admission note    CURRENT MEDICATIONS:   Reviewed from admission to present day    VS:  BP (!) 155/55   Pulse 86   Temp 36.7 °C (98.1 °F) (Temporal)   Resp 17   Ht 1.651 m (5' 5\")   Wt 71.1 kg (156 lb 12 oz)   SpO2 96%   BMI 26.08 kg/m²     Physical Exam  Vitals and nursing note reviewed.   Constitutional:       General: She is not in acute distress.     Appearance: Normal appearance.   HENT:      Head: Normocephalic and atraumatic.   Eyes:      General: No scleral icterus.  Cardiovascular:      Rate and Rhythm: Normal rate and " regular rhythm.      Comments: +L chest PC  Pulmonary:      Effort: Pulmonary effort is normal. No respiratory distress.      Breath sounds: Examination of the right-lower field reveals decreased breath sounds. Examination of the left-lower field reveals decreased breath sounds. Decreased breath sounds present.   Abdominal:      General: Bowel sounds are normal. There is no distension.      Palpations: Abdomen is soft.   Musculoskeletal:         General: No deformity.      Right lower leg: No edema.      Left lower leg: No edema.   Skin:     General: Skin is warm and dry.      Findings: No rash.   Neurological:      General: No focal deficit present.      Comments: Oriented x2 to person and place   Psychiatric:         Mood and Affect: Affect is tearful.         Behavior: Behavior is cooperative.         Fluids:  In: 500 [Dialysis:500]  Out: 2500     LABS:  Recent Labs     06/21/22  1055   SODIUM 135   POTASSIUM 5.5   CHLORIDE 95*   CO2 26   GLUCOSE 116*   BUN 65*   CREATININE 7.75*   CALCIUM 9.3       IMAGING:   All imaging reviewed from admission to present day    IMPRESSION:  # ESRD             - iHD q TThS and prn              - via R AVF (s/p thrombectomy and temp line)             - Follows with Dr. Mayers outpt               # HTN, controlled              - Goal BP < 140/90             - Continue BB             - UF with iHD as tolerated      # Edema  # Anemia of CKD, below target             - Goal Hgb 10-11             - Ferritin 1675, isat 11%  # CKD-MBD             - Ca:WNL             - PO4 low  #Thrombocytopenia, mild  #DMII             - per primary team   # AMS , improved   - 2/2 missed hd vs COVID infection            PLAN:  - No HD today (WED)  - Continue iHD  qTTS   - UF as tolerated  - Hold phos binder for now  - No dietary protein restrictions  - Dose all meds per ESRD  - Covid management per primary  - JUDITH with HD   - Monitor BP for now, decrease hydralazine if BP remains low  - Check labs in  am    **Pt to resume care with her regular nephrologist Dr. Mayers upon discharge
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
## 2022-06-22 NOTE — PROGRESS NOTES
Pt not participating in care and has not improved since her last admission. Asked MD his thoughts about getting palliative care on board considering patient's condition, quality of life and extenuating circumstances.
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
## 2022-06-22 NOTE — THERAPY
Occupational Therapy  Daily Treatment     Patient Name: Liberty Bolton  Age:  67 y.o., Sex:  female  Medical Record #: [id number]  Today's Date: 6/22/2022     Precautions  Precautions: Fall Risk    Assessment     Pt currently limited by decreased functional mobility, activity tolerance, cognition, sensation, strength, AROM, coordination, balance,  which are affecting pt's ability to complete ADLs/IADLs at baseline. Pt would benefit from OT services in the acute care setting to maximize functional recovery.      Plan    Treatment plan modified to 1 time per week for 3 visits for the following treatments:  Self Care/Activities of Daily Living and Therapeutic Activities.       Discharge Recommendations: (P) Recommend post-acute placement for additional occupational therapy services prior to discharge home         06/22/22 1121   Activities of Daily Living   Grooming Total Assist   Upper Body Dressing Total Assist   Lower Body Dressing Total Assist   Toileting Total Assist   Functional Mobility   Sit to Stand Total Assist   Bed, Chair, Wheelchair Transfer Unable to Participate   Short Term Goals   Short Term Goal # 1 min A withe UB dressing   Goal Outcome # 1 Progressing slower than expected   Short Term Goal # 2 mod A with LB dressing   Goal Outcome # 2 Progressing slower than expected   Short Term Goal # 3 mod A with ADL txfs   Goal Outcome # 3 Progressing slower than expected   Anticipated Discharge Equipment and Recommendations   Discharge Recommendations Recommend post-acute placement for additional occupational therapy services prior to discharge home

## 2022-06-22 NOTE — PROGRESS NOTES
"Rounded on patient. Pt crying and repeating \"my brother my brother\". Patient unable to verbalize problem--confused at this time. Emotional support provided. Pt inconsolable.     Pt alert and oriented to self only, disoriented to place and situation stating \"[place]\". Patient reoriented accordingly and vitals reassessed.     PO hydralazine and metoprolol held as per order as bp is 97/39 following dialysis. Pt refusing additional PO meds and PO dinner tray. Offered assistance to which pt declines. Will leave dinner tray within reach and reassess readiness.     Warm blanket provided. Safety precautions maintained. Bed in lowest position with call light and personal items within reach. No acute distress. No additional needs at this time.   "

## 2022-06-22 NOTE — THERAPY
Physical Therapy   Daily Treatment     Patient Name: Liberty Bolton  Age:  67 y.o., Sex:  female  Medical Record #: [id number]  Today's Date: 6/22/2022       Assessment    Pt continues to present with lethargy and strong disinterest in participating with therapy. Performed supine<>sit with total assist, unable to find midline and presented with posterior lean requiring support from therapist. Attempted STS from EOB, required total assist x 2 however still unable to come fully upright due to increased pain and lack of effort. Plan to reduce frequency due to lack of progress with goals. Will continue to follow to increase pt tolerance with activity and overall mobility. Will follow.       Plan    Continue current treatment plan.    DC Equipment Recommendations: Unable to determine at this time  Discharge Recommendations: Recommend post-acute placement for additional physical therapy services prior to discharge home       Objective       06/22/22 1106   Cognition    Cognition / Consciousness X   Level of Consciousness Responds to voice   Comments continues to present with lethargy and lack of effort with any movement or verbalization   Balance   Sitting Balance (Static) Dependent   Sitting Balance (Dynamic) Dependent   Standing Balance (Static) Dependent   Standing Balance (Dynamic) Dependent   Weight Shift Sitting Poor   Weight Shift Standing Absent   Comments posterior lean in sitting and standing, unable to correct with verbal cues   Gait Analysis   Gait Level Of Assist Unable to Participate   # of Stairs Climbed 0   Weight Bearing Status NR   Functional Mobility   Sit to Stand Total Assist   Bed, Chair, Wheelchair Transfer Unable to Participate   Mobility bed mobility, STS   Skilled Intervention Verbal Cuing;Sequencing;Tactile Cuing   Comments difficulty standing upright and completing STS due to posterior lean and c/o increased pain

## 2022-06-23 PROBLEM — F32.A DEPRESSION: Status: ACTIVE | Noted: 2022-06-23

## 2022-06-23 PROCEDURE — A9270 NON-COVERED ITEM OR SERVICE: HCPCS | Performed by: INTERNAL MEDICINE

## 2022-06-23 PROCEDURE — 700111 HCHG RX REV CODE 636 W/ 250 OVERRIDE (IP): Performed by: INTERNAL MEDICINE

## 2022-06-23 PROCEDURE — A9270 NON-COVERED ITEM OR SERVICE: HCPCS | Performed by: STUDENT IN AN ORGANIZED HEALTH CARE EDUCATION/TRAINING PROGRAM

## 2022-06-23 PROCEDURE — 99225 PR SUBSEQUENT OBSERVATION CARE,LEVEL II: CPT | Performed by: INTERNAL MEDICINE

## 2022-06-23 PROCEDURE — 700102 HCHG RX REV CODE 250 W/ 637 OVERRIDE(OP): Performed by: STUDENT IN AN ORGANIZED HEALTH CARE EDUCATION/TRAINING PROGRAM

## 2022-06-23 PROCEDURE — 96376 TX/PRO/DX INJ SAME DRUG ADON: CPT | Mod: XU

## 2022-06-23 PROCEDURE — 700111 HCHG RX REV CODE 636 W/ 250 OVERRIDE (IP): Performed by: STUDENT IN AN ORGANIZED HEALTH CARE EDUCATION/TRAINING PROGRAM

## 2022-06-23 PROCEDURE — 700102 HCHG RX REV CODE 250 W/ 637 OVERRIDE(OP): Performed by: INTERNAL MEDICINE

## 2022-06-23 PROCEDURE — 90935 HEMODIALYSIS ONE EVALUATION: CPT

## 2022-06-23 PROCEDURE — G0378 HOSPITAL OBSERVATION PER HR: HCPCS

## 2022-06-23 PROCEDURE — 96372 THER/PROPH/DIAG INJ SC/IM: CPT | Mod: XU

## 2022-06-23 RX ORDER — ESCITALOPRAM OXALATE 10 MG/1
10 TABLET ORAL DAILY
Status: DISCONTINUED | OUTPATIENT
Start: 2022-06-23 | End: 2022-07-12 | Stop reason: HOSPADM

## 2022-06-23 RX ADMIN — HEPARIN SODIUM 5000 UNITS: 5000 INJECTION, SOLUTION INTRAVENOUS; SUBCUTANEOUS at 14:12

## 2022-06-23 RX ADMIN — METOPROLOL TARTRATE 50 MG: 25 TABLET, FILM COATED ORAL at 17:15

## 2022-06-23 RX ADMIN — EPOETIN ALFA-EPBX 10000 UNITS: 10000 INJECTION, SOLUTION INTRAVENOUS; SUBCUTANEOUS at 09:14

## 2022-06-23 RX ADMIN — HEPARIN SODIUM 1500 UNITS: 1000 INJECTION, SOLUTION INTRAVENOUS; SUBCUTANEOUS at 07:45

## 2022-06-23 RX ADMIN — ATORVASTATIN CALCIUM 80 MG: 80 TABLET, FILM COATED ORAL at 17:15

## 2022-06-23 RX ADMIN — HYDRALAZINE HYDROCHLORIDE 25 MG: 25 TABLET, FILM COATED ORAL at 17:15

## 2022-06-23 RX ADMIN — ESCITALOPRAM OXALATE 10 MG: 10 TABLET ORAL at 12:46

## 2022-06-23 RX ADMIN — HEPARIN SODIUM 5000 UNITS: 5000 INJECTION, SOLUTION INTRAVENOUS; SUBCUTANEOUS at 04:42

## 2022-06-23 RX ADMIN — GABAPENTIN 300 MG: 300 CAPSULE ORAL at 17:15

## 2022-06-23 RX ADMIN — HEPARIN SODIUM 5000 UNITS: 5000 INJECTION, SOLUTION INTRAVENOUS; SUBCUTANEOUS at 21:52

## 2022-06-23 ASSESSMENT — LIFESTYLE VARIABLES: SUBSTANCE_ABUSE: 0

## 2022-06-23 ASSESSMENT — ENCOUNTER SYMPTOMS
SHORTNESS OF BREATH: 0
COUGH: 0
DEPRESSION: 1
PALPITATIONS: 0
VOMITING: 0
FEVER: 0
FALLS: 0
DOUBLE VISION: 0
NAUSEA: 0
BACK PAIN: 0
ABDOMINAL PAIN: 0
HALLUCINATIONS: 0
DIARRHEA: 0
SORE THROAT: 0
BLURRED VISION: 0
CHILLS: 0

## 2022-06-23 ASSESSMENT — PAIN SCALES - PAIN ASSESSMENT IN ADVANCED DEMENTIA (PAINAD)
FACIALEXPRESSION: SAD, FRIGHTENED, FROWN
NEGVOCALIZATION: OCCASIONAL MOAN/GROAN, LOW SPEECH, NEGATIVE/DISAPPROVING QUALITY
TOTALSCORE: 2
CONSOLABILITY: NO NEED TO CONSOLE
BODYLANGUAGE: RELAXED
BREATHING: NORMAL

## 2022-06-23 ASSESSMENT — PAIN DESCRIPTION - PAIN TYPE: TYPE: ACUTE PAIN

## 2022-06-23 NOTE — PROGRESS NOTES
"[facility] -  PROGRESS NOTE               Author: Keyla Mccauley M.D. Date & Time: 6/23/2022  11:26 AM     HPI:   67 y.o. female who presented 6/13/2022 with lethargy from [facility].  Patient has not had dialysis for the last 10 days due to behavioral issues.  Patient tested positive for COVID on June 6.  History limited due to AMS.   In the ED, patient found to be hypertensive. Found to have BUN 88 creatinine 11.7.  CT head negative. Chest x-ray showing increased interstitial markings consistent with mild interstitial edema.  Nephrology consulted, recommends dialysis in a.m.     DAILY NEPHROLOGY SUMMARY:  6/15: got hd yesterday. naoe  6/16: NAOE, irritated   6/17: calm, resting in the bed, no new issues, On O2 supplementation.   6/18: HD done today. UF off due to hypotension. Dialyzer clotted, able to return blood. Completed dialysis treatment with heparin.  6/19: SBP 100s-120s, phos 2.2, no new c/o  6/20: no events, drowsy but arousable, denies any CP/SOB/abd pain, BP stable  6/21: No events, pt upset and unwilling to answer questions this am, BP stable  6/22: No events, tolerated HD yest with 2L UF, confused overnight, tearful and oriented x2 this am, denies any CP/SOB/LE edema/Abd pain, BP variable  6/23: No events, feels ok, denies any CP/SOB/LE edema, BP stable, due for HD today    REVIEW OF SYSTEMS:    Unable to obtain, pt does not answer questions    PMH/PSH/SH/FH:   Reviewed and unchanged since admission note    CURRENT MEDICATIONS:   Reviewed from admission to present day    VS:  /44   Pulse 70   Temp 36.4 °C (97.5 °F) (Temporal)   Resp 18   Ht 1.651 m (5' 5\")   Wt 71.1 kg (156 lb 12 oz)   SpO2 96%   BMI 26.08 kg/m²     Physical Exam  Vitals and nursing note reviewed.   Constitutional:       General: She is not in acute distress.     Appearance: Normal appearance.   HENT:      Head: Normocephalic and atraumatic.   Eyes:      General: No scleral " icterus.  Cardiovascular:      Rate and Rhythm: Normal rate and regular rhythm.      Comments: +L chest PC  Pulmonary:      Effort: Pulmonary effort is normal. No respiratory distress.      Breath sounds: Examination of the right-lower field reveals decreased breath sounds. Examination of the left-lower field reveals decreased breath sounds. Decreased breath sounds present.   Abdominal:      General: Bowel sounds are normal. There is no distension.      Palpations: Abdomen is soft.   Musculoskeletal:         General: No deformity.      Right lower leg: No edema.      Left lower leg: No edema.   Skin:     General: Skin is warm and dry.      Findings: No rash.   Neurological:      General: No focal deficit present.      Comments: Oriented x2 to person and place   Psychiatric:         Attention and Perception: She is inattentive.         Mood and Affect: Affect is flat.         Fluids:  In: 300 [P.O.:300]  Out: -     LABS:  Recent Labs     06/21/22  1055 06/22/22  1520   SODIUM 135 139   POTASSIUM 5.5 4.8   CHLORIDE 95* 97   CO2 26 29   GLUCOSE 116* 159*   BUN 65* 41*   CREATININE 7.75* 6.14*   CALCIUM 9.3 8.8       IMAGING:   All imaging reviewed from admission to present day    IMPRESSION:  # ESRD             - iHD q TThS and prn              - via R AVF (s/p thrombectomy and temp line)             - Follows with Dr. Mayers outpt               # HTN, controlled              - Goal BP < 140/90             - Continue BB             - UF with iHD as tolerated      # Edema--resolved  # Anemia of CKD, below target             - Goal Hgb 10-11             - Ferritin 1675, isat 11%  # CKD-MBD             - Ca:WNL             - PO4 low  #Thrombocytopenia, mild  #DMII             - per primary team   # AMS , improved   - 2/2 missed hd vs COVID infection            PLAN:  - HD today (THURS)  - Continue iHD  qTTS   - UF as tolerated  - Hold phos binder for now  - No dietary protein restrictions  - Dose all meds per ESRD  - Covid  management per primary  - JUDITH with HD   - Monitor BP for now, decrease hydralazine if BP remains low  - Consider palliative care consult    **Pt to resume care with her regular nephrologist Dr. Mayers who is not affiliated with [facility] upon discharge.
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
## 2022-06-23 NOTE — CARE PLAN
Problem: Mobility  Goal: Patient's capacity to carry out activities will improve  Outcome: Not Progressing     Problem: Knowledge Deficit - Standard  Goal: Patient and family/care givers will demonstrate understanding of plan of care, disease process/condition, diagnostic tests and medications  Outcome: Progressing     Problem: Skin Integrity  Goal: Skin integrity is maintained or improved  Outcome: Progressing     Problem: Fall Risk  Goal: Patient will remain free from falls  Outcome: Progressing     Problem: Psychosocial  Goal: Patient's level of anxiety will decrease  Outcome: Progressing  Goal: Patient's ability to verbalize feelings about condition will improve  Outcome: Progressing  Goal: Patient's ability to re-evaluate and adapt role responsibilities will improve  Outcome: Progressing  Goal: Patient and family will demonstrate ability to cope with life altering diagnosis and/or procedure  Outcome: Progressing  Goal: Spiritual and cultural needs incorporated into hospitalization  Outcome: Progressing     Problem: Communication  Goal: The ability to communicate needs accurately and effectively will improve  Outcome: Progressing     Problem: Discharge Barriers/Planning  Goal: Patient's continuum of care needs are met  Outcome: Progressing     Problem: Hemodynamics  Goal: Patient's hemodynamics, fluid balance and neurologic status will be stable or improve  Outcome: Progressing     Problem: Risk for Aspiration  Goal: Patient's risk for aspiration will be absent or decrease  Outcome: Progressing     Problem: Nutrition  Goal: Patient's nutritional and fluid intake will be adequate or improve  Outcome: Progressing  Goal: Enteral nutrition will be maintained or improve  Outcome: Progressing  Goal: Enteral nutrition will be maintained or improve  Outcome: Progressing     Problem: Bowel Elimination  Goal: Establish and maintain regular bowel function  Outcome: Progressing     Problem: Pain - Standard  Goal:  Alleviation of pain or a reduction in pain to the patient’s comfort goal  Outcome: Progressing   The patient is Watcher - Medium risk of patient condition declining or worsening    Shift Goals  Clinical Goals: safety, VSS, q2 turn  Patient Goals: rest  Family Goals: CARIE    Progress made toward(s) clinical / shift goals:  Safety maintained. VSS and patient not requiring PRN hydralazine to manage BP. Q2 turn continued. Pt more alert and able to answer more questions today than last week, however she is still only oriented x2 and is poorly engaged in daily activities.     Patient is not progressing towards the following goals:      Problem: Mobility  Goal: Patient's capacity to carry out activities will improve  Outcome: Not Progressing    Patient does not participate in care or ambulation attempts. She is unable to physically engage and continues to be quite lethargic during the day. She frequently closes her eyes during conversations and must be prompted frequently to re-engage with conversation/ treatment interventions. MD contacted to ask about getting palliative care on board.

## 2022-06-23 NOTE — DISCHARGE PLANNING
Received Choice form at [phone]  Agency/Facility Name: [facility]  Referral sent per Choice form @ [phone]

## 2022-06-23 NOTE — PROGRESS NOTES
[facility] Progress Note      HD today x 3 hours per Dr. Mccauley.   Initiated at 0745 and ended at 1045.   Patient assessed prior tx.  Alert and oriented x 2-3. Aware of self and place. No SOB, O2 sat 94-96% on room air. Repositioned patient for comfort. Warm blanket provided       UF Net: 1,600 mL    Patient had low BP at the last hour of dialysis, Patient verbalized,not feeling well. No UF and 100 cc bolus given. Patient BP stable post tx. Denies dizziness, lightheadedness.         Blood returned. Applied gauze and held SWATI AVF site for 10 minutes. Verified no bleeding post treatment. Bruit and thrill present post dialysis.   Instructions given to Primary RN that if bleeding occurs on the AVF site, change dressing and held the site with pressure.       Report given to Shaunna Centeno RN.
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
## 2022-06-24 PROCEDURE — G0378 HOSPITAL OBSERVATION PER HR: HCPCS

## 2022-06-24 PROCEDURE — 700111 HCHG RX REV CODE 636 W/ 250 OVERRIDE (IP): Performed by: STUDENT IN AN ORGANIZED HEALTH CARE EDUCATION/TRAINING PROGRAM

## 2022-06-24 PROCEDURE — A9270 NON-COVERED ITEM OR SERVICE: HCPCS | Performed by: GENERAL PRACTICE

## 2022-06-24 PROCEDURE — 700101 HCHG RX REV CODE 250: Performed by: GENERAL PRACTICE

## 2022-06-24 PROCEDURE — 700102 HCHG RX REV CODE 250 W/ 637 OVERRIDE(OP): Performed by: INTERNAL MEDICINE

## 2022-06-24 PROCEDURE — 700102 HCHG RX REV CODE 250 W/ 637 OVERRIDE(OP): Performed by: STUDENT IN AN ORGANIZED HEALTH CARE EDUCATION/TRAINING PROGRAM

## 2022-06-24 PROCEDURE — 96372 THER/PROPH/DIAG INJ SC/IM: CPT

## 2022-06-24 PROCEDURE — A9270 NON-COVERED ITEM OR SERVICE: HCPCS | Performed by: INTERNAL MEDICINE

## 2022-06-24 PROCEDURE — 700102 HCHG RX REV CODE 250 W/ 637 OVERRIDE(OP): Performed by: GENERAL PRACTICE

## 2022-06-24 PROCEDURE — A9270 NON-COVERED ITEM OR SERVICE: HCPCS | Performed by: STUDENT IN AN ORGANIZED HEALTH CARE EDUCATION/TRAINING PROGRAM

## 2022-06-24 PROCEDURE — 99225 PR SUBSEQUENT OBSERVATION CARE,LEVEL II: CPT | Performed by: INTERNAL MEDICINE

## 2022-06-24 RX ADMIN — HYDRALAZINE HYDROCHLORIDE 25 MG: 25 TABLET, FILM COATED ORAL at 04:58

## 2022-06-24 RX ADMIN — HEPARIN SODIUM 5000 UNITS: 5000 INJECTION, SOLUTION INTRAVENOUS; SUBCUTANEOUS at 14:28

## 2022-06-24 RX ADMIN — ESCITALOPRAM OXALATE 10 MG: 10 TABLET ORAL at 04:58

## 2022-06-24 RX ADMIN — METOPROLOL TARTRATE 50 MG: 25 TABLET, FILM COATED ORAL at 16:20

## 2022-06-24 RX ADMIN — HYDRALAZINE HYDROCHLORIDE 25 MG: 25 TABLET, FILM COATED ORAL at 16:20

## 2022-06-24 RX ADMIN — ATORVASTATIN CALCIUM 80 MG: 80 TABLET, FILM COATED ORAL at 16:20

## 2022-06-24 RX ADMIN — METOPROLOL TARTRATE 50 MG: 25 TABLET, FILM COATED ORAL at 04:58

## 2022-06-24 RX ADMIN — HEPARIN SODIUM 5000 UNITS: 5000 INJECTION, SOLUTION INTRAVENOUS; SUBCUTANEOUS at 22:35

## 2022-06-24 RX ADMIN — GABAPENTIN 300 MG: 300 CAPSULE ORAL at 16:20

## 2022-06-24 RX ADMIN — LIDOCAINE 1 PATCH: 50 PATCH CUTANEOUS at 10:00

## 2022-06-24 RX ADMIN — DOCUSATE SODIUM 50 MG AND SENNOSIDES 8.6 MG 2 TABLET: 8.6; 5 TABLET, FILM COATED ORAL at 04:57

## 2022-06-24 RX ADMIN — HEPARIN SODIUM 5000 UNITS: 5000 INJECTION, SOLUTION INTRAVENOUS; SUBCUTANEOUS at 04:57

## 2022-06-24 ASSESSMENT — ENCOUNTER SYMPTOMS
FALLS: 0
DEPRESSION: 1
COUGH: 0
BACK PAIN: 0
ABDOMINAL PAIN: 0
FEVER: 0
NAUSEA: 0
DOUBLE VISION: 0
HALLUCINATIONS: 0
VOMITING: 0
SHORTNESS OF BREATH: 0
BLURRED VISION: 0
CHILLS: 0
SORE THROAT: 0
DIARRHEA: 0
PALPITATIONS: 0

## 2022-06-24 ASSESSMENT — LIFESTYLE VARIABLES: SUBSTANCE_ABUSE: 0

## 2022-06-24 NOTE — PROGRESS NOTES
"[facility] -  PROGRESS NOTE               Author: ASHLY Moreno, CNN-NP Date & Time: 6/24/2022  10:50 AM     HPI:   67 y.o. female who presented 6/13/2022 with lethargy from [facility].  Patient has not had dialysis for the last 10 days due to behavioral issues.  Patient tested positive for COVID on June 6.  History limited due to AMS.   In the ED, patient found to be hypertensive. Found to have BUN 88 creatinine 11.7.  CT head negative. Chest x-ray showing increased interstitial markings consistent with mild interstitial edema.  Nephrology consulted, recommends dialysis in a.m.     DAILY NEPHROLOGY SUMMARY:  6/15: got hd yesterday. naoe  6/16: NAOE, irritated   6/17: calm, resting in the bed, no new issues, On O2 supplementation.   6/18: HD done today. UF off due to hypotension. Dialyzer clotted, able to return blood. Completed dialysis treatment with heparin.  6/19: SBP 100s-120s, phos 2.2, no new c/o  6/20: no events, drowsy but arousable, denies any CP/SOB/abd pain, BP stable  6/21: No events, pt upset and unwilling to answer questions this am, BP stable  6/22: No events, tolerated HD yest with 2L UF, confused overnight, tearful and oriented x2 this am, denies any CP/SOB/LE edema/Abd pain, BP variable  6/23: No events, feels ok, denies any CP/SOB/LE edema, BP stable, due for HD today  6/24: laying in bed, sleepy, no complaints, tolerated iHD yesterday UF: 1.6L, pending SNF     REVIEW OF SYSTEMS:    Unable to obtain, pt does not answer questions    PMH/PSH/SH/FH:   Reviewed and unchanged since admission note    CURRENT MEDICATIONS:   Reviewed from admission to present day    VS:  BP (!) 152/63   Pulse 85   Temp 36.9 °C (98.5 °F) (Temporal)   Resp 16   Ht 1.651 m (5' 5\")   Wt 71.1 kg (156 lb 12 oz)   SpO2 96%   BMI 26.08 kg/m²     Physical Exam  Vitals and nursing note reviewed.   Constitutional:       General: She is not in acute distress.     Appearance: Normal " appearance.   HENT:      Head: Normocephalic and atraumatic.   Eyes:      General: No scleral icterus.  Cardiovascular:      Rate and Rhythm: Normal rate and regular rhythm.      Comments: +L chest PC  Pulmonary:      Effort: Pulmonary effort is normal. No respiratory distress.      Breath sounds: Examination of the right-lower field reveals decreased breath sounds. Examination of the left-lower field reveals decreased breath sounds. Decreased breath sounds present.   Abdominal:      General: Bowel sounds are normal. There is no distension.      Palpations: Abdomen is soft.   Musculoskeletal:         General: No deformity.      Right lower leg: No edema.      Left lower leg: No edema.   Skin:     General: Skin is warm and dry.      Findings: No rash.   Neurological:      General: No focal deficit present.      Comments: Oriented x2 to person and place   Psychiatric:         Attention and Perception: She is inattentive.         Mood and Affect: Affect is flat.         Fluids:  In: 960 [P.O.:360; Dialysis:600]  Out: 2200     LABS:  Recent Labs     06/21/22  1055 06/22/22  1520   SODIUM 135 139   POTASSIUM 5.5 4.8   CHLORIDE 95* 97   CO2 26 29   GLUCOSE 116* 159*   BUN 65* 41*   CREATININE 7.75* 6.14*   CALCIUM 9.3 8.8       IMAGING:   All imaging reviewed from admission to present day    IMPRESSION:  # ESRD             - iHD q TThS and prn              - via R AVF (s/p thrombectomy and temp line)             - Follows with Dr. Mayers outpt               # HTN, controlled              - Goal BP < 140/90             - Continue BB             - UF with iHD as tolerated      # Edema--resolved  # Anemia of CKD, below target             - Goal Hgb 10-11             - Ferritin 1675, isat 11%  # CKD-MBD             - Ca:WNL             - PO4 low  #Thrombocytopenia, mild  #DMII             - per primary team   # AMS , improved   - 2/2 missed hd vs COVID infection            PLAN:  - No iHD today, next tx due tomorrow (SAT)  -  Continue iHD  qTTS   - UF as tolerated  - Hold phos binder for now  - No dietary protein restrictions  - Dose all meds per ESRD  - Covid management per primary  - JUDITH with HD   - Monitor BP for now, decrease hydralazine if BP remains low  - Consider palliative care consult    **Pt to resume care with her regular nephrologist Dr. Mayers who is not affiliated with [facility] upon discharge.

## 2022-06-24 NOTE — CARE PLAN
The patient is Stable - Low risk of patient condition declining or worsening    Shift Goals  Clinical Goals: HD, safety, emotional support  Patient Goals: rest, emotional support  Family Goals: CARIE    Progress made toward(s) clinical / shift goals:    Problem: Knowledge Deficit - Standard  Goal: Patient and family/care givers will demonstrate understanding of plan of care, disease process/condition, diagnostic tests and medications  Outcome: Progressing     Problem: Skin Integrity  Goal: Skin integrity is maintained or improved  Outcome: Progressing     Problem: Psychosocial  Goal: Patient's level of anxiety will decrease  Outcome: Progressing       Patient is not progressing towards the following goals:      Problem: Fall Risk  Goal: Patient will remain free from falls  Outcome: Not Progressing     Problem: Communication  Goal: The ability to communicate needs accurately and effectively will improve  Outcome: Not Progressing     Problem: Discharge Barriers/Planning  Goal: Patient's continuum of care needs are met  Outcome: Not Progressing

## 2022-06-24 NOTE — PROGRESS NOTES
Telesitter discontinued. Pt not deemed safety risk to self at this point. Isolation also taken off today as patient is at her 10 day vanessa. Will continue to monitor.

## 2022-06-24 NOTE — DISCHARGE PLANNING
"MSW sent a message to DPA via Teams for an update on SNF placement.       @ 1:14 PM sent another message to DPA for an update on SNF placement status.    MSW sent a message to a different DPA, and within a couple of minutes, received this reply.  \"[1:23 PM] Lily Garcias, i called Priscilla the liaison earlier today and she said as of right now she is unsure when they will start accepting patients again. She said she will let is know.      "

## 2022-06-24 NOTE — CONSULTS
Reason for PC Consult: Advance Care Planning    Consulted by: Dr. Phipps    Assessment:  General: This is a 67 year old female with PMHx of hypertension, hyperlipidemia, type 2 diabetes A1c 5.4, ESRD on HD TTS, recent admission for thrombosed RUE AVF s/p thrombectomy and revision of RUE AVF with temp HD placement, anemia of CKD, thrombocytopenia and recent COVID infection on 06/6/2022 who was sent from [facility] on 06/13/2022 for uremic encephalopathy. Pt was admitted to [facility] 5/21/2022 then d/c to [facility]. Pt has a hx of Breast Cancer with chemo/radiation. She then started dialysis due to side effects from her chemotherapy per her  Ruperto. He was not able to remember what yr she started dialysis.       Social: Prior to last admit pt was living with her son Steve who lives in [facility] and receiving dialysis there, when she returned to live with her  in [facility] she tried to set up services in [place] with the dialysis treatment and was not able to get appointments. Per Ruperto that is why she was admitted to the hospital in 5/21/2022    Consults: Nephrology  Dyspnea: No-    Last BM: 06/24/22-    Pain: No-    Depression: No-    Dementia: No;       Spiritual:  Is Tenriism or spirituality important for coping with this illness? No-    Has a  or spiritual provider visit been requested? No    Palliative Performance Scale: 30%    Advance Directive: None-    DPOA: No-    POLST: No-      Code Status: Full-      Outcome:  Per Dr. Phipps Pt does not currently have capacity to make her own decisions. Introduced self and role of Palliative care to pt's . Assessed pt's understanding of pt's current medical status, overall caprice picture, and options for future care.  saw pt when she first was admitted to the hospital for the second time. Helped provide a clinical update. He was surprised to hear that her mental status is still confused. He has not been able to  see pt due to travel cost. He was not able to tell this RN the exact yr when she started dialysis. He stated that she had treatment for breast cancer and the treatment caused her kidneys to fail. He feels that she has been on dialysis for 7-8 yrs. Explained that with long term dialysis there is decline in it's effects and high risk for infection with other issues such as thrombus. Ruperto verbalized his understanding.     PC RN explored pt's values and beliefs and preferences in order to clarify GOC. Ruperto stated that prior to coming into [facility] in May she has had multiple admits to [facility] and [facility]. She lived with her son for some time and then just moved back and had trouble getting her dialysis appointments set up.     Explained at this time Liberty is not able to make her own medical decisions therefore he is the direct medical decision maker. Asked Brain if Liberty had ever completed a Advance directive and he had said no he does not believe so. PC RN quarried if Liberty ever addressed CPR and if she would want mechanical ventilation. David stated that she has voice that she wants to be a Full code and had a conversation when with the medical providers in the past about her wishes. Explained that she is currently a Full code described the measures employed in a full code including CPR, medications, and possibly defibrillation.  Further explained that a DNR would not preclude any treatments/interventions offered. David does not want to make any changes at this time. He would like to see if she improves and returns back to her baseline. He was not aware that she tested positive for Covid. Brain does communicate with Liberty's son Steve. He raised Steve since he was 1/12 considers him his son. Brain would like a update from the MD about her progress and stated he will be available tomorrow.   PC RN asked Brain to consider what gives Liberty quality of life and if she continues to have mental status changes and unable to  participate in therapies there may be challenging decisions that will need to be made in the future. Ruperto acknowledged that he does not like to think about that and is hopeful that she will be able to improve with consistent dialysis.     Active listening, reflection, reminiscing, validation and normalization and empathic support provided throughout the encounter. All questions answered and PC contact information provided.     Updated: Dr. Phipps, RN CM     Plan: Continue to be a support will reach out to Dr. Phipps to contact pt's  on 6/25/2022 for a clinical update.     Thank you for allowing Palliative Care to participate in this patient's care. Please feel free to call [phone] with any questions or concerns.
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
## 2022-06-24 NOTE — CARE PLAN
Problem: Knowledge Deficit - Standard  Goal: Patient and family/care givers will demonstrate understanding of plan of care, disease process/condition, diagnostic tests and medications  Outcome: Progressing     Problem: Skin Integrity  Goal: Skin integrity is maintained or improved  Outcome: Progressing     Problem: Fall Risk  Goal: Patient will remain free from falls  Outcome: Progressing     Problem: Psychosocial  Goal: Patient's level of anxiety will decrease  Outcome: Progressing  Goal: Patient's ability to verbalize feelings about condition will improve  Outcome: Progressing  Goal: Patient's ability to re-evaluate and adapt role responsibilities will improve  Outcome: Progressing  Goal: Patient and family will demonstrate ability to cope with life altering diagnosis and/or procedure  Outcome: Progressing  Goal: Spiritual and cultural needs incorporated into hospitalization  Outcome: Progressing     Problem: Communication  Goal: The ability to communicate needs accurately and effectively will improve  Outcome: Progressing     Problem: Discharge Barriers/Planning  Goal: Patient's continuum of care needs are met  Outcome: Progressing     Problem: Hemodynamics  Goal: Patient's hemodynamics, fluid balance and neurologic status will be stable or improve  Outcome: Progressing     Problem: Respiratory  Goal: Patient will achieve/maintain optimum respiratory ventilation and gas exchange  Outcome: Progressing     Problem: Risk for Aspiration  Goal: Patient's risk for aspiration will be absent or decrease  Outcome: Progressing     Problem: Nutrition  Goal: Patient's nutritional and fluid intake will be adequate or improve  Outcome: Progressing  Goal: Enteral nutrition will be maintained or improve  Outcome: Progressing  Goal: Enteral nutrition will be maintained or improve  Outcome: Progressing     Problem: Bowel Elimination  Goal: Establish and maintain regular bowel function  Outcome: Progressing     Problem:  Mobility  Goal: Patient's capacity to carry out activities will improve  Outcome: Progressing     Problem: Pain - Standard  Goal: Alleviation of pain or a reduction in pain to the patient’s comfort goal  Outcome: Progressing   The patient is Stable - Low risk of patient condition declining or worsening    Shift Goals  Clinical Goals: HD, safety, emotional support, VSS, q2 turns  Patient Goals: rest, emotional support  Family Goals: CARIE    Progress made toward(s) clinical / shift goals:  Pt completed HD with 1.6 L pulled off. Safety maintained and emotional support provided. RN concerned pt seemed depressed and asked MD for psych consult. MD wrote for Lexapro. Pt states she has taken this before. VSS throughout the day. Q2 turns continued and offloading provided for sacral wound protection. Pt is more clear today than she has been the last few days, though she still has baseline confusion and is currently oriented to person and place only.

## 2022-06-25 LAB
ALBUMIN SERPL BCP-MCNC: 3.2 G/DL (ref 3.2–4.9)
ALBUMIN/GLOB SERPL: 0.8 G/DL
ALP SERPL-CCNC: 66 U/L (ref 30–99)
ALT SERPL-CCNC: 36 U/L (ref 2–50)
ANION GAP SERPL CALC-SCNC: 13 MMOL/L (ref 7–16)
AST SERPL-CCNC: 22 U/L (ref 12–45)
BILIRUB SERPL-MCNC: 0.4 MG/DL (ref 0.1–1.5)
BUN SERPL-MCNC: 49 MG/DL (ref 8–22)
CALCIUM SERPL-MCNC: 9.5 MG/DL (ref 8.5–10.5)
CHLORIDE SERPL-SCNC: 98 MMOL/L (ref 96–112)
CO2 SERPL-SCNC: 27 MMOL/L (ref 20–33)
CREAT SERPL-MCNC: 6.58 MG/DL (ref 0.5–1.4)
GFR SERPLBLD CREATININE-BSD FMLA CKD-EPI: 6 ML/MIN/1.73 M 2
GLOBULIN SER CALC-MCNC: 4.1 G/DL (ref 1.9–3.5)
GLUCOSE SERPL-MCNC: 120 MG/DL (ref 65–99)
MAGNESIUM SERPL-MCNC: 2 MG/DL (ref 1.5–2.5)
PHOSPHATE SERPL-MCNC: 3.3 MG/DL (ref 2.5–4.5)
POTASSIUM SERPL-SCNC: 5.2 MMOL/L (ref 3.6–5.5)
PROT SERPL-MCNC: 7.3 G/DL (ref 6–8.2)
SODIUM SERPL-SCNC: 138 MMOL/L (ref 135–145)

## 2022-06-25 PROCEDURE — A9270 NON-COVERED ITEM OR SERVICE: HCPCS | Performed by: GENERAL PRACTICE

## 2022-06-25 PROCEDURE — 700111 HCHG RX REV CODE 636 W/ 250 OVERRIDE (IP): Performed by: STUDENT IN AN ORGANIZED HEALTH CARE EDUCATION/TRAINING PROGRAM

## 2022-06-25 PROCEDURE — A9270 NON-COVERED ITEM OR SERVICE: HCPCS | Performed by: INTERNAL MEDICINE

## 2022-06-25 PROCEDURE — 700102 HCHG RX REV CODE 250 W/ 637 OVERRIDE(OP): Performed by: INTERNAL MEDICINE

## 2022-06-25 PROCEDURE — 700101 HCHG RX REV CODE 250: Performed by: GENERAL PRACTICE

## 2022-06-25 PROCEDURE — A9270 NON-COVERED ITEM OR SERVICE: HCPCS | Performed by: STUDENT IN AN ORGANIZED HEALTH CARE EDUCATION/TRAINING PROGRAM

## 2022-06-25 PROCEDURE — 700102 HCHG RX REV CODE 250 W/ 637 OVERRIDE(OP): Performed by: STUDENT IN AN ORGANIZED HEALTH CARE EDUCATION/TRAINING PROGRAM

## 2022-06-25 PROCEDURE — G0378 HOSPITAL OBSERVATION PER HR: HCPCS

## 2022-06-25 PROCEDURE — 700102 HCHG RX REV CODE 250 W/ 637 OVERRIDE(OP): Performed by: GENERAL PRACTICE

## 2022-06-25 PROCEDURE — 700111 HCHG RX REV CODE 636 W/ 250 OVERRIDE (IP)

## 2022-06-25 PROCEDURE — 99225 PR SUBSEQUENT OBSERVATION CARE,LEVEL II: CPT | Performed by: INTERNAL MEDICINE

## 2022-06-25 PROCEDURE — 96376 TX/PRO/DX INJ SAME DRUG ADON: CPT | Mod: XU

## 2022-06-25 PROCEDURE — 80053 COMPREHEN METABOLIC PANEL: CPT

## 2022-06-25 PROCEDURE — 83735 ASSAY OF MAGNESIUM: CPT

## 2022-06-25 PROCEDURE — 84100 ASSAY OF PHOSPHORUS: CPT

## 2022-06-25 PROCEDURE — 90935 HEMODIALYSIS ONE EVALUATION: CPT

## 2022-06-25 PROCEDURE — 96372 THER/PROPH/DIAG INJ SC/IM: CPT | Mod: XU

## 2022-06-25 RX ORDER — HEPARIN SODIUM 1000 [USP'U]/ML
INJECTION, SOLUTION INTRAVENOUS; SUBCUTANEOUS
Status: COMPLETED
Start: 2022-06-25 | End: 2022-06-25

## 2022-06-25 RX ADMIN — HEPARIN SODIUM 1500 UNITS: 1000 INJECTION, SOLUTION INTRAVENOUS; SUBCUTANEOUS at 09:15

## 2022-06-25 RX ADMIN — ESCITALOPRAM OXALATE 10 MG: 10 TABLET ORAL at 05:22

## 2022-06-25 RX ADMIN — METOPROLOL TARTRATE 50 MG: 25 TABLET, FILM COATED ORAL at 05:22

## 2022-06-25 RX ADMIN — HEPARIN SODIUM 5000 UNITS: 5000 INJECTION, SOLUTION INTRAVENOUS; SUBCUTANEOUS at 13:03

## 2022-06-25 RX ADMIN — EPOETIN ALFA-EPBX 10000 UNITS: 10000 INJECTION, SOLUTION INTRAVENOUS; SUBCUTANEOUS at 11:45

## 2022-06-25 RX ADMIN — DOCUSATE SODIUM 50 MG AND SENNOSIDES 8.6 MG 2 TABLET: 8.6; 5 TABLET, FILM COATED ORAL at 05:21

## 2022-06-25 RX ADMIN — HYDRALAZINE HYDROCHLORIDE 25 MG: 25 TABLET, FILM COATED ORAL at 17:17

## 2022-06-25 RX ADMIN — GABAPENTIN 300 MG: 300 CAPSULE ORAL at 17:17

## 2022-06-25 RX ADMIN — LIDOCAINE 1 PATCH: 50 PATCH CUTANEOUS at 13:03

## 2022-06-25 RX ADMIN — HEPARIN SODIUM 5000 UNITS: 5000 INJECTION, SOLUTION INTRAVENOUS; SUBCUTANEOUS at 22:11

## 2022-06-25 RX ADMIN — ATORVASTATIN CALCIUM 80 MG: 80 TABLET, FILM COATED ORAL at 17:17

## 2022-06-25 RX ADMIN — HEPARIN SODIUM 5000 UNITS: 5000 INJECTION, SOLUTION INTRAVENOUS; SUBCUTANEOUS at 05:21

## 2022-06-25 RX ADMIN — DICLOFENAC SODIUM 2 G: 10 GEL TOPICAL at 05:34

## 2022-06-25 RX ADMIN — HYDRALAZINE HYDROCHLORIDE 25 MG: 25 TABLET, FILM COATED ORAL at 05:22

## 2022-06-25 ASSESSMENT — ENCOUNTER SYMPTOMS
CHILLS: 0
SHORTNESS OF BREATH: 0
NAUSEA: 0
HALLUCINATIONS: 0
DOUBLE VISION: 0
BLURRED VISION: 0
FALLS: 0
DEPRESSION: 1
VOMITING: 0
DIARRHEA: 0
PALPITATIONS: 0
COUGH: 0
ABDOMINAL PAIN: 0
FEVER: 0
SORE THROAT: 0
BACK PAIN: 0

## 2022-06-25 ASSESSMENT — LIFESTYLE VARIABLES: SUBSTANCE_ABUSE: 0

## 2022-06-25 NOTE — CARE PLAN
The patient is Stable - Low risk of patient condition declining or worsening    Shift Goals  Clinical Goals: safety, q2 turns  Patient Goals: sleep  Family Goals: n/a      Patient is not progressing towards the following goals:      Problem: Communication  Goal: The ability to communicate needs accurately and effectively will improve  Outcome: Not Progressing  Note: Pt is incontinent of bowel and doesn't communicate if/when she needs to go.  Doesn't use call light.     Problem: Self Care  Goal: Patient will have the ability to perform ADLs independently or with assistance (bathe, groom, dress, toilet and feed)  Outcome: Not Progressing  Note: Pt unable to perform ADL's.  Requires full assistance for all ADLs

## 2022-06-25 NOTE — PROGRESS NOTES
Pt had an uneventful night.  Slept through the night.  Took PO medication this AM. Pt participated in oral care, but was unable to brush mouth thoroughly without assistance.

## 2022-06-25 NOTE — PROGRESS NOTES
"[facility] -  PROGRESS NOTE               Author: Keyla Mccauley M.D., CNN-NP Date & Time: 6/25/2022  11:00 AM     HPI:   67 y.o. female who presented 6/13/2022 with lethargy from [facility].  Patient has not had dialysis for the last 10 days due to behavioral issues.  Patient tested positive for COVID on June 6.  History limited due to AMS.   In the ED, patient found to be hypertensive. Found to have BUN 88 creatinine 11.7.  CT head negative. Chest x-ray showing increased interstitial markings consistent with mild interstitial edema.  Nephrology consulted, recommends dialysis in a.m.     DAILY NEPHROLOGY SUMMARY:  6/15: got hd yesterday. naoe  6/16: NAOE, irritated   6/17: calm, resting in the bed, no new issues, On O2 supplementation.   6/18: HD done today. UF off due to hypotension. Dialyzer clotted, able to return blood. Completed dialysis treatment with heparin.  6/19: SBP 100s-120s, phos 2.2, no new c/o  6/20: no events, drowsy but arousable, denies any CP/SOB/abd pain, BP stable  6/21: No events, pt upset and unwilling to answer questions this am, BP stable  6/22: No events, tolerated HD yest with 2L UF, confused overnight, tearful and oriented x2 this am, denies any CP/SOB/LE edema/Abd pain, BP variable  6/23: No events, feels ok, denies any CP/SOB/LE edema, BP stable, due for HD today  6/24: laying in bed, sleepy, no complaints, tolerated iHD yesterday UF: 1.6L, pending SNF   6/25: No events, seen and examined on hemodialysis VSS--see dialysis treatment sheet for full details, drowsy, denies any CP/SOB    REVIEW OF SYSTEMS:    Unable to obtain, pt only answers limited questions intermittently    PMH/PSH/SH/FH:   Reviewed and unchanged since admission note    CURRENT MEDICATIONS:   Reviewed from admission to present day    VS:  BP (!) 95/51 Comment: Rn Notifed  Pulse 68   Temp 36.3 °C (97.3 °F) (Temporal)   Resp 17   Ht 1.651 m (5' 5\")   Wt 71.1 kg (156 lb 12 oz)   SpO2 " 93%   BMI 26.08 kg/m²     Physical Exam  Vitals and nursing note reviewed.   Constitutional:       General: She is not in acute distress.     Appearance: Normal appearance.      Comments: Drowsy but arousable   HENT:      Head: Normocephalic and atraumatic.   Eyes:      General: No scleral icterus.  Cardiovascular:      Rate and Rhythm: Normal rate and regular rhythm.      Comments: +L chest PC  Pulmonary:      Effort: Pulmonary effort is normal. No respiratory distress.      Breath sounds: Examination of the right-lower field reveals decreased breath sounds. Examination of the left-lower field reveals decreased breath sounds. Decreased breath sounds present.   Abdominal:      General: Bowel sounds are normal. There is no distension.      Palpations: Abdomen is soft.   Musculoskeletal:         General: No deformity.      Right lower leg: No edema.      Left lower leg: No edema.   Skin:     General: Skin is warm and dry.      Findings: No rash.   Neurological:      General: No focal deficit present.      Comments: Oriented x2 to person and place   Psychiatric:         Attention and Perception: She is inattentive.         Mood and Affect: Affect is flat.      Comments: Drowsy and only responds intermittently to questions         Fluids:  No intake/output data recorded.    LABS:  Recent Labs     06/22/22  1520 06/25/22  0329   SODIUM 139 138   POTASSIUM 4.8 5.2   CHLORIDE 97 98   CO2 29 27   GLUCOSE 159* 120*   BUN 41* 49*   CREATININE 6.14* 6.58*   CALCIUM 8.8 9.5       IMAGING:   All imaging reviewed from admission to present day    IMPRESSION:  # ESRD             - iHD q TThS and prn              - via R AVF (s/p thrombectomy and temp line)             - Follows with Dr. Mayers outpt               # HTN, controlled              - Goal BP < 140/90             - Continue BB             - UF with iHD as tolerated      # Edema--resolved  # Anemia of CKD, below target             - Goal Hgb 10-11             - Ferritin  1675, isat 11%  # CKD-MBD             - Ca:WNL             - PO4 low  #Thrombocytopenia, mild  #DMII             - per primary team   # AMS , improved   - 2/2 missed hd vs COVID infection            PLAN:  - iHD today (SAT)  - Continue iHD  qTTS   - UF as tolerated  - Hold phos binder for now  - No dietary protein restrictions  - Dose all meds per ESRD  - Covid management per primary  - JUDITH with HD   - BP has been variable  - Monitor BP for now, decrease hydralazine if BP remains low  - Palliative consulted    **Upon discharge pt to resume care with her regular nephrologist Dr. Mayers who is not affiliated with [facility]
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
## 2022-06-26 LAB — GLUCOSE BLD STRIP.AUTO-MCNC: 123 MG/DL (ref 65–99)

## 2022-06-26 PROCEDURE — 700102 HCHG RX REV CODE 250 W/ 637 OVERRIDE(OP): Performed by: INTERNAL MEDICINE

## 2022-06-26 PROCEDURE — 82962 GLUCOSE BLOOD TEST: CPT

## 2022-06-26 PROCEDURE — 700102 HCHG RX REV CODE 250 W/ 637 OVERRIDE(OP): Performed by: GENERAL PRACTICE

## 2022-06-26 PROCEDURE — 96372 THER/PROPH/DIAG INJ SC/IM: CPT | Mod: XU

## 2022-06-26 PROCEDURE — 99225 PR SUBSEQUENT OBSERVATION CARE,LEVEL II: CPT | Performed by: INTERNAL MEDICINE

## 2022-06-26 PROCEDURE — A9270 NON-COVERED ITEM OR SERVICE: HCPCS | Performed by: INTERNAL MEDICINE

## 2022-06-26 PROCEDURE — 700111 HCHG RX REV CODE 636 W/ 250 OVERRIDE (IP): Performed by: STUDENT IN AN ORGANIZED HEALTH CARE EDUCATION/TRAINING PROGRAM

## 2022-06-26 PROCEDURE — 700101 HCHG RX REV CODE 250: Performed by: GENERAL PRACTICE

## 2022-06-26 PROCEDURE — A9270 NON-COVERED ITEM OR SERVICE: HCPCS | Performed by: STUDENT IN AN ORGANIZED HEALTH CARE EDUCATION/TRAINING PROGRAM

## 2022-06-26 PROCEDURE — G0378 HOSPITAL OBSERVATION PER HR: HCPCS

## 2022-06-26 PROCEDURE — 97602 WOUND(S) CARE NON-SELECTIVE: CPT

## 2022-06-26 PROCEDURE — A9270 NON-COVERED ITEM OR SERVICE: HCPCS | Performed by: GENERAL PRACTICE

## 2022-06-26 PROCEDURE — 700102 HCHG RX REV CODE 250 W/ 637 OVERRIDE(OP): Performed by: STUDENT IN AN ORGANIZED HEALTH CARE EDUCATION/TRAINING PROGRAM

## 2022-06-26 RX ADMIN — HEPARIN SODIUM 5000 UNITS: 5000 INJECTION, SOLUTION INTRAVENOUS; SUBCUTANEOUS at 04:47

## 2022-06-26 RX ADMIN — LIDOCAINE 1 PATCH: 50 PATCH CUTANEOUS at 08:41

## 2022-06-26 RX ADMIN — METOPROLOL TARTRATE 50 MG: 25 TABLET, FILM COATED ORAL at 04:47

## 2022-06-26 RX ADMIN — ESCITALOPRAM OXALATE 10 MG: 10 TABLET ORAL at 04:47

## 2022-06-26 RX ADMIN — HEPARIN SODIUM 5000 UNITS: 5000 INJECTION, SOLUTION INTRAVENOUS; SUBCUTANEOUS at 15:46

## 2022-06-26 RX ADMIN — METOPROLOL TARTRATE 50 MG: 25 TABLET, FILM COATED ORAL at 18:16

## 2022-06-26 RX ADMIN — ATORVASTATIN CALCIUM 80 MG: 80 TABLET, FILM COATED ORAL at 18:17

## 2022-06-26 RX ADMIN — HYDRALAZINE HYDROCHLORIDE 25 MG: 25 TABLET, FILM COATED ORAL at 18:18

## 2022-06-26 RX ADMIN — ACETAMINOPHEN 650 MG: 325 TABLET ORAL at 15:45

## 2022-06-26 RX ADMIN — GABAPENTIN 300 MG: 300 CAPSULE ORAL at 18:17

## 2022-06-26 RX ADMIN — ACETAMINOPHEN 650 MG: 325 TABLET ORAL at 22:35

## 2022-06-26 RX ADMIN — HYDRALAZINE HYDROCHLORIDE 25 MG: 25 TABLET, FILM COATED ORAL at 04:47

## 2022-06-26 RX ADMIN — HEPARIN SODIUM 5000 UNITS: 5000 INJECTION, SOLUTION INTRAVENOUS; SUBCUTANEOUS at 22:35

## 2022-06-26 ASSESSMENT — ENCOUNTER SYMPTOMS
DIARRHEA: 0
NAUSEA: 0
HALLUCINATIONS: 0
FALLS: 0
COUGH: 0
BLURRED VISION: 0
BACK PAIN: 0
SHORTNESS OF BREATH: 0
SORE THROAT: 0
FEVER: 0
ABDOMINAL PAIN: 0
DOUBLE VISION: 0
CHILLS: 0
DEPRESSION: 1
PALPITATIONS: 0
VOMITING: 0

## 2022-06-26 ASSESSMENT — LIFESTYLE VARIABLES: SUBSTANCE_ABUSE: 0

## 2022-06-26 NOTE — PROGRESS NOTES
[facility] Note:     Hemodialysis treatment ordered today per Dr Mccauley x 3 hours. Treatment initiated at 0915, ended at 1215.  Pt A/Ox1-2, fatigued, arouses easily to voice and touch, not in distress pre-HD.    UF goal adjusted and 100 mL NS bolus x1 due to SBP : 80-90's, Pt asymptomatic, BP improved post Tx, no discomfort reported  ; see e-flow sheet for details.     Net  mL.     Needles removed from access site. Dressings applied and sites held x 10 minutes; verified no bleeding. Positive bruit/thrill post tx. Staff RN to monitor AVF for breakthrough bleeding. Should breakthrough bleeding occur, staff RN to apply pressure to access sites until bleeding resolved. Notify Dialysis and Nephrologist for follow-up.    Report given to Primary RN.

## 2022-06-26 NOTE — PROGRESS NOTES
[facility] -  PROGRESS NOTE               Author: Keyla Mccauley M.D. Date & Time: 6/26/2022  11:32 AM     HPI:   67 y.o. female who presented 6/13/2022 with lethargy from [facility].  Patient has not had dialysis for the last 10 days due to behavioral issues.  Patient tested positive for COVID on June 6.  History limited due to AMS.   In the ED, patient found to be hypertensive. Found to have BUN 88 creatinine 11.7.  CT head negative. Chest x-ray showing increased interstitial markings consistent with mild interstitial edema.  Nephrology consulted, recommends dialysis in a.m.     DAILY NEPHROLOGY SUMMARY:  6/15: got hd yesterday. naoe  6/16: NAOE, irritated   6/17: calm, resting in the bed, no new issues, On O2 supplementation.   6/18: HD done today. UF off due to hypotension. Dialyzer clotted, able to return blood. Completed dialysis treatment with heparin.  6/19: SBP 100s-120s, phos 2.2, no new c/o  6/20: no events, drowsy but arousable, denies any CP/SOB/abd pain, BP stable  6/21: No events, pt upset and unwilling to answer questions this am, BP stable  6/22: No events, tolerated HD yest with 2L UF, confused overnight, tearful and oriented x2 this am, denies any CP/SOB/LE edema/Abd pain, BP variable  6/23: No events, feels ok, denies any CP/SOB/LE edema, BP stable, due for HD today  6/24: laying in bed, sleepy, no complaints, tolerated iHD yesterday UF: 1.6L, pending SNF   6/25: No events, seen and examined on hemodialysis VSS--see dialysis treatment sheet for full details, drowsy, denies any CP/SOB  6/26: No events, tolerated HD yest, only intermittently answers questions, denies any CP/SOB    REVIEW OF SYSTEMS:    Unable to obtain, pt only answers limited questions intermittently    PMH/PSH/SH/FH:   Reviewed and unchanged since admission note    CURRENT MEDICATIONS:   Reviewed from admission to present day    VS:  /58   Pulse 77   Temp 36 °C (96.8 °F) (Temporal)   Resp  "16   Ht 1.651 m (5' 5\")   Wt 71.1 kg (156 lb 12 oz)   SpO2 96%   BMI 26.08 kg/m²     Physical Exam  Vitals and nursing note reviewed.   Constitutional:       General: She is not in acute distress.     Appearance: Normal appearance.      Comments: Drowsy but arousable   HENT:      Head: Normocephalic and atraumatic.   Eyes:      General: No scleral icterus.  Cardiovascular:      Rate and Rhythm: Normal rate and regular rhythm.      Comments: +L chest PC  Pulmonary:      Effort: Pulmonary effort is normal. No respiratory distress.      Breath sounds: Examination of the right-lower field reveals decreased breath sounds. Examination of the left-lower field reveals decreased breath sounds. Decreased breath sounds present.   Abdominal:      General: Bowel sounds are normal. There is no distension.      Palpations: Abdomen is soft.   Musculoskeletal:         General: No deformity.      Right lower leg: No edema.      Left lower leg: No edema.   Skin:     General: Skin is warm and dry.      Findings: No rash.   Neurological:      General: No focal deficit present.      Comments: Oriented x2 to person and place   Psychiatric:         Attention and Perception: She is inattentive.         Mood and Affect: Affect is flat.      Comments: Drowsy and only responds intermittently to questions         Fluids:  In: 1580 [P.O.:480; Blood:500; Dialysis:600]  Out: 1033     LABS:  Recent Labs     06/25/22  0329   SODIUM 138   POTASSIUM 5.2   CHLORIDE 98   CO2 27   GLUCOSE 120*   BUN 49*   CREATININE 6.58*   CALCIUM 9.5       IMAGING:   All imaging reviewed from admission to present day    IMPRESSION:  # ESRD             - iHD q TThS and prn              - via R AVF (s/p thrombectomy and temp line)             - Follows with Dr. Mayers outpt            # HTN, controlled              - Goal BP < 140/90             - Continue BB             - UF with iHD as tolerated   # Edema--resolved  # Anemia of CKD, below target             - Goal " Hgb 10-11             - Ferritin 1675, isat 11%  # CKD-MBD             - Ca:WNL             - PO4 low  #Thrombocytopenia, mild  #DMII             - per primary team   # AMS , improved   - 2/2 missed hd vs COVID infection            PLAN:  - No iHD today (SUN)  - Continue iHD  qTTS   - UF as tolerated  - Hold phos binder for now  - No dietary protein restrictions  - Dose all meds per ESRD  - Covid management per primary  - JUDITH with HD   - BP has been variable  - Monitor BP for now, decrease hydralazine if BP remains low    **Upon discharge pt to resume care with her regular nephrologist Dr. Mayers who is not affiliated with [facility]

## 2022-06-26 NOTE — WOUND TEAM
"[facility]  Inpatient Services  Wound and Skin Care Brief Evaluation    Admission Date: 6/13/2022     Last order of IP CONSULT TO WOUND CARE was found on 6/22/2022 from Hospital Encounter on 6/13/2022     HPI, PMH, SH: Reviewed    Chief Complaint   Patient presents with   • Malaise     Pt biba from [facility] for lethargy. Per EMS pt hasn't received dialysis in 10 days relate to \"behavioral issues\" such as pulling out HD cath. Pt has HD cath on arrival in left upper chest and fistula in right arm. Pt is alert and oriented x 2, oriented to self and place. Per ems this is baseline. Pt also tested positive for covid on 6/6.      Diagnosis: Uremic encephalopathy [G93.49, N19]    Unit where seen by Wound Team: S149/00     Wound consult placed regarding sacrum. Chart and images reviewed. This discussed with bedside RN. This RN in to assess patient. Pt pleasant and agreeable. Patient was turned to her left side. Non-selectively debrided with NS/wound cleanser and gauze OR moist warm washcloth and no rinse foam soap. Moisture associated skin damage found to the perineal/anus area.  No pressure injuries or advanced wound care needs identified, all areas are blanching at this visit. Wound consult completed. No further follow up unless indicated and consulted.             RSKIN:   CURRENTLY IN PLACE (X), APPLIED THIS VISIT (A), ORDERED (O):   Q shift Pacheco:  X  Q shift pressure point assessments:  X    Surface/Positioning   Pressure redistribution mattress      x      Low Airloss          Bariatric foam      Bariatric JENNIFER     Waffle cushion        Waffle Overlay     x     Reposition q 2 hours      TAPs Turning system     Z Eladio Pillow     Offloading/Redistribution   Sacral Mepilex (Silicone dressing)     Heel Mepilex (Silicone dressing)         Heel float boots (Prevalon boot)             Float Heels off Bed with Pillows     x      Respiratory NA, room air  Silicone O2 tubing         Gray Foam Ear protectors   "   Cannula fixation Device (Tender )          High flow offloading Clip    Elastic head band offloading device      Anchorfast                                                         Trach with Optifoam split foam             Containment/Moisture Prevention     Rectal tube or BMS    Purwick/Condom Cath        [place] Catheter    Barrier wipes           Barrier paste     x  Antifungal tx      Interdry        Mobilization NA      Up to chair        Ambulate      PT/OT      Nutrition NA      Dietician        Diabetes Education      PO     TF     TPN     NPO   # days     Other
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
## 2022-06-26 NOTE — CARE PLAN
The patient is Stable - Low risk of patient condition declining or worsening    Shift Goals  Clinical Goals: q2 turns, participate in ADLs  Patient Goals: rest  Family Goals: n/a    Progress made toward(s) clinical / shift goals:    Problem: Skin Integrity  Goal: Skin integrity is maintained or improved  Outcome: Progressing  Note: Blanchable redness on sacrum, no new redness noted.       Patient is not progressing towards the following goals:      Problem: Self Care  Goal: Patient will have the ability to perform ADLs independently or with assistance (bathe, groom, dress, toilet and feed)  Outcome: Not Progressing  Note: Pt minimally participates in ADLs.  When prompted to use a tooth brush, she focuses on one side of her mouth unless prompted to move tooth brush around.

## 2022-06-27 LAB — GLUCOSE BLD STRIP.AUTO-MCNC: 172 MG/DL (ref 65–99)

## 2022-06-27 PROCEDURE — 700102 HCHG RX REV CODE 250 W/ 637 OVERRIDE(OP): Performed by: STUDENT IN AN ORGANIZED HEALTH CARE EDUCATION/TRAINING PROGRAM

## 2022-06-27 PROCEDURE — A9270 NON-COVERED ITEM OR SERVICE: HCPCS | Performed by: GENERAL PRACTICE

## 2022-06-27 PROCEDURE — A9270 NON-COVERED ITEM OR SERVICE: HCPCS | Performed by: INTERNAL MEDICINE

## 2022-06-27 PROCEDURE — 700101 HCHG RX REV CODE 250: Performed by: GENERAL PRACTICE

## 2022-06-27 PROCEDURE — 96372 THER/PROPH/DIAG INJ SC/IM: CPT

## 2022-06-27 PROCEDURE — 82962 GLUCOSE BLOOD TEST: CPT

## 2022-06-27 PROCEDURE — 700111 HCHG RX REV CODE 636 W/ 250 OVERRIDE (IP): Performed by: STUDENT IN AN ORGANIZED HEALTH CARE EDUCATION/TRAINING PROGRAM

## 2022-06-27 PROCEDURE — 700102 HCHG RX REV CODE 250 W/ 637 OVERRIDE(OP): Performed by: GENERAL PRACTICE

## 2022-06-27 PROCEDURE — A9270 NON-COVERED ITEM OR SERVICE: HCPCS | Performed by: STUDENT IN AN ORGANIZED HEALTH CARE EDUCATION/TRAINING PROGRAM

## 2022-06-27 PROCEDURE — G0378 HOSPITAL OBSERVATION PER HR: HCPCS

## 2022-06-27 PROCEDURE — 99224 PR SUBSEQUENT OBSERVATION CARE,LEVEL I: CPT | Performed by: INTERNAL MEDICINE

## 2022-06-27 PROCEDURE — 700102 HCHG RX REV CODE 250 W/ 637 OVERRIDE(OP): Performed by: INTERNAL MEDICINE

## 2022-06-27 RX ORDER — HYDRALAZINE HYDROCHLORIDE 10 MG/1
10 TABLET, FILM COATED ORAL 2 TIMES DAILY
Status: DISCONTINUED | OUTPATIENT
Start: 2022-06-27 | End: 2022-06-30

## 2022-06-27 RX ADMIN — LIDOCAINE 1 PATCH: 50 PATCH CUTANEOUS at 09:26

## 2022-06-27 RX ADMIN — DOCUSATE SODIUM 50 MG AND SENNOSIDES 8.6 MG 2 TABLET: 8.6; 5 TABLET, FILM COATED ORAL at 05:17

## 2022-06-27 RX ADMIN — GABAPENTIN 300 MG: 300 CAPSULE ORAL at 18:29

## 2022-06-27 RX ADMIN — ATORVASTATIN CALCIUM 80 MG: 80 TABLET, FILM COATED ORAL at 18:29

## 2022-06-27 RX ADMIN — HEPARIN SODIUM 5000 UNITS: 5000 INJECTION, SOLUTION INTRAVENOUS; SUBCUTANEOUS at 05:16

## 2022-06-27 RX ADMIN — DOCUSATE SODIUM 50 MG AND SENNOSIDES 8.6 MG 2 TABLET: 8.6; 5 TABLET, FILM COATED ORAL at 18:29

## 2022-06-27 RX ADMIN — ESCITALOPRAM OXALATE 10 MG: 10 TABLET ORAL at 05:17

## 2022-06-27 RX ADMIN — HEPARIN SODIUM 5000 UNITS: 5000 INJECTION, SOLUTION INTRAVENOUS; SUBCUTANEOUS at 13:10

## 2022-06-27 RX ADMIN — ACETAMINOPHEN 650 MG: 325 TABLET ORAL at 18:32

## 2022-06-27 RX ADMIN — HEPARIN SODIUM 5000 UNITS: 5000 INJECTION, SOLUTION INTRAVENOUS; SUBCUTANEOUS at 20:29

## 2022-06-27 ASSESSMENT — ENCOUNTER SYMPTOMS
BACK PAIN: 0
VOMITING: 0
FEVER: 0
SORE THROAT: 0
NAUSEA: 0
COUGH: 0
PALPITATIONS: 0
HALLUCINATIONS: 0
DIARRHEA: 0
ABDOMINAL PAIN: 0
SHORTNESS OF BREATH: 0
DEPRESSION: 1
CHILLS: 0
BLURRED VISION: 0
DOUBLE VISION: 0
FALLS: 0

## 2022-06-27 ASSESSMENT — LIFESTYLE VARIABLES: SUBSTANCE_ABUSE: 0

## 2022-06-27 ASSESSMENT — PAIN DESCRIPTION - PAIN TYPE: TYPE: ACUTE PAIN

## 2022-06-27 NOTE — DISCHARGE PLANNING
Late entry:  Voice message left on 6-24-22 @ 0165  Agency/Facility Name: [facility]  Outcome: DPA received a voice message from Bobbi.  Referral is declined.  Facility cannot accommodate dialysis.

## 2022-06-27 NOTE — PROGRESS NOTES
"[facility] -  PROGRESS NOTE               Author: ASHLY Holguin Date & Time: 6/27/2022  9:50 AM     HPI:   67 y.o. female who presented 6/13/2022 with lethargy from [facility].  Patient has not had dialysis for the last 10 days due to behavioral issues.  Patient tested positive for COVID on June 6.  History limited due to AMS.   In the ED, patient found to be hypertensive. Found to have BUN 88 creatinine 11.7.  CT head negative. Chest x-ray showing increased interstitial markings consistent with mild interstitial edema.  Nephrology consulted, recommends dialysis in a.m.     DAILY NEPHROLOGY SUMMARY:  6/15: got hd yesterday. naoe  6/16: NAOE, irritated   6/17: calm, resting in the bed, no new issues, On O2 supplementation.   6/18: HD done today. UF off due to hypotension. Dialyzer clotted, able to return blood. Completed dialysis treatment with heparin.  6/19: SBP 100s-120s, phos 2.2, no new c/o  6/20: no events, drowsy but arousable, denies any CP/SOB/abd pain, BP stable  6/21: No events, pt upset and unwilling to answer questions this am, BP stable  6/22: No events, tolerated HD yest with 2L UF, confused overnight, tearful and oriented x2 this am, denies any CP/SOB/LE edema/Abd pain, BP variable  6/23: No events, feels ok, denies any CP/SOB/LE edema, BP stable, due for HD today  6/24: laying in bed, sleepy, no complaints, tolerated iHD yesterday UF: 1.6L, pending SNF   6/25: No events, seen and examined on hemodialysis VSS--see dialysis treatment sheet for full details, drowsy, denies any CP/SOB  6/26: No events, tolerated HD yest, only intermittently answers questions, denies any CP/SOB  6/27: Pt sitting up in bed, RN assisting with breakfast tray, pt says she feels \"better\", no physical complaints, no new labs, waiting on SNF bed, Bps soft requiring antihtn to be held, resp status stable on RA    REVIEW OF SYSTEMS:    10-point ROS performed and is as HPI/daily summary or " "otherwise negative.    PMH/PSH/SH/FH:   Reviewed and unchanged since admission note    CURRENT MEDICATIONS:   Reviewed from admission to present day    VS:  /58   Pulse 70   Temp 36.7 °C (98 °F) (Tympanic)   Resp 18   Ht 1.651 m (5' 5\")   Wt 71.1 kg (156 lb 12 oz)   SpO2 95%   BMI 26.08 kg/m²     Physical Exam  Vitals and nursing note reviewed.   Constitutional:       General: She is not in acute distress.     Appearance: She is ill-appearing.   HENT:      Head: Normocephalic and atraumatic.   Eyes:      General: No scleral icterus.  Cardiovascular:      Rate and Rhythm: Normal rate and regular rhythm.      Comments: RUE AVF +T/B  Pulmonary:      Effort: Pulmonary effort is normal. No respiratory distress.      Breath sounds: Examination of the right-lower field reveals decreased breath sounds. Examination of the left-lower field reveals decreased breath sounds. Decreased breath sounds present.   Abdominal:      General: Bowel sounds are normal. There is no distension.      Palpations: Abdomen is soft.   Musculoskeletal:         General: No deformity.      Right lower leg: No edema.      Left lower leg: No edema.   Skin:     General: Skin is warm and dry.      Findings: No rash.   Neurological:      General: No focal deficit present.      Mental Status: She is alert.      Comments: Slowed responses   Psychiatric:         Attention and Perception: She is inattentive.         Mood and Affect: Affect is flat.         Fluids:  In: 120 [P.O.:120]  Out: -     LABS:  Recent Labs     06/25/22  0329   SODIUM 138   POTASSIUM 5.2   CHLORIDE 98   CO2 27   GLUCOSE 120*   BUN 49*   CREATININE 6.58*   CALCIUM 9.5       IMAGING:   All imaging reviewed from admission to present day    IMPRESSION:  # ESRD, dependent on HD             - iHD q TThS and prn              - via R AVF (s/p thrombectomy and temp line)             - Follows with Dr. Mayers outpt            # HTN, overcontrolled              - Goal BP " < 140/90             - On hydralazine and metoprolol              - UF with iHD as tolerated   # Edema--resolved  # Anemia of CKD, below target             - Goal Hgb 10-11             - Ferritin 1675, isat 11%             - JUDITH with HD  # CKD-MBD             - Ca:WNL             - PO4 2.2-->3.3  # Thrombocytopenia, mild, resolved   # DMII             - per primary team   # AMS, improved   - 2/2 missed hd vs COVID infection   # Debility/weakness  # Hypoalbuminemia     PLAN:  - Continue iHD qTTS, treatment due tomorrow (TUES)  - UF as tolerated  - Continue to hold phos binder for now  - No dietary protein restrictions  - Dose all meds per ESRD  - Covid management per primary  - JUDITH with HD to goal Hgb 10-11  - Transfuse PRN Hgb <7   - Decreasing hydralazine dose, with holding parameters  - Hold BP meds before HD  - Follow labs  - PT/OT  - OK to transition to OP HD once medically cleared    **Upon discharge pt to resume care with her regular nephrologist Dr. Mayers who is not affiliated with [facility]

## 2022-06-27 NOTE — CARE PLAN
The patient is Stable - Low risk of patient condition declining or worsening    Shift Goals: turning, encouraging to eat and drink  Clinical Goals: pain control, Q2h turns, sleep  Patient Goals: CARIE  Family Goals: n/a    Progress made toward(s) clinical / shift goals:  see above    Patient is not progressing towards the following goals:
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
## 2022-06-27 NOTE — DISCHARGE PLANNING
Case Management Discharge Planning    Admission Date: 6/13/2022  GMLOS:    ALOS: 0    6-Clicks ADL Score: 6  6-Clicks Mobility Score: 6  PT and/or OT Eval ordered: Yes  Post-acute Referrals Ordered: Yes  Post-acute Choice Obtained: Yes  Has referral(s) been sent to post-acute provider:  Yes      Anticipated Discharge Dispo: Discharge Disposition: D/T to SNF with Medicare cert in anticipation of skilled care (03)    DME Needed: No    Action(s) Taken: OTHER Contacted [facility] to inquire if they are able to accept patients yet. It was previously noted that the facility could not accept admissions due to a COVID outbreak. Left VM. At this time, there are no other accepting facilities.      Escalations Completed: None    Medically Clear: Yes    Next Steps: Follow up with [facility].     Barriers to Discharge: Pending Placement    Is the patient up for discharge tomorrow: If a bed is available.

## 2022-06-27 NOTE — CARE PLAN
The patient is Watcher - Medium risk of patient condition declining or worsening    Shift Goals  Clinical Goals: pain control, Q2h turns, sleep  Patient Goals: CARIE  Family Goals: n/a    Progress made toward(s) clinical / shift goals:    Problem: Knowledge Deficit - Standard  Goal: Patient and family/care givers will demonstrate understanding of plan of care, disease process/condition, diagnostic tests and medications  6/27/2022 0308 by AWILDA FrancisN.  Outcome: Progressing  6/27/2022 0307 by AWILDA FrancisN.  Outcome: Progressing     Problem: Skin Integrity  Goal: Skin integrity is maintained or improved  6/27/2022 0308 by AWILDA FrancisN.  Outcome: Progressing  6/27/2022 0307 by AWILDA FrancisN.  Outcome: Progressing     Problem: Fall Risk  Goal: Patient will remain free from falls  6/27/2022 0308 by Abe Montgomery R.N.  Outcome: Progressing  6/27/2022 0307 by AWILDA FrancisN.  Outcome: Progressing     Problem: Pain - Standard  Goal: Alleviation of pain or a reduction in pain to the patient’s comfort goal  Outcome: Progressing       Patient is not progressing towards the following goals:

## 2022-06-28 PROCEDURE — A9270 NON-COVERED ITEM OR SERVICE: HCPCS | Performed by: NURSE PRACTITIONER

## 2022-06-28 PROCEDURE — 700102 HCHG RX REV CODE 250 W/ 637 OVERRIDE(OP): Performed by: GENERAL PRACTICE

## 2022-06-28 PROCEDURE — G0378 HOSPITAL OBSERVATION PER HR: HCPCS

## 2022-06-28 PROCEDURE — 99225 PR SUBSEQUENT OBSERVATION CARE,LEVEL II: CPT | Mod: CS | Performed by: GENERAL PRACTICE

## 2022-06-28 PROCEDURE — 90935 HEMODIALYSIS ONE EVALUATION: CPT

## 2022-06-28 PROCEDURE — 96376 TX/PRO/DX INJ SAME DRUG ADON: CPT | Mod: XU

## 2022-06-28 PROCEDURE — 700102 HCHG RX REV CODE 250 W/ 637 OVERRIDE(OP): Performed by: INTERNAL MEDICINE

## 2022-06-28 PROCEDURE — 700102 HCHG RX REV CODE 250 W/ 637 OVERRIDE(OP): Performed by: STUDENT IN AN ORGANIZED HEALTH CARE EDUCATION/TRAINING PROGRAM

## 2022-06-28 PROCEDURE — 700111 HCHG RX REV CODE 636 W/ 250 OVERRIDE (IP): Performed by: STUDENT IN AN ORGANIZED HEALTH CARE EDUCATION/TRAINING PROGRAM

## 2022-06-28 PROCEDURE — A9270 NON-COVERED ITEM OR SERVICE: HCPCS | Performed by: GENERAL PRACTICE

## 2022-06-28 PROCEDURE — 700101 HCHG RX REV CODE 250: Performed by: GENERAL PRACTICE

## 2022-06-28 PROCEDURE — A9270 NON-COVERED ITEM OR SERVICE: HCPCS | Performed by: STUDENT IN AN ORGANIZED HEALTH CARE EDUCATION/TRAINING PROGRAM

## 2022-06-28 PROCEDURE — 700102 HCHG RX REV CODE 250 W/ 637 OVERRIDE(OP): Performed by: NURSE PRACTITIONER

## 2022-06-28 PROCEDURE — 700111 HCHG RX REV CODE 636 W/ 250 OVERRIDE (IP)

## 2022-06-28 PROCEDURE — A9270 NON-COVERED ITEM OR SERVICE: HCPCS | Performed by: INTERNAL MEDICINE

## 2022-06-28 PROCEDURE — 96372 THER/PROPH/DIAG INJ SC/IM: CPT | Mod: XU

## 2022-06-28 RX ORDER — ATORVASTATIN CALCIUM 40 MG/1
40 TABLET, FILM COATED ORAL EVERY EVENING
Status: DISCONTINUED | OUTPATIENT
Start: 2022-06-29 | End: 2022-07-12 | Stop reason: HOSPADM

## 2022-06-28 RX ORDER — ESCITALOPRAM OXALATE 10 MG/1
10 TABLET ORAL DAILY
Qty: 30 TABLET | Status: SHIPPED
Start: 2022-06-29 | End: 2022-11-27

## 2022-06-28 RX ORDER — HEPARIN SODIUM 1000 [USP'U]/ML
INJECTION, SOLUTION INTRAVENOUS; SUBCUTANEOUS
Status: COMPLETED
Start: 2022-06-28 | End: 2022-06-28

## 2022-06-28 RX ORDER — ATORVASTATIN CALCIUM 40 MG/1
40 TABLET, FILM COATED ORAL EVERY EVENING
Qty: 30 TABLET | Status: SHIPPED
Start: 2022-06-29

## 2022-06-28 RX ADMIN — EPOETIN ALFA-EPBX 10000 UNITS: 10000 INJECTION, SOLUTION INTRAVENOUS; SUBCUTANEOUS at 12:15

## 2022-06-28 RX ADMIN — DOCUSATE SODIUM 50 MG AND SENNOSIDES 8.6 MG 2 TABLET: 8.6; 5 TABLET, FILM COATED ORAL at 04:30

## 2022-06-28 RX ADMIN — HEPARIN SODIUM 1500 UNITS: 1000 INJECTION, SOLUTION INTRAVENOUS; SUBCUTANEOUS at 11:38

## 2022-06-28 RX ADMIN — ACETAMINOPHEN 650 MG: 325 TABLET ORAL at 17:58

## 2022-06-28 RX ADMIN — METOPROLOL TARTRATE 50 MG: 25 TABLET, FILM COATED ORAL at 04:30

## 2022-06-28 RX ADMIN — HYDRALAZINE HYDROCHLORIDE 10 MG: 10 TABLET, FILM COATED ORAL at 04:32

## 2022-06-28 RX ADMIN — HEPARIN SODIUM 5000 UNITS: 5000 INJECTION, SOLUTION INTRAVENOUS; SUBCUTANEOUS at 04:30

## 2022-06-28 RX ADMIN — HEPARIN SODIUM 5000 UNITS: 5000 INJECTION, SOLUTION INTRAVENOUS; SUBCUTANEOUS at 15:44

## 2022-06-28 RX ADMIN — GABAPENTIN 300 MG: 300 CAPSULE ORAL at 17:58

## 2022-06-28 RX ADMIN — LIDOCAINE 1 PATCH: 50 PATCH CUTANEOUS at 10:22

## 2022-06-28 RX ADMIN — ATORVASTATIN CALCIUM 80 MG: 80 TABLET, FILM COATED ORAL at 17:59

## 2022-06-28 RX ADMIN — ESCITALOPRAM OXALATE 10 MG: 10 TABLET ORAL at 04:29

## 2022-06-28 RX ADMIN — HEPARIN SODIUM 5000 UNITS: 5000 INJECTION, SOLUTION INTRAVENOUS; SUBCUTANEOUS at 21:34

## 2022-06-28 ASSESSMENT — ENCOUNTER SYMPTOMS: WEAKNESS: 1

## 2022-06-28 NOTE — CARE PLAN
The patient is Stable - Low risk of patient condition declining or worsening    Shift Goals  Clinical Goals: safety, Q2 turns, sleep  Patient Goals: CARIE  Family Goals: n/a    Progress made toward(s) clinical / shift goals:    Problem: Knowledge Deficit - Standard  Goal: Patient and family/care givers will demonstrate understanding of plan of care, disease process/condition, diagnostic tests and medications  Outcome: Progressing     Problem: Skin Integrity  Goal: Skin integrity is maintained or improved  Outcome: Progressing     Problem: Fall Risk  Goal: Patient will remain free from falls  Outcome: Progressing       Patient is not progressing towards the following goals:

## 2022-06-28 NOTE — PROGRESS NOTES
[facility] Progress Note     Hemodialysis treatment ordered today per Dr. Ashlee Burnham x 3 hours.   Received awake and oriented x 3; denies pain; soft blood pressure at 90's; on room air.    Treatment initiated at 1142 ended at 1442.       Net UF 0  mL.   Limited by low BP at 80's. asymptomatic        Needles removed from access site. Dressings applied and sites held x 10 minutes; verified no bleeding. Positive bruit/thrill post tx.   Staff RN to monitor AVF/G for breakthrough bleeding. Should breakthrough bleeding occur, staff RN to apply pressure to access sites until bleeding resolved.   Notify Dialysis and Nephrologist for follow-up.     Report given to Primary RN Monika Espitia.

## 2022-06-28 NOTE — PROGRESS NOTES
"[facility] -  PROGRESS NOTE               Author: ASHLY Moreno, CNN-NP Date & Time: 6/28/2022  10:17 AM     HPI:   67 y.o. female who presented 6/13/2022 with lethargy from [facility].  Patient has not had dialysis for the last 10 days due to behavioral issues.  Patient tested positive for COVID on June 6.  History limited due to AMS.   In the ED, patient found to be hypertensive. Found to have BUN 88 creatinine 11.7.  CT head negative. Chest x-ray showing increased interstitial markings consistent with mild interstitial edema.  Nephrology consulted, recommends dialysis in a.m.     DAILY NEPHROLOGY SUMMARY:  6/15: got hd yesterday. naoe  6/16: NAOE, irritated   6/17: calm, resting in the bed, no new issues, On O2 supplementation.   6/18: HD done today. UF off due to hypotension. Dialyzer clotted, able to return blood. Completed dialysis treatment with heparin.  6/19: SBP 100s-120s, phos 2.2, no new c/o  6/20: no events, drowsy but arousable, denies any CP/SOB/abd pain, BP stable  6/21: No events, pt upset and unwilling to answer questions this am, BP stable  6/22: No events, tolerated HD yest with 2L UF, confused overnight, tearful and oriented x2 this am, denies any CP/SOB/LE edema/Abd pain, BP variable  6/23: No events, feels ok, denies any CP/SOB/LE edema, BP stable, due for HD today  6/24: laying in bed, sleepy, no complaints, tolerated iHD yesterday UF: 1.6L, pending SNF   6/25: No events, seen and examined on hemodialysis VSS--see dialysis treatment sheet for full details, drowsy, denies any CP/SOB  6/26: No events, tolerated HD yest, only intermittently answers questions, denies any CP/SOB  6/27: Pt sitting up in bed, RN assisting with breakfast tray, pt says she feels \"better\", no physical complaints, no new labs, waiting on SNF bed, Bps soft requiring antihtn to be held, resp status stable on RA  6/28: sitting up in bed, no complaints, for iHD today, pending placement " "    REVIEW OF SYSTEMS:    10-point ROS performed and is as HPI/daily summary or otherwise negative.    PMH/PSH/SH/FH:   Reviewed and unchanged since admission note    CURRENT MEDICATIONS:   Reviewed from admission to present day    VS:  /53   Pulse 71   Temp 36.3 °C (97.4 °F) (Temporal)   Resp 18   Ht 1.651 m (5' 5\")   Wt 71.1 kg (156 lb 12 oz)   SpO2 95%   BMI 26.08 kg/m²     Physical Exam  Vitals and nursing note reviewed.   Constitutional:       General: She is not in acute distress.     Appearance: She is ill-appearing.   HENT:      Head: Normocephalic and atraumatic.   Eyes:      General: No scleral icterus.  Cardiovascular:      Rate and Rhythm: Normal rate and regular rhythm.      Comments: RUE AVF +T/B  Pulmonary:      Effort: Pulmonary effort is normal. No respiratory distress.      Breath sounds: Examination of the right-lower field reveals decreased breath sounds. Examination of the left-lower field reveals decreased breath sounds. Decreased breath sounds present.   Abdominal:      General: Bowel sounds are normal. There is no distension.      Palpations: Abdomen is soft.   Musculoskeletal:         General: No deformity.      Right lower leg: No edema.      Left lower leg: No edema.   Skin:     General: Skin is warm and dry.      Findings: No rash.   Neurological:      General: No focal deficit present.      Mental Status: She is alert.      Comments: Slowed responses   Psychiatric:         Attention and Perception: She is inattentive.         Mood and Affect: Affect is flat.         Fluids:  In: 360 [P.O.:360]  Out: -     LABS:        IMAGING:   All imaging reviewed from admission to present day    IMPRESSION:  # ESRD, dependent on HD             - iHD q TThS and prn              - via R AVF (s/p thrombectomy and temp line)             - Follows with Dr. Mayers outpt            # HTN, overcontrolled              - Goal BP < 140/90             - On hydralazine and metoprolol              - UF " with iHD as tolerated   # Edema--resolved  # Anemia of CKD, below target             - Goal Hgb 10-11             - Ferritin 1675, isat 11%             - JUDITH with HD  # CKD-MBD             - Ca:WNL             - PO4 2.2-->3.3  # Thrombocytopenia, mild, resolved   # DMII             - per primary team   # AMS, improved   - 2/2 missed hd vs COVID infection   # Debility/weakness  # Hypoalbuminemia     PLAN:  - Continue iHD qTTS, treatment due today (TUES)  - UF as tolerated  - Continue to hold phos binder for now  - No dietary protein restrictions  - Dose all meds per ESRD  - Covid management per primary  - JUDITH with HD to goal Hgb 10-11  - Transfuse PRN Hgb <7   - Decreasing hydralazine dose, with holding parameters  - Hold BP meds before HD  - Follow labs  - PT/OT  - OK to transition to OP HD once medically cleared    **Upon discharge pt to resume care with her regular nephrologist Dr. Mayers who is not affiliated with [facility]

## 2022-06-28 NOTE — THERAPY
Physical Therapy Contact Note    PT treatment attempted. Patient out of room for HD. Will re attempt as able and appropriate.    Mary Price, PT, DPT  [phone]

## 2022-06-28 NOTE — PROGRESS NOTES
MD notified that the patient's BP was soft and the night doses of metoprolol and hydralazine were held and that nephrology adjusted hydralazine dose.

## 2022-06-28 NOTE — DISCHARGE PLANNING
Case Management Discharge Planning    Admission Date: 6/13/2022  GMLOS:    ALOS: 0    6-Clicks ADL Score: 6  6-Clicks Mobility Score: 6  PT and/or OT Eval ordered: Yes  Post-acute Referrals Ordered: Yes  Post-acute Choice Obtained: Yes  Has referral(s) been sent to post-acute provider:  Yes      Anticipated Discharge Dispo: Discharge Disposition: D/T to SNF with Medicare cert in anticipation of skilled care (03)        Action(s) Taken: Called to [facility] facility to ascertain status of beds.  No reply.  Generic VM left requesting call back.     Escalations Completed: Provider        Next Steps: Follow up with [facility] for bed availability    Barriers to Discharge: Pending Placement    Is the patient up for discharge tomorrow: No

## 2022-06-29 PROCEDURE — G0378 HOSPITAL OBSERVATION PER HR: HCPCS

## 2022-06-29 PROCEDURE — 700111 HCHG RX REV CODE 636 W/ 250 OVERRIDE (IP): Performed by: STUDENT IN AN ORGANIZED HEALTH CARE EDUCATION/TRAINING PROGRAM

## 2022-06-29 PROCEDURE — A9270 NON-COVERED ITEM OR SERVICE: HCPCS | Performed by: STUDENT IN AN ORGANIZED HEALTH CARE EDUCATION/TRAINING PROGRAM

## 2022-06-29 PROCEDURE — A9270 NON-COVERED ITEM OR SERVICE: HCPCS | Performed by: GENERAL PRACTICE

## 2022-06-29 PROCEDURE — 700111 HCHG RX REV CODE 636 W/ 250 OVERRIDE (IP): Performed by: GENERAL PRACTICE

## 2022-06-29 PROCEDURE — 700102 HCHG RX REV CODE 250 W/ 637 OVERRIDE(OP): Performed by: STUDENT IN AN ORGANIZED HEALTH CARE EDUCATION/TRAINING PROGRAM

## 2022-06-29 PROCEDURE — 700102 HCHG RX REV CODE 250 W/ 637 OVERRIDE(OP): Performed by: INTERNAL MEDICINE

## 2022-06-29 PROCEDURE — 96372 THER/PROPH/DIAG INJ SC/IM: CPT

## 2022-06-29 PROCEDURE — 99225 PR SUBSEQUENT OBSERVATION CARE,LEVEL II: CPT | Mod: CS | Performed by: GENERAL PRACTICE

## 2022-06-29 PROCEDURE — 97530 THERAPEUTIC ACTIVITIES: CPT | Mod: XU

## 2022-06-29 PROCEDURE — A9270 NON-COVERED ITEM OR SERVICE: HCPCS | Performed by: INTERNAL MEDICINE

## 2022-06-29 PROCEDURE — 92526 ORAL FUNCTION THERAPY: CPT

## 2022-06-29 PROCEDURE — 700102 HCHG RX REV CODE 250 W/ 637 OVERRIDE(OP): Performed by: GENERAL PRACTICE

## 2022-06-29 RX ORDER — HEPARIN SODIUM 5000 [USP'U]/ML
5000 INJECTION, SOLUTION INTRAVENOUS; SUBCUTANEOUS EVERY 12 HOURS
Status: DISCONTINUED | OUTPATIENT
Start: 2022-06-29 | End: 2022-07-12 | Stop reason: HOSPADM

## 2022-06-29 RX ADMIN — ESCITALOPRAM OXALATE 10 MG: 10 TABLET ORAL at 04:58

## 2022-06-29 RX ADMIN — GABAPENTIN 300 MG: 300 CAPSULE ORAL at 17:33

## 2022-06-29 RX ADMIN — HEPARIN SODIUM 5000 UNITS: 5000 INJECTION, SOLUTION INTRAVENOUS; SUBCUTANEOUS at 17:33

## 2022-06-29 RX ADMIN — DOCUSATE SODIUM 50 MG AND SENNOSIDES 8.6 MG 2 TABLET: 8.6; 5 TABLET, FILM COATED ORAL at 04:58

## 2022-06-29 RX ADMIN — HEPARIN SODIUM 5000 UNITS: 5000 INJECTION, SOLUTION INTRAVENOUS; SUBCUTANEOUS at 04:58

## 2022-06-29 RX ADMIN — ATORVASTATIN CALCIUM 40 MG: 40 TABLET, FILM COATED ORAL at 17:33

## 2022-06-29 ASSESSMENT — COGNITIVE AND FUNCTIONAL STATUS - GENERAL
WALKING IN HOSPITAL ROOM: TOTAL
MOBILITY SCORE: 6
MOVING FROM LYING ON BACK TO SITTING ON SIDE OF FLAT BED: UNABLE
STANDING UP FROM CHAIR USING ARMS: TOTAL
MOVING TO AND FROM BED TO CHAIR: UNABLE
CLIMB 3 TO 5 STEPS WITH RAILING: TOTAL
SUGGESTED CMS G CODE MODIFIER MOBILITY: CN
TURNING FROM BACK TO SIDE WHILE IN FLAT BAD: UNABLE

## 2022-06-29 ASSESSMENT — GAIT ASSESSMENTS: GAIT LEVEL OF ASSIST: UNABLE TO PARTICIPATE

## 2022-06-29 ASSESSMENT — ENCOUNTER SYMPTOMS: WEAKNESS: 1

## 2022-06-29 ASSESSMENT — PAIN DESCRIPTION - PAIN TYPE: TYPE: ACUTE PAIN

## 2022-06-29 NOTE — PROGRESS NOTES
"[facility] -  PROGRESS NOTE               Author: ASHLY Cardoza, CNN-NP Date & Time: 6/29/2022  8:43 AM     HPI:   67 y.o. female who presented 6/13/2022 with lethargy from [facility].  Patient has not had dialysis for the last 10 days due to behavioral issues.  Patient tested positive for COVID on June 6.  History limited due to AMS.   In the ED, patient found to be hypertensive. Found to have BUN 88 creatinine 11.7.  CT head negative. Chest x-ray showing increased interstitial markings consistent with mild interstitial edema.  Nephrology consulted, recommends dialysis in a.m.     DAILY NEPHROLOGY SUMMARY:  6/15: got hd yesterday. naoe  6/16: NAOE, irritated   6/17: calm, resting in the bed, no new issues, On O2 supplementation.   6/18: HD done today. UF off due to hypotension. Dialyzer clotted, able to return blood. Completed dialysis treatment with heparin.  6/19: SBP 100s-120s, phos 2.2, no new c/o  6/20: no events, drowsy but arousable, denies any CP/SOB/abd pain, BP stable  6/21: No events, pt upset and unwilling to answer questions this am, BP stable  6/22: No events, tolerated HD yest with 2L UF, confused overnight, tearful and oriented x2 this am, denies any CP/SOB/LE edema/Abd pain, BP variable  6/23: No events, feels ok, denies any CP/SOB/LE edema, BP stable, due for HD today  6/24: laying in bed, sleepy, no complaints, tolerated iHD yesterday UF: 1.6L, pending SNF   6/25: No events, seen and examined on hemodialysis VSS--see dialysis treatment sheet for full details, drowsy, denies any CP/SOB  6/26: No events, tolerated HD yest, only intermittently answers questions, denies any CP/SOB  6/27: Pt sitting up in bed, RN assisting with breakfast tray, pt says she feels \"better\", no physical complaints, no new labs, waiting on SNF bed, Bps soft requiring antihtn to be held, resp status stable on RA  6/28: sitting up in bed, no complaints, for iHD today, pending " "placement   6/29: iHD yesterday, no net UF, soft Bps.  VSS on Room air.     REVIEW OF SYSTEMS:    10-point ROS performed and is as HPI/daily summary or otherwise negative.    PMH/PSH/SH/FH:   Reviewed and unchanged since admission note    CURRENT MEDICATIONS:   Reviewed from admission to present day    VS:  /55   Pulse 81   Temp 36.4 °C (97.5 °F) (Temporal)   Resp 16   Ht 1.651 m (5' 5\")   Wt 71.1 kg (156 lb 12 oz)   SpO2 92%   BMI 26.08 kg/m²     Physical Exam  Vitals and nursing note reviewed.   Constitutional:       General: She is not in acute distress.     Appearance: She is ill-appearing.   HENT:      Head: Normocephalic and atraumatic.   Eyes:      General: No scleral icterus.  Cardiovascular:      Rate and Rhythm: Normal rate and regular rhythm.      Comments: RUE AVF +T/B  Pulmonary:      Effort: Pulmonary effort is normal. No respiratory distress.      Breath sounds: Examination of the right-lower field reveals decreased breath sounds. Examination of the left-lower field reveals decreased breath sounds. Decreased breath sounds present.   Abdominal:      General: Bowel sounds are normal. There is no distension.      Palpations: Abdomen is soft.   Musculoskeletal:         General: No deformity.      Right lower leg: No edema.      Left lower leg: No edema.   Skin:     General: Skin is warm and dry.      Findings: No rash.   Neurological:      General: No focal deficit present.      Mental Status: She is alert.      Comments: Slowed responses   Psychiatric:         Attention and Perception: She is inattentive.         Mood and Affect: Affect is flat.         Fluids:  In: 1100 [P.O.:600; Dialysis:500]  Out: 500     LABS:        IMAGING:   All imaging reviewed from admission to present day    IMPRESSION:  # ESRD, dependent on HD             - iHD q TThS and prn              - via R AVF (s/p thrombectomy and temp line)             - Follows with Dr. Mayers outpt            # HTN, overcontrolled "              - Goal BP < 140/90             - On hydralazine and metoprolol              - UF with iHD as tolerated   # Edema--resolved  # Anemia of CKD, below target             - Goal Hgb 10-11             - Ferritin 1675, isat 11%             - JUDITH with HD  # CKD-MBD             - Ca:WNL             - PO4 2.2-->3.3  # Thrombocytopenia, mild, resolved   # DMII             - per primary team   # AMS, improved   - 2/2 missed hd vs COVID infection   # Debility/weakness  # Hypoalbuminemia     PLAN:  - Continue iHD qTTS, treatment due tomorrow (THUR)  - UF as tolerated  - Continue to hold phos binder for now  - No dietary protein restrictions  - Dose all meds per ESRD  - Covid management per primary  - JUDITH with HD to goal Hgb 10-11  - Transfuse PRN Hgb <7   - Decreasing hydralazine dose, with holding parameters  - Hold BP meds before HD  - Follow labs weekly  - PT/OT  - OK to transition to OP HD once medically cleared    **Upon discharge pt to resume care with her regular nephrologist Dr. Mayers who is not affiliated with [facility]

## 2022-06-29 NOTE — CARE PLAN
Problem: Skin Integrity  Goal: Skin integrity is maintained or improved  Outcome: Progressing   Pt is able to move around in bed and her sacral area is red but blanching   Problem: Nutrition  Goal: Patient's nutritional and fluid intake will be adequate or improve  Outcome: Progressing  Pt is able to eat her meals as long as she is wake    The patient is Stable - Low risk of patient condition declining or worsening    Shift Goals  Clinical Goals: Safety, Q2 turn turn, dc planning  Patient Goals: Rest  Family Goals: CARIE    Progress made toward(s) clinical / shift goals:  Progressing    Patient is not progressing towards the following goals:

## 2022-06-29 NOTE — CARE PLAN
The patient is Stable - Low risk of patient condition declining or worsening    Shift Goals  Clinical Goals: safety, Q2 turns, sleep  Patient Goals: CARIE  Family Goals: n/a    Progress made toward(s) clinical / shift goals:    Problem: Skin Integrity  Goal: Skin integrity is maintained or improved  Outcome: Progressing     Problem: Fall Risk  Goal: Patient will remain free from falls  Outcome: Progressing     Problem: Psychosocial  Goal: Patient's level of anxiety will decrease  Outcome: Progressing     Problem: Self Care  Goal: Patient will have the ability to perform ADLs independently or with assistance (bathe, groom, dress, toilet and feed)  Outcome: Progressing       Patient is not progressing towards the following goals:
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
## 2022-06-29 NOTE — THERAPY
Speech Language Pathology  Daily Treatment     Patient Name: Liberty Bolton  Age:  67 y.o., Sex:  female  Medical Record #: [id number]  Today's Date: 6/29/2022     Precautions  Precautions: Fall Risk, Swallow Precautions ( See Comments)    Assessment  Pt seen this date for dysphagia therapy. Per RN and CNA, pt has been declining most of breakfast, but typically eats lunch and dinner. Pt was agreeable to cheese, crackers, and thin liquids. Mastication was noted to be slightly prolonged, however functional, and no overt s/sx concerning for aspiration. She consumed single and consecutive sips of thins via straw with no difficulty, improvement from previous SLP session. She was able to feed herself independently. At this time, pt appears appropriate to continue with current diet, she is now okay to drink thins via straw. No further SLP acute care needs are indicated at this time. SLP to sign off.     Recommendations  1.  Soft/bite sized solids, ground meat and thin lquids    Plan  Discharge secondary to goals met.    Discharge Recommendations: Anticipate that the patient will have no further speech therapy needs after discharge from the hospital     Objective     06/29/22 1131   Precautions   Precautions Fall Risk;Swallow Precautions ( See Comments)   Pain 0 - 10 Group   Therapist Pain Assessment Post Activity Pain Same as Prior to Activity;Nurse Notified;0   Dysphagia    Positioning / Behavior Modification Modulate Rate or Bite Size;Self Monitoring   Recommended Route of Medication Administration   Medication Administration  Float Whole with Puree;Whole with Liquid Wash   Short Term Goals   Short Term Goal # 1 B  Patient will consume meals of minced/moist solids and thin liquids with no s/sx of aspiration given direct S/feeding A.   Goal Outcome  # 1 B Goal met
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
## 2022-06-30 LAB
ANION GAP SERPL CALC-SCNC: 14 MMOL/L (ref 7–16)
BUN SERPL-MCNC: 56 MG/DL (ref 8–22)
CALCIUM SERPL-MCNC: 9.3 MG/DL (ref 8.5–10.5)
CHLORIDE SERPL-SCNC: 92 MMOL/L (ref 96–112)
CO2 SERPL-SCNC: 27 MMOL/L (ref 20–33)
CREAT SERPL-MCNC: 6.13 MG/DL (ref 0.5–1.4)
ERYTHROCYTE [DISTWIDTH] IN BLOOD BY AUTOMATED COUNT: 52.6 FL (ref 35.9–50)
GFR SERPLBLD CREATININE-BSD FMLA CKD-EPI: 7 ML/MIN/1.73 M 2
GLUCOSE SERPL-MCNC: 131 MG/DL (ref 65–99)
HCT VFR BLD AUTO: 28 % (ref 37–47)
HGB BLD-MCNC: 8.8 G/DL (ref 12–16)
MCH RBC QN AUTO: 30.8 PG (ref 27–33)
MCHC RBC AUTO-ENTMCNC: 31.4 G/DL (ref 33.6–35)
MCV RBC AUTO: 97.9 FL (ref 81.4–97.8)
PLATELET # BLD AUTO: 210 K/UL (ref 164–446)
PMV BLD AUTO: 10.2 FL (ref 9–12.9)
POTASSIUM SERPL-SCNC: 4.8 MMOL/L (ref 3.6–5.5)
RBC # BLD AUTO: 2.86 M/UL (ref 4.2–5.4)
SODIUM SERPL-SCNC: 133 MMOL/L (ref 135–145)
WBC # BLD AUTO: 6 K/UL (ref 4.8–10.8)

## 2022-06-30 PROCEDURE — A9270 NON-COVERED ITEM OR SERVICE: HCPCS | Performed by: STUDENT IN AN ORGANIZED HEALTH CARE EDUCATION/TRAINING PROGRAM

## 2022-06-30 PROCEDURE — G0378 HOSPITAL OBSERVATION PER HR: HCPCS

## 2022-06-30 PROCEDURE — 36415 COLL VENOUS BLD VENIPUNCTURE: CPT

## 2022-06-30 PROCEDURE — 700111 HCHG RX REV CODE 636 W/ 250 OVERRIDE (IP)

## 2022-06-30 PROCEDURE — 700102 HCHG RX REV CODE 250 W/ 637 OVERRIDE(OP): Performed by: STUDENT IN AN ORGANIZED HEALTH CARE EDUCATION/TRAINING PROGRAM

## 2022-06-30 PROCEDURE — 90935 HEMODIALYSIS ONE EVALUATION: CPT

## 2022-06-30 PROCEDURE — A9270 NON-COVERED ITEM OR SERVICE: HCPCS | Performed by: INTERNAL MEDICINE

## 2022-06-30 PROCEDURE — 97530 THERAPEUTIC ACTIVITIES: CPT

## 2022-06-30 PROCEDURE — 700101 HCHG RX REV CODE 250: Performed by: GENERAL PRACTICE

## 2022-06-30 PROCEDURE — 700102 HCHG RX REV CODE 250 W/ 637 OVERRIDE(OP): Performed by: INTERNAL MEDICINE

## 2022-06-30 PROCEDURE — 85027 COMPLETE CBC AUTOMATED: CPT

## 2022-06-30 PROCEDURE — A9270 NON-COVERED ITEM OR SERVICE: HCPCS | Performed by: GENERAL PRACTICE

## 2022-06-30 PROCEDURE — 700111 HCHG RX REV CODE 636 W/ 250 OVERRIDE (IP): Performed by: GENERAL PRACTICE

## 2022-06-30 PROCEDURE — 700102 HCHG RX REV CODE 250 W/ 637 OVERRIDE(OP): Performed by: GENERAL PRACTICE

## 2022-06-30 PROCEDURE — 96372 THER/PROPH/DIAG INJ SC/IM: CPT | Mod: XU

## 2022-06-30 PROCEDURE — 80048 BASIC METABOLIC PNL TOTAL CA: CPT

## 2022-06-30 PROCEDURE — 99225 PR SUBSEQUENT OBSERVATION CARE,LEVEL II: CPT | Mod: CS | Performed by: GENERAL PRACTICE

## 2022-06-30 RX ADMIN — EPOETIN ALFA-EPBX 10000 UNITS: 10000 INJECTION, SOLUTION INTRAVENOUS; SUBCUTANEOUS at 13:51

## 2022-06-30 RX ADMIN — ATORVASTATIN CALCIUM 40 MG: 40 TABLET, FILM COATED ORAL at 16:30

## 2022-06-30 RX ADMIN — GABAPENTIN 300 MG: 300 CAPSULE ORAL at 16:30

## 2022-06-30 RX ADMIN — HEPARIN SODIUM 5000 UNITS: 5000 INJECTION, SOLUTION INTRAVENOUS; SUBCUTANEOUS at 06:42

## 2022-06-30 RX ADMIN — ESCITALOPRAM OXALATE 10 MG: 10 TABLET ORAL at 06:43

## 2022-06-30 RX ADMIN — DOCUSATE SODIUM 50 MG AND SENNOSIDES 8.6 MG 2 TABLET: 8.6; 5 TABLET, FILM COATED ORAL at 06:43

## 2022-06-30 RX ADMIN — LIDOCAINE 1 PATCH: 50 PATCH CUTANEOUS at 10:44

## 2022-06-30 RX ADMIN — HEPARIN SODIUM 5000 UNITS: 5000 INJECTION, SOLUTION INTRAVENOUS; SUBCUTANEOUS at 16:30

## 2022-06-30 ASSESSMENT — COGNITIVE AND FUNCTIONAL STATUS - GENERAL
DRESSING REGULAR UPPER BODY CLOTHING: TOTAL
DAILY ACTIVITIY SCORE: 6
EATING MEALS: TOTAL
TOILETING: TOTAL
DRESSING REGULAR LOWER BODY CLOTHING: TOTAL
PERSONAL GROOMING: TOTAL
HELP NEEDED FOR BATHING: TOTAL
SUGGESTED CMS G CODE MODIFIER DAILY ACTIVITY: CN

## 2022-06-30 ASSESSMENT — PAIN DESCRIPTION - PAIN TYPE
TYPE: ACUTE PAIN
TYPE: ACUTE PAIN

## 2022-06-30 ASSESSMENT — ENCOUNTER SYMPTOMS: WEAKNESS: 1

## 2022-06-30 NOTE — DISCHARGE PLANNING
Case Management Discharge Planning    Admission Date: 6/13/2022  GMLOS:    ALOS: 0    6-Clicks ADL Score: 6  6-Clicks Mobility Score: 6  PT and/or OT Eval ordered: Yes  Post-acute Referrals Ordered: Yes  Post-acute Choice Obtained: Yes  Has referral(s) been sent to post-acute provider:  Yes      Anticipated Discharge Dispo: Discharge Disposition: D/T to SNF with Medicare cert in anticipation of skilled care (03)    DME Needed: No    Action(s) Taken: Updated Provider/Nurse on Discharge Plan    Escalations Completed: None    Medically Clear: Yes    Next Steps: Called to  Intake at  [facility]  [phone].  No reply.   left requesting call back.     Barriers to Discharge: Pending Placement    Is the patient up for discharge tomorrow: No       1140 AM  Advised [facility] now cannot take patient as at capacity for HD patients.  Patient has previously been declined by 8 other SNFs.  Reached out to leadership for advice.

## 2022-06-30 NOTE — PROGRESS NOTES
"[facility] -  PROGRESS NOTE               Author: Ashlee Burnham M.D. Date & Time: 6/30/2022  11:01 AM     HPI:   67 y.o. female who presented 6/13/2022 with lethargy from [facility].  Patient has not had dialysis for the last 10 days due to behavioral issues.  Patient tested positive for COVID on June 6.  History limited due to AMS.   In the ED, patient found to be hypertensive. Found to have BUN 88 creatinine 11.7.  CT head negative. Chest x-ray showing increased interstitial markings consistent with mild interstitial edema.  Nephrology consulted, recommends dialysis in a.m.     DAILY NEPHROLOGY SUMMARY:  6/15: got hd yesterday. naoe  6/16: NAOE, irritated   6/17: calm, resting in the bed, no new issues, On O2 supplementation.   6/18: HD done today. UF off due to hypotension. Dialyzer clotted, able to return blood. Completed dialysis treatment with heparin.  6/19: SBP 100s-120s, phos 2.2, no new c/o  6/20: no events, drowsy but arousable, denies any CP/SOB/abd pain, BP stable  6/21: No events, pt upset and unwilling to answer questions this am, BP stable  6/22: No events, tolerated HD yest with 2L UF, confused overnight, tearful and oriented x2 this am, denies any CP/SOB/LE edema/Abd pain, BP variable  6/23: No events, feels ok, denies any CP/SOB/LE edema, BP stable, due for HD today  6/24: laying in bed, sleepy, no complaints, tolerated iHD yesterday UF: 1.6L, pending SNF   6/25: No events, seen and examined on hemodialysis VSS--see dialysis treatment sheet for full details, drowsy, denies any CP/SOB  6/26: No events, tolerated HD yest, only intermittently answers questions, denies any CP/SOB  6/27: Pt sitting up in bed, RN assisting with breakfast tray, pt says she feels \"better\", no physical complaints, no new labs, waiting on SNF bed, Bps soft requiring antihtn to be held, resp status stable on RA  6/28: sitting up in bed, no complaints, for iHD today, pending placement   6/29: iHD " "yesterday, no net UF, soft Bps.  VSS on Room air.   6/30: no new complaints, bored and grumpy , due for HD     REVIEW OF SYSTEMS:    10 point ROS reviewed and is as per HPI/daily summary or otherwise negative    PMH/PSH/SH/FH:   Reviewed and unchanged since admission note    CURRENT MEDICATIONS:   Reviewed from admission to present day    VS:  /40   Pulse 82   Temp 36.6 °C (97.9 °F) (Temporal)   Resp 17   Ht 1.651 m (5' 5\")   Wt 71.1 kg (156 lb 12 oz)   SpO2 92%   BMI 26.08 kg/m²     Physical Exam  Vitals and nursing note reviewed.   Constitutional:       General: She is not in acute distress.     Appearance: She is ill-appearing.   HENT:      Head: Normocephalic and atraumatic.   Eyes:      General: No scleral icterus.  Cardiovascular:      Rate and Rhythm: Normal rate and regular rhythm.      Comments: RUE AVF +T/B  Pulmonary:      Effort: Pulmonary effort is normal. No respiratory distress.      Breath sounds:clear   Abdominal:      General: Bowel sounds are normal. There is no distension.      Palpations: Abdomen is soft.   Musculoskeletal:         General: No deformity.      Right lower leg: No edema.      Left lower leg: No edema.   Skin:     General: Skin is warm and dry.      Findings: No rash.   Neurological:      General: No focal deficit present.      Mental Status: She is alert.      Comments: Slowed responses   Psychiatric:         Attention and Perception: She is inattentive.         Mood and Affect: Affect is flat.   Fluids:  No intake/output data recorded.    LABS:  Recent Labs     06/30/22  0841   SODIUM 133*   POTASSIUM 4.8   CHLORIDE 92*   CO2 27   GLUCOSE 131*   BUN 56*   CREATININE 6.13*   CALCIUM 9.3       IMAGING:   All imaging reviewed from admission to present day    IMPRESSION:  # ESRD, dependent on HD             - iHD q TThS and prn              - via R AVF (s/p thrombectomy and temp line)             - Follows with Dr. Mayers outpt            # HTN, overcontrolled "              - Goal BP < 140/90             - On hydralazine and metoprolol              - UF with iHD as tolerated   # Edema--resolved  # Anemia of CKD, below target             - Goal Hgb 10-11             - Ferritin 1675, isat 11%             - JUDITH with HD  # CKD-MBD             - Ca:WNL             - PO4 2.2-->3.3  # Thrombocytopenia, mild, resolved   # DMII             - per primary team   # AMS, improved   - 2/2 missed hd vs COVID infection   # Debility/weakness  # Hypoalbuminemia     PLAN:  - Continue iHD qTTS,   - UF as tolerated  - Continue to hold phos binder for now  - No dietary protein restrictions  - Dose all meds per ESRD  - JUDITH with HD to goal Hgb 10-11  - Transfuse PRN Hgb <7   - Hold BP meds before HD  - OK to transition to OP HD once medically cleared     **Upon discharge pt to resume care with her regular nephrologist Dr. Mayers who is not affiliated with [facility]

## 2022-06-30 NOTE — PROGRESS NOTES
[facility] Progress Note      HD ordered by Dr. Burnham. Treatment started at 1136 and ended at 1436.     Total Net UF 1300mL.    Pt tolerated treatment well with no issues. See flow sheet for details. Cannulation needles taken out, held pressure for 10 min and placed gauze dressing with no bleeding. Dressing CDI post dialysis, primary RN instructed to monitor for bleeding. RUE AVF + for bruit/thrill. Report given to ALICIA Barrientos RN.

## 2022-06-30 NOTE — CARE PLAN
The patient is Watcher - Medium risk of patient condition declining or worsening    Shift Goals  Clinical Goals: Remain safe and free from falls  Patient Goals: Rest  Family Goals: CARIE    Progress made toward(s) clinical / shift goals:      Patient is not progressing towards the following goals:

## 2022-06-30 NOTE — CARE PLAN
Problem: Skin Integrity  Goal: Skin integrity is maintained or improved  Outcome: Progressing   PT is q2 turn and on a waffle overlay.   Problem: Fall Risk  Goal: Patient will remain free from falls  Outcome: Progressing   Pt has bed alarm and all fall precautions on place  Problem: Discharge Barriers/Planning  Goal: Patient's continuum of care needs are met  Outcome: Slowly Progressing  Attempting to get her back to [facility] where she came from but they are stating she is too high level of care with dialysis even though she had dialysis set up outpt and was taken there before   The patient is Stable - Low risk of patient condition declining or worsening    Shift Goals  Clinical Goals: saefty, dailysis and reorientation  Patient Goals: Rest  Family Goals: CARIE    Progress made toward(s) clinical / shift goals:  Progressing slowly    Patient is not progressing towards the following goals:

## 2022-06-30 NOTE — THERAPY
Occupational Therapy  Daily Treatment     Patient Name: Liberty Bolton  Age:  67 y.o., Sex:  female  Medical Record #: [id number]  Today's Date: 6/30/2022     Precautions  Precautions: Fall Risk, Swallow Precautions ( See Comments)    Assessment    Pt currently limited by decreased functional mobility, activity tolerance, cognition, sensation, strength, AROM, coordination, balance, which are affecting pt's ability to complete ADLs/IADLs at baseline. Pt would benefit from OT services in the acute care setting to maximize functional recovery.      Plan    Continue current treatment plan.       Discharge Recommendations: (P) Recommend post-acute placement for additional occupational therapy services prior to discharge home         06/30/22 1058   Activities of Daily Living   Grooming Total Assist   Upper Body Dressing Total Assist   Lower Body Dressing Moderate Assist   Toileting Moderate Assist   Functional Mobility   Sit to Stand Maximal Assist   Bed, Chair, Wheelchair Transfer Unable to Participate   Short Term Goals   Short Term Goal # 1 min A withe UB dressing   Goal Outcome # 1 Progressing slower than expected   Short Term Goal # 2 mod A with LB dressing   Goal Outcome # 2 Progressing slower than expected   Short Term Goal # 3 mod A with ADL txfs   Goal Outcome # 3 Progressing slower than expected   Anticipated Discharge Equipment and Recommendations   Discharge Recommendations Recommend post-acute placement for additional occupational therapy services prior to discharge home

## 2022-06-30 NOTE — DISCHARGE PLANNING
Agency/Facility Name: [facility]   Spoke To: Priscilla  Outcome: Pt cannot return to [facility] at this time as SNF is at capacity for Pts receiving dialysis.     RN CM notified
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
## 2022-07-01 PROCEDURE — 96372 THER/PROPH/DIAG INJ SC/IM: CPT

## 2022-07-01 PROCEDURE — 700102 HCHG RX REV CODE 250 W/ 637 OVERRIDE(OP): Performed by: GENERAL PRACTICE

## 2022-07-01 PROCEDURE — 700111 HCHG RX REV CODE 636 W/ 250 OVERRIDE (IP): Performed by: GENERAL PRACTICE

## 2022-07-01 PROCEDURE — 700102 HCHG RX REV CODE 250 W/ 637 OVERRIDE(OP): Performed by: STUDENT IN AN ORGANIZED HEALTH CARE EDUCATION/TRAINING PROGRAM

## 2022-07-01 PROCEDURE — 700101 HCHG RX REV CODE 250: Performed by: GENERAL PRACTICE

## 2022-07-01 PROCEDURE — G0378 HOSPITAL OBSERVATION PER HR: HCPCS

## 2022-07-01 PROCEDURE — 99225 PR SUBSEQUENT OBSERVATION CARE,LEVEL II: CPT | Mod: CS | Performed by: GENERAL PRACTICE

## 2022-07-01 PROCEDURE — A9270 NON-COVERED ITEM OR SERVICE: HCPCS | Performed by: STUDENT IN AN ORGANIZED HEALTH CARE EDUCATION/TRAINING PROGRAM

## 2022-07-01 PROCEDURE — A9270 NON-COVERED ITEM OR SERVICE: HCPCS | Performed by: GENERAL PRACTICE

## 2022-07-01 RX ADMIN — ATORVASTATIN CALCIUM 40 MG: 40 TABLET, FILM COATED ORAL at 17:00

## 2022-07-01 RX ADMIN — GABAPENTIN 300 MG: 300 CAPSULE ORAL at 17:00

## 2022-07-01 RX ADMIN — HEPARIN SODIUM 5000 UNITS: 5000 INJECTION, SOLUTION INTRAVENOUS; SUBCUTANEOUS at 17:00

## 2022-07-01 RX ADMIN — LIDOCAINE 1 PATCH: 50 PATCH CUTANEOUS at 10:37

## 2022-07-01 ASSESSMENT — ENCOUNTER SYMPTOMS: WEAKNESS: 1

## 2022-07-01 ASSESSMENT — PAIN DESCRIPTION - PAIN TYPE
TYPE: ACUTE PAIN
TYPE: ACUTE PAIN
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
## 2022-07-01 NOTE — PROGRESS NOTES
"[facility] -  PROGRESS NOTE               Author: ASHLY Cardoza Date & Time: 7/1/2022  12:29 PM     HPI:   67 y.o. female who presented 6/13/2022 with lethargy from [facility].  Patient has not had dialysis for the last 10 days due to behavioral issues.  Patient tested positive for COVID on June 6.  History limited due to AMS.   In the ED, patient found to be hypertensive. Found to have BUN 88 creatinine 11.7.  CT head negative. Chest x-ray showing increased interstitial markings consistent with mild interstitial edema.  Nephrology consulted, recommends dialysis in a.m.     DAILY NEPHROLOGY SUMMARY:  6/15: got hd yesterday. naoe  6/16: NAOE, irritated   6/17: calm, resting in the bed, no new issues, On O2 supplementation.   6/18: HD done today. UF off due to hypotension. Dialyzer clotted, able to return blood. Completed dialysis treatment with heparin.  6/19: SBP 100s-120s, phos 2.2, no new c/o  6/20: no events, drowsy but arousable, denies any CP/SOB/abd pain, BP stable  6/21: No events, pt upset and unwilling to answer questions this am, BP stable  6/22: No events, tolerated HD yest with 2L UF, confused overnight, tearful and oriented x2 this am, denies any CP/SOB/LE edema/Abd pain, BP variable  6/23: No events, feels ok, denies any CP/SOB/LE edema, BP stable, due for HD today  6/24: laying in bed, sleepy, no complaints, tolerated iHD yesterday UF: 1.6L, pending SNF   6/25: No events, seen and examined on hemodialysis VSS--see dialysis treatment sheet for full details, drowsy, denies any CP/SOB  6/26: No events, tolerated HD yest, only intermittently answers questions, denies any CP/SOB  6/27: Pt sitting up in bed, RN assisting with breakfast tray, pt says she feels \"better\", no physical complaints, no new labs, waiting on SNF bed, Bps soft requiring antihtn to be held, resp status stable on RA  6/28: sitting up in bed, no complaints, for iHD today, pending " "placement   6/29: iHD yesterday, no net UF, soft Bps.  VSS on Room air.   6/30: no new complaints, bored and grumpy , due for HD   7/1:  Resting in bed,  no complaints.  VSS RA. iHD yesterday net UF 1.3L.     REVIEW OF SYSTEMS:    10 point ROS reviewed and is as per HPI/daily summary or otherwise negative    PMH/PSH/SH/FH:   Reviewed and unchanged since admission note    CURRENT MEDICATIONS:   Reviewed from admission to present day    VS:  /46   Pulse 78   Temp 36.9 °C (98.4 °F) (Temporal)   Resp 18   Ht 1.651 m (5' 5\")   Wt 71.1 kg (156 lb 12 oz)   SpO2 95%   BMI 26.08 kg/m²     Physical Exam  Vitals and nursing note reviewed.   Constitutional:       General: She is not in acute distress.     Appearance: She is ill-appearing.   HENT:      Head: Normocephalic and atraumatic.   Eyes:      General: No scleral icterus.  Cardiovascular:      Rate and Rhythm: Normal rate and regular rhythm.      Comments: RUE AVF +T/B  Pulmonary:      Effort: Pulmonary effort is normal. No respiratory distress.      Breath sounds:clear   Abdominal:      General: Bowel sounds are normal. There is no distension.      Palpations: Abdomen is soft.   Musculoskeletal:         General: No deformity.      Right lower leg: No edema.      Left lower leg: No edema.   Skin:     General: Skin is warm and dry.      Findings: No rash.   Neurological:      General: No focal deficit present.      Mental Status: She is alert.      Comments: Slowed responses   Psychiatric:         Attention and Perception: She is inattentive.         Mood and Affect: Affect is flat.   Fluids:  In: 980 [P.O.:480; Dialysis:500]  Out: 1800     LABS:  Recent Labs     06/30/22  0841   SODIUM 133*   POTASSIUM 4.8   CHLORIDE 92*   CO2 27   GLUCOSE 131*   BUN 56*   CREATININE 6.13*   CALCIUM 9.3       IMAGING:   All imaging reviewed from admission to present day    IMPRESSION:  # ESRD, dependent on HD             - iHD q TThS and prn              - via R AVF (s/p " thrombectomy and temp line)             - Follows with Dr. Mayers outpt            # HTN, overcontrolled              - Goal BP < 140/90             - On hydralazine and metoprolol              - UF with iHD as tolerated   # Edema--resolved  # Anemia of CKD, below target             - Goal Hgb 10-11             - Ferritin 1675, isat 11%             - JUDITH with HD  # CKD-MBD             - Ca:WNL             - PO4 2.2-->3.3  # Thrombocytopenia, mild, resolved   # DMII             - per primary team   # AMS, improved   - 2/2 missed hd vs COVID infection   # Debility/weakness  # Hypoalbuminemia     PLAN:  - Continue iHD qTTS, next tx SAT  - UF as tolerated  - Continue to hold phos binder for now  - No dietary protein restrictions  - Dose all meds per ESRD  - JUDITH with HD to goal Hgb 10-11  - Transfuse PRN Hgb <7   - Hold BP meds before HD  - OK to transition to OP HD once medically cleared     **Upon discharge pt to resume care with her regular nephrologist Dr. Mayers who is not affiliated with [facility]

## 2022-07-01 NOTE — CARE PLAN
The patient is Stable - Low risk of patient condition declining or worsening    Shift Goals  Clinical Goals: rest, reorientation  Patient Goals: rest  Family Goals: elisabeth    Progress made toward(s) clinical / shift goals:  yes      Problem: Knowledge Deficit - Standard  Goal: Patient and family/care givers will demonstrate understanding of plan of care, disease process/condition, diagnostic tests and medications  Outcome: Progressing  Note: Pt actively participates in POC. Pt and board updated, all questions and concerns answered. Pt encouraged to voice all questions and concerns.      Problem: Skin Integrity  Goal: Skin integrity is maintained or improved  Outcome: Progressing  Note: Appropriate interventions in place to protect skin. Pt on q2 turns, dry flows in place,Bed changes PRN. Heels floated, extra pillows for positioning. Wound on board.       Problem: Fall Risk  Goal: Patient will remain free from falls  Outcome: Progressing  Note: Safety and fall education given. All fall precautions are in place with belongings at bedside table. Needs are tended to. Educated to use call light for assistance. Pt verbalized understanding.

## 2022-07-01 NOTE — CARE PLAN
Problem: Skin Integrity  Goal: Skin integrity is maintained or improved  Outcome: Progressing   Pt is q2 turn and tolerating it well  Problem: Fall Risk  Goal: Patient will remain free from falls  Outcome: Progressing  Pt has bed alarm in place and is able to be redirected easily   The patient is Stable - Low risk of patient condition declining or worsening    Shift Goals  Clinical Goals: Staying awake for meals, increae mobility and reorientation  Patient Goals: Rest  Family Goals: CARIE    Progress made toward(s) clinical / shift goals:  Progressing    Patient is not progressing towards the following goals:

## 2022-07-01 NOTE — PALLIATIVE CARE
"Palliative Care follow-up  Attempted to discuss Advance Directive with pt this am however she had a diffiuclt time making choices and then stated that she would want her  and then stated no maybe my son to be her decision maker. When PC RN asked her to reiterate the meaning of what the AD document was for Liberty was not able to say. She then also declined in filling it out. Pt was tearful and stated that her  was with another women. When asked Liberty how she knew that she said it was a \"feeling\" Liberty appeared sad and was slightly tearful and stated that she felt alone. Provided emotional support at the time of visit.       Updated: ash Alvarado     Plan: continue to follow for any needs.     Thank you for allowing Palliative Care to support this patient and family. Contact [phone] for additional assistance, change in patient status, or with any questions/concerns.   "

## 2022-07-02 LAB
ANION GAP SERPL CALC-SCNC: 14 MMOL/L (ref 7–16)
BUN SERPL-MCNC: 58 MG/DL (ref 8–22)
CALCIUM SERPL-MCNC: 9.1 MG/DL (ref 8.5–10.5)
CHLORIDE SERPL-SCNC: 96 MMOL/L (ref 96–112)
CO2 SERPL-SCNC: 26 MMOL/L (ref 20–33)
CREAT SERPL-MCNC: 6.18 MG/DL (ref 0.5–1.4)
ERYTHROCYTE [DISTWIDTH] IN BLOOD BY AUTOMATED COUNT: 50.7 FL (ref 35.9–50)
GFR SERPLBLD CREATININE-BSD FMLA CKD-EPI: 7 ML/MIN/1.73 M 2
GLUCOSE SERPL-MCNC: 138 MG/DL (ref 65–99)
HCT VFR BLD AUTO: 25.6 % (ref 37–47)
HGB BLD-MCNC: 8.3 G/DL (ref 12–16)
MCH RBC QN AUTO: 30.9 PG (ref 27–33)
MCHC RBC AUTO-ENTMCNC: 32.4 G/DL (ref 33.6–35)
MCV RBC AUTO: 95.2 FL (ref 81.4–97.8)
PLATELET # BLD AUTO: 240 K/UL (ref 164–446)
PMV BLD AUTO: 9.8 FL (ref 9–12.9)
POTASSIUM SERPL-SCNC: 4.9 MMOL/L (ref 3.6–5.5)
RBC # BLD AUTO: 2.69 M/UL (ref 4.2–5.4)
SODIUM SERPL-SCNC: 136 MMOL/L (ref 135–145)
WBC # BLD AUTO: 5.3 K/UL (ref 4.8–10.8)

## 2022-07-02 PROCEDURE — 700102 HCHG RX REV CODE 250 W/ 637 OVERRIDE(OP): Performed by: GENERAL PRACTICE

## 2022-07-02 PROCEDURE — 96376 TX/PRO/DX INJ SAME DRUG ADON: CPT | Mod: XU

## 2022-07-02 PROCEDURE — 99225 PR SUBSEQUENT OBSERVATION CARE,LEVEL II: CPT | Mod: CS | Performed by: GENERAL PRACTICE

## 2022-07-02 PROCEDURE — 80048 BASIC METABOLIC PNL TOTAL CA: CPT

## 2022-07-02 PROCEDURE — 700102 HCHG RX REV CODE 250 W/ 637 OVERRIDE(OP): Performed by: INTERNAL MEDICINE

## 2022-07-02 PROCEDURE — 700111 HCHG RX REV CODE 636 W/ 250 OVERRIDE (IP)

## 2022-07-02 PROCEDURE — A9270 NON-COVERED ITEM OR SERVICE: HCPCS | Performed by: INTERNAL MEDICINE

## 2022-07-02 PROCEDURE — 700101 HCHG RX REV CODE 250: Performed by: GENERAL PRACTICE

## 2022-07-02 PROCEDURE — 85027 COMPLETE CBC AUTOMATED: CPT

## 2022-07-02 PROCEDURE — 700111 HCHG RX REV CODE 636 W/ 250 OVERRIDE (IP): Performed by: GENERAL PRACTICE

## 2022-07-02 PROCEDURE — A9270 NON-COVERED ITEM OR SERVICE: HCPCS | Performed by: GENERAL PRACTICE

## 2022-07-02 PROCEDURE — 96372 THER/PROPH/DIAG INJ SC/IM: CPT | Mod: XU

## 2022-07-02 PROCEDURE — G0378 HOSPITAL OBSERVATION PER HR: HCPCS

## 2022-07-02 PROCEDURE — 700102 HCHG RX REV CODE 250 W/ 637 OVERRIDE(OP): Performed by: STUDENT IN AN ORGANIZED HEALTH CARE EDUCATION/TRAINING PROGRAM

## 2022-07-02 PROCEDURE — A9270 NON-COVERED ITEM OR SERVICE: HCPCS | Performed by: STUDENT IN AN ORGANIZED HEALTH CARE EDUCATION/TRAINING PROGRAM

## 2022-07-02 PROCEDURE — 90935 HEMODIALYSIS ONE EVALUATION: CPT

## 2022-07-02 RX ORDER — HEPARIN SODIUM 1000 [USP'U]/ML
INJECTION, SOLUTION INTRAVENOUS; SUBCUTANEOUS
Status: COMPLETED
Start: 2022-07-02 | End: 2022-07-02

## 2022-07-02 RX ORDER — VITAMIN B COMPLEX
2000 TABLET ORAL DAILY
Status: DISCONTINUED | OUTPATIENT
Start: 2022-07-03 | End: 2022-07-12 | Stop reason: HOSPADM

## 2022-07-02 RX ADMIN — ATORVASTATIN CALCIUM 40 MG: 40 TABLET, FILM COATED ORAL at 18:45

## 2022-07-02 RX ADMIN — HEPARIN SODIUM 1500 UNITS: 1000 INJECTION, SOLUTION INTRAVENOUS; SUBCUTANEOUS at 08:04

## 2022-07-02 RX ADMIN — GABAPENTIN 300 MG: 300 CAPSULE ORAL at 18:45

## 2022-07-02 RX ADMIN — ESCITALOPRAM OXALATE 10 MG: 10 TABLET ORAL at 06:25

## 2022-07-02 RX ADMIN — EPOETIN ALFA-EPBX 10000 UNITS: 10000 INJECTION, SOLUTION INTRAVENOUS; SUBCUTANEOUS at 09:13

## 2022-07-02 RX ADMIN — HEPARIN SODIUM 5000 UNITS: 5000 INJECTION, SOLUTION INTRAVENOUS; SUBCUTANEOUS at 06:25

## 2022-07-02 RX ADMIN — LIDOCAINE 1 PATCH: 50 PATCH CUTANEOUS at 11:48

## 2022-07-02 RX ADMIN — HEPARIN SODIUM 5000 UNITS: 5000 INJECTION, SOLUTION INTRAVENOUS; SUBCUTANEOUS at 18:44

## 2022-07-02 ASSESSMENT — ENCOUNTER SYMPTOMS: WEAKNESS: 1

## 2022-07-02 NOTE — PROGRESS NOTES
3hr HD started @ 0807 and ended @ 1107,net UF = 700ml only limited by low bp ,asymptomatic.VSS post HD,RUAAVF + B/T,cannulation sites covered with DD,CDI.Report given to Ellen Garduno RN.

## 2022-07-02 NOTE — PROGRESS NOTES
"[facility] -  PROGRESS NOTE               Author: ASHLY Holguin Date & Time: 7/2/2022  11:19 AM     HPI:   67 y.o. female who presented 6/13/2022 with lethargy from [facility].  Patient has not had dialysis for the last 10 days due to behavioral issues.  Patient tested positive for COVID on June 6.  History limited due to AMS.   In the ED, patient found to be hypertensive. Found to have BUN 88 creatinine 11.7.  CT head negative. Chest x-ray showing increased interstitial markings consistent with mild interstitial edema.  Nephrology consulted, recommends dialysis in a.m.     DAILY NEPHROLOGY SUMMARY:  6/15: got hd yesterday. naoe  6/16: NAOE, irritated   6/17: calm, resting in the bed, no new issues, On O2 supplementation.   6/18: HD done today. UF off due to hypotension. Dialyzer clotted, able to return blood. Completed dialysis treatment with heparin.  6/19: SBP 100s-120s, phos 2.2, no new c/o  6/20: no events, drowsy but arousable, denies any CP/SOB/abd pain, BP stable  6/21: No events, pt upset and unwilling to answer questions this am, BP stable  6/22: No events, tolerated HD yest with 2L UF, confused overnight, tearful and oriented x2 this am, denies any CP/SOB/LE edema/Abd pain, BP variable  6/23: No events, feels ok, denies any CP/SOB/LE edema, BP stable, due for HD today  6/24: laying in bed, sleepy, no complaints, tolerated iHD yesterday UF: 1.6L, pending SNF   6/25: No events, seen and examined on hemodialysis VSS--see dialysis treatment sheet for full details, drowsy, denies any CP/SOB  6/26: No events, tolerated HD yest, only intermittently answers questions, denies any CP/SOB  6/27: Pt sitting up in bed, RN assisting with breakfast tray, pt says she feels \"better\", no physical complaints, no new labs, waiting on SNF bed, Bps soft requiring antihtn to be held, resp status stable on RA  6/28: sitting up in bed, no complaints, for iHD today, pending placement   6/29: " "iHD yesterday, no net UF, soft Bps.  VSS on Room air.   6/30: no new complaints, bored and grumpy , due for HD   7/1:  Resting in bed,  no complaints.  VSS RA. iHD yesterday net UF 1.3L.   7/2: Pt seen on HD, comfortable, low Bps limiting UF, asymptomatic, labs reviewed, resp status stable on RA    REVIEW OF SYSTEMS:    10 point ROS reviewed and is as per HPI/daily summary or otherwise negative    PMH/PSH/SH/FH:   Reviewed and unchanged since admission note    CURRENT MEDICATIONS:   Reviewed from admission to present day    VS:  /74   Pulse 88   Temp 36.6 °C (97.9 °F) (Oral)   Resp 15   Ht 1.651 m (5' 5\")   Wt 71.1 kg (156 lb 12 oz)   SpO2 98%   BMI 26.08 kg/m²     Physical Exam  Vitals and nursing note reviewed.   Constitutional:       General: She is not in acute distress.     Appearance: She is ill-appearing.   HENT:      Head: Normocephalic and atraumatic.   Eyes:      General: No scleral icterus.  Cardiovascular:      Rate and Rhythm: Normal rate and regular rhythm.      Comments: RUE AVF in use  Pulmonary:      Effort: Pulmonary effort is normal. No respiratory distress.      Breath sounds:clear   Abdominal:      General: Bowel sounds are normal. There is no distension.      Palpations: Abdomen is soft.   Musculoskeletal:         General: No deformity.      Right lower leg: No edema.      Left lower leg: No edema.   Skin:     General: Skin is warm and dry.      Findings: No rash.   Neurological:      General: No focal deficit present.      Mental Status: She is alert.      Comments: Slowed responses   Psychiatric:         Attention and Perception: She is inattentive.         Mood and Affect: Affect is flat.   Fluids:  In: 120 [P.O.:120]  Out: -     LABS:  Recent Labs     06/30/22  0841 07/02/22  0807   SODIUM 133* 136   POTASSIUM 4.8 4.9   CHLORIDE 92* 96   CO2 27 26   GLUCOSE 131* 138*   BUN 56* 58*   CREATININE 6.13* 6.18*   CALCIUM 9.3 9.1       IMAGING:   All imaging reviewed from admission to " present day    IMPRESSION:  # ESRD, dependent on HD             - iHD q TThS and PRN             - via R AVF (s/p thrombectomy and temp line)             - Follows with Dr. Mayers outpt            # HTN, was overcontrolled, now improved off meds             - Goal BP < 140/90             - Now off hydralazine and metoprolol              - UF with iHD as tolerated   # Edema--resolved  # Anemia of CKD, below target             - Goal Hgb 10-11             - Ferritin 1675, isat 11%             - JUDITH with HD  # CKD-MBD             - Ca:WNL             - PO4 2.2-->3.3; not on binders  # Thrombocytopenia, mild, resolved   # DMII             - Per primary team   # AMS, improved    - 2/2 missed hd vs COVID infection   # Debility/weakness  # Hypoalbuminemia, improved     PLAN:  - Continue iHD qTTS, treatment due today (SAT)  - UF as tolerated  - Continue to hold phos binders  - No dietary protein restrictions  - Dose all meds per ESRD  - JUDITH with HD to goal Hgb 10-11  - Transfuse PRN Hgb <7   - Continue off of BP meds for now  - OK to transition to OP HD once medically cleared  - Pallative has consulted   - DC planning underway for SNF     **Upon discharge pt to resume care with her regular nephrologist Dr. Mayers who is not affiliated with [facility]

## 2022-07-02 NOTE — CARE PLAN
The patient is Stable - Low risk of patient condition declining or worsening    Shift Goals  Clinical Goals: reorientation, rest  Patient Goals: comfort  Family Goals: elisabeth    Progress made toward(s) clinical / shift goals:  yes  Problem: Skin Integrity  Goal: Skin integrity is maintained or improved  Outcome: Progressing  Note: Appropriate interventions in place to protect skin. Pt on q2 turns, dry flows in place, Bed changes PRN. Heels floated, extra pillows for positioning. Wound on board.       Problem: Fall Risk  Goal: Patient will remain free from falls  Outcome: Progressing  Note: Safety and fall education given. All fall precautions are in place with belongings at bedside table. Needs are tended to. Educated to use call light for assistance. Pt verbalized understanding.         Patient is not progressing towards the following goals:      Problem: Mobility  Goal: Patient's capacity to carry out activities will improve  Outcome: Not Progressing  Note: Patient heavy assist oob, pt/ot following
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
## 2022-07-03 PROCEDURE — 700101 HCHG RX REV CODE 250: Performed by: GENERAL PRACTICE

## 2022-07-03 PROCEDURE — A9270 NON-COVERED ITEM OR SERVICE: HCPCS | Performed by: INTERNAL MEDICINE

## 2022-07-03 PROCEDURE — A9270 NON-COVERED ITEM OR SERVICE: HCPCS | Performed by: STUDENT IN AN ORGANIZED HEALTH CARE EDUCATION/TRAINING PROGRAM

## 2022-07-03 PROCEDURE — 700111 HCHG RX REV CODE 636 W/ 250 OVERRIDE (IP): Performed by: GENERAL PRACTICE

## 2022-07-03 PROCEDURE — 700102 HCHG RX REV CODE 250 W/ 637 OVERRIDE(OP): Performed by: GENERAL PRACTICE

## 2022-07-03 PROCEDURE — A9270 NON-COVERED ITEM OR SERVICE: HCPCS | Performed by: GENERAL PRACTICE

## 2022-07-03 PROCEDURE — 700102 HCHG RX REV CODE 250 W/ 637 OVERRIDE(OP): Performed by: INTERNAL MEDICINE

## 2022-07-03 PROCEDURE — 96372 THER/PROPH/DIAG INJ SC/IM: CPT

## 2022-07-03 PROCEDURE — 700102 HCHG RX REV CODE 250 W/ 637 OVERRIDE(OP): Performed by: STUDENT IN AN ORGANIZED HEALTH CARE EDUCATION/TRAINING PROGRAM

## 2022-07-03 PROCEDURE — 99225 PR SUBSEQUENT OBSERVATION CARE,LEVEL II: CPT | Mod: CS | Performed by: GENERAL PRACTICE

## 2022-07-03 PROCEDURE — G0378 HOSPITAL OBSERVATION PER HR: HCPCS

## 2022-07-03 RX ADMIN — Medication 2000 UNITS: at 06:16

## 2022-07-03 RX ADMIN — LIDOCAINE 1 PATCH: 50 PATCH CUTANEOUS at 10:15

## 2022-07-03 RX ADMIN — ESCITALOPRAM OXALATE 10 MG: 10 TABLET ORAL at 06:16

## 2022-07-03 RX ADMIN — ATORVASTATIN CALCIUM 40 MG: 40 TABLET, FILM COATED ORAL at 18:11

## 2022-07-03 RX ADMIN — GABAPENTIN 300 MG: 300 CAPSULE ORAL at 18:11

## 2022-07-03 RX ADMIN — HEPARIN SODIUM 5000 UNITS: 5000 INJECTION, SOLUTION INTRAVENOUS; SUBCUTANEOUS at 06:16

## 2022-07-03 RX ADMIN — HEPARIN SODIUM 5000 UNITS: 5000 INJECTION, SOLUTION INTRAVENOUS; SUBCUTANEOUS at 18:11

## 2022-07-03 ASSESSMENT — ENCOUNTER SYMPTOMS: WEAKNESS: 1
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
## 2022-07-03 NOTE — CARE PLAN
The patient is Watcher - Medium risk of patient condition declining or worsening    Shift Goals  Clinical Goals:  (Remain safe and free from falls)  Patient Goals: Rest  Family Goals: CARIE    Progress made toward(s) clinical / shift goals:     Patient is not progressing towards the following goals:

## 2022-07-03 NOTE — PROGRESS NOTES
"[facility] -  PROGRESS NOTE               Author: ASHLY Holguin Date & Time: 7/3/2022  12:01 PM     HPI:   67 y.o. female who presented 6/13/2022 with lethargy from [facility].  Patient has not had dialysis for the last 10 days due to behavioral issues.  Patient tested positive for COVID on June 6.  History limited due to AMS.   In the ED, patient found to be hypertensive. Found to have BUN 88 creatinine 11.7.  CT head negative. Chest x-ray showing increased interstitial markings consistent with mild interstitial edema.  Nephrology consulted, recommends dialysis in a.m.     DAILY NEPHROLOGY SUMMARY:  6/15: got hd yesterday. naoe  6/16: NAOE, irritated   6/17: calm, resting in the bed, no new issues, On O2 supplementation.   6/18: HD done today. UF off due to hypotension. Dialyzer clotted, able to return blood. Completed dialysis treatment with heparin.  6/19: SBP 100s-120s, phos 2.2, no new c/o  6/20: no events, drowsy but arousable, denies any CP/SOB/abd pain, BP stable  6/21: No events, pt upset and unwilling to answer questions this am, BP stable  6/22: No events, tolerated HD yest with 2L UF, confused overnight, tearful and oriented x2 this am, denies any CP/SOB/LE edema/Abd pain, BP variable  6/23: No events, feels ok, denies any CP/SOB/LE edema, BP stable, due for HD today  6/24: laying in bed, sleepy, no complaints, tolerated iHD yesterday UF: 1.6L, pending SNF   6/25: No events, seen and examined on hemodialysis VSS--see dialysis treatment sheet for full details, drowsy, denies any CP/SOB  6/26: No events, tolerated HD yest, only intermittently answers questions, denies any CP/SOB  6/27: Pt sitting up in bed, RN assisting with breakfast tray, pt says she feels \"better\", no physical complaints, no new labs, waiting on SNF bed, Bps soft requiring antihtn to be held, resp status stable on RA  6/28: sitting up in bed, no complaints, for iHD today, pending placement   6/29: " "iHD yesterday, no net UF, soft Bps.  VSS on Room air.   6/30: no new complaints, bored and grumpy , due for HD   7/1:  Resting in bed,  no complaints.  VSS RA. iHD yesterday net UF 1.3L.   7/2: Pt seen on HD, comfortable, low Bps limiting UF, asymptomatic, labs reviewed, resp status stable on RA  7/3: Pt in bed, flat affect, no new complaints, VSS on RA, iHD yesterday with 700 mL UF, pt is medically cleared, pending bed availability at [facility]    REVIEW OF SYSTEMS:    10 point ROS reviewed and is as per HPI/daily summary or otherwise negative    PMH/PSH/SH/FH:   Reviewed and unchanged since admission note    CURRENT MEDICATIONS:   Reviewed from admission to present day    VS:  BP (!) 127/36 Comment: Rn notified   Pulse 76   Temp 36.6 °C (97.8 °F) (Temporal)   Resp 16   Ht 1.651 m (5' 5\")   Wt 71.1 kg (156 lb 12 oz)   SpO2 94%   BMI 26.08 kg/m²     Physical Exam  Vitals and nursing note reviewed.   Constitutional:       General: She is not in acute distress.     Appearance: She is ill-appearing.   HENT:      Head: Normocephalic and atraumatic.   Eyes:      General: No scleral icterus.  Cardiovascular:      Rate and Rhythm: Normal rate and regular rhythm.      Comments: RUE AVF +T/B  Pulmonary:      Effort: Pulmonary effort is normal. No respiratory distress.      Breath sounds:clear   Abdominal:      General: Bowel sounds are normal. There is no distension.      Palpations: Abdomen is soft.   Musculoskeletal:         General: No deformity.      Right lower leg: No edema.      Left lower leg: No edema.   Skin:     General: Skin is warm and dry.      Findings: No rash.   Neurological:      General: No focal deficit present.      Mental Status: She is alert.      Comments: Slowed responses   Psychiatric:         Attention and Perception: She is inattentive.         Mood and Affect: Affect is flat.   Fluids:  In: 500 [Dialysis:500]  Out: 1200     LABS:  Recent Labs     07/02/22  0807   SODIUM 136   POTASSIUM " 4.9   CHLORIDE 96   CO2 26   GLUCOSE 138*   BUN 58*   CREATININE 6.18*   CALCIUM 9.1       IMAGING:   All imaging reviewed from admission to present day    IMPRESSION:  # ESRD, dependent on HD             - iHD q TThS and PRN             - via R AVF (s/p thrombectomy and temp line)             - Follows with Dr. Mayers outpt            # HTN, was overcontrolled, now improved off meds             - Goal BP < 140/90             - Now off hydralazine and metoprolol              - UF with iHD as tolerated   # Edema--resolved  # Anemia of CKD, below target             - Goal Hgb 10-11             - Ferritin 1675, isat 11%             - JUDITH with HD  # CKD-MBD             - Ca WNL             - PO4 2.2-->3.3; not on binders             - iPTH 138 and Vit D 14 on 5/23             - Started on cholecalciferol 7/2  # Thrombocytopenia, mild, resolved   # DMII             - Per primary team   # AMS, improved    - 2/2 missed hd vs COVID infection   # Debility/weakness  # Hypoalbuminemia, improved     PLAN:  - Continue iHD qTTS, treatment next due TUES  - UF as tolerated  - Continue to hold phos binders  - Continue Vit D3  - No dietary protein restrictions  - Dose all meds per ESRD  - JUDITH with HD to goal Hgb 10-11  - Transfuse PRN Hgb <7   - Continue off of BP meds for now  - OK to transition to OP HD once medically cleared  - Pallative has consulted   - DC planning underway for SNF, pending bed availability at [facility]     **Upon discharge pt to resume care with her regular nephrologist Dr. Mayers who is not affiliated with [facility]

## 2022-07-03 NOTE — PROGRESS NOTES
"Received bedside report from JUAN Hughes. Patient was laying in bed crying. When asked what was wrong patient responded with \"I don't know\". This nurse asked patient If there was anything she could do and patient replied with \"no\". This nurse tried to provide comfort measures for patient. Patient on room air. Bed locked and in lowest position. Call light within reach.   "
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
## 2022-07-04 PROCEDURE — 700102 HCHG RX REV CODE 250 W/ 637 OVERRIDE(OP): Performed by: INTERNAL MEDICINE

## 2022-07-04 PROCEDURE — A9270 NON-COVERED ITEM OR SERVICE: HCPCS | Performed by: INTERNAL MEDICINE

## 2022-07-04 PROCEDURE — 96372 THER/PROPH/DIAG INJ SC/IM: CPT

## 2022-07-04 PROCEDURE — G0378 HOSPITAL OBSERVATION PER HR: HCPCS

## 2022-07-04 PROCEDURE — 99225 PR SUBSEQUENT OBSERVATION CARE,LEVEL II: CPT | Mod: CS | Performed by: GENERAL PRACTICE

## 2022-07-04 PROCEDURE — 700102 HCHG RX REV CODE 250 W/ 637 OVERRIDE(OP): Performed by: GENERAL PRACTICE

## 2022-07-04 PROCEDURE — 700101 HCHG RX REV CODE 250: Performed by: GENERAL PRACTICE

## 2022-07-04 PROCEDURE — 700111 HCHG RX REV CODE 636 W/ 250 OVERRIDE (IP): Performed by: GENERAL PRACTICE

## 2022-07-04 PROCEDURE — A9270 NON-COVERED ITEM OR SERVICE: HCPCS | Performed by: STUDENT IN AN ORGANIZED HEALTH CARE EDUCATION/TRAINING PROGRAM

## 2022-07-04 PROCEDURE — 97530 THERAPEUTIC ACTIVITIES: CPT

## 2022-07-04 PROCEDURE — 700102 HCHG RX REV CODE 250 W/ 637 OVERRIDE(OP): Performed by: STUDENT IN AN ORGANIZED HEALTH CARE EDUCATION/TRAINING PROGRAM

## 2022-07-04 PROCEDURE — A9270 NON-COVERED ITEM OR SERVICE: HCPCS | Performed by: GENERAL PRACTICE

## 2022-07-04 RX ADMIN — HEPARIN SODIUM 5000 UNITS: 5000 INJECTION, SOLUTION INTRAVENOUS; SUBCUTANEOUS at 05:55

## 2022-07-04 RX ADMIN — HEPARIN SODIUM 5000 UNITS: 5000 INJECTION, SOLUTION INTRAVENOUS; SUBCUTANEOUS at 17:40

## 2022-07-04 RX ADMIN — LIDOCAINE 1 PATCH: 50 PATCH CUTANEOUS at 10:38

## 2022-07-04 RX ADMIN — DOCUSATE SODIUM 50 MG AND SENNOSIDES 8.6 MG 2 TABLET: 8.6; 5 TABLET, FILM COATED ORAL at 17:40

## 2022-07-04 RX ADMIN — ATORVASTATIN CALCIUM 40 MG: 40 TABLET, FILM COATED ORAL at 17:40

## 2022-07-04 RX ADMIN — GABAPENTIN 300 MG: 300 CAPSULE ORAL at 17:40

## 2022-07-04 RX ADMIN — Medication 2000 UNITS: at 05:55

## 2022-07-04 RX ADMIN — ESCITALOPRAM OXALATE 10 MG: 10 TABLET ORAL at 05:55

## 2022-07-04 ASSESSMENT — COGNITIVE AND FUNCTIONAL STATUS - GENERAL
WALKING IN HOSPITAL ROOM: TOTAL
MOVING TO AND FROM BED TO CHAIR: UNABLE
STANDING UP FROM CHAIR USING ARMS: TOTAL
CLIMB 3 TO 5 STEPS WITH RAILING: TOTAL
MOBILITY SCORE: 6
TURNING FROM BACK TO SIDE WHILE IN FLAT BAD: UNABLE
MOVING FROM LYING ON BACK TO SITTING ON SIDE OF FLAT BED: UNABLE
SUGGESTED CMS G CODE MODIFIER MOBILITY: CN

## 2022-07-04 ASSESSMENT — ENCOUNTER SYMPTOMS: WEAKNESS: 1

## 2022-07-04 ASSESSMENT — GAIT ASSESSMENTS: GAIT LEVEL OF ASSIST: UNABLE TO PARTICIPATE
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
## 2022-07-04 NOTE — THERAPY
Physical Therapy   Daily Treatment     Patient Name: Liberty Bolton  Age:  67 y.o., Sex:  female  Medical Record #: [id number]  Today's Date: 7/4/2022     Precautions  Precautions: Fall Risk;Swallow Precautions ( See Comments)    Assessment    Patient continues to be limited by volition, functional weakness, and decreased activity tolerance. She required max A for bed mobility and was unable to maintain sitting balance at EOB without assist. She was minimally participatory but when prompted she stated she wants to get better and walk again. Will continue to follow. Recommend patient mobilize to EOB or to cardiac chair 1x/wk with RN staff to progress tolerance to upright and strength.    Plan    Continue current treatment plan.    DC Equipment Recommendations: Unable to determine at this time  Discharge Recommendations: Recommend post-acute placement for additional physical therapy services prior to discharge home     Objective       07/04/22 1027   Charge Group   Charges  Yes   PT Therapeutic Activities 2  (24 min)   Precautions   Precautions Fall Risk;Swallow Precautions ( See Comments)   Vitals   O2 (LPM) 0   O2 Delivery Device None - Room Air   Pain 0 - 10 Group   Location Generalized;Neck   Therapist Pain Assessment During Activity;Nurse Notified   Cognition    Cognition / Consciousness X   Speech/ Communication Delayed Responses   Level of Consciousness Alert   Ability To Follow Commands 1 Step   Safety Awareness Impaired   New Learning Impaired   Attention Impaired   Sequencing Impaired   Initiation Impaired   Comments appeared less confused today but limited volition despite stating that she wants to get better and walk again   Balance   Sitting Balance (Static) Trace   Sitting Balance (Dynamic) Dependent   Weight Shift Sitting Absent   Skilled Intervention Compensatory Strategies;Facilitation;Postural Facilitation;Tactile Cuing   Comments posterior lean in sitting, unable to correct   Gait Analysis   Gait Level  "Of Assist Unable to Participate   Weight Bearing Status no restrictions   Vision Deficits Impacting Mobility NT   Bed Mobility    Supine to Sit Total Assist   Sit to Supine Total Assist   Scooting Maximal Assist   Skilled Intervention Verbal Cuing;Compensatory Strategies;Facilitation   Functional Mobility   Sit to Stand Unable to Participate  (deferred given balance and participation)   How much difficulty does the patient currently have...   Turning over in bed (including adjusting bedclothes, sheets and blankets)? 1   Sitting down on and standing up from a chair with arms (e.g., wheelchair, bedside commode, etc.) 1   Moving from lying on back to sitting on the side of the bed? 1   How much help from another person does the patient currently need...   Moving to and from a bed to a chair (including a wheelchair)? 1   Need to walk in a hospital room? 1   Climbing 3-5 steps with a railing? 1   6 clicks Mobility Score 6   Activity Tolerance   Sitting in Chair unable   Sitting Edge of Bed 15 min but required max A   Standing unable   Comments limited by weakness   Patient / Family Goals    Patient / Family Goal #1 \"Walk again\"   Short Term Goals    Short Term Goal # 1 Pt will be able to perform STS with moderate assist w/FWW within 6 visits to increase functional mobility   Goal Outcome # 1 goal not met   Short Term Goal # 2 Pt able to perform supine<> sit with moderate assist within 6 visits to imporve in bed mobility   Goal Outcome # 2 Goal not met   Short Term Goal # 3 Pt able to sit EOB for 10 min under SPV within 6 visits to improve tolerance for static sitting   Goal Outcome # 3 Goal not met   Short Term Goal # 4 Pt will self propel w/c x 150ft with B UE/LE support wihtin 6 visits to ensure household mobility   Goal Outcome # 4 Goal not met   Short Term Goal # 5 Pt will ambulate x 50ft with FWW and min A within 6 visits to ensure independent mobility at home.   Goal Outcome # 5 Goal not met   Anticipated Discharge " Equipment and Recommendations   DC Equipment Recommendations Unable to determine at this time   Discharge Recommendations Recommend post-acute placement for additional physical therapy services prior to discharge home   Interdisciplinary Plan of Care Collaboration   IDT Collaboration with  Nursing   Patient Position at End of Therapy In Bed;Call Light within Reach   Collaboration Comments RN aware of visit, response   Session Information   Date / Session Number  7/4-4 (2/2, 7/5)

## 2022-07-04 NOTE — PROGRESS NOTES
Received bedside shift report from JUAN Marsh. Patient in laying in bed looking out of her window. Room air. Bed in lowest position and locked, call light within reach as well as bedside table. No needs at this time. Updates whiteboard with night time nurse and CNA's for this evening. Bed alarm in on and active

## 2022-07-04 NOTE — PROGRESS NOTES
"[facility] -  PROGRESS NOTE               Author: Sandeep Zuluaga M.D. Date & Time: 7/4/2022  9:59 AM     HPI:   67 y.o. female who presented 6/13/2022 with lethargy from [facility].  Patient has not had dialysis for the last 10 days due to behavioral issues.  Patient tested positive for COVID on June 6.  History limited due to AMS.   In the ED, patient found to be hypertensive. Found to have BUN 88 creatinine 11.7.  CT head negative. Chest x-ray showing increased interstitial markings consistent with mild interstitial edema.  Nephrology consulted, recommends dialysis in a.m.     DAILY NEPHROLOGY SUMMARY:  6/15: got hd yesterday. naoe  6/16: NAOE, irritated   6/17: calm, resting in the bed, no new issues, On O2 supplementation.   6/18: HD done today. UF off due to hypotension. Dialyzer clotted, able to return blood. Completed dialysis treatment with heparin.  6/19: SBP 100s-120s, phos 2.2, no new c/o  6/20: no events, drowsy but arousable, denies any CP/SOB/abd pain, BP stable  6/21: No events, pt upset and unwilling to answer questions this am, BP stable  6/22: No events, tolerated HD yest with 2L UF, confused overnight, tearful and oriented x2 this am, denies any CP/SOB/LE edema/Abd pain, BP variable  6/23: No events, feels ok, denies any CP/SOB/LE edema, BP stable, due for HD today  6/24: laying in bed, sleepy, no complaints, tolerated iHD yesterday UF: 1.6L, pending SNF   6/25: No events, seen and examined on hemodialysis VSS--see dialysis treatment sheet for full details, drowsy, denies any CP/SOB  6/26: No events, tolerated HD yest, only intermittently answers questions, denies any CP/SOB  6/27: Pt sitting up in bed, RN assisting with breakfast tray, pt says she feels \"better\", no physical complaints, no new labs, waiting on SNF bed, Bps soft requiring antihtn to be held, resp status stable on RA  6/28: sitting up in bed, no complaints, for iHD today, pending placement   6/29: iHD " "yesterday, no net UF, soft Bps.  VSS on Room air.   6/30: no new complaints, bored and grumpy , due for HD   7/1:  Resting in bed,  no complaints.  VSS RA. iHD yesterday net UF 1.3L.   7/2: Pt seen on HD, comfortable, low Bps limiting UF, asymptomatic, labs reviewed, resp status stable on RA  7/3: Pt in bed, flat affect, no new complaints, VSS on RA, iHD yesterday with 700 mL UF, pt is medically cleared, pending bed availability at [facility]  7/4: No new overnight renal events. Feels ok.     REVIEW OF SYSTEMS:    10 point ROS reviewed and is as per HPI/daily summary or otherwise negative    PMH/PSH/SH/FH:   Reviewed and unchanged since admission note    CURRENT MEDICATIONS:   Reviewed from admission to present day    VS:  /39   Pulse 76   Temp 36.4 °C (97.6 °F) (Temporal)   Resp 16   Ht 1.651 m (5' 5\")   Wt 71.1 kg (156 lb 12 oz)   SpO2 97%   BMI 26.08 kg/m²     Physical Exam  Vitals and nursing note reviewed.       Vitals and nursing note reviewed.   Constitutional:       General: She is not in acute distress.     Appearance: She is ill-appearing.   HENT:      Head: Normocephalic and atraumatic.   Eyes:      General: No scleral icterus.  Cardiovascular:      Rate and Rhythm: Normal rate and regular rhythm.      Comments: RUE AVF +T/B  Pulmonary:      Effort: Pulmonary effort is normal. No respiratory distress.      Breath sounds:clear   Abdominal:      General: Bowel sounds are normal. There is no distension.      Palpations: Abdomen is soft.   Musculoskeletal:         General: No deformity.      Right lower leg: No edema.      Left lower leg: No edema.   Skin:     General: Skin is warm and dry.      Findings: No rash.   Neurological:      General: No focal deficit present.      Mental Status: She is alert.      Comments: Slowed responses   Psychiatric:         Attention and Perception: She is inattentive.         Mood and Affect: Affect is flat.   Fluids:  In: 360 [P.O.:360]  Out: - "     LABS:  Recent Labs     07/02/22  0807   SODIUM 136   POTASSIUM 4.9   CHLORIDE 96   CO2 26   GLUCOSE 138*   BUN 58*   CREATININE 6.18*   CALCIUM 9.1       IMAGING:   All imaging reviewed from admission to present day    IMPRESSION:  # ESRD, dependent on HD             - iHD q TThS and PRN             - via R AVF (s/p thrombectomy and temp line)             - Follows with Dr. Mayers outpt            # HTN, was overcontrolled, now improved off meds             - Goal BP < 140/90             - Now off hydralazine and metoprolol              - UF with iHD as tolerated   # Edema--resolved  # Anemia of CKD, below target             - Goal Hgb 10-11             - Ferritin 1675, isat 11%             - JUDITH with HD  # CKD-MBD             - Ca WNL             - PO4 2.2-->3.3; not on binders             - iPTH 138 and Vit D 14 on 5/23             - Started on cholecalciferol 7/2  # Thrombocytopenia, mild, resolved   # DMII             - Per primary team   # AMS, improved    - 2/2 missed hd vs COVID infection   # Debility/weakness  # Hypoalbuminemia, improved     PLAN:  - No plans for HD today.  - Continue iHD qTTS, treatment tomorrow TUES  - UF as tolerated  - Continue to hold phos binders  - No dietary protein restrictions  - Dose all meds per ESRD  - JUDITH with HD to goal Hgb 10-11  - Transfuse PRN Hgb <7   - Continue off of BP meds for now  - OK to transition to OP HD once medically cleared  - Pallative has consulted   - DC planning underway for SNF, pending bed availability at [facility]     **Upon discharge pt to resume care with her regular nephrologist Dr. Mayers who is not affiliated with [facility]

## 2022-07-05 LAB
ANION GAP SERPL CALC-SCNC: 13 MMOL/L (ref 7–16)
BUN SERPL-MCNC: 77 MG/DL (ref 8–22)
CALCIUM SERPL-MCNC: 9.5 MG/DL (ref 8.5–10.5)
CHLORIDE SERPL-SCNC: 91 MMOL/L (ref 96–112)
CO2 SERPL-SCNC: 28 MMOL/L (ref 20–33)
CREAT SERPL-MCNC: 6.8 MG/DL (ref 0.5–1.4)
ERYTHROCYTE [DISTWIDTH] IN BLOOD BY AUTOMATED COUNT: 50.6 FL (ref 35.9–50)
GFR SERPLBLD CREATININE-BSD FMLA CKD-EPI: 6 ML/MIN/1.73 M 2
GLUCOSE SERPL-MCNC: 183 MG/DL (ref 65–99)
HCT VFR BLD AUTO: 24.9 % (ref 37–47)
HGB BLD-MCNC: 8 G/DL (ref 12–16)
MCH RBC QN AUTO: 30.7 PG (ref 27–33)
MCHC RBC AUTO-ENTMCNC: 32.1 G/DL (ref 33.6–35)
MCV RBC AUTO: 95.4 FL (ref 81.4–97.8)
PLATELET # BLD AUTO: 253 K/UL (ref 164–446)
PMV BLD AUTO: 9.4 FL (ref 9–12.9)
POTASSIUM SERPL-SCNC: 5.5 MMOL/L (ref 3.6–5.5)
RBC # BLD AUTO: 2.61 M/UL (ref 4.2–5.4)
SODIUM SERPL-SCNC: 132 MMOL/L (ref 135–145)
WBC # BLD AUTO: 4.6 K/UL (ref 4.8–10.8)

## 2022-07-05 PROCEDURE — 700102 HCHG RX REV CODE 250 W/ 637 OVERRIDE(OP): Performed by: INTERNAL MEDICINE

## 2022-07-05 PROCEDURE — 80048 BASIC METABOLIC PNL TOTAL CA: CPT

## 2022-07-05 PROCEDURE — A9270 NON-COVERED ITEM OR SERVICE: HCPCS | Performed by: GENERAL PRACTICE

## 2022-07-05 PROCEDURE — 36415 COLL VENOUS BLD VENIPUNCTURE: CPT

## 2022-07-05 PROCEDURE — A9270 NON-COVERED ITEM OR SERVICE: HCPCS | Performed by: INTERNAL MEDICINE

## 2022-07-05 PROCEDURE — 700111 HCHG RX REV CODE 636 W/ 250 OVERRIDE (IP): Performed by: GENERAL PRACTICE

## 2022-07-05 PROCEDURE — 700101 HCHG RX REV CODE 250: Performed by: GENERAL PRACTICE

## 2022-07-05 PROCEDURE — 96372 THER/PROPH/DIAG INJ SC/IM: CPT | Mod: XU

## 2022-07-05 PROCEDURE — G0378 HOSPITAL OBSERVATION PER HR: HCPCS

## 2022-07-05 PROCEDURE — 700111 HCHG RX REV CODE 636 W/ 250 OVERRIDE (IP)

## 2022-07-05 PROCEDURE — 97110 THERAPEUTIC EXERCISES: CPT

## 2022-07-05 PROCEDURE — 96376 TX/PRO/DX INJ SAME DRUG ADON: CPT | Mod: XU

## 2022-07-05 PROCEDURE — 99225 PR SUBSEQUENT OBSERVATION CARE,LEVEL II: CPT | Performed by: STUDENT IN AN ORGANIZED HEALTH CARE EDUCATION/TRAINING PROGRAM

## 2022-07-05 PROCEDURE — 700102 HCHG RX REV CODE 250 W/ 637 OVERRIDE(OP): Performed by: STUDENT IN AN ORGANIZED HEALTH CARE EDUCATION/TRAINING PROGRAM

## 2022-07-05 PROCEDURE — 90935 HEMODIALYSIS ONE EVALUATION: CPT

## 2022-07-05 PROCEDURE — 97530 THERAPEUTIC ACTIVITIES: CPT

## 2022-07-05 PROCEDURE — 85027 COMPLETE CBC AUTOMATED: CPT

## 2022-07-05 PROCEDURE — 700102 HCHG RX REV CODE 250 W/ 637 OVERRIDE(OP): Performed by: GENERAL PRACTICE

## 2022-07-05 PROCEDURE — A9270 NON-COVERED ITEM OR SERVICE: HCPCS | Performed by: STUDENT IN AN ORGANIZED HEALTH CARE EDUCATION/TRAINING PROGRAM

## 2022-07-05 RX ORDER — HEPARIN SODIUM 1000 [USP'U]/ML
INJECTION, SOLUTION INTRAVENOUS; SUBCUTANEOUS
Status: COMPLETED
Start: 2022-07-05 | End: 2022-07-05

## 2022-07-05 RX ADMIN — LIDOCAINE 1 PATCH: 50 PATCH CUTANEOUS at 09:36

## 2022-07-05 RX ADMIN — HEPARIN SODIUM 1500 UNITS: 1000 INJECTION, SOLUTION INTRAVENOUS; SUBCUTANEOUS at 11:02

## 2022-07-05 RX ADMIN — DOCUSATE SODIUM 50 MG AND SENNOSIDES 8.6 MG 2 TABLET: 8.6; 5 TABLET, FILM COATED ORAL at 04:10

## 2022-07-05 RX ADMIN — EPOETIN ALFA-EPBX 10000 UNITS: 10000 INJECTION, SOLUTION INTRAVENOUS; SUBCUTANEOUS at 11:50

## 2022-07-05 RX ADMIN — ESCITALOPRAM OXALATE 10 MG: 10 TABLET ORAL at 04:10

## 2022-07-05 RX ADMIN — HEPARIN SODIUM 5000 UNITS: 5000 INJECTION, SOLUTION INTRAVENOUS; SUBCUTANEOUS at 04:10

## 2022-07-05 RX ADMIN — HEPARIN SODIUM 5000 UNITS: 5000 INJECTION, SOLUTION INTRAVENOUS; SUBCUTANEOUS at 17:03

## 2022-07-05 RX ADMIN — Medication 2000 UNITS: at 04:09

## 2022-07-05 ASSESSMENT — COGNITIVE AND FUNCTIONAL STATUS - GENERAL
DAILY ACTIVITIY SCORE: 6
PERSONAL GROOMING: TOTAL
TOILETING: TOTAL
SUGGESTED CMS G CODE MODIFIER DAILY ACTIVITY: CN
DRESSING REGULAR UPPER BODY CLOTHING: TOTAL
DRESSING REGULAR LOWER BODY CLOTHING: TOTAL
HELP NEEDED FOR BATHING: TOTAL
EATING MEALS: TOTAL

## 2022-07-05 ASSESSMENT — ENCOUNTER SYMPTOMS: WEAKNESS: 1

## 2022-07-05 ASSESSMENT — PAIN DESCRIPTION - PAIN TYPE
TYPE: ACUTE PAIN
TYPE: ACUTE PAIN

## 2022-07-05 ASSESSMENT — PAIN SCALES - PAIN ASSESSMENT IN ADVANCED DEMENTIA (PAINAD)
BREATHING: NORMAL
BREATHING: NORMAL

## 2022-07-05 NOTE — CARE PLAN
The patient is Stable - Low risk of patient condition declining or worsening    Shift Goals  Clinical Goals: comfort  Patient Goals: rest  Family Goals: n/a    Progress made toward(s) clinical / shift goals:    Problem: Fall Risk  Goal: Patient will remain free from falls  Outcome: Progressing  Note: Bedrails up x3.  Bed locked and in lowest posiiton.  Call light within reach.       Patient is not progressing towards the following goals:      Problem: Communication  Goal: The ability to communicate needs accurately and effectively will improve  Outcome: Not Progressing  Note: Pt needs reinforcement and prompting to communicate needs.

## 2022-07-05 NOTE — PROGRESS NOTES
"[facility] -  PROGRESS NOTE               Author: Sandeep Zuluaga M.D. Date & Time: 7/5/2022  10:54 AM     HPI:   67 y.o. female who presented 6/13/2022 with lethargy from [facility].  Patient has not had dialysis for the last 10 days due to behavioral issues.  Patient tested positive for COVID on June 6.  History limited due to AMS.   In the ED, patient found to be hypertensive. Found to have BUN 88 creatinine 11.7.  CT head negative. Chest x-ray showing increased interstitial markings consistent with mild interstitial edema.  Nephrology consulted, recommends dialysis in a.m.     DAILY NEPHROLOGY SUMMARY:  6/15: got hd yesterday. naoe  6/16: NAOE, irritated   6/17: calm, resting in the bed, no new issues, On O2 supplementation.   6/18: HD done today. UF off due to hypotension. Dialyzer clotted, able to return blood. Completed dialysis treatment with heparin.  6/19: SBP 100s-120s, phos 2.2, no new c/o  6/20: no events, drowsy but arousable, denies any CP/SOB/abd pain, BP stable  6/21: No events, pt upset and unwilling to answer questions this am, BP stable  6/22: No events, tolerated HD yest with 2L UF, confused overnight, tearful and oriented x2 this am, denies any CP/SOB/LE edema/Abd pain, BP variable  6/23: No events, feels ok, denies any CP/SOB/LE edema, BP stable, due for HD today  6/24: laying in bed, sleepy, no complaints, tolerated iHD yesterday UF: 1.6L, pending SNF   6/25: No events, seen and examined on hemodialysis VSS--see dialysis treatment sheet for full details, drowsy, denies any CP/SOB  6/26: No events, tolerated HD yest, only intermittently answers questions, denies any CP/SOB  6/27: Pt sitting up in bed, RN assisting with breakfast tray, pt says she feels \"better\", no physical complaints, no new labs, waiting on SNF bed, Bps soft requiring antihtn to be held, resp status stable on RA  6/28: sitting up in bed, no complaints, for iHD today, pending placement   6/29: iHD " "yesterday, no net UF, soft Bps.  VSS on Room air.   6/30: no new complaints, bored and grumpy , due for HD   7/1:  Resting in bed,  no complaints.  VSS RA. iHD yesterday net UF 1.3L.   7/2: Pt seen on HD, comfortable, low Bps limiting UF, asymptomatic, labs reviewed, resp status stable on RA  7/3: Pt in bed, flat affect, no new complaints, VSS on RA, iHD yesterday with 700 mL UF, pt is medically cleared, pending bed availability at [facility]  7/4: No new overnight renal events. Feels ok.   7/5: No new overnight renal events. HD today.    REVIEW OF SYSTEMS:    10 point ROS reviewed and is as per HPI/daily summary or otherwise negative    PMH/PSH/SH/FH:   Reviewed and unchanged since admission note    CURRENT MEDICATIONS:   Reviewed from admission to present day    VS:  /56   Pulse 73   Temp 36.1 °C (97 °F) (Temporal)   Resp 16   Ht 1.651 m (5' 5\")   Wt 71.1 kg (156 lb 12 oz)   SpO2 98%   BMI 26.08 kg/m²     Physical Exam  Vitals and nursing note reviewed.       Vitals and nursing note reviewed.   Constitutional:       General: She is not in acute distress.     Appearance: She is ill-appearing.   HENT:      Head: Normocephalic and atraumatic.   Eyes:      General: No scleral icterus.  Cardiovascular:      Rate and Rhythm: Normal rate and regular rhythm.      Comments: RUE AVF +T/B  Pulmonary:      Effort: Pulmonary effort is normal. No respiratory distress.      Breath sounds:clear   Abdominal:      General: Bowel sounds are normal. There is no distension.      Palpations: Abdomen is soft.   Musculoskeletal:         General: No deformity.      Right lower leg: No edema.      Left lower leg: No edema.   Skin:     General: Skin is warm and dry.      Findings: No rash.   Neurological:      General: No focal deficit present.      Mental Status: She is alert.      Comments: Slowed responses   Psychiatric:         Attention and Perception: She is inattentive.         Mood and Affect: Affect is flat. "   Fluids:  In: 560 [P.O.:560]  Out: -     LABS:        IMAGING:   All imaging reviewed from admission to present day    IMPRESSION:  # ESRD, dependent on HD             - iHD q TThS and PRN             - via R AVF (s/p thrombectomy and temp line)             - Follows with Dr. Mayers outpt            # HTN, was overcontrolled, now improved off meds             - Goal BP < 140/90             - Now off hydralazine and metoprolol              - UF with iHD as tolerated   # Edema--resolved  # Anemia of CKD, below target             - Goal Hgb 10-11             - Ferritin 1675, isat 11%             - JUDITH with HD  # CKD-MBD             - Ca WNL             - PO4 2.2-->3.3; not on binders             - iPTH 138 and Vit D 14 on 5/23             - Started on cholecalciferol 7/2  # Thrombocytopenia, mild, resolved   # DMII             - Per primary team   # AMS, improved    - 2/2 missed hd vs COVID infection   # Debility/weakness  # Hypoalbuminemia, improved     PLAN:  - HD today.  - Continue iHD qTTS, treatment tomorrow TUES  - UF as tolerated  - Continue to hold phos binders  - No dietary protein restrictions  - Dose all meds per ESRD  - JUDITH with HD to goal Hgb 10-11  - Transfuse PRN Hgb <7   - Continue off of BP meds for now  - OK to transition to OP HD once medically cleared  - Pallative has consulted   - DC planning underway for SNF, pending bed availability at [facility]     **Upon discharge pt to resume care with her regular nephrologist Dr. Mayers who is not affiliated with [facility]
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
## 2022-07-05 NOTE — PROGRESS NOTES
HD treatment ordered by Dr Zuluaga started at 1102 and ended at 1402 with net UF of 1.5 Liter as tolerated. Right upper arm AVF x 2 using 15 gauge needles patent with good BFR x 2 entire treatment. Had to get the pt from bedside to HD room due to transport delay. Had to wait for 45 minutes for the transport that didn't come.

## 2022-07-05 NOTE — CARE PLAN
Problem: Knowledge Deficit - Standard  Goal: Patient and family/care givers will demonstrate understanding of plan of care, disease process/condition, diagnostic tests and medications  Outcome: Progressing     Problem: Fall Risk  Goal: Patient will remain free from falls  Outcome: Progressing     Problem: Nutrition  Goal: Enteral nutrition will be maintained or improve  Outcome: Progressing   The patient is Stable - Low risk of patient condition declining or worsening    Shift Goals  Clinical Goals: dialysis  Patient Goals: rest  Family Goals: elisabeth    Progress made toward(s) clinical / shift goals:  Patient received dialysis today. Patient free from falls during shift. Limited progress on patient understanding her condition and progress.

## 2022-07-05 NOTE — THERAPY
Occupational Therapy  Daily Treatment     Patient Name: Liberty Bolton  Age:  67 y.o., Sex:  female  Medical Record #: [id number]  Today's Date: 7/5/2022     Precautions: Fall Risk, Swallow Precautions ( See Comments)    Assessment  Pt agreeable to OT session. Pt continues to require max/total A for mobility and self cares. Pt limited by poor postural control (strong posterior lean EOB and unable to correct), impaired cog, poor volition, and weakness. Will continue to follow for skilled OT services.      Plan    Continue current treatment plan.    DC Equipment Recommendations: (P) Unable to determine at this time  Discharge Recommendations: (P) Recommend post-acute placement for additional occupational therapy services prior to discharge home     07/05/22 0832   Cognition    Comments agreeable but limited volition at EOB   Supine Upper Body Exercises   Supine Upper Body Exercises Yes   Comments scap mob and BUE arm raises x5   Balance   Sitting Balance (Static) Trace   Sitting Balance (Dynamic) Dependent   Standing Balance (Static) Trace   Standing Balance (Dynamic) Dependent   Weight Shift Sitting Absent   Weight Shift Standing Absent   Skilled Intervention Compensatory Strategies;Verbal Cuing;Sequencing;Tactile Cuing   Comments cues for use of bed rails, strong posterior lean, unable to corrent despite max cues   Bed Mobility    Supine to Sit Total Assist   Sit to Supine Total Assist   Scooting Maximal Assist   Rolling Maximal Assist to Rt.   Skilled Intervention Verbal Cuing;Sequencing;Postural Facilitation;Compensatory Strategies   Activities of Daily Living   Grooming Total Assist   Lower Body Dressing Total Assist   Skilled Intervention Verbal Cuing;Tactile Cuing   How much help from another person does the patient currently need...   6 Clicks Daily Activity Score 6   Functional Mobility   Sit to Stand Unable to Participate   Bed, Chair, Wheelchair Transfer Unable to Participate   Toilet Transfers Unable to  Participate   Patient / Family Goals   Patient / Family Goal #1 none stated   Short Term Goals   Short Term Goal # 1 min A with UB dressing   Goal Outcome # 1 Progressing slower than expected   Short Term Goal # 2 mod A with LB dressing   Goal Outcome # 2 Goal not met   Short Term Goal # 3 mod A with ADL txfs   Goal Outcome # 3 Goal not met   Education Group   Role of Occupational Therapist Patient Response Patient;Acceptance;Explanation;Reinforcement Needed;No Learning Evidence   Anticipated Discharge Equipment and Recommendations   DC Equipment Recommendations Unable to determine at this time   Discharge Recommendations Recommend post-acute placement for additional occupational therapy services prior to discharge home

## 2022-07-05 NOTE — PROGRESS NOTES
Patient continues to be confused regarding situation, e.g. patient was in dialysis during lunch. Upon returning from dialysis at 3:30, tech called to get her an early dinner tray since food had been sitting for greater than 3 hours. Patient stated that we were taking her lunch so that we could eat it. Her 3+ hour old lunch tray was placed, uneaten, in the dirty utility.    Patient did receive breakfast and was fed by the tech.

## 2022-07-06 LAB
ANION GAP SERPL CALC-SCNC: 13 MMOL/L (ref 7–16)
BUN SERPL-MCNC: 41 MG/DL (ref 8–22)
CALCIUM SERPL-MCNC: 9.8 MG/DL (ref 8.5–10.5)
CHLORIDE SERPL-SCNC: 89 MMOL/L (ref 96–112)
CO2 SERPL-SCNC: 28 MMOL/L (ref 20–33)
CREAT SERPL-MCNC: 4.31 MG/DL (ref 0.5–1.4)
ERYTHROCYTE [DISTWIDTH] IN BLOOD BY AUTOMATED COUNT: 53.1 FL (ref 35.9–50)
GFR SERPLBLD CREATININE-BSD FMLA CKD-EPI: 11 ML/MIN/1.73 M 2
GLUCOSE SERPL-MCNC: 100 MG/DL (ref 65–99)
HCT VFR BLD AUTO: 28.8 % (ref 37–47)
HGB BLD-MCNC: 9.1 G/DL (ref 12–16)
MCH RBC QN AUTO: 30.6 PG (ref 27–33)
MCHC RBC AUTO-ENTMCNC: 31.6 G/DL (ref 33.6–35)
MCV RBC AUTO: 97 FL (ref 81.4–97.8)
PLATELET # BLD AUTO: 241 K/UL (ref 164–446)
PMV BLD AUTO: 9.5 FL (ref 9–12.9)
POTASSIUM SERPL-SCNC: 4.7 MMOL/L (ref 3.6–5.5)
RBC # BLD AUTO: 2.97 M/UL (ref 4.2–5.4)
SODIUM SERPL-SCNC: 130 MMOL/L (ref 135–145)
WBC # BLD AUTO: 5.4 K/UL (ref 4.8–10.8)

## 2022-07-06 PROCEDURE — 700111 HCHG RX REV CODE 636 W/ 250 OVERRIDE (IP): Performed by: GENERAL PRACTICE

## 2022-07-06 PROCEDURE — 99224 PR SUBSEQUENT OBSERVATION CARE,LEVEL I: CPT | Performed by: STUDENT IN AN ORGANIZED HEALTH CARE EDUCATION/TRAINING PROGRAM

## 2022-07-06 PROCEDURE — 700102 HCHG RX REV CODE 250 W/ 637 OVERRIDE(OP): Performed by: INTERNAL MEDICINE

## 2022-07-06 PROCEDURE — 96372 THER/PROPH/DIAG INJ SC/IM: CPT

## 2022-07-06 PROCEDURE — 36415 COLL VENOUS BLD VENIPUNCTURE: CPT

## 2022-07-06 PROCEDURE — 700102 HCHG RX REV CODE 250 W/ 637 OVERRIDE(OP): Performed by: GENERAL PRACTICE

## 2022-07-06 PROCEDURE — G0378 HOSPITAL OBSERVATION PER HR: HCPCS

## 2022-07-06 PROCEDURE — 700101 HCHG RX REV CODE 250: Performed by: GENERAL PRACTICE

## 2022-07-06 PROCEDURE — A9270 NON-COVERED ITEM OR SERVICE: HCPCS | Performed by: STUDENT IN AN ORGANIZED HEALTH CARE EDUCATION/TRAINING PROGRAM

## 2022-07-06 PROCEDURE — 700102 HCHG RX REV CODE 250 W/ 637 OVERRIDE(OP): Performed by: STUDENT IN AN ORGANIZED HEALTH CARE EDUCATION/TRAINING PROGRAM

## 2022-07-06 PROCEDURE — A9270 NON-COVERED ITEM OR SERVICE: HCPCS | Performed by: GENERAL PRACTICE

## 2022-07-06 PROCEDURE — 85027 COMPLETE CBC AUTOMATED: CPT

## 2022-07-06 PROCEDURE — A9270 NON-COVERED ITEM OR SERVICE: HCPCS | Performed by: INTERNAL MEDICINE

## 2022-07-06 PROCEDURE — 97530 THERAPEUTIC ACTIVITIES: CPT

## 2022-07-06 PROCEDURE — 80048 BASIC METABOLIC PNL TOTAL CA: CPT

## 2022-07-06 RX ADMIN — DOCUSATE SODIUM 50 MG AND SENNOSIDES 8.6 MG 2 TABLET: 8.6; 5 TABLET, FILM COATED ORAL at 18:27

## 2022-07-06 RX ADMIN — ESCITALOPRAM OXALATE 10 MG: 10 TABLET ORAL at 05:32

## 2022-07-06 RX ADMIN — ATORVASTATIN CALCIUM 40 MG: 40 TABLET, FILM COATED ORAL at 18:26

## 2022-07-06 RX ADMIN — Medication 2000 UNITS: at 05:32

## 2022-07-06 RX ADMIN — HEPARIN SODIUM 5000 UNITS: 5000 INJECTION, SOLUTION INTRAVENOUS; SUBCUTANEOUS at 18:26

## 2022-07-06 RX ADMIN — HEPARIN SODIUM 5000 UNITS: 5000 INJECTION, SOLUTION INTRAVENOUS; SUBCUTANEOUS at 05:32

## 2022-07-06 RX ADMIN — GABAPENTIN 300 MG: 300 CAPSULE ORAL at 18:27

## 2022-07-06 RX ADMIN — LIDOCAINE 1 PATCH: 50 PATCH CUTANEOUS at 10:34

## 2022-07-06 RX ADMIN — DOCUSATE SODIUM 50 MG AND SENNOSIDES 8.6 MG 2 TABLET: 8.6; 5 TABLET, FILM COATED ORAL at 05:32

## 2022-07-06 ASSESSMENT — PAIN SCALES - PAIN ASSESSMENT IN ADVANCED DEMENTIA (PAINAD): BREATHING: NORMAL

## 2022-07-06 ASSESSMENT — ENCOUNTER SYMPTOMS: WEAKNESS: 1

## 2022-07-06 ASSESSMENT — PAIN DESCRIPTION - PAIN TYPE
TYPE: ACUTE PAIN

## 2022-07-06 ASSESSMENT — COGNITIVE AND FUNCTIONAL STATUS - GENERAL
TURNING FROM BACK TO SIDE WHILE IN FLAT BAD: UNABLE
MOVING TO AND FROM BED TO CHAIR: UNABLE
SUGGESTED CMS G CODE MODIFIER MOBILITY: CN
WALKING IN HOSPITAL ROOM: TOTAL
MOBILITY SCORE: 6
MOVING FROM LYING ON BACK TO SITTING ON SIDE OF FLAT BED: UNABLE
STANDING UP FROM CHAIR USING ARMS: TOTAL
CLIMB 3 TO 5 STEPS WITH RAILING: TOTAL

## 2022-07-06 ASSESSMENT — GAIT ASSESSMENTS: GAIT LEVEL OF ASSIST: UNABLE TO PARTICIPATE

## 2022-07-06 NOTE — PROGRESS NOTES
Patient refusing to swallow scheduled medications. Repeated attempts were made with patient's dinner, applesauce, and pudding. Patient continues to actively pick out pills with her tongue and refuses to swallow the pills. MD buenrostro.
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
## 2022-07-06 NOTE — THERAPY
"Physical Therapy   Daily Treatment     Patient Name: Liberty Bolton  Age:  67 y.o., Sex:  female  Medical Record #: [id number]  Today's Date: 7/6/2022     Precautions  Precautions: Fall Risk;Swallow Precautions ( See Comments)    Assessment    Patient continues to be limited by impaired balance and coordination, functional weakness, and decreased activity tolerance. Today she demonstrated improved arousal and participation as compared to prior; patient had just completed bed bath with RN staff so question if this contributed to improvement. She was able to intermittently maintain sitting balance at EOB with UE support but performance decreased with continued time OOB and fatigue. She was resistant to moving beyond EOB but was able to stand with max A x2, R lateral and posterior lean persisted in standing. Will continue to follow.    Plan    Continue current treatment plan.    DC Equipment Recommendations: Unable to determine at this time  Discharge Recommendations: Recommend post-acute placement for additional physical therapy services prior to discharge home      Subjective    \"How did my dad do this morning?\"     Objective       07/06/22 1141   Charge Group   Charges  Yes   PT Therapeutic Activities 1  (20 min)   Precautions   Precautions Fall Risk;Swallow Precautions ( See Comments)   Vitals   O2 (LPM) 0   O2 Delivery Device None - Room Air   Pain 0 - 10 Group   Location Neck;Generalized   Therapist Pain Assessment During Activity;Nurse Notified   Cognition    Cognition / Consciousness X   Speech/ Communication Delayed Responses   Level of Consciousness Alert   Ability To Follow Commands 1 Step   Safety Awareness Impaired   New Learning Impaired   Attention Impaired   Sequencing Impaired   Initiation Impaired   Comments improved arousal as compared to prior but appeared confused and decreased volition   Balance   Sitting Balance (Static) Poor -   Sitting Balance (Dynamic) Poor   Standing Balance (Static) Trace " "  Standing Balance (Dynamic) Dependent   Weight Shift Sitting Absent   Weight Shift Standing Absent   Skilled Intervention Verbal Cuing;Compensatory Strategies;Facilitation   Comments R lateral and posterior lean with limited ability to correct   Gait Analysis   Gait Level Of Assist Unable to Participate   Weight Bearing Status no restrictions   Vision Deficits Impacting Mobility NT   Bed Mobility    Supine to Sit Moderate Assist   Sit to Supine Minimal Assist   Scooting Maximal Assist   Skilled Intervention Verbal Cuing;Compensatory Strategies;Facilitation   Functional Mobility   Sit to Stand Maximal Assist  (x2)   Bed, Chair, Wheelchair Transfer Unable to Participate   How much difficulty does the patient currently have...   Turning over in bed (including adjusting bedclothes, sheets and blankets)? 1   Sitting down on and standing up from a chair with arms (e.g., wheelchair, bedside commode, etc.) 1   Moving from lying on back to sitting on the side of the bed? 1   How much help from another person does the patient currently need...   Moving to and from a bed to a chair (including a wheelchair)? 1   Need to walk in a hospital room? 1   Climbing 3-5 steps with a railing? 1   6 clicks Mobility Score 6   Activity Tolerance   Sitting in Chair unable   Sitting Edge of Bed 10+ min   Standing 10-15 sec   Comments limited by weakness   Patient / Family Goals    Patient / Family Goal #1 \"Walk again\"   Goal #1 Outcome Goal not met   Short Term Goals    Short Term Goal # 1 Pt will be able to perform STS with moderate assist w/FWW within 6 visits to increase functional mobility   Goal Outcome # 1 goal not met   Short Term Goal # 2 Pt able to perform supine<> sit with moderate assist within 6 visits to imporve in bed mobility   Goal Outcome # 2 Goal not met   Short Term Goal # 3 Pt able to sit EOB for 10 min under SPV within 6 visits to improve tolerance for static sitting   Goal Outcome # 3 Goal not met   Short Term Goal # 4 " Pt will self propel w/c x 150ft with B UE/LE support wihtin 6 visits to ensure household mobility   Goal Outcome # 4 Goal not met   Short Term Goal # 5 Pt will ambulate x 50ft with FWW and min A within 6 visits to ensure independent mobility at home.   Goal Outcome # 5 Goal not met   Anticipated Discharge Equipment and Recommendations   DC Equipment Recommendations Unable to determine at this time   Discharge Recommendations Recommend post-acute placement for additional physical therapy services prior to discharge home   Interdisciplinary Plan of Care Collaboration   IDT Collaboration with  Nursing;Therapy Tech   Patient Position at End of Therapy In Bed;Bed Alarm On;Call Light within Reach;Tray Table within Reach   Collaboration Comments RN aware of visit, response   Session Information   Date / Session Number  7/6-5 (1/2, 7/12)

## 2022-07-06 NOTE — CARE PLAN
Problem: Knowledge Deficit - Standard  Goal: Patient and family/care givers will demonstrate understanding of plan of care, disease process/condition, diagnostic tests and medications  Outcome: Not Progressing     Problem: Psychosocial  Goal: Patient's ability to verbalize feelings about condition will improve  Outcome: Not Progressing  Goal: Patient's ability to re-evaluate and adapt role responsibilities will improve  Outcome: Not Progressing     Problem: Mobility  Goal: Patient's capacity to carry out activities will improve  Outcome: Not Progressing     Problem: Skin Integrity  Goal: Skin integrity is maintained or improved  Outcome: Progressing     Problem: Fall Risk  Goal: Patient will remain free from falls  Outcome: Progressing     Problem: Psychosocial  Goal: Patient's level of anxiety will decrease  Outcome: Progressing     Problem: Communication  Goal: The ability to communicate needs accurately and effectively will improve  Outcome: Progressing     Problem: Discharge Barriers/Planning  Goal: Patient's continuum of care needs are met  Outcome: Progressing     Problem: Hemodynamics  Goal: Patient's hemodynamics, fluid balance and neurologic status will be stable or improve  Outcome: Progressing     Problem: Respiratory  Goal: Patient will achieve/maintain optimum respiratory ventilation and gas exchange  Outcome: Progressing     Problem: Risk for Aspiration  Goal: Patient's risk for aspiration will be absent or decrease  Outcome: Progressing     Problem: Nutrition  Goal: Patient's nutritional and fluid intake will be adequate or improve  Outcome: Progressing     Problem: Bowel Elimination  Goal: Establish and maintain regular bowel function  Outcome: Progressing     Problem: Self Care  Goal: Patient will have the ability to perform ADLs independently or with assistance (bathe, groom, dress, toilet and feed)  Outcome: Progressing     Shift Goals  Clinical Goals: H/D Treatments, monitor Blood Glucose  levels, Pain control  Patient Goals: Rest/sleep to get better  Family Goals: CARIE    Progress made toward(s) clinical / shift goals:  yes    Patient is not progressing towards the following goals:      Problem: Knowledge Deficit - Standard  Goal: Patient and family/care givers will demonstrate understanding of plan of care, disease process/condition, diagnostic tests and medications  Outcome: Not Progressing     Problem: Psychosocial  Goal: Patient's ability to verbalize feelings about condition will improve  Outcome: Not Progressing  Goal: Patient's ability to re-evaluate and adapt role responsibilities will improve  Outcome: Not Progressing     Problem: Mobility  Goal: Patient's capacity to carry out activities will improve  Outcome: Not Progressing

## 2022-07-06 NOTE — PROGRESS NOTES
"[facility] -  PROGRESS NOTE               Author: Sandeep Zuluaga M.D. Date & Time: 7/6/2022  10:11 AM     HPI:   67 y.o. female who presented 6/13/2022 with lethargy from [facility].  Patient has not had dialysis for the last 10 days due to behavioral issues.  Patient tested positive for COVID on June 6.  History limited due to AMS.   In the ED, patient found to be hypertensive. Found to have BUN 88 creatinine 11.7.  CT head negative. Chest x-ray showing increased interstitial markings consistent with mild interstitial edema.  Nephrology consulted, recommends dialysis in a.m.     DAILY NEPHROLOGY SUMMARY:  6/15: got hd yesterday. naoe  6/16: NAOE, irritated   6/17: calm, resting in the bed, no new issues, On O2 supplementation.   6/18: HD done today. UF off due to hypotension. Dialyzer clotted, able to return blood. Completed dialysis treatment with heparin.  6/19: SBP 100s-120s, phos 2.2, no new c/o  6/20: no events, drowsy but arousable, denies any CP/SOB/abd pain, BP stable  6/21: No events, pt upset and unwilling to answer questions this am, BP stable  6/22: No events, tolerated HD yest with 2L UF, confused overnight, tearful and oriented x2 this am, denies any CP/SOB/LE edema/Abd pain, BP variable  6/23: No events, feels ok, denies any CP/SOB/LE edema, BP stable, due for HD today  6/24: laying in bed, sleepy, no complaints, tolerated iHD yesterday UF: 1.6L, pending SNF   6/25: No events, seen and examined on hemodialysis VSS--see dialysis treatment sheet for full details, drowsy, denies any CP/SOB  6/26: No events, tolerated HD yest, only intermittently answers questions, denies any CP/SOB  6/27: Pt sitting up in bed, RN assisting with breakfast tray, pt says she feels \"better\", no physical complaints, no new labs, waiting on SNF bed, Bps soft requiring antihtn to be held, resp status stable on RA  6/28: sitting up in bed, no complaints, for iHD today, pending placement   6/29: iHD " "yesterday, no net UF, soft Bps.  VSS on Room air.   6/30: no new complaints, bored and grumpy , due for HD   7/1:  Resting in bed,  no complaints.  VSS RA. iHD yesterday net UF 1.3L.   7/2: Pt seen on HD, comfortable, low Bps limiting UF, asymptomatic, labs reviewed, resp status stable on RA  7/3: Pt in bed, flat affect, no new complaints, VSS on RA, iHD yesterday with 700 mL UF, pt is medically cleared, pending bed availability at [facility]  7/4: No new overnight renal events. Feels ok.   7/5: No new overnight renal events. HD today.  7/6: S/p HD yesterday with net UF of 1.5L and tolerated well. No new overnight renal events.     REVIEW OF SYSTEMS:    10 point ROS reviewed and is as per HPI/daily summary or otherwise negative    PMH/PSH/SH/FH:   Reviewed and unchanged since admission note    CURRENT MEDICATIONS:   Reviewed from admission to present day    VS:  /50   Pulse 75   Temp 36.2 °C (97.1 °F) (Temporal)   Resp 18   Ht 1.651 m (5' 5\")   Wt 71.1 kg (156 lb 12 oz)   SpO2 98%   BMI 26.08 kg/m²     Physical Exam  Vitals and nursing note reviewed.       Vitals and nursing note reviewed.   Constitutional:       General: She is not in acute distress.     Appearance: She is ill-appearing.   HENT:      Head: Normocephalic and atraumatic.   Eyes:      General: No scleral icterus.  Cardiovascular:      Rate and Rhythm: Normal rate and regular rhythm.      Comments: RUE AVF +T/B  Pulmonary:      Effort: Pulmonary effort is normal. No respiratory distress.      Breath sounds:clear   Abdominal:      General: Bowel sounds are normal. There is no distension.      Palpations: Abdomen is soft.   Musculoskeletal:         General: No deformity.      Right lower leg: No edema.      Left lower leg: No edema.   Skin:     General: Skin is warm and dry.      Findings: No rash.   Neurological:      General: No focal deficit present.      Mental Status: She is alert.      Comments: Slowed responses   Psychiatric:         " Attention and Perception: She is inattentive.         Mood and Affect: Affect is flat.   Fluids:  In: 1230 [P.O.:730; Dialysis:500]  Out: 2000     LABS:  Recent Labs     07/05/22  1055 07/06/22  0508   SODIUM 132* 130*   POTASSIUM 5.5 4.7   CHLORIDE 91* 89*   CO2 28 28   GLUCOSE 183* 100*   BUN 77* 41*   CREATININE 6.80* 4.31*   CALCIUM 9.5 9.8       IMAGING:   All imaging reviewed from admission to present day    IMPRESSION:  # ESRD, dependent on HD             - iHD q TThS and PRN             - via R AVF (s/p thrombectomy and temp line)             - Follows with Dr. Mayers outpt            # HTN, was overcontrolled, now improved off meds             - Goal BP < 140/90             - Now off hydralazine and metoprolol              - UF with iHD as tolerated   # Edema--resolved  # Anemia of CKD, below target             - Goal Hgb 10-11             - Ferritin 1675, isat 11%             - JUDITH with HD  # CKD-MBD             - Ca WNL             - PO4 2.2-->3.3; not on binders             - iPTH 138 and Vit D 14 on 5/23             - Started on cholecalciferol 7/2  # Thrombocytopenia, mild, resolved   # DMII             - Per primary team   # AMS, improved    - 2/2 missed hd vs COVID infection   # Debility/weakness  # Hypoalbuminemia, improved     PLAN:  - No plans for HD today.  - Continue iHD qTTS, treatment tomorrow THURS  - UF as tolerated  - Continue to hold phos binders  - No dietary protein restrictions  - Dose all meds per ESRD  - JUDITH with HD to goal Hgb 10-11  - Transfuse PRN Hgb <7   - Continue off of BP meds for now  - OK to transition to OP HD once medically cleared  - Pallative has consulted   - DC planning underway for SNF, pending bed availability at [facility]     **Upon discharge pt to resume care with her regular nephrologist Dr. Mayers who is not affiliated with [facility]

## 2022-07-06 NOTE — DISCHARGE PLANNING
Case Management Discharge Planning    Admission Date: 6/13/2022  GMLOS:    ALOS: 0    6-Clicks ADL Score: 6  6-Clicks Mobility Score: 6  PT and/or OT Eval ordered: Yes  Post-acute Referrals Ordered: Yes  Post-acute Choice Obtained: Yes  Has referral(s) been sent to post-acute provider:  Yes      Anticipated Discharge Dispo: Discharge Disposition: D/T to SNF with Medicare cert in anticipation of skilled care (03)    DME Needed: No    Action(s) Taken: Pt was discussed in IDR. Pt is still pending placement. Pt came from [facility].   CM called Priscilla with [facility] and she states they still have no HD beds available, but encouraged us to keep following up with them daily as they may have some openings coming up.     Escalations Completed: Pending Discharge Destination    Medically Clear: Yes    Next Steps: cont to f/u with SNF for placement     Barriers to Discharge: Pending Placement    Is the patient up for discharge tomorrow: Yes    Is transport arranged for discharge disposition: No

## 2022-07-06 NOTE — CARE PLAN
The patient is Stable - Low risk of patient condition declining or worsening    Shift Goals  Clinical Goals: q2turn  Patient Goals: rest  Family Goals: CARIE    Progress made toward(s) clinical / shift goals:  see above     Patient is not progressing towards the following goals:

## 2022-07-07 LAB
ANION GAP SERPL CALC-SCNC: 13 MMOL/L (ref 7–16)
BUN SERPL-MCNC: 62 MG/DL (ref 8–22)
CALCIUM SERPL-MCNC: 9.5 MG/DL (ref 8.5–10.5)
CHLORIDE SERPL-SCNC: 90 MMOL/L (ref 96–112)
CO2 SERPL-SCNC: 23 MMOL/L (ref 20–33)
CREAT SERPL-MCNC: 5.32 MG/DL (ref 0.5–1.4)
GFR SERPLBLD CREATININE-BSD FMLA CKD-EPI: 8 ML/MIN/1.73 M 2
GLUCOSE SERPL-MCNC: 101 MG/DL (ref 65–99)
POTASSIUM SERPL-SCNC: 5.7 MMOL/L (ref 3.6–5.5)
SODIUM SERPL-SCNC: 126 MMOL/L (ref 135–145)

## 2022-07-07 PROCEDURE — G0378 HOSPITAL OBSERVATION PER HR: HCPCS

## 2022-07-07 PROCEDURE — A9270 NON-COVERED ITEM OR SERVICE: HCPCS | Performed by: INTERNAL MEDICINE

## 2022-07-07 PROCEDURE — 80048 BASIC METABOLIC PNL TOTAL CA: CPT

## 2022-07-07 PROCEDURE — 700102 HCHG RX REV CODE 250 W/ 637 OVERRIDE(OP): Performed by: GENERAL PRACTICE

## 2022-07-07 PROCEDURE — 90935 HEMODIALYSIS ONE EVALUATION: CPT

## 2022-07-07 PROCEDURE — 700102 HCHG RX REV CODE 250 W/ 637 OVERRIDE(OP): Performed by: INTERNAL MEDICINE

## 2022-07-07 PROCEDURE — 700111 HCHG RX REV CODE 636 W/ 250 OVERRIDE (IP)

## 2022-07-07 PROCEDURE — 700111 HCHG RX REV CODE 636 W/ 250 OVERRIDE (IP): Performed by: GENERAL PRACTICE

## 2022-07-07 PROCEDURE — 96372 THER/PROPH/DIAG INJ SC/IM: CPT | Mod: XU

## 2022-07-07 PROCEDURE — 700102 HCHG RX REV CODE 250 W/ 637 OVERRIDE(OP): Performed by: STUDENT IN AN ORGANIZED HEALTH CARE EDUCATION/TRAINING PROGRAM

## 2022-07-07 PROCEDURE — 99225 PR SUBSEQUENT OBSERVATION CARE,LEVEL II: CPT | Performed by: STUDENT IN AN ORGANIZED HEALTH CARE EDUCATION/TRAINING PROGRAM

## 2022-07-07 PROCEDURE — 96376 TX/PRO/DX INJ SAME DRUG ADON: CPT | Mod: XU

## 2022-07-07 PROCEDURE — A9270 NON-COVERED ITEM OR SERVICE: HCPCS | Performed by: STUDENT IN AN ORGANIZED HEALTH CARE EDUCATION/TRAINING PROGRAM

## 2022-07-07 PROCEDURE — 36415 COLL VENOUS BLD VENIPUNCTURE: CPT

## 2022-07-07 PROCEDURE — A9270 NON-COVERED ITEM OR SERVICE: HCPCS | Performed by: GENERAL PRACTICE

## 2022-07-07 RX ORDER — HEPARIN SODIUM 1000 [USP'U]/ML
INJECTION, SOLUTION INTRAVENOUS; SUBCUTANEOUS
Status: COMPLETED
Start: 2022-07-07 | End: 2022-07-07

## 2022-07-07 RX ADMIN — HEPARIN SODIUM 5000 UNITS: 5000 INJECTION, SOLUTION INTRAVENOUS; SUBCUTANEOUS at 17:51

## 2022-07-07 RX ADMIN — EPOETIN ALFA-EPBX 10000 UNITS: 10000 INJECTION, SOLUTION INTRAVENOUS; SUBCUTANEOUS at 11:30

## 2022-07-07 RX ADMIN — DOCUSATE SODIUM 50 MG AND SENNOSIDES 8.6 MG 2 TABLET: 8.6; 5 TABLET, FILM COATED ORAL at 05:31

## 2022-07-07 RX ADMIN — Medication 2000 UNITS: at 05:31

## 2022-07-07 RX ADMIN — ESCITALOPRAM OXALATE 10 MG: 10 TABLET ORAL at 05:31

## 2022-07-07 RX ADMIN — GABAPENTIN 300 MG: 300 CAPSULE ORAL at 17:52

## 2022-07-07 RX ADMIN — HEPARIN SODIUM 1500 UNITS: 1000 INJECTION, SOLUTION INTRAVENOUS; SUBCUTANEOUS at 10:25

## 2022-07-07 RX ADMIN — HEPARIN SODIUM 5000 UNITS: 5000 INJECTION, SOLUTION INTRAVENOUS; SUBCUTANEOUS at 05:31

## 2022-07-07 RX ADMIN — ATORVASTATIN CALCIUM 40 MG: 40 TABLET, FILM COATED ORAL at 17:52

## 2022-07-07 ASSESSMENT — ENCOUNTER SYMPTOMS: WEAKNESS: 1
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
## 2022-07-07 NOTE — DISCHARGE PLANNING
Agency/Facility Name: [facility]  Outcome: DPA left a voicemail regarding acceptance status and dialysis bed availability. DPA requesting a call back.     1145:  Agency/Facility Name: [facility]  Spoke To: Priscilla  Outcome: [facility] was notified SNF does not have dialysis bed available yet, once they have a separate unit set up SNF will have beds available most likely Monday, 7/11. [facility] will reach out if bed becomes available sooner.

## 2022-07-07 NOTE — CARE PLAN
"The patient is Stable - Low risk of patient condition declining or worsening    Shift Goals  Clinical Goals: safety, q2turn  Patient Goals: \"find max\"  Family Goals: n/a    Progress made toward(s) clinical / shift goals:    Problem: Skin Integrity  Goal: Skin integrity is maintained or improved  Outcome: Progressing  Note: Q2 turns.  Waffle overlay.     Problem: Fall Risk  Goal: Patient will remain free from falls  Outcome: Progressing       Patient is not progressing towards the following goals:      Problem: Communication  Goal: The ability to communicate needs accurately and effectively will improve  Outcome: Not Progressing  Note: Unable to communicate all needs     "

## 2022-07-07 NOTE — PROGRESS NOTES
"[facility] -  PROGRESS NOTE               Author: Sandeep Zuluaga M.D. Date & Time: 7/7/2022  11:06 AM     HPI:   67 y.o. female who presented 6/13/2022 with lethargy from [facility].  Patient has not had dialysis for the last 10 days due to behavioral issues.  Patient tested positive for COVID on June 6.  History limited due to AMS.   In the ED, patient found to be hypertensive. Found to have BUN 88 creatinine 11.7.  CT head negative. Chest x-ray showing increased interstitial markings consistent with mild interstitial edema.  Nephrology consulted, recommends dialysis in a.m.     DAILY NEPHROLOGY SUMMARY:  6/15: got hd yesterday. naoe  6/16: NAOE, irritated   6/17: calm, resting in the bed, no new issues, On O2 supplementation.   6/18: HD done today. UF off due to hypotension. Dialyzer clotted, able to return blood. Completed dialysis treatment with heparin.  6/19: SBP 100s-120s, phos 2.2, no new c/o  6/20: no events, drowsy but arousable, denies any CP/SOB/abd pain, BP stable  6/21: No events, pt upset and unwilling to answer questions this am, BP stable  6/22: No events, tolerated HD yest with 2L UF, confused overnight, tearful and oriented x2 this am, denies any CP/SOB/LE edema/Abd pain, BP variable  6/23: No events, feels ok, denies any CP/SOB/LE edema, BP stable, due for HD today  6/24: laying in bed, sleepy, no complaints, tolerated iHD yesterday UF: 1.6L, pending SNF   6/25: No events, seen and examined on hemodialysis VSS--see dialysis treatment sheet for full details, drowsy, denies any CP/SOB  6/26: No events, tolerated HD yest, only intermittently answers questions, denies any CP/SOB  6/27: Pt sitting up in bed, RN assisting with breakfast tray, pt says she feels \"better\", no physical complaints, no new labs, waiting on SNF bed, Bps soft requiring antihtn to be held, resp status stable on RA  6/28: sitting up in bed, no complaints, for iHD today, pending placement   6/29: iHD " "yesterday, no net UF, soft Bps.  VSS on Room air.   6/30: no new complaints, bored and grumpy , due for HD   7/1:  Resting in bed,  no complaints.  VSS RA. iHD yesterday net UF 1.3L.   7/2: Pt seen on HD, comfortable, low Bps limiting UF, asymptomatic, labs reviewed, resp status stable on RA  7/3: Pt in bed, flat affect, no new complaints, VSS on RA, iHD yesterday with 700 mL UF, pt is medically cleared, pending bed availability at [facility]  7/4: No new overnight renal events. Feels ok.   7/5: No new overnight renal events. HD today.  7/6: S/p HD yesterday with net UF of 1.5L and tolerated well. No new overnight renal events.   7/7: No new overnight renal events. HD today. K+ 5.7    REVIEW OF SYSTEMS:    10 point ROS reviewed and is as per HPI/daily summary or otherwise negative    PMH/PSH/SH/FH:   Reviewed and unchanged since admission note    CURRENT MEDICATIONS:   Reviewed from admission to present day    VS:  /58   Pulse 79   Temp 36.8 °C (98.3 °F) (Temporal)   Resp 18   Ht 1.651 m (5' 5\")   Wt 71.1 kg (156 lb 12 oz)   SpO2 97%   BMI 26.08 kg/m²     Physical Exam  Vitals and nursing note reviewed.       Vitals and nursing note reviewed.   Constitutional:       General: She is not in acute distress.     Appearance: She is ill-appearing.   HENT:      Head: Normocephalic and atraumatic.   Eyes:      General: No scleral icterus.  Cardiovascular:      Rate and Rhythm: Normal rate and regular rhythm.      Comments: RUE AVF +T/B  Pulmonary:      Effort: Pulmonary effort is normal. No respiratory distress.      Breath sounds:clear   Abdominal:      General: Bowel sounds are normal. There is no distension.      Palpations: Abdomen is soft.   Musculoskeletal:         General: No deformity.      Right lower leg: No edema.      Left lower leg: No edema.   Skin:     General: Skin is warm and dry.      Findings: No rash.   Neurological:      General: No focal deficit present.      Mental Status: She is alert. "      Comments: Slowed responses   Psychiatric:         Attention and Perception: She is inattentive.         Mood and Affect: Affect is flat.   Fluids:  In: 720 [P.O.:720]  Out: -     LABS:  Recent Labs     07/05/22  1055 07/06/22  0508 07/07/22  0255   SODIUM 132* 130* 126*   POTASSIUM 5.5 4.7 5.7*   CHLORIDE 91* 89* 90*   CO2 28 28 23   GLUCOSE 183* 100* 101*   BUN 77* 41* 62*   CREATININE 6.80* 4.31* 5.32*   CALCIUM 9.5 9.8 9.5       IMAGING:   All imaging reviewed from admission to present day    IMPRESSION:  # ESRD, dependent on HD             - iHD q TThS and PRN             - via R AVF (s/p thrombectomy and temp line)             - Follows with Dr. Mayers outpt            # HTN, was overcontrolled, now improved off meds             - Goal BP < 140/90             - Now off hydralazine and metoprolol              - UF with iHD as tolerated   # Edema--resolved  # Anemia of CKD, below target             - Goal Hgb 10-11             - Ferritin 1675, isat 11%             - JUDITH with HD  # CKD-MBD             - Ca WNL             - PO4 2.2-->3.3; not on binders             - iPTH 138 and Vit D 14 on 5/23             - Started on cholecalciferol 7/2  # Thrombocytopenia, mild, resolved   # DMII             - Per primary team   # AMS, improved    - 2/2 missed hd vs COVID infection   # Debility/weakness  # Hypoalbuminemia, improved     PLAN:  - HD today.  - Continue iHD qTTS, treatment tomorrow THURS  - UF as tolerated  - Continue to hold phos binders  - No dietary protein restrictions  - Dose all meds per ESRD  - JUDITH with HD to goal Hgb 10-11  - Transfuse PRN Hgb <7   - Continue off of BP meds for now  - OK to transition to OP HD once medically cleared  - Pallative has consulted   - DC planning underway for SNF, pending bed availability at [facility]     **Upon discharge pt to resume care with her regular nephrologist Dr. Mayers who is not affiliated with [facility]

## 2022-07-08 LAB
ANION GAP SERPL CALC-SCNC: 9 MMOL/L (ref 7–16)
BUN SERPL-MCNC: 39 MG/DL (ref 8–22)
CALCIUM SERPL-MCNC: 9.7 MG/DL (ref 8.5–10.5)
CHLORIDE SERPL-SCNC: 90 MMOL/L (ref 96–112)
CO2 SERPL-SCNC: 28 MMOL/L (ref 20–33)
CREAT SERPL-MCNC: 3.6 MG/DL (ref 0.5–1.4)
GFR SERPLBLD CREATININE-BSD FMLA CKD-EPI: 13 ML/MIN/1.73 M 2
GLUCOSE SERPL-MCNC: 132 MG/DL (ref 65–99)
POTASSIUM SERPL-SCNC: 4.5 MMOL/L (ref 3.6–5.5)
SODIUM SERPL-SCNC: 127 MMOL/L (ref 135–145)

## 2022-07-08 PROCEDURE — 700102 HCHG RX REV CODE 250 W/ 637 OVERRIDE(OP): Performed by: GENERAL PRACTICE

## 2022-07-08 PROCEDURE — 700102 HCHG RX REV CODE 250 W/ 637 OVERRIDE(OP): Performed by: INTERNAL MEDICINE

## 2022-07-08 PROCEDURE — A9270 NON-COVERED ITEM OR SERVICE: HCPCS | Performed by: INTERNAL MEDICINE

## 2022-07-08 PROCEDURE — 700111 HCHG RX REV CODE 636 W/ 250 OVERRIDE (IP): Performed by: GENERAL PRACTICE

## 2022-07-08 PROCEDURE — A9270 NON-COVERED ITEM OR SERVICE: HCPCS | Performed by: GENERAL PRACTICE

## 2022-07-08 PROCEDURE — 700102 HCHG RX REV CODE 250 W/ 637 OVERRIDE(OP): Performed by: STUDENT IN AN ORGANIZED HEALTH CARE EDUCATION/TRAINING PROGRAM

## 2022-07-08 PROCEDURE — 99225 PR SUBSEQUENT OBSERVATION CARE,LEVEL II: CPT | Performed by: STUDENT IN AN ORGANIZED HEALTH CARE EDUCATION/TRAINING PROGRAM

## 2022-07-08 PROCEDURE — A9270 NON-COVERED ITEM OR SERVICE: HCPCS | Performed by: STUDENT IN AN ORGANIZED HEALTH CARE EDUCATION/TRAINING PROGRAM

## 2022-07-08 PROCEDURE — G0378 HOSPITAL OBSERVATION PER HR: HCPCS

## 2022-07-08 PROCEDURE — 96372 THER/PROPH/DIAG INJ SC/IM: CPT

## 2022-07-08 PROCEDURE — 36415 COLL VENOUS BLD VENIPUNCTURE: CPT

## 2022-07-08 PROCEDURE — 700101 HCHG RX REV CODE 250: Performed by: GENERAL PRACTICE

## 2022-07-08 PROCEDURE — 80048 BASIC METABOLIC PNL TOTAL CA: CPT

## 2022-07-08 RX ADMIN — LIDOCAINE 1 PATCH: 50 PATCH CUTANEOUS at 11:29

## 2022-07-08 RX ADMIN — Medication 2000 UNITS: at 04:44

## 2022-07-08 RX ADMIN — HEPARIN SODIUM 5000 UNITS: 5000 INJECTION, SOLUTION INTRAVENOUS; SUBCUTANEOUS at 04:44

## 2022-07-08 RX ADMIN — GABAPENTIN 300 MG: 300 CAPSULE ORAL at 17:24

## 2022-07-08 RX ADMIN — DOCUSATE SODIUM 50 MG AND SENNOSIDES 8.6 MG 2 TABLET: 8.6; 5 TABLET, FILM COATED ORAL at 04:44

## 2022-07-08 RX ADMIN — ESCITALOPRAM OXALATE 10 MG: 10 TABLET ORAL at 04:44

## 2022-07-08 RX ADMIN — ATORVASTATIN CALCIUM 40 MG: 40 TABLET, FILM COATED ORAL at 17:24

## 2022-07-08 RX ADMIN — HEPARIN SODIUM 5000 UNITS: 5000 INJECTION, SOLUTION INTRAVENOUS; SUBCUTANEOUS at 17:24

## 2022-07-08 ASSESSMENT — ENCOUNTER SYMPTOMS: WEAKNESS: 1

## 2022-07-08 ASSESSMENT — PAIN DESCRIPTION - PAIN TYPE
TYPE: ACUTE PAIN
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
## 2022-07-08 NOTE — PROGRESS NOTES
"[facility] -  PROGRESS NOTE               Author: Sandeep Zuluaga M.D. Date & Time: 7/8/2022  12:41 PM     HPI:   67 y.o. female who presented 6/13/2022 with lethargy from [facility].  Patient has not had dialysis for the last 10 days due to behavioral issues.  Patient tested positive for COVID on June 6.  History limited due to AMS.   In the ED, patient found to be hypertensive. Found to have BUN 88 creatinine 11.7.  CT head negative. Chest x-ray showing increased interstitial markings consistent with mild interstitial edema.  Nephrology consulted, recommends dialysis in a.m.     DAILY NEPHROLOGY SUMMARY:  6/15: got hd yesterday. naoe  6/16: NAOE, irritated   6/17: calm, resting in the bed, no new issues, On O2 supplementation.   6/18: HD done today. UF off due to hypotension. Dialyzer clotted, able to return blood. Completed dialysis treatment with heparin.  6/19: SBP 100s-120s, phos 2.2, no new c/o  6/20: no events, drowsy but arousable, denies any CP/SOB/abd pain, BP stable  6/21: No events, pt upset and unwilling to answer questions this am, BP stable  6/22: No events, tolerated HD yest with 2L UF, confused overnight, tearful and oriented x2 this am, denies any CP/SOB/LE edema/Abd pain, BP variable  6/23: No events, feels ok, denies any CP/SOB/LE edema, BP stable, due for HD today  6/24: laying in bed, sleepy, no complaints, tolerated iHD yesterday UF: 1.6L, pending SNF   6/25: No events, seen and examined on hemodialysis VSS--see dialysis treatment sheet for full details, drowsy, denies any CP/SOB  6/26: No events, tolerated HD yest, only intermittently answers questions, denies any CP/SOB  6/27: Pt sitting up in bed, RN assisting with breakfast tray, pt says she feels \"better\", no physical complaints, no new labs, waiting on SNF bed, Bps soft requiring antihtn to be held, resp status stable on RA  6/28: sitting up in bed, no complaints, for iHD today, pending placement   6/29: iHD " "yesterday, no net UF, soft Bps.  VSS on Room air.   6/30: no new complaints, bored and grumpy , due for HD   7/1:  Resting in bed,  no complaints.  VSS RA. iHD yesterday net UF 1.3L.   7/2: Pt seen on HD, comfortable, low Bps limiting UF, asymptomatic, labs reviewed, resp status stable on RA  7/3: Pt in bed, flat affect, no new complaints, VSS on RA, iHD yesterday with 700 mL UF, pt is medically cleared, pending bed availability at [facility]  7/4: No new overnight renal events. Feels ok.   7/5: No new overnight renal events. HD today.  7/6: S/p HD yesterday with net UF of 1.5L and tolerated well. No new overnight renal events.   7/7: No new overnight renal events. HD today. K+ 5.7  7/8: S/p HD yesterday and tolerated well. No new overnight renal events.     REVIEW OF SYSTEMS:    10 point ROS reviewed and is as per HPI/daily summary or otherwise negative    PMH/PSH/SH/FH:   Reviewed and unchanged since admission note    CURRENT MEDICATIONS:   Reviewed from admission to present day    VS:  /60   Pulse 70   Temp 36.9 °C (98.5 °F) (Temporal)   Resp 18   Ht 1.651 m (5' 5\")   Wt 71.1 kg (156 lb 12 oz)   SpO2 98%   BMI 26.08 kg/m²     Physical Exam  Vitals and nursing note reviewed.       Vitals and nursing note reviewed.   Constitutional:       General: She is not in acute distress.     Appearance: She is ill-appearing.   HENT:      Head: Normocephalic and atraumatic.   Eyes:      General: No scleral icterus.  Cardiovascular:      Rate and Rhythm: Normal rate and regular rhythm.      Comments: RUE AVF +T/B  Pulmonary:      Effort: Pulmonary effort is normal. No respiratory distress.      Breath sounds:clear   Abdominal:      General: Bowel sounds are normal. There is no distension.      Palpations: Abdomen is soft.   Musculoskeletal:         General: No deformity.      Right lower leg: No edema.      Left lower leg: No edema.   Skin:     General: Skin is warm and dry.      Findings: No rash.   Neurological: "      General: No focal deficit present.      Mental Status: She is alert.      Comments: Slowed responses   Psychiatric:         Attention and Perception: She is inattentive.         Mood and Affect: Affect is flat.   Fluids:  No intake/output data recorded.    LABS:  Recent Labs     07/06/22  0508 07/07/22  0255 07/08/22  0148   SODIUM 130* 126* 127*   POTASSIUM 4.7 5.7* 4.5   CHLORIDE 89* 90* 90*   CO2 28 23 28   GLUCOSE 100* 101* 132*   BUN 41* 62* 39*   CREATININE 4.31* 5.32* 3.60*   CALCIUM 9.8 9.5 9.7       IMAGING:   All imaging reviewed from admission to present day    IMPRESSION:  # ESRD, dependent on HD             - iHD q TThS and PRN             - via R AVF (s/p thrombectomy and temp line)             - Follows with Dr. Mayers outpt            # HTN, was overcontrolled, now improved off meds             - Goal BP < 140/90             - Now off hydralazine and metoprolol              - UF with iHD as tolerated   # Edema--resolved  # Anemia of CKD, below target             - Goal Hgb 10-11             - Ferritin 1675, isat 11%             - JUDITH with HD  # CKD-MBD             - Ca WNL             - PO4 2.2-->3.3; not on binders             - iPTH 138 and Vit D 14 on 5/23             - Started on cholecalciferol 7/2  # Thrombocytopenia, mild, resolved   # DMII             - Per primary team   # AMS, improved    - 2/2 missed hd vs COVID infection   # Debility/weakness  # Hypoalbuminemia, improved     PLAN:  - No plans for HD today.  - Continue iHD qTTS, treatment tomorrow THURS  - UF as tolerated  - Continue to hold phos binders  - No dietary protein restrictions  - Dose all meds per ESRD  - JUDITH with HD to goal Hgb 10-11  - Transfuse PRN Hgb <7   - Continue off of BP meds for now  - OK to transition to OP HD once medically cleared  - Pallative has consulted   - DC planning underway for SNF, pending bed availability at [facility]     **Upon discharge pt to resume care with her regular nephrologist Dr. Mayers  who is not affiliated with [facility]

## 2022-07-08 NOTE — CARE PLAN
The patient is Stable - Low risk of patient condition declining or worsening    Shift Goals  Clinical Goals: safety  Patient Goals: sleep  Family Goals: n/a    Progress made toward(s) clinical / shift goals:    Problem: Psychosocial  Goal: Patient's level of anxiety will decrease  Outcome: Progressing     Problem: Pain - Standard  Goal: Alleviation of pain or a reduction in pain to the patient’s comfort goal  Outcome: Progressing     Problem: Self Care  Goal: Patient will have the ability to perform ADLs independently or with assistance (bathe, groom, dress, toilet and feed)  Outcome: Progressing       Patient is not progressing towards the following goals:      Problem: Mobility  Goal: Patient's capacity to carry out activities will improve  Outcome: Not Progressing

## 2022-07-09 PROBLEM — R33.9 URINARY RETENTION: Status: ACTIVE | Noted: 2022-07-09

## 2022-07-09 LAB
ANION GAP SERPL CALC-SCNC: 10 MMOL/L (ref 7–16)
BUN SERPL-MCNC: 27 MG/DL (ref 8–22)
CALCIUM SERPL-MCNC: 9.2 MG/DL (ref 8.5–10.5)
CHLORIDE SERPL-SCNC: 96 MMOL/L (ref 96–112)
CO2 SERPL-SCNC: 30 MMOL/L (ref 20–33)
CREAT SERPL-MCNC: 2.79 MG/DL (ref 0.5–1.4)
GFR SERPLBLD CREATININE-BSD FMLA CKD-EPI: 18 ML/MIN/1.73 M 2
GLUCOSE SERPL-MCNC: 178 MG/DL (ref 65–99)
POTASSIUM SERPL-SCNC: 4.3 MMOL/L (ref 3.6–5.5)
SODIUM SERPL-SCNC: 136 MMOL/L (ref 135–145)

## 2022-07-09 PROCEDURE — 80048 BASIC METABOLIC PNL TOTAL CA: CPT

## 2022-07-09 PROCEDURE — 99225 PR SUBSEQUENT OBSERVATION CARE,LEVEL II: CPT | Performed by: STUDENT IN AN ORGANIZED HEALTH CARE EDUCATION/TRAINING PROGRAM

## 2022-07-09 PROCEDURE — 700102 HCHG RX REV CODE 250 W/ 637 OVERRIDE(OP): Performed by: INTERNAL MEDICINE

## 2022-07-09 PROCEDURE — 51702 INSERT TEMP BLADDER CATH: CPT

## 2022-07-09 PROCEDURE — 96372 THER/PROPH/DIAG INJ SC/IM: CPT | Mod: XU

## 2022-07-09 PROCEDURE — 700111 HCHG RX REV CODE 636 W/ 250 OVERRIDE (IP): Performed by: GENERAL PRACTICE

## 2022-07-09 PROCEDURE — 90935 HEMODIALYSIS ONE EVALUATION: CPT

## 2022-07-09 PROCEDURE — A9270 NON-COVERED ITEM OR SERVICE: HCPCS | Performed by: INTERNAL MEDICINE

## 2022-07-09 PROCEDURE — G0378 HOSPITAL OBSERVATION PER HR: HCPCS

## 2022-07-09 PROCEDURE — A9270 NON-COVERED ITEM OR SERVICE: HCPCS | Performed by: GENERAL PRACTICE

## 2022-07-09 PROCEDURE — 36415 COLL VENOUS BLD VENIPUNCTURE: CPT

## 2022-07-09 PROCEDURE — 700102 HCHG RX REV CODE 250 W/ 637 OVERRIDE(OP): Performed by: GENERAL PRACTICE

## 2022-07-09 RX ORDER — HEPARIN SODIUM 1000 [USP'U]/ML
INJECTION, SOLUTION INTRAVENOUS; SUBCUTANEOUS
Status: DISPENSED
Start: 2022-07-09 | End: 2022-07-09

## 2022-07-09 RX ADMIN — HEPARIN SODIUM 5000 UNITS: 5000 INJECTION, SOLUTION INTRAVENOUS; SUBCUTANEOUS at 18:34

## 2022-07-09 RX ADMIN — DOCUSATE SODIUM 50 MG AND SENNOSIDES 8.6 MG 2 TABLET: 8.6; 5 TABLET, FILM COATED ORAL at 06:00

## 2022-07-09 RX ADMIN — Medication 2000 UNITS: at 06:00

## 2022-07-09 ASSESSMENT — PAIN DESCRIPTION - PAIN TYPE
TYPE: ACUTE PAIN

## 2022-07-09 ASSESSMENT — ENCOUNTER SYMPTOMS
WEAKNESS: 1
ABDOMINAL PAIN: 1
CONSTIPATION: 1
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
## 2022-07-09 NOTE — CARE PLAN
The patient is Stable - Low risk of patient condition declining or worsening    Shift Goals  Clinical Goals: carrillo for retention   Patient Goals: rest   Family Goals: CARIE    Progress made toward(s) clinical / shift goals:  see above     Patient is not progressing towards the following goals:

## 2022-07-09 NOTE — PROGRESS NOTES
"[facility] Note:     Hemodialysis treatment ordered today per Dr Zuluaga x 3 hours. Treatment initiated at 0958, ended at 1258.    Pt A/Ox2, restless and withdrawn , Pt stated \" I want to go home\", eventually agreed to do start HD tx, not in distress,VSS.    Pt was sleeping during HD, until the last 30 min when Pt woke up crying and restless again but denied any pain or discomfort,refused any comfort measures. BP trended down, UF goal adjusted as BP tolerates; see e- flow sheet for details.     Net UF 1,630 mL.     Needles removed from access site. Dressings applied and sites held x 10 minutes; verified no bleeding. Positive bruit/thrill post tx. Staff RN to monitor AVF for breakthrough bleeding. Should breakthrough bleeding occur, staff RN to apply pressure to access sites until bleeding resolved. Notify Dialysis and Nephrologist for follow-up.    Report given to Primary RN.     "

## 2022-07-09 NOTE — ASSESSMENT & PLAN NOTE
Multiple days of stating she has to pee without urination   Bladder scan showing retention, carrillo placed

## 2022-07-09 NOTE — CARE PLAN
Problem: Self Care  Goal: Patient will have the ability to perform ADLs independently or with assistance (bathe, groom, dress, toilet and feed)  Outcome: Not Progressing     Problem: Knowledge Deficit - Standard  Goal: Patient and family/care givers will demonstrate understanding of plan of care, disease process/condition, diagnostic tests and medications  Outcome: Progressing     Problem: Skin Integrity  Goal: Skin integrity is maintained or improved  Outcome: Progressing     Problem: Fall Risk  Goal: Patient will remain free from falls  Outcome: Progressing     Problem: Psychosocial  Goal: Patient's level of anxiety will decrease  Outcome: Progressing  Goal: Patient's ability to verbalize feelings about condition will improve  Outcome: Progressing  Goal: Patient and family will demonstrate ability to cope with life altering diagnosis and/or procedure  Outcome: Progressing     Problem: Communication  Goal: The ability to communicate needs accurately and effectively will improve  Outcome: Progressing     Problem: Discharge Barriers/Planning  Goal: Patient's continuum of care needs are met  Outcome: Progressing     Problem: Hemodynamics  Goal: Patient's hemodynamics, fluid balance and neurologic status will be stable or improve  Outcome: Progressing     Problem: Risk for Aspiration  Goal: Patient's risk for aspiration will be absent or decrease  Outcome: Progressing     Problem: Nutrition  Goal: Patient's nutritional and fluid intake will be adequate or improve  Outcome: Progressing  Goal: Enteral nutrition will be maintained or improve  Outcome: Progressing     Problem: Bowel Elimination  Goal: Establish and maintain regular bowel function  Outcome: Progressing     Problem: Mobility  Goal: Patient's capacity to carry out activities will improve  Outcome: Progressing      Shift Goals  Clinical Goals: hemodyanmic stablity, neuro intact, pain mgt, safety  Patient Goals: to sleep  Family Goals: CARIE    Progress made  toward(s) clinical / shift goals:  Yes    Patient is not progressing towards the following goals:      Problem: Self Care  Goal: Patient will have the ability to perform ADLs independently or with assistance (bathe, groom, dress, toilet and feed)  Outcome: Not Progressing

## 2022-07-09 NOTE — PROGRESS NOTES
"[facility] -  PROGRESS NOTE               Author: Sandeep Zuluaga M.D. Date & Time: 7/9/2022  10:38 AM     HPI:   67 y.o. female who presented 6/13/2022 with lethargy from [facility].  Patient has not had dialysis for the last 10 days due to behavioral issues.  Patient tested positive for COVID on June 6.  History limited due to AMS.   In the ED, patient found to be hypertensive. Found to have BUN 88 creatinine 11.7.  CT head negative. Chest x-ray showing increased interstitial markings consistent with mild interstitial edema.  Nephrology consulted, recommends dialysis in a.m.     DAILY NEPHROLOGY SUMMARY:  6/15: got hd yesterday. naoe  6/16: NAOE, irritated   6/17: calm, resting in the bed, no new issues, On O2 supplementation.   6/18: HD done today. UF off due to hypotension. Dialyzer clotted, able to return blood. Completed dialysis treatment with heparin.  6/19: SBP 100s-120s, phos 2.2, no new c/o  6/20: no events, drowsy but arousable, denies any CP/SOB/abd pain, BP stable  6/21: No events, pt upset and unwilling to answer questions this am, BP stable  6/22: No events, tolerated HD yest with 2L UF, confused overnight, tearful and oriented x2 this am, denies any CP/SOB/LE edema/Abd pain, BP variable  6/23: No events, feels ok, denies any CP/SOB/LE edema, BP stable, due for HD today  6/24: laying in bed, sleepy, no complaints, tolerated iHD yesterday UF: 1.6L, pending SNF   6/25: No events, seen and examined on hemodialysis VSS--see dialysis treatment sheet for full details, drowsy, denies any CP/SOB  6/26: No events, tolerated HD yest, only intermittently answers questions, denies any CP/SOB  6/27: Pt sitting up in bed, RN assisting with breakfast tray, pt says she feels \"better\", no physical complaints, no new labs, waiting on SNF bed, Bps soft requiring antihtn to be held, resp status stable on RA  6/28: sitting up in bed, no complaints, for iHD today, pending placement   6/29: iHD " "yesterday, no net UF, soft Bps.  VSS on Room air.   6/30: no new complaints, bored and grumpy , due for HD   7/1:  Resting in bed,  no complaints.  VSS RA. iHD yesterday net UF 1.3L.   7/2: Pt seen on HD, comfortable, low Bps limiting UF, asymptomatic, labs reviewed, resp status stable on RA  7/3: Pt in bed, flat affect, no new complaints, VSS on RA, iHD yesterday with 700 mL UF, pt is medically cleared, pending bed availability at [facility]  7/4: No new overnight renal events. Feels ok.   7/5: No new overnight renal events. HD today.  7/6: S/p HD yesterday with net UF of 1.5L and tolerated well. No new overnight renal events.   7/7: No new overnight renal events. HD today. K+ 5.7  7/8: S/p HD yesterday and tolerated well. No new overnight renal events.   7/9: No new overnight renal events. HD today.    REVIEW OF SYSTEMS:    10 point ROS reviewed and is as per HPI/daily summary or otherwise negative    PMH/PSH/SH/FH:   Reviewed and unchanged since admission note    CURRENT MEDICATIONS:   Reviewed from admission to present day    VS:  BP (!) 144/69   Pulse 81   Temp 36.6 °C (97.8 °F) (Temporal)   Resp 17   Ht 1.651 m (5' 5\")   Wt 71.1 kg (156 lb 12 oz)   SpO2 96%   BMI 26.08 kg/m²     Physical Exam  Vitals and nursing note reviewed.       Vitals and nursing note reviewed.   Constitutional:       General: She is not in acute distress.     Appearance: She is ill-appearing.   HENT:      Head: Normocephalic and atraumatic.   Eyes:      General: No scleral icterus.  Cardiovascular:      Rate and Rhythm: Normal rate and regular rhythm.      Comments: RUE AVF +T/B  Pulmonary:      Effort: Pulmonary effort is normal. No respiratory distress.      Breath sounds:clear   Abdominal:      General: Bowel sounds are normal. There is no distension.      Palpations: Abdomen is soft.   Musculoskeletal:         General: No deformity.      Right lower leg: No edema.      Left lower leg: No edema.   Skin:     General: Skin is " warm and dry.      Findings: No rash.   Neurological:      General: No focal deficit present.      Mental Status: She is alert.      Comments: Slowed responses   Psychiatric:         Attention and Perception: She is inattentive.         Mood and Affect: Affect is flat.   Fluids:  In: 120 [P.O.:120]  Out: -     LABS:  Recent Labs     07/07/22  0255 07/08/22  0148   SODIUM 126* 127*   POTASSIUM 5.7* 4.5   CHLORIDE 90* 90*   CO2 23 28   GLUCOSE 101* 132*   BUN 62* 39*   CREATININE 5.32* 3.60*   CALCIUM 9.5 9.7       IMAGING:   All imaging reviewed from admission to present day    IMPRESSION:  # ESRD, dependent on HD             - iHD q TThS and PRN             - via R AVF (s/p thrombectomy and temp line)             - Follows with Dr. Mayers outpt            # HTN, was overcontrolled, now improved off meds             - Goal BP < 140/90             - Now off hydralazine and metoprolol              - UF with iHD as tolerated   # Edema--resolved  # Anemia of CKD, below target             - Goal Hgb 10-11             - Ferritin 1675, isat 11%             - JUDITH with HD  # CKD-MBD             - Ca WNL             - PO4 2.2-->3.3; not on binders             - iPTH 138 and Vit D 14 on 5/23             - Started on cholecalciferol 7/2  # Thrombocytopenia, mild, resolved   # DMII             - Per primary team   # AMS, improved    - 2/2 missed hd vs COVID infection   # Debility/weakness  # Hypoalbuminemia, improved     PLAN:  - HD today.  - Continue iHD qTTS, treatment tomorrow THURS  - UF as tolerated  - Continue to hold phos binders  - No dietary protein restrictions  - Dose all meds per ESRD  - JUDITH with HD to goal Hgb 10-11  - Transfuse PRN Hgb <7   - Continue off of BP meds for now  - OK to transition to OP HD once medically cleared  - Pallative has consulted   - DC planning underway for SNF, pending bed availability at [facility]     **Upon discharge pt to resume care with her regular nephrologist Dr. Mayers who is not  affiliated with [facility]

## 2022-07-10 LAB
ANION GAP SERPL CALC-SCNC: 13 MMOL/L (ref 7–16)
BUN SERPL-MCNC: 48 MG/DL (ref 8–22)
CALCIUM SERPL-MCNC: 9.8 MG/DL (ref 8.5–10.5)
CHLORIDE SERPL-SCNC: 92 MMOL/L (ref 96–112)
CO2 SERPL-SCNC: 28 MMOL/L (ref 20–33)
CREAT SERPL-MCNC: 4.38 MG/DL (ref 0.5–1.4)
GFR SERPLBLD CREATININE-BSD FMLA CKD-EPI: 10 ML/MIN/1.73 M 2
GLUCOSE SERPL-MCNC: 152 MG/DL (ref 65–99)
MAGNESIUM SERPL-MCNC: 2 MG/DL (ref 1.5–2.5)
PHOSPHATE SERPL-MCNC: 4.8 MG/DL (ref 2.5–4.5)
POTASSIUM SERPL-SCNC: 4.9 MMOL/L (ref 3.6–5.5)
SODIUM SERPL-SCNC: 133 MMOL/L (ref 135–145)

## 2022-07-10 PROCEDURE — 700102 HCHG RX REV CODE 250 W/ 637 OVERRIDE(OP): Performed by: INTERNAL MEDICINE

## 2022-07-10 PROCEDURE — 83735 ASSAY OF MAGNESIUM: CPT

## 2022-07-10 PROCEDURE — A9270 NON-COVERED ITEM OR SERVICE: HCPCS | Performed by: INTERNAL MEDICINE

## 2022-07-10 PROCEDURE — 80048 BASIC METABOLIC PNL TOTAL CA: CPT

## 2022-07-10 PROCEDURE — A9270 NON-COVERED ITEM OR SERVICE: HCPCS | Performed by: STUDENT IN AN ORGANIZED HEALTH CARE EDUCATION/TRAINING PROGRAM

## 2022-07-10 PROCEDURE — G0378 HOSPITAL OBSERVATION PER HR: HCPCS

## 2022-07-10 PROCEDURE — A9270 NON-COVERED ITEM OR SERVICE: HCPCS | Performed by: GENERAL PRACTICE

## 2022-07-10 PROCEDURE — 700101 HCHG RX REV CODE 250: Performed by: GENERAL PRACTICE

## 2022-07-10 PROCEDURE — 36415 COLL VENOUS BLD VENIPUNCTURE: CPT

## 2022-07-10 PROCEDURE — 700102 HCHG RX REV CODE 250 W/ 637 OVERRIDE(OP): Performed by: STUDENT IN AN ORGANIZED HEALTH CARE EDUCATION/TRAINING PROGRAM

## 2022-07-10 PROCEDURE — 700102 HCHG RX REV CODE 250 W/ 637 OVERRIDE(OP): Performed by: GENERAL PRACTICE

## 2022-07-10 PROCEDURE — 84100 ASSAY OF PHOSPHORUS: CPT

## 2022-07-10 PROCEDURE — 99225 PR SUBSEQUENT OBSERVATION CARE,LEVEL II: CPT | Performed by: STUDENT IN AN ORGANIZED HEALTH CARE EDUCATION/TRAINING PROGRAM

## 2022-07-10 PROCEDURE — 96372 THER/PROPH/DIAG INJ SC/IM: CPT

## 2022-07-10 PROCEDURE — 700111 HCHG RX REV CODE 636 W/ 250 OVERRIDE (IP): Performed by: GENERAL PRACTICE

## 2022-07-10 RX ADMIN — DOCUSATE SODIUM 50 MG AND SENNOSIDES 8.6 MG 2 TABLET: 8.6; 5 TABLET, FILM COATED ORAL at 05:10

## 2022-07-10 RX ADMIN — HEPARIN SODIUM 5000 UNITS: 5000 INJECTION, SOLUTION INTRAVENOUS; SUBCUTANEOUS at 05:10

## 2022-07-10 RX ADMIN — DOCUSATE SODIUM 50 MG AND SENNOSIDES 8.6 MG 2 TABLET: 8.6; 5 TABLET, FILM COATED ORAL at 16:39

## 2022-07-10 RX ADMIN — LIDOCAINE 1 PATCH: 50 PATCH CUTANEOUS at 12:14

## 2022-07-10 RX ADMIN — GABAPENTIN 300 MG: 300 CAPSULE ORAL at 16:40

## 2022-07-10 RX ADMIN — ESCITALOPRAM OXALATE 10 MG: 10 TABLET ORAL at 05:10

## 2022-07-10 RX ADMIN — ATORVASTATIN CALCIUM 40 MG: 40 TABLET, FILM COATED ORAL at 16:40

## 2022-07-10 RX ADMIN — Medication 2000 UNITS: at 05:10

## 2022-07-10 RX ADMIN — HEPARIN SODIUM 5000 UNITS: 5000 INJECTION, SOLUTION INTRAVENOUS; SUBCUTANEOUS at 16:40

## 2022-07-10 ASSESSMENT — ENCOUNTER SYMPTOMS
WEAKNESS: 1
ABDOMINAL PAIN: 1
CONSTIPATION: 1

## 2022-07-10 ASSESSMENT — PAIN DESCRIPTION - PAIN TYPE: TYPE: ACUTE PAIN

## 2022-07-10 NOTE — PROGRESS NOTES
"[facility] -  PROGRESS NOTE               Author: Sandeep Zuluaga M.D. Date & Time: 7/10/2022  10:23 AM     HPI:   67 y.o. female who presented 6/13/2022 with lethargy from [facility].  Patient has not had dialysis for the last 10 days due to behavioral issues.  Patient tested positive for COVID on June 6.  History limited due to AMS.   In the ED, patient found to be hypertensive. Found to have BUN 88 creatinine 11.7.  CT head negative. Chest x-ray showing increased interstitial markings consistent with mild interstitial edema.  Nephrology consulted, recommends dialysis in a.m.     DAILY NEPHROLOGY SUMMARY:  6/15: got hd yesterday. naoe  6/16: NAOE, irritated   6/17: calm, resting in the bed, no new issues, On O2 supplementation.   6/18: HD done today. UF off due to hypotension. Dialyzer clotted, able to return blood. Completed dialysis treatment with heparin.  6/19: SBP 100s-120s, phos 2.2, no new c/o  6/20: no events, drowsy but arousable, denies any CP/SOB/abd pain, BP stable  6/21: No events, pt upset and unwilling to answer questions this am, BP stable  6/22: No events, tolerated HD yest with 2L UF, confused overnight, tearful and oriented x2 this am, denies any CP/SOB/LE edema/Abd pain, BP variable  6/23: No events, feels ok, denies any CP/SOB/LE edema, BP stable, due for HD today  6/24: laying in bed, sleepy, no complaints, tolerated iHD yesterday UF: 1.6L, pending SNF   6/25: No events, seen and examined on hemodialysis VSS--see dialysis treatment sheet for full details, drowsy, denies any CP/SOB  6/26: No events, tolerated HD yest, only intermittently answers questions, denies any CP/SOB  6/27: Pt sitting up in bed, RN assisting with breakfast tray, pt says she feels \"better\", no physical complaints, no new labs, waiting on SNF bed, Bps soft requiring antihtn to be held, resp status stable on RA  6/28: sitting up in bed, no complaints, for iHD today, pending placement   6/29: iHD " "yesterday, no net UF, soft Bps.  VSS on Room air.   6/30: no new complaints, bored and grumpy , due for HD   7/1:  Resting in bed,  no complaints.  VSS RA. iHD yesterday net UF 1.3L.   7/2: Pt seen on HD, comfortable, low Bps limiting UF, asymptomatic, labs reviewed, resp status stable on RA  7/3: Pt in bed, flat affect, no new complaints, VSS on RA, iHD yesterday with 700 mL UF, pt is medically cleared, pending bed availability at [facility]  7/4: No new overnight renal events. Feels ok.   7/5: No new overnight renal events. HD today.  7/6: S/p HD yesterday with net UF of 1.5L and tolerated well. No new overnight renal events.   7/7: No new overnight renal events. HD today. K+ 5.7  7/8: S/p HD yesterday and tolerated well. No new overnight renal events.   7/9: No new overnight renal events. HD today.  7/10: S/p HD yesterday with net UF of 1.6 L and tolerated well. No new overnight renal events.     REVIEW OF SYSTEMS:    10 point ROS reviewed and is as per HPI/daily summary or otherwise negative    PMH/PSH/SH/FH:   Reviewed and unchanged since admission note    CURRENT MEDICATIONS:   Reviewed from admission to present day    VS:  /63   Pulse 85   Temp 36.3 °C (97.3 °F) (Temporal)   Resp 16   Ht 1.651 m (5' 5\")   Wt 71.1 kg (156 lb 12 oz)   SpO2 96%   BMI 26.08 kg/m²     Physical Exam  Vitals and nursing note reviewed.       Vitals and nursing note reviewed.   Constitutional:       General: She is not in acute distress.     Appearance: She is ill-appearing.   HENT:      Head: Normocephalic and atraumatic.   Eyes:      General: No scleral icterus.  Cardiovascular:      Rate and Rhythm: Normal rate and regular rhythm.      Comments: RUE AVF +T/B  Pulmonary:      Effort: Pulmonary effort is normal. No respiratory distress.      Breath sounds:clear   Abdominal:      General: Bowel sounds are normal. There is no distension.      Palpations: Abdomen is soft.   Musculoskeletal:         General: No deformity. "      Right lower leg: No edema.      Left lower leg: No edema.   Skin:     General: Skin is warm and dry.      Findings: No rash.   Neurological:      General: No focal deficit present.      Mental Status: She is alert.      Comments: Slowed responses   Psychiatric:         Attention and Perception: She is inattentive.         Mood and Affect: Affect is flat.   Fluids:  In: 1100 [P.O.:600; Dialysis:500]  Out: 2830     LABS:  Recent Labs     07/08/22  0148 07/09/22  1340   SODIUM 127* 136   POTASSIUM 4.5 4.3   CHLORIDE 90* 96   CO2 28 30   GLUCOSE 132* 178*   BUN 39* 27*   CREATININE 3.60* 2.79*   CALCIUM 9.7 9.2       IMAGING:   All imaging reviewed from admission to present day    IMPRESSION:  # ESRD, dependent on HD             - iHD q TThS and PRN             - via R AVF (s/p thrombectomy and temp line)             - Follows with Dr. Mayers outpt            # HTN, was overcontrolled, now improved off meds             - Goal BP < 140/90             - Now off hydralazine and metoprolol              - UF with iHD as tolerated   # Edema--resolved  # Anemia of CKD, below target             - Goal Hgb 10-11             - Ferritin 1675, isat 11%             - JUDITH with HD  # CKD-MBD             - Ca WNL             - PO4 2.2-->3.3; not on binders             - iPTH 138 and Vit D 14 on 5/23             - Started on cholecalciferol 7/2  # Thrombocytopenia, mild, resolved   # DMII             - Per primary team   # AMS, improved    - 2/2 missed hd vs COVID infection   # Debility/weakness  # Hypoalbuminemia, improved     PLAN:  - No plans for HD today.  - Continue iHD qTTS  - UF as tolerated  - Continue to hold phos binders  - No dietary protein restrictions  - Dose all meds per ESRD  - JUDITH with HD to goal Hgb 10-11  - Transfuse PRN Hgb <7   - Continue off of BP meds for now  - OK to transition to OP HD once medically cleared  - Pallative has consulted   - DC planning underway for SNF, pending bed availability at  [facility]     **Upon discharge pt to resume care with her regular nephrologist Dr. Mayers who [facility]
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
## 2022-07-10 NOTE — CARE PLAN
The patient is Stable - Low risk of patient condition declining or worsening    Shift Goals  Clinical Goals: placement  Patient Goals: rest, comfort  Family Goals: CARIE    Progress made toward(s) clinical / shift goals:  Q 2 turns, no complaints of pain    Problem: Knowledge Deficit - Standard  Goal: Patient and family/care givers will demonstrate understanding of plan of care, disease process/condition, diagnostic tests and medications  Outcome: Progressing     Problem: Skin Integrity  Goal: Skin integrity is maintained or improved  Outcome: Progressing     Problem: Fall Risk  Goal: Patient will remain free from falls  Outcome: Progressing     Problem: Psychosocial  Goal: Patient's level of anxiety will decrease  Outcome: Progressing  Goal: Patient's ability to verbalize feelings about condition will improve  Outcome: Progressing  Goal: Patient's ability to re-evaluate and adapt role responsibilities will improve  Outcome: Progressing  Goal: Patient and family will demonstrate ability to cope with life altering diagnosis and/or procedure  Outcome: Progressing  Goal: Spiritual and cultural needs incorporated into hospitalization  Outcome: Progressing     Problem: Communication  Goal: The ability to communicate needs accurately and effectively will improve  Outcome: Progressing     Problem: Discharge Barriers/Planning  Goal: Patient's continuum of care needs are met  Outcome: Progressing     Problem: Hemodynamics  Goal: Patient's hemodynamics, fluid balance and neurologic status will be stable or improve  Outcome: Progressing     Problem: Respiratory  Goal: Patient will achieve/maintain optimum respiratory ventilation and gas exchange  Outcome: Progressing     Problem: Risk for Aspiration  Goal: Patient's risk for aspiration will be absent or decrease  Outcome: Progressing     Problem: Nutrition  Goal: Patient's nutritional and fluid intake will be adequate or improve  Outcome: Progressing  Goal: Enteral nutrition  will be maintained or improve  Outcome: Progressing  Goal: Enteral nutrition will be maintained or improve  Outcome: Progressing     Problem: Bowel Elimination  Goal: Establish and maintain regular bowel function  Outcome: Progressing     Problem: Mobility  Goal: Patient's capacity to carry out activities will improve  Outcome: Progressing     Problem: Pain - Standard  Goal: Alleviation of pain or a reduction in pain to the patient’s comfort goal  Outcome: Progressing     Problem: Self Care  Goal: Patient will have the ability to perform ADLs independently or with assistance (bathe, groom, dress, toilet and feed)  Outcome: Progressing

## 2022-07-10 NOTE — PROGRESS NOTES
Nurse rec'd BSR from night shift nurse, updated on POC. Patient denies any pain or any s/s of distress/discomfort. Nurse to resume care, call light in reach. CEDRICK

## 2022-07-11 PROCEDURE — 51798 US URINE CAPACITY MEASURE: CPT

## 2022-07-11 PROCEDURE — 96372 THER/PROPH/DIAG INJ SC/IM: CPT

## 2022-07-11 PROCEDURE — 700101 HCHG RX REV CODE 250: Performed by: GENERAL PRACTICE

## 2022-07-11 PROCEDURE — 700102 HCHG RX REV CODE 250 W/ 637 OVERRIDE(OP): Performed by: INTERNAL MEDICINE

## 2022-07-11 PROCEDURE — G0378 HOSPITAL OBSERVATION PER HR: HCPCS

## 2022-07-11 PROCEDURE — A9270 NON-COVERED ITEM OR SERVICE: HCPCS | Performed by: GENERAL PRACTICE

## 2022-07-11 PROCEDURE — A9270 NON-COVERED ITEM OR SERVICE: HCPCS | Performed by: INTERNAL MEDICINE

## 2022-07-11 PROCEDURE — 700102 HCHG RX REV CODE 250 W/ 637 OVERRIDE(OP): Performed by: GENERAL PRACTICE

## 2022-07-11 PROCEDURE — 99225 PR SUBSEQUENT OBSERVATION CARE,LEVEL II: CPT | Performed by: STUDENT IN AN ORGANIZED HEALTH CARE EDUCATION/TRAINING PROGRAM

## 2022-07-11 PROCEDURE — A9270 NON-COVERED ITEM OR SERVICE: HCPCS | Performed by: STUDENT IN AN ORGANIZED HEALTH CARE EDUCATION/TRAINING PROGRAM

## 2022-07-11 PROCEDURE — 700102 HCHG RX REV CODE 250 W/ 637 OVERRIDE(OP): Performed by: STUDENT IN AN ORGANIZED HEALTH CARE EDUCATION/TRAINING PROGRAM

## 2022-07-11 PROCEDURE — 700111 HCHG RX REV CODE 636 W/ 250 OVERRIDE (IP): Performed by: GENERAL PRACTICE

## 2022-07-11 RX ADMIN — ESCITALOPRAM OXALATE 10 MG: 10 TABLET ORAL at 05:05

## 2022-07-11 RX ADMIN — HEPARIN SODIUM 5000 UNITS: 5000 INJECTION, SOLUTION INTRAVENOUS; SUBCUTANEOUS at 05:05

## 2022-07-11 RX ADMIN — DOCUSATE SODIUM 50 MG AND SENNOSIDES 8.6 MG 2 TABLET: 8.6; 5 TABLET, FILM COATED ORAL at 05:05

## 2022-07-11 RX ADMIN — LIDOCAINE 1 PATCH: 50 PATCH CUTANEOUS at 15:46

## 2022-07-11 RX ADMIN — Medication 2000 UNITS: at 05:04

## 2022-07-11 ASSESSMENT — ENCOUNTER SYMPTOMS
WEAKNESS: 1
CONSTIPATION: 1
ABDOMINAL PAIN: 1
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
## 2022-07-11 NOTE — DISCHARGE PLANNING
Case Management Discharge Planning    Admission Date: 6/13/2022  GMLOS:    ALOS: 0    6-Clicks ADL Score: 6  6-Clicks Mobility Score: 6  PT and/or OT Eval ordered: Yes  Post-acute Referrals Ordered: Yes  Post-acute Choice Obtained: Yes  Has referral(s) been sent to post-acute provider:  Yes      Anticipated Discharge Dispo: Discharge Disposition: D/T to SNF with Medicare cert in anticipation of skilled care (03)    DME Needed: No    Action(s) Taken: OTHER    Escalations Completed: None    Medically Clear: No    Next Steps: Team reviewed status and discharge plans. To date the only accepting facility is [facility]. At this time the facility does not have any open HD spots for admission. Facility will continue to follow for admission.     Barriers to Discharge: Pending Placement    Is the patient up for discharge tomorrow: No

## 2022-07-11 NOTE — PROGRESS NOTES
Nurse gave BSR to night shift nurse, updated on POC, patient s/s of distress/discomfort. Call light in reach, nurse relinquished care.

## 2022-07-11 NOTE — CARE PLAN
The patient is Stable - Low risk of patient condition declining or worsening    Shift Goals  Clinical Goals: strict I+O, adequate oxygenation  Patient Goals: rest, dc to SNF  Family Goals: unable to assess    Progress made toward(s) clinical / shift goals:    Problem: Knowledge Deficit - Standard  Goal: Patient and family/care givers will demonstrate understanding of plan of care, disease process/condition, diagnostic tests and medications  Outcome: Progressing  Note: Patient updated on POC, no questions or concerns     Problem: Skin Integrity  Goal: Skin integrity is maintained or improved  Outcome: Progressing  Note: Buttocks are red, but pt turns by self and barrier paste is being used       Patient is not progressing towards the following goals:

## 2022-07-11 NOTE — DISCHARGE PLANNING
Agency/Facility Name: [facility]  Outcome: DPA left a voicemail regarding bed availability. DPA requesting a call back.

## 2022-07-11 NOTE — PROGRESS NOTES
Rounded on patient who is observed to be sitting upright in bed, eating afternoon meal. Patient alert and oriented to self/place, denies pain/discomfort--maintains flat affect, refuses repositioning, requests to be left to eat. Call light and personal items within reach. No acute distress. No additional needs at this time.

## 2022-07-11 NOTE — PROGRESS NOTES
"[facility] -  PROGRESS NOTE               Author: Keyla Mccauley M.D. Date & Time: 7/11/2022  12:09 PM     HPI:   67 y.o. female who presented 6/13/2022 with lethargy from [facility].  Patient has not had dialysis for the last 10 days due to behavioral issues.  Patient tested positive for COVID on June 6.  History limited due to AMS.   In the ED, patient found to be hypertensive. Found to have BUN 88 creatinine 11.7.  CT head negative. Chest x-ray showing increased interstitial markings consistent with mild interstitial edema.  Nephrology consulted, recommends dialysis in a.m.     DAILY NEPHROLOGY SUMMARY:  6/15: got hd yesterday. naoe  6/16: NAOE, irritated   6/17: calm, resting in the bed, no new issues, On O2 supplementation.   6/18: HD done today. UF off due to hypotension. Dialyzer clotted, able to return blood. Completed dialysis treatment with heparin.  6/19: SBP 100s-120s, phos 2.2, no new c/o  6/20: no events, drowsy but arousable, denies any CP/SOB/abd pain, BP stable  6/21: No events, pt upset and unwilling to answer questions this am, BP stable  6/22: No events, tolerated HD yest with 2L UF, confused overnight, tearful and oriented x2 this am, denies any CP/SOB/LE edema/Abd pain, BP variable  6/23: No events, feels ok, denies any CP/SOB/LE edema, BP stable, due for HD today  6/24: laying in bed, sleepy, no complaints, tolerated iHD yesterday UF: 1.6L, pending SNF   6/25: No events, seen and examined on hemodialysis VSS--see dialysis treatment sheet for full details, drowsy, denies any CP/SOB  6/26: No events, tolerated HD yest, only intermittently answers questions, denies any CP/SOB  6/27: Pt sitting up in bed, RN assisting with breakfast tray, pt says she feels \"better\", no physical complaints, no new labs, waiting on SNF bed, Bps soft requiring antihtn to be held, resp status stable on RA  6/28: sitting up in bed, no complaints, for iHD today, pending placement   6/29: " "iHD yesterday, no net UF, soft Bps.  VSS on Room air.   6/30: no new complaints, bored and grumpy , due for HD   7/1:  Resting in bed,  no complaints.  VSS RA. iHD yesterday net UF 1.3L.   7/2: Pt seen on HD, comfortable, low Bps limiting UF, asymptomatic, labs reviewed, resp status stable on RA  7/3: Pt in bed, flat affect, no new complaints, VSS on RA, iHD yesterday with 700 mL UF, pt is medically cleared, pending bed availability at [facility]  7/4: No new overnight renal events. Feels ok.   7/5: No new overnight renal events. HD today.  7/6: S/p HD yesterday with net UF of 1.5L and tolerated well. No new overnight renal events.   7/7: No new overnight renal events. HD today. K+ 5.7  7/8: S/p HD yesterday and tolerated well. No new overnight renal events.   7/9: No new overnight renal events. HD today.  7/10: S/p HD yesterday with net UF of 1.6 L and tolerated well. No new overnight renal events.   7/11: No events, drowsy but arousable, denies any CP/SOB, BP elevated overnight and stable this am    REVIEW OF SYSTEMS:    10 point ROS reviewed and is as per HPI/daily summary or otherwise negative    PMH/PSH/SH/FH:   Reviewed and unchanged since admission note    CURRENT MEDICATIONS:   Reviewed from admission to present day    VS:  /64   Pulse 88   Temp 36.4 °C (97.6 °F) (Temporal)   Resp 18   Ht 1.651 m (5' 5\")   Wt 71.1 kg (156 lb 12 oz)   SpO2 97%   BMI 26.08 kg/m²     Physical Exam  Vitals and nursing note reviewed.   Constitutional:       General: She is not in acute distress.     Appearance: She is ill-appearing.   HENT:      Head: Normocephalic and atraumatic.   Eyes:      General: No scleral icterus.     Extraocular Movements: Extraocular movements intact.   Cardiovascular:      Rate and Rhythm: Normal rate and regular rhythm.   Pulmonary:      Effort: Pulmonary effort is normal. No respiratory distress.      Breath sounds: Normal breath sounds.   Abdominal:      General: Bowel sounds are " normal. There is no distension.      Palpations: Abdomen is soft.   Musculoskeletal:         General: No deformity.      Right lower leg: No edema.      Left lower leg: No edema.      Comments: +R arm AVF with thrill/bruit   Skin:     General: Skin is warm and dry.      Findings: No rash.   Neurological:      General: No focal deficit present.      Mental Status: She is alert.      Comments: Oriented only to self   Psychiatric:         Attention and Perception: She is inattentive.         Mood and Affect: Affect is flat.         Speech: Speech is delayed.         Fluids:  No intake/output data recorded.    LABS:  Recent Labs     07/09/22  1340 07/10/22  1027   SODIUM 136 133*   POTASSIUM 4.3 4.9   CHLORIDE 96 92*   CO2 30 28   GLUCOSE 178* 152*   BUN 27* 48*   CREATININE 2.79* 4.38*   CALCIUM 9.2 9.8       IMAGING:   All imaging reviewed from admission to present day    IMPRESSION:  # ESRD, dependent on HD             - iHD q TThS and PRN             - via R AVF (s/p thrombectomy and temp line)             - Follows with Dr. Mayers outpt            # HTN, was overcontrolled, now improved off meds             - Goal BP < 140/90             - Now off hydralazine and metoprolol              - UF with iHD as tolerated   # Edema--resolved  # Anemia of CKD, below target             - Goal Hgb 10-11             - Ferritin 1675, isat 11%             - JUDITH with HD  # CKD-MBD             - Ca WNL             - PO4 2.2-->3.3; not on binders             - iPTH 138 and Vit D 14 on 5/23             - Started on cholecalciferol 7/2  # Thrombocytopenia, mild, resolved   # DMII             - Per primary team   # AMS, improved    - 2/2 missed hd vs COVID infection   # Debility/weakness  # Hypoalbuminemia, improved     PLAN:  - No plans for HD today (MON).  - Continue iHD qTTS  - UF as tolerated  - Continue to hold phos binders  - No dietary protein restrictions  - Dose all meds per ESRD  - JUDITH with HD to goal Hgb 10-11  - Transfuse  PRN Hgb <7   - Continue off of BP meds for now  - OK to transition to OP HD once medically cleared  - Pallative has consulted, pt is full code  - DC planning underway for SNF, pending bed availability at [facility]     **Upon discharge pt to resume care with her regular nephrologist Dr. Mayers who is not affiliated with [facility]

## 2022-07-11 NOTE — PROGRESS NOTES
Report received, assumed care of patient who is observed to be sleeping at this time, RR WDL on RA, no acute distress. Safety precautions maintained with bed in lowest position, wheels locked, bed alarm set. Call light and personal items within reach.

## 2022-07-11 NOTE — PROGRESS NOTES
Patient resting in bed at this time, arouses easily to voice.     Monroy catheter removed as per order for voiding trial. Process explained to pt who verbalizes understanding     Patient demonstrates one episode of incontinence; large/loose at this time, edda care performed, linen changed, pt repositioned for comfort.     Safety precautions maintained, bed in lowest position, wheels locked, bed alarm set. Call light and personal items within reach

## 2022-07-11 NOTE — PROGRESS NOTES
Specimen sent as per order, contact precautions implemented, safety precautions maintained, safety instructions reinforced. Pt verbalizes understanding. Call light and personal items within reach. No additional needs at this time.

## 2022-07-12 VITALS
HEIGHT: 65 IN | DIASTOLIC BLOOD PRESSURE: 44 MMHG | BODY MASS INDEX: 26.12 KG/M2 | HEART RATE: 85 BPM | OXYGEN SATURATION: 92 % | TEMPERATURE: 97.5 F | SYSTOLIC BLOOD PRESSURE: 121 MMHG | WEIGHT: 156.75 LBS | RESPIRATION RATE: 18 BRPM

## 2022-07-12 LAB
SARS-COV+SARS-COV-2 AG RESP QL IA.RAPID: NOTDETECTED
SPECIMEN SOURCE: NORMAL

## 2022-07-12 PROCEDURE — 96372 THER/PROPH/DIAG INJ SC/IM: CPT | Mod: XU

## 2022-07-12 PROCEDURE — 87426 SARSCOV CORONAVIRUS AG IA: CPT

## 2022-07-12 PROCEDURE — A9270 NON-COVERED ITEM OR SERVICE: HCPCS | Performed by: INTERNAL MEDICINE

## 2022-07-12 PROCEDURE — 700101 HCHG RX REV CODE 250: Performed by: GENERAL PRACTICE

## 2022-07-12 PROCEDURE — 700102 HCHG RX REV CODE 250 W/ 637 OVERRIDE(OP): Performed by: INTERNAL MEDICINE

## 2022-07-12 PROCEDURE — G0378 HOSPITAL OBSERVATION PER HR: HCPCS

## 2022-07-12 PROCEDURE — 700111 HCHG RX REV CODE 636 W/ 250 OVERRIDE (IP): Performed by: GENERAL PRACTICE

## 2022-07-12 PROCEDURE — 99217 PR OBSERVATION CARE DISCHARGE: CPT | Mod: CS | Performed by: STUDENT IN AN ORGANIZED HEALTH CARE EDUCATION/TRAINING PROGRAM

## 2022-07-12 PROCEDURE — 90935 HEMODIALYSIS ONE EVALUATION: CPT

## 2022-07-12 PROCEDURE — 700111 HCHG RX REV CODE 636 W/ 250 OVERRIDE (IP): Performed by: INTERNAL MEDICINE

## 2022-07-12 RX ORDER — SEVELAMER CARBONATE 800 MG/1
800 TABLET, FILM COATED ORAL
Qty: 270 TABLET | Status: SHIPPED
Start: 2022-07-12 | End: 2022-11-27

## 2022-07-12 RX ADMIN — LIDOCAINE 1 PATCH: 50 PATCH CUTANEOUS at 09:15

## 2022-07-12 RX ADMIN — HEPARIN SODIUM 5000 UNITS: 5000 INJECTION, SOLUTION INTRAVENOUS; SUBCUTANEOUS at 04:16

## 2022-07-12 RX ADMIN — ESCITALOPRAM OXALATE 10 MG: 10 TABLET ORAL at 04:16

## 2022-07-12 RX ADMIN — EPOETIN ALFA-EPBX 10000 UNITS: 10000 INJECTION, SOLUTION INTRAVENOUS; SUBCUTANEOUS at 07:00

## 2022-07-12 RX ADMIN — Medication 2000 UNITS: at 04:16

## 2022-07-12 ASSESSMENT — ENCOUNTER SYMPTOMS
ABDOMINAL PAIN: 1
CONSTIPATION: 1
WEAKNESS: 1

## 2022-07-12 NOTE — PROGRESS NOTES
Pt HD treatment completed. Therapy duration 3 HRS, with 1L of fluid removed. Pt's systolic bp dropped to low 70's during treatment but is now stable @123/52.

## 2022-07-12 NOTE — PROGRESS NOTES
HD treatment today per routine order using RUAAVF.Treatment fairly tolerated.BP dropped in the 70'S in the last 45 mins of treatment.Net UF removed 1000 ml./52 post treatment.Epoetin given.Report given to primary Rn.

## 2022-07-12 NOTE — THERAPY
Physical Therapy Contact Note    Per chart patient pending DC today to SNF. Will hold PT treatment and resume tomorrow if DC unsuccessful.    Mary Price, PT, DPT  [phone]
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
## 2022-07-12 NOTE — DISCHARGE PLANNING
Agency/Facility Name: [facility]  Outcome: DPA left a voicemail regarding bed availability. MADHU Newman, informed this DPA that [facility] stated yesterday they can take Pt today on 7/12. DPA requesting a call back.     0910:  Agency/Facility Name: [facility]  Outcome: DPA received a v-mail from [facility] stating they do have a bed for Pt and they are requesting a transport time of 1400.     SW notified.     0950:   DPA received [facility] form requesting transport of 1400. [facility] to follow up with [facility].     1010:  Agency/Facility Name: [facility]  Spoke To: Jose  Outcome: DPA was notified [facility] has received form, just need facesheet for Pt.     1012:  DPA faxed over Pt facesheet.     1015:   Agency/Facility Name: [facility]  Spoke To: Jose  Outcome: DPA confirmed transport time of 1400 via [facility]. DPA left contact information in case of change.     SW notified.     1020:  Agency/Facility Name: [facility]  Spoke To: Priscilla  Outcome: DPA confirmed transport time of 1400 via [facility].     1030:  Agency/Facility Name: [facility]  Spoke To: Jose  Outcome: DPA was notified Pt has medicaid as secondary insurance. DPA to call [facility] and check if Pt has transport benefits.     1035:  [facility]ency[facility]Facility Name: [facility]  Spoke To: Wilda  Outcome: DPA was notified Pt does not have transport benefits.     SW notified.     1040:  Agency/Facility Name: [facility]  Spoke To: Jose  Outcome: DPA notified [facility] that Pt does not have transport benefits per Wilda @ [facility]. Jose stated Pt does not need an approved services form filled out.     SW notified.     1150:  Agency/Facility Name: [facility]  Spoke To: Priscilla  Outcome: DPA was notified Pt transport needs to be pushed to 1500 or later. [facility]     1155:  Agency/Facility Name: [facility]  Spoke To: Jose  Outcome: DPA notified Jose Pt transport needs to be pushed. [facility] moved time to 1515.     1156:  Agency/Facility Name: [facility]  Spoke To: Priscilla  Outcome: DPA  confirmed a transport time of 1515 via [facility].     FABIAN notified.     1600:  Agency/Facility Name: [facility]  Spoke To: Priscilla  Outcome: DPA notified [facility] Pt was brought back due to low blood pressure in [facility]. [facility] will save bed for Pt tomorrow when she is medically cleared.     FABIAN notified.

## 2022-07-12 NOTE — PROGRESS NOTES
"[facility] -  PROGRESS NOTE               Author: Keyla Mccauley M.D. Date & Time: 7/12/2022  9:59 AM     HPI:   67 y.o. female who presented 6/13/2022 with lethargy from [facility].  Patient has not had dialysis for the last 10 days due to behavioral issues.  Patient tested positive for COVID on June 6.  History limited due to AMS.   In the ED, patient found to be hypertensive. Found to have BUN 88 creatinine 11.7.  CT head negative. Chest x-ray showing increased interstitial markings consistent with mild interstitial edema.  Nephrology consulted, recommends dialysis in a.m.     DAILY NEPHROLOGY SUMMARY:  6/15: got hd yesterday. naoe  6/16: NAOE, irritated   6/17: calm, resting in the bed, no new issues, On O2 supplementation.   6/18: HD done today. UF off due to hypotension. Dialyzer clotted, able to return blood. Completed dialysis treatment with heparin.  6/19: SBP 100s-120s, phos 2.2, no new c/o  6/20: no events, drowsy but arousable, denies any CP/SOB/abd pain, BP stable  6/21: No events, pt upset and unwilling to answer questions this am, BP stable  6/22: No events, tolerated HD yest with 2L UF, confused overnight, tearful and oriented x2 this am, denies any CP/SOB/LE edema/Abd pain, BP variable  6/23: No events, feels ok, denies any CP/SOB/LE edema, BP stable, due for HD today  6/24: laying in bed, sleepy, no complaints, tolerated iHD yesterday UF: 1.6L, pending SNF   6/25: No events, seen and examined on hemodialysis VSS--see dialysis treatment sheet for full details, drowsy, denies any CP/SOB  6/26: No events, tolerated HD yest, only intermittently answers questions, denies any CP/SOB  6/27: Pt sitting up in bed, RN assisting with breakfast tray, pt says she feels \"better\", no physical complaints, no new labs, waiting on SNF bed, Bps soft requiring antihtn to be held, resp status stable on RA  6/28: sitting up in bed, no complaints, for iHD today, pending placement   6/29: " "iHD yesterday, no net UF, soft Bps.  VSS on Room air.   6/30: no new complaints, bored and grumpy , due for HD   7/1:  Resting in bed,  no complaints.  VSS RA. iHD yesterday net UF 1.3L.   7/2: Pt seen on HD, comfortable, low Bps limiting UF, asymptomatic, labs reviewed, resp status stable on RA  7/3: Pt in bed, flat affect, no new complaints, VSS on RA, iHD yesterday with 700 mL UF, pt is medically cleared, pending bed availability at [facility]  7/4: No new overnight renal events. Feels ok.   7/5: No new overnight renal events. HD today.  7/6: S/p HD yesterday with net UF of 1.5L and tolerated well. No new overnight renal events.   7/7: No new overnight renal events. HD today. K+ 5.7  7/8: S/p HD yesterday and tolerated well. No new overnight renal events.   7/9: No new overnight renal events. HD today.  7/10: S/p HD yesterday with net UF of 1.6 L and tolerated well. No new overnight renal events.   7/11: No events, drowsy but arousable, denies any CP/SOB, BP elevated overnight and stable this am  7/12: No events, denies any CP/OB/LE edema, completed HD this am with 1L UF, BP stable at this time but did have intradialytic hypotension with HD this am    REVIEW OF SYSTEMS:    10 point ROS reviewed and is as per HPI/daily summary or otherwise negative    PMH/PSH/SH/FH:   Reviewed and unchanged since admission note    CURRENT MEDICATIONS:   Reviewed from admission to present day    VS:  /52   Pulse 86   Temp 36.1 °C (97 °F) (Temporal)   Resp 18   Ht 1.651 m (5' 5\")   Wt 71.1 kg (156 lb 12 oz)   SpO2 95%   BMI 26.08 kg/m²     Physical Exam  Vitals and nursing note reviewed.   Constitutional:       General: She is not in acute distress.     Appearance: She is ill-appearing.   HENT:      Head: Normocephalic and atraumatic.   Eyes:      General: No scleral icterus.     Extraocular Movements: Extraocular movements intact.   Cardiovascular:      Rate and Rhythm: Normal rate and regular rhythm.   Pulmonary:     "  Effort: Pulmonary effort is normal. No respiratory distress.      Breath sounds: Normal breath sounds.   Abdominal:      General: Bowel sounds are normal. There is no distension.      Palpations: Abdomen is soft.   Musculoskeletal:         General: No deformity.      Right lower leg: No edema.      Left lower leg: No edema.      Comments: +R arm AVF with thrill/bruit   Skin:     General: Skin is warm and dry.      Findings: No rash.   Neurological:      General: No focal deficit present.      Mental Status: She is alert.      Comments: Oriented only to self   Psychiatric:         Attention and Perception: She is inattentive.         Mood and Affect: Affect is flat.         Speech: Speech is delayed.         Fluids:  In: 600 [P.O.:600]  Out: 250     LABS:  Recent Labs     07/09/22  1340 07/10/22  1027   SODIUM 136 133*   POTASSIUM 4.3 4.9   CHLORIDE 96 92*   CO2 30 28   GLUCOSE 178* 152*   BUN 27* 48*   CREATININE 2.79* 4.38*   CALCIUM 9.2 9.8       IMAGING:   All imaging reviewed from admission to present day    IMPRESSION:  # ESRD, dependent on HD             - iHD q TThS and PRN             - via R AVF (s/p thrombectomy and temp line)             - Follows with Dr. Mayers outpt            # HTN, was overcontrolled, now improved off meds             - Goal BP < 140/90             - Now off hydralazine and metoprolol              - UF with iHD as tolerated   # Edema--resolved  # Anemia of CKD, below target             - Goal Hgb 10-11             - Ferritin 1675, isat 11%             - JUDITH with HD  # CKD-MBD             - Ca WNL             - PO4 2.2-->3.3; not on binders             - iPTH 138 and Vit D 14 on 5/23             - Started on cholecalciferol 7/2  # Thrombocytopenia, mild, resolved   # DMII             - Per primary team   # AMS, improved    - 2/2 missed hd vs COVID infection   # Debility/weakness  # Hypoalbuminemia, improved     PLAN:  - HD today (TUES)  - Continue iHD qTTS  - UF as tolerated  -  Continue to hold phos binders  - No dietary protein restrictions  - Dose all meds per ESRD  - JUDITH with HD to goal Hgb 10-11  - Transfuse PRN Hgb <7   - Continue off of BP meds for now  - OK to transition to OP HD once medically cleared  - Pallative has consulted, pt is full code  - DC planning underway for SNF, pending bed availability at [facility]     **Upon discharge pt to resume care with her regular nephrologist Dr. Mayers who is not affiliated with [facility]

## 2022-07-12 NOTE — CARE PLAN
Problem: Skin Integrity  Goal: Skin integrity is maintained or improved  Outcome: Progressing     Problem: Fall Risk  Goal: Patient will remain free from falls  Outcome: Progressing   The patient is Stable - Low risk of patient condition declining or worsening    Shift Goals  Clinical Goals: Dialysis, D/C  Patient Goals: rest, safety  Family Goals: rest, D/C    Progress made toward(s) clinical / shift goals: Progressing    Patient is not progressing towards the following goals:N/A

## 2022-07-12 NOTE — DISCHARGE INSTRUCTIONS
Follow up with primary care physician in 1 week.   Follow up with nephrology for ESRD as scheduled  Please take the medications as instructed    Go to the local Emergency Department if you have any worsening condition.

## 2022-07-12 NOTE — CARE PLAN
The patient is Stable - Low risk of patient condition declining or worsening    Shift Goals  Clinical Goals: safety  Patient Goals: rest  Family Goals: rest, D/C    Progress made toward(s) clinical / shift goals:    Problem: Skin Integrity  Goal: Skin integrity is maintained or improved  Outcome: Progressing     Problem: Fall Risk  Goal: Patient will remain free from falls  Outcome: Progressing     Problem: Risk for Aspiration  Goal: Patient's risk for aspiration will be absent or decrease  Outcome: Progressing     Problem: Self Care  Goal: Patient will have the ability to perform ADLs independently or with assistance (bathe, groom, dress, toilet and feed)  Outcome: Progressing       Patient is not progressing towards the following goals:      Problem: Mobility  Goal: Patient's capacity to carry out activities will improve  Outcome: Not Progressing
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
## 2022-07-16 ENCOUNTER — APPOINTMENT (OUTPATIENT)
Dept: RADIOLOGY | Facility: MEDICAL CENTER | Age: 67
End: 2022-07-16
Attending: STUDENT IN AN ORGANIZED HEALTH CARE EDUCATION/TRAINING PROGRAM
Payer: MEDICARE

## 2022-07-16 ENCOUNTER — HOSPITAL ENCOUNTER (EMERGENCY)
Facility: MEDICAL CENTER | Age: 67
End: 2022-07-17
Attending: STUDENT IN AN ORGANIZED HEALTH CARE EDUCATION/TRAINING PROGRAM
Payer: MEDICARE

## 2022-07-16 DIAGNOSIS — I82.611 SUPERFICIAL VENOUS THROMBOSIS OF RIGHT UPPER EXTREMITY: ICD-10-CM

## 2022-07-16 PROCEDURE — 93971 EXTREMITY STUDY: CPT | Mod: RT

## 2022-07-16 PROCEDURE — 99284 EMERGENCY DEPT VISIT MOD MDM: CPT

## 2022-07-16 ASSESSMENT — FIBROSIS 4 INDEX: FIB4 SCORE: 1.02

## 2022-07-17 VITALS
TEMPERATURE: 98 F | HEART RATE: 67 BPM | RESPIRATION RATE: 22 BRPM | BODY MASS INDEX: 25.83 KG/M2 | HEIGHT: 65 IN | SYSTOLIC BLOOD PRESSURE: 168 MMHG | WEIGHT: 155 LBS | DIASTOLIC BLOOD PRESSURE: 71 MMHG | OXYGEN SATURATION: 95 %

## 2022-07-17 PROCEDURE — 93971 EXTREMITY STUDY: CPT | Mod: 26,RT | Performed by: INTERNAL MEDICINE

## 2022-07-17 NOTE — DISCHARGE INSTRUCTIONS
If you develop any significant arm swelling severe pain, redness of the joint return for recheck warm packs over the vein may help.

## 2022-07-17 NOTE — DISCHARGE PLANNING
Medical Social Work     SW receive a text from the RN requesting SW assistance with [facility] transport back to [facility]. FABIAN faxed a PCS form to [facility] and set up transport with Cherri for [phone]. Transfer packet complete and given to the RN.

## 2022-07-17 NOTE — ED TRIAGE NOTES
"Chief Complaint   Patient presents with   • Arm Swelling     RUE fistula swelling post HD dialysis yesterday   • Arm Pain       Pt BIB EMS from [facility]. Pt states after her HD yesterday she has had RUE arm swelling with pain. Bandage still in place, upon assessment appears to still be lightly bleeding. + bruit/thrill.    BP (!) 161/73   Pulse 79   Temp 36.4 °C (97.6 °F) (Temporal)   Resp 20   Ht 1.651 m (5' 5\")   Wt 70.3 kg (155 lb)   SpO2 97%   BMI 25.79 kg/m²       "

## 2022-07-17 NOTE — ED NOTES
Pt is discharged. Soilasa at bedside for the transport of pt to [facility]. Chart and discharge paperwork sent. Report given to crew.

## 2022-07-17 NOTE — ED PROVIDER NOTES
CHIEF COMPLAINT  Chief Complaint   Patient presents with   • Arm Swelling     RUE fistula swelling post HD dialysis yesterday   • Arm Pain       HPI  Liberty Bolton is a 67 y.o. female who presents evaluation of right elbow pain.  Patient has a history of end-stage renal disease is on hemodialysis and has a AV fistula in her right upper extremity.  States that she underwent hemodialysis yesterday and this morning when she woke up she felt like her elbow was swollen, and had pain near where the fistula site was accessed.  Pain is described as a throbbing sensation mild to moderate worse with moving better with rest no relieving or exacerbating factors.  She did feel like her elbow was swollen though that this is since resolved throughout the day.  No fevers no redness of the joint no trauma no falls  REVIEW OF SYSTEMS  See HPI for further details. All other systems are negative.     PAST MEDICAL HISTORY   has a past medical history of Diabetes (HCC) and Renal disorder.    SOCIAL HISTORY  Social History     Tobacco Use   • Smoking status: Never Smoker   • Smokeless tobacco: Never Used   Vaping Use   • Vaping Use: Never used   Substance and Sexual Activity   • Alcohol use: Not Currently   • Drug use: Not Currently   • Sexual activity: Not on file       SURGICAL HISTORY   has a past surgical history that includes thrombectomy (Right, 5/23/2022); av fistula creation (Right, 5/23/2022); cath placement (Left, 5/23/2022); and angioplasty upper (Right, 5/23/2022).    CURRENT MEDICATIONS  Home Medications    **Home medications have not yet been reviewed for this encounter**         ALLERGIES  Allergies   Allergen Reactions   • Bactrim [Sulfamethoxazole-Trimethoprim] Swelling     angioedema   • Dilaudid [Hydromorphone] Unspecified     Per [facility]   • Enalapril Anaphylaxis and Swelling     angioedema   • Vicodin Hp [Hydrocodone-Acetaminophen] Unspecified     Per [facility]       FAMILY HISTORY  No pertinent  "family history    PHYSICAL EXAM   BP (!) 161/73   Pulse 79   Temp 36.4 °C (97.6 °F) (Temporal)   Resp 20   Ht 1.651 m (5' 5\")   Wt 70.3 kg (155 lb)   SpO2 97%   BMI 25.79 kg/m²  @TAMIKA[[id number]::@   Pulse ox interpretation: I interpret this pulse ox as normal.  VITALS - vital signs documented prior to this note have been reviewed and noted,  GENERAL - awake, alert, oriented, GCS 15, no apparent distress, non-toxic  appearing  HEENT - normocephalic, atraumatic, pupils equal, sclera anicteric, mucus  membranes moist  NECK - supple, no meningismus, full active range of motion, trachea midline  CARDIOVASCULAR - regular rate/rhythm,systolic ejection murmur  PULMONARY - no respiratory distress, speaking in full sentences, clear to  auscultation bilaterally, no wheezing/ronchi/rales, no accessory muscle use  GASTROINTESTINAL - soft, non-tender, non-distended, no rebound, guarding,  or peritonitis  GENITOURINARY - Deferred  NEUROLOGIC - Awake alert, normal mental status, speech fluid, cognition  normal, moves all extremities  MUSCULOSKELETAL - no obvious asymmetry or deformities present  EXTREMITIES - warm, well-perfused, no cyanosis or significant edema she has a palpable thrill in her right AV fistula there is no overlying erythema or tenderness of the fistula she has no pain with passive range of motion of the elbow there is no obvious joint effusion there is mild tenderness with palpation near her antecubital fossa otherwise on the right compartments are soft 2+ radial pulses   DERMATOLOGIC - warm, dry, no rashes, no jaundice  PSYCHIATRIC - normal affect, normal insight, normal concentration              LABS      Labs Reviewed - No data to display      Pertinent Labs & Imaging studies reviewed. (See chart for details)    RADIOLOGY  US-EXTREMITY VENOUS UPPER UNILAT RIGHT    (Results Pending)             ED COURSE/PROCEDURES          Medications - No data to display            MEDICAL DECISION MAKING    Patient " presented for evaluation of pain around her AV fistula.  Differential initially included was not limited to AV fistula stenosis thrombosis .  Among other considerations. No signs of infection.  Patient has no specific bony tenderness no joint swelling or erythema doubt underlying a septic arthritis or occult fracture.  Ultrasound was obtained did show patent AV fistula though there was a superficial  venous thrombosis of the cephalic vein.  This may be the cause of her pain.  She is instructed on symptomatic care for her superficial thrombosis and will be discharged all pertinent return precautions were discussed with the patient, and they expressed understanding.  Patient was discharged in a stable condition            FINAL IMPRESSION  1.  ESRD  2.  Superficial venous thrombosis  3.  AV fistula pain         Electronically signed by: Jose Craig D.O., 7/16/2022 10:43 PM      Dictation Disclaimer  Please note this report has been produced using speech recognition software and  may contain errors related to that system, including errors seen in grammar,  punctuation and spelling, as well as words and phrases that may be inappropriate.  If there are any questions or concerns, please feel free to contact the dictating  physician for clarification.

## 2022-11-26 ENCOUNTER — HOSPITAL ENCOUNTER (INPATIENT)
Facility: MEDICAL CENTER | Age: 67
LOS: 4 days | DRG: 070 | End: 2022-12-01
Attending: STUDENT IN AN ORGANIZED HEALTH CARE EDUCATION/TRAINING PROGRAM | Admitting: STUDENT IN AN ORGANIZED HEALTH CARE EDUCATION/TRAINING PROGRAM
Payer: MEDICARE

## 2022-11-26 DIAGNOSIS — N30.90 CYSTITIS: ICD-10-CM

## 2022-11-26 DIAGNOSIS — G93.40 ACUTE ENCEPHALOPATHY: ICD-10-CM

## 2022-11-26 DIAGNOSIS — R93.89 ABNORMAL CXR: ICD-10-CM

## 2022-11-26 DIAGNOSIS — N18.6 ESRD (END STAGE RENAL DISEASE) ON DIALYSIS (HCC): ICD-10-CM

## 2022-11-26 DIAGNOSIS — R79.89 ELEVATED TROPONIN: ICD-10-CM

## 2022-11-26 DIAGNOSIS — E87.5 HYPERKALEMIA: ICD-10-CM

## 2022-11-26 DIAGNOSIS — R41.82 ALTERED MENTAL STATUS, UNSPECIFIED ALTERED MENTAL STATUS TYPE: ICD-10-CM

## 2022-11-26 DIAGNOSIS — R53.1 GENERALIZED WEAKNESS: Chronic | ICD-10-CM

## 2022-11-26 DIAGNOSIS — Z99.2 ESRD (END STAGE RENAL DISEASE) ON DIALYSIS (HCC): ICD-10-CM

## 2022-11-26 PROCEDURE — 99285 EMERGENCY DEPT VISIT HI MDM: CPT

## 2022-11-26 ASSESSMENT — FIBROSIS 4 INDEX: FIB4 SCORE: 1.02

## 2022-11-27 ENCOUNTER — APPOINTMENT (OUTPATIENT)
Dept: RADIOLOGY | Facility: MEDICAL CENTER | Age: 67
DRG: 070 | End: 2022-11-27
Attending: STUDENT IN AN ORGANIZED HEALTH CARE EDUCATION/TRAINING PROGRAM
Payer: MEDICARE

## 2022-11-27 PROBLEM — R41.82 ALTERED MENTAL STATUS: Status: ACTIVE | Noted: 2022-11-27

## 2022-11-27 PROBLEM — D63.1 ANEMIA DUE TO CHRONIC KIDNEY DISEASE, ON CHRONIC DIALYSIS (HCC): Status: ACTIVE | Noted: 2022-05-05

## 2022-11-27 PROBLEM — R79.89 ELEVATED TROPONIN: Status: ACTIVE | Noted: 2022-11-27

## 2022-11-27 PROBLEM — R93.89 ABNORMAL CXR: Status: ACTIVE | Noted: 2022-11-27

## 2022-11-27 PROBLEM — Z99.2 HYPERCALCEMIA ASSOCIATED WITH CHRONIC DIALYSIS (HCC): Status: ACTIVE | Noted: 2022-11-27

## 2022-11-27 PROBLEM — G62.9 PERIPHERAL NEUROPATHY: Status: ACTIVE | Noted: 2022-11-27

## 2022-11-27 PROBLEM — E87.29 HIGH ANION GAP METABOLIC ACIDOSIS: Status: ACTIVE | Noted: 2022-11-27

## 2022-11-27 PROBLEM — N39.0 URINARY TRACT INFECTION: Status: ACTIVE | Noted: 2022-11-27

## 2022-11-27 PROBLEM — E83.52 HYPERCALCEMIA ASSOCIATED WITH CHRONIC DIALYSIS (HCC): Status: ACTIVE | Noted: 2022-11-27

## 2022-11-27 PROBLEM — R41.82 ALTERED MENTAL STATUS: Status: RESOLVED | Noted: 2022-11-27 | Resolved: 2022-11-27

## 2022-11-27 LAB
ALBUMIN SERPL BCP-MCNC: 3.7 G/DL (ref 3.2–4.9)
ALBUMIN/GLOB SERPL: 0.8 G/DL
ALP SERPL-CCNC: 60 U/L (ref 30–99)
ALT SERPL-CCNC: 9 U/L (ref 2–50)
AMMONIA PLAS-SCNC: 11 UMOL/L (ref 11–45)
AMPHET UR QL SCN: NEGATIVE
ANION GAP SERPL CALC-SCNC: 17 MMOL/L (ref 7–16)
ANION GAP SERPL CALC-SCNC: 19 MMOL/L (ref 7–16)
ANION GAP SERPL CALC-SCNC: 20 MMOL/L (ref 7–16)
APPEARANCE UR: ABNORMAL
AST SERPL-CCNC: 13 U/L (ref 12–45)
BACTERIA #/AREA URNS HPF: ABNORMAL /HPF
BARBITURATES UR QL SCN: NEGATIVE
BASOPHILS # BLD AUTO: 0.9 % (ref 0–1.8)
BASOPHILS # BLD: 0.05 K/UL (ref 0–0.12)
BENZODIAZ UR QL SCN: NEGATIVE
BILIRUB SERPL-MCNC: 0.4 MG/DL (ref 0.1–1.5)
BILIRUB UR QL STRIP.AUTO: NEGATIVE
BUN SERPL-MCNC: 126 MG/DL (ref 8–22)
BUN SERPL-MCNC: 128 MG/DL (ref 8–22)
BUN SERPL-MCNC: 43 MG/DL (ref 8–22)
BZE UR QL SCN: NEGATIVE
CALCIUM SERPL-MCNC: 10.6 MG/DL (ref 8.5–10.5)
CALCIUM SERPL-MCNC: 11 MG/DL (ref 8.5–10.5)
CALCIUM SERPL-MCNC: 9.5 MG/DL (ref 8.5–10.5)
CANNABINOIDS UR QL SCN: NEGATIVE
CHLORIDE SERPL-SCNC: 93 MMOL/L (ref 96–112)
CHLORIDE SERPL-SCNC: 94 MMOL/L (ref 96–112)
CHLORIDE SERPL-SCNC: 94 MMOL/L (ref 96–112)
CO2 SERPL-SCNC: 24 MMOL/L (ref 20–33)
CO2 SERPL-SCNC: 24 MMOL/L (ref 20–33)
CO2 SERPL-SCNC: 25 MMOL/L (ref 20–33)
COLOR UR: YELLOW
CREAT SERPL-MCNC: 10.09 MG/DL (ref 0.5–1.4)
CREAT SERPL-MCNC: 4.41 MG/DL (ref 0.5–1.4)
CREAT SERPL-MCNC: 9.81 MG/DL (ref 0.5–1.4)
EKG IMPRESSION: NORMAL
EOSINOPHIL # BLD AUTO: 0.04 K/UL (ref 0–0.51)
EOSINOPHIL NFR BLD: 0.7 % (ref 0–6.9)
EPI CELLS #/AREA URNS HPF: ABNORMAL /HPF
ERYTHROCYTE [DISTWIDTH] IN BLOOD BY AUTOMATED COUNT: 51.7 FL (ref 35.9–50)
ETHANOL BLD-MCNC: <10.1 MG/DL
GFR SERPLBLD CREATININE-BSD FMLA CKD-EPI: 10 ML/MIN/1.73 M 2
GFR SERPLBLD CREATININE-BSD FMLA CKD-EPI: 4 ML/MIN/1.73 M 2
GFR SERPLBLD CREATININE-BSD FMLA CKD-EPI: 4 ML/MIN/1.73 M 2
GLOBULIN SER CALC-MCNC: 4.6 G/DL (ref 1.9–3.5)
GLUCOSE BLD STRIP.AUTO-MCNC: 117 MG/DL (ref 65–99)
GLUCOSE BLD STRIP.AUTO-MCNC: 185 MG/DL (ref 65–99)
GLUCOSE SERPL-MCNC: 120 MG/DL (ref 65–99)
GLUCOSE SERPL-MCNC: 124 MG/DL (ref 65–99)
GLUCOSE SERPL-MCNC: 152 MG/DL (ref 65–99)
GLUCOSE UR STRIP.AUTO-MCNC: NEGATIVE MG/DL
HCT VFR BLD AUTO: 29.7 % (ref 37–47)
HGB BLD-MCNC: 9 G/DL (ref 12–16)
HYALINE CASTS #/AREA URNS LPF: ABNORMAL /LPF
IMM GRANULOCYTES # BLD AUTO: 0.02 K/UL (ref 0–0.11)
IMM GRANULOCYTES NFR BLD AUTO: 0.4 % (ref 0–0.9)
KETONES UR STRIP.AUTO-MCNC: ABNORMAL MG/DL
LACTATE SERPL-SCNC: 1 MMOL/L (ref 0.5–2)
LEUKOCYTE ESTERASE UR QL STRIP.AUTO: ABNORMAL
LYMPHOCYTES # BLD AUTO: 0.73 K/UL (ref 1–4.8)
LYMPHOCYTES NFR BLD: 13.2 % (ref 22–41)
MAGNESIUM SERPL-MCNC: 2.6 MG/DL (ref 1.5–2.5)
MCH RBC QN AUTO: 26.9 PG (ref 27–33)
MCHC RBC AUTO-ENTMCNC: 30.3 G/DL (ref 33.6–35)
MCV RBC AUTO: 88.9 FL (ref 81.4–97.8)
METHADONE UR QL SCN: NEGATIVE
MICRO URNS: ABNORMAL
MONOCYTES # BLD AUTO: 0.21 K/UL (ref 0–0.85)
MONOCYTES NFR BLD AUTO: 3.8 % (ref 0–13.4)
NEUTROPHILS # BLD AUTO: 4.46 K/UL (ref 2–7.15)
NEUTROPHILS NFR BLD: 81 % (ref 44–72)
NITRITE UR QL STRIP.AUTO: NEGATIVE
NRBC # BLD AUTO: 0 K/UL
NRBC BLD-RTO: 0 /100 WBC
NT-PROBNP SERPL IA-MCNC: 9280 PG/ML (ref 0–125)
OPIATES UR QL SCN: NEGATIVE
OXYCODONE UR QL SCN: NEGATIVE
PCP UR QL SCN: NEGATIVE
PH UR STRIP.AUTO: 7.5 [PH] (ref 5–8)
PHOSPHATE SERPL-MCNC: 6.4 MG/DL (ref 2.5–4.5)
PLATELET # BLD AUTO: 217 K/UL (ref 164–446)
PMV BLD AUTO: 10.3 FL (ref 9–12.9)
POTASSIUM SERPL-SCNC: 4.2 MMOL/L (ref 3.6–5.5)
POTASSIUM SERPL-SCNC: 5.7 MMOL/L (ref 3.6–5.5)
POTASSIUM SERPL-SCNC: 6 MMOL/L (ref 3.6–5.5)
PROCALCITONIN SERPL-MCNC: 0.99 NG/ML
PROPOXYPH UR QL SCN: NEGATIVE
PROT SERPL-MCNC: 8.3 G/DL (ref 6–8.2)
PROT UR QL STRIP: 300 MG/DL
RBC # BLD AUTO: 3.34 M/UL (ref 4.2–5.4)
RBC # URNS HPF: ABNORMAL /HPF
RBC UR QL AUTO: ABNORMAL
SODIUM SERPL-SCNC: 135 MMOL/L (ref 135–145)
SODIUM SERPL-SCNC: 137 MMOL/L (ref 135–145)
SODIUM SERPL-SCNC: 138 MMOL/L (ref 135–145)
SP GR UR STRIP.AUTO: 1.01
TROPONIN T SERPL-MCNC: 221 NG/L (ref 6–19)
TROPONIN T SERPL-MCNC: 303 NG/L (ref 6–19)
TSH SERPL DL<=0.005 MIU/L-ACNC: 0.94 UIU/ML (ref 0.38–5.33)
UROBILINOGEN UR STRIP.AUTO-MCNC: 0.2 MG/DL
VIT B12 SERPL-MCNC: 1293 PG/ML (ref 211–911)
WBC # BLD AUTO: 5.5 K/UL (ref 4.8–10.8)
WBC #/AREA URNS HPF: ABNORMAL /HPF

## 2022-11-27 PROCEDURE — 96365 THER/PROPH/DIAG IV INF INIT: CPT

## 2022-11-27 PROCEDURE — 5A1D70Z PERFORMANCE OF URINARY FILTRATION, INTERMITTENT, LESS THAN 6 HOURS PER DAY: ICD-10-PCS | Performed by: INTERNAL MEDICINE

## 2022-11-27 PROCEDURE — 97163 PT EVAL HIGH COMPLEX 45 MIN: CPT

## 2022-11-27 PROCEDURE — 92610 EVALUATE SWALLOWING FUNCTION: CPT

## 2022-11-27 PROCEDURE — 700102 HCHG RX REV CODE 250 W/ 637 OVERRIDE(OP): Performed by: STUDENT IN AN ORGANIZED HEALTH CARE EDUCATION/TRAINING PROGRAM

## 2022-11-27 PROCEDURE — A9270 NON-COVERED ITEM OR SERVICE: HCPCS | Performed by: STUDENT IN AN ORGANIZED HEALTH CARE EDUCATION/TRAINING PROGRAM

## 2022-11-27 PROCEDURE — 84145 PROCALCITONIN (PCT): CPT

## 2022-11-27 PROCEDURE — 99221 1ST HOSP IP/OBS SF/LOW 40: CPT | Performed by: INTERNAL MEDICINE

## 2022-11-27 PROCEDURE — 700101 HCHG RX REV CODE 250: Performed by: STUDENT IN AN ORGANIZED HEALTH CARE EDUCATION/TRAINING PROGRAM

## 2022-11-27 PROCEDURE — 80048 BASIC METABOLIC PNL TOTAL CA: CPT

## 2022-11-27 PROCEDURE — 83735 ASSAY OF MAGNESIUM: CPT

## 2022-11-27 PROCEDURE — 36415 COLL VENOUS BLD VENIPUNCTURE: CPT

## 2022-11-27 PROCEDURE — 82962 GLUCOSE BLOOD TEST: CPT

## 2022-11-27 PROCEDURE — 93005 ELECTROCARDIOGRAM TRACING: CPT

## 2022-11-27 PROCEDURE — 84443 ASSAY THYROID STIM HORMONE: CPT

## 2022-11-27 PROCEDURE — 80307 DRUG TEST PRSMV CHEM ANLYZR: CPT

## 2022-11-27 PROCEDURE — 84100 ASSAY OF PHOSPHORUS: CPT

## 2022-11-27 PROCEDURE — 96375 TX/PRO/DX INJ NEW DRUG ADDON: CPT

## 2022-11-27 PROCEDURE — 85025 COMPLETE CBC W/AUTO DIFF WBC: CPT

## 2022-11-27 PROCEDURE — 81001 URINALYSIS AUTO W/SCOPE: CPT

## 2022-11-27 PROCEDURE — 90935 HEMODIALYSIS ONE EVALUATION: CPT

## 2022-11-27 PROCEDURE — 82077 ASSAY SPEC XCP UR&BREATH IA: CPT

## 2022-11-27 PROCEDURE — 99223 1ST HOSP IP/OBS HIGH 75: CPT | Mod: AI,GC | Performed by: STUDENT IN AN ORGANIZED HEALTH CARE EDUCATION/TRAINING PROGRAM

## 2022-11-27 PROCEDURE — 700111 HCHG RX REV CODE 636 W/ 250 OVERRIDE (IP): Performed by: STUDENT IN AN ORGANIZED HEALTH CARE EDUCATION/TRAINING PROGRAM

## 2022-11-27 PROCEDURE — 84484 ASSAY OF TROPONIN QUANT: CPT

## 2022-11-27 PROCEDURE — 82607 VITAMIN B-12: CPT

## 2022-11-27 PROCEDURE — 83880 ASSAY OF NATRIURETIC PEPTIDE: CPT

## 2022-11-27 PROCEDURE — 770020 HCHG ROOM/CARE - TELE (206)

## 2022-11-27 PROCEDURE — 80053 COMPREHEN METABOLIC PANEL: CPT

## 2022-11-27 PROCEDURE — 71045 X-RAY EXAM CHEST 1 VIEW: CPT

## 2022-11-27 PROCEDURE — 700111 HCHG RX REV CODE 636 W/ 250 OVERRIDE (IP)

## 2022-11-27 PROCEDURE — 700105 HCHG RX REV CODE 258: Performed by: STUDENT IN AN ORGANIZED HEALTH CARE EDUCATION/TRAINING PROGRAM

## 2022-11-27 PROCEDURE — 82140 ASSAY OF AMMONIA: CPT

## 2022-11-27 PROCEDURE — 83605 ASSAY OF LACTIC ACID: CPT

## 2022-11-27 PROCEDURE — 87040 BLOOD CULTURE FOR BACTERIA: CPT

## 2022-11-27 PROCEDURE — 87077 CULTURE AEROBIC IDENTIFY: CPT

## 2022-11-27 RX ORDER — GABAPENTIN 300 MG/1
300 CAPSULE ORAL EVERY EVENING
Status: DISCONTINUED | OUTPATIENT
Start: 2022-11-27 | End: 2022-12-01 | Stop reason: HOSPADM

## 2022-11-27 RX ORDER — CALCIUM GLUCONATE 20 MG/ML
1 INJECTION, SOLUTION INTRAVENOUS ONCE
Status: DISCONTINUED | OUTPATIENT
Start: 2022-11-27 | End: 2022-11-27

## 2022-11-27 RX ORDER — VITAMIN B COMPLEX
1000 TABLET ORAL
COMMUNITY

## 2022-11-27 RX ORDER — AMOXICILLIN 250 MG
2 CAPSULE ORAL 2 TIMES DAILY
Status: DISCONTINUED | OUTPATIENT
Start: 2022-11-27 | End: 2022-12-01 | Stop reason: HOSPADM

## 2022-11-27 RX ORDER — VITAMIN B COMPLEX
1000 TABLET ORAL
Status: DISCONTINUED | OUTPATIENT
Start: 2022-11-27 | End: 2022-12-01 | Stop reason: HOSPADM

## 2022-11-27 RX ORDER — BISACODYL 10 MG
10 SUPPOSITORY, RECTAL RECTAL
Status: DISCONTINUED | OUTPATIENT
Start: 2022-11-27 | End: 2022-12-01 | Stop reason: HOSPADM

## 2022-11-27 RX ORDER — HYDRALAZINE HYDROCHLORIDE 20 MG/ML
20 INJECTION INTRAMUSCULAR; INTRAVENOUS EVERY 6 HOURS PRN
Status: DISCONTINUED | OUTPATIENT
Start: 2022-11-27 | End: 2022-12-01 | Stop reason: HOSPADM

## 2022-11-27 RX ORDER — ESCITALOPRAM OXALATE 5 MG/1
5 TABLET ORAL DAILY
COMMUNITY

## 2022-11-27 RX ORDER — HEPARIN SODIUM 1000 [USP'U]/ML
INJECTION, SOLUTION INTRAVENOUS; SUBCUTANEOUS
Status: COMPLETED
Start: 2022-11-27 | End: 2022-11-27

## 2022-11-27 RX ORDER — LABETALOL HYDROCHLORIDE 5 MG/ML
20 INJECTION, SOLUTION INTRAVENOUS EVERY 4 HOURS PRN
Status: DISCONTINUED | OUTPATIENT
Start: 2022-11-27 | End: 2022-12-01 | Stop reason: HOSPADM

## 2022-11-27 RX ORDER — HYDRALAZINE HYDROCHLORIDE 50 MG/1
50 TABLET, FILM COATED ORAL 3 TIMES DAILY
Status: DISCONTINUED | OUTPATIENT
Start: 2022-11-27 | End: 2022-12-01 | Stop reason: HOSPADM

## 2022-11-27 RX ORDER — AMLODIPINE BESYLATE 10 MG/1
10 TABLET ORAL
COMMUNITY

## 2022-11-27 RX ORDER — SEVELAMER CARBONATE 800 MG/1
1600 TABLET, FILM COATED ORAL
COMMUNITY

## 2022-11-27 RX ORDER — MIRTAZAPINE 15 MG/1
15 TABLET, FILM COATED ORAL NIGHTLY
Status: DISCONTINUED | OUTPATIENT
Start: 2022-11-27 | End: 2022-12-01 | Stop reason: HOSPADM

## 2022-11-27 RX ORDER — HEPARIN SODIUM 1000 [USP'U]/ML
1800 INJECTION, SOLUTION INTRAVENOUS; SUBCUTANEOUS
Status: DISCONTINUED | OUTPATIENT
Start: 2022-11-27 | End: 2022-12-01 | Stop reason: HOSPADM

## 2022-11-27 RX ORDER — LABETALOL HYDROCHLORIDE 5 MG/ML
10 INJECTION, SOLUTION INTRAVENOUS ONCE
Status: COMPLETED | OUTPATIENT
Start: 2022-11-27 | End: 2022-11-27

## 2022-11-27 RX ORDER — AMLODIPINE BESYLATE 10 MG/1
10 TABLET ORAL
Status: DISCONTINUED | OUTPATIENT
Start: 2022-11-27 | End: 2022-12-01 | Stop reason: HOSPADM

## 2022-11-27 RX ORDER — SEVELAMER CARBONATE 800 MG/1
1600 TABLET, FILM COATED ORAL
Status: DISCONTINUED | OUTPATIENT
Start: 2022-11-27 | End: 2022-11-30

## 2022-11-27 RX ORDER — HEPARIN SODIUM 5000 [USP'U]/ML
5000 INJECTION, SOLUTION INTRAVENOUS; SUBCUTANEOUS EVERY 8 HOURS
Status: DISCONTINUED | OUTPATIENT
Start: 2022-11-27 | End: 2022-12-01 | Stop reason: HOSPADM

## 2022-11-27 RX ORDER — ASPIRIN 325 MG
325 TABLET ORAL ONCE
Status: DISPENSED | OUTPATIENT
Start: 2022-11-27 | End: 2022-11-28

## 2022-11-27 RX ORDER — POLYETHYLENE GLYCOL 3350 17 G/17G
1 POWDER, FOR SOLUTION ORAL
Status: DISCONTINUED | OUTPATIENT
Start: 2022-11-27 | End: 2022-12-01 | Stop reason: HOSPADM

## 2022-11-27 RX ORDER — ATORVASTATIN CALCIUM 40 MG/1
40 TABLET, FILM COATED ORAL EVERY EVENING
Status: DISCONTINUED | OUTPATIENT
Start: 2022-11-27 | End: 2022-12-01 | Stop reason: HOSPADM

## 2022-11-27 RX ORDER — ESCITALOPRAM OXALATE 10 MG/1
5 TABLET ORAL DAILY
Status: DISCONTINUED | OUTPATIENT
Start: 2022-11-27 | End: 2022-12-01 | Stop reason: HOSPADM

## 2022-11-27 RX ORDER — HYDRALAZINE HYDROCHLORIDE 50 MG/1
50 TABLET, FILM COATED ORAL 3 TIMES DAILY
COMMUNITY

## 2022-11-27 RX ORDER — MIRTAZAPINE 15 MG/1
15 TABLET, FILM COATED ORAL NIGHTLY
COMMUNITY

## 2022-11-27 RX ADMIN — ATORVASTATIN CALCIUM 40 MG: 40 TABLET, FILM COATED ORAL at 17:18

## 2022-11-27 RX ADMIN — HEPARIN SODIUM 1800 UNITS: 1000 INJECTION, SOLUTION INTRAVENOUS; SUBCUTANEOUS at 11:00

## 2022-11-27 RX ADMIN — LABETALOL HYDROCHLORIDE 10 MG: 5 INJECTION, SOLUTION INTRAVENOUS at 04:59

## 2022-11-27 RX ADMIN — DEXTROSE MONOHYDRATE 25 G: 100 INJECTION, SOLUTION INTRAVENOUS at 01:46

## 2022-11-27 RX ADMIN — HEPARIN SODIUM 5000 UNITS: 5000 INJECTION, SOLUTION INTRAVENOUS; SUBCUTANEOUS at 22:41

## 2022-11-27 RX ADMIN — METOPROLOL TARTRATE 25 MG: 25 TABLET, FILM COATED ORAL at 17:18

## 2022-11-27 RX ADMIN — Medication 1000 UNITS: at 22:42

## 2022-11-27 RX ADMIN — INSULIN HUMAN 3 UNITS: 100 INJECTION, SOLUTION PARENTERAL at 01:44

## 2022-11-27 RX ADMIN — AMLODIPINE BESYLATE 10 MG: 10 TABLET ORAL at 22:43

## 2022-11-27 RX ADMIN — HYDRALAZINE HYDROCHLORIDE 50 MG: 50 TABLET, FILM COATED ORAL at 17:19

## 2022-11-27 RX ADMIN — MIRTAZAPINE 15 MG: 15 TABLET, FILM COATED ORAL at 22:42

## 2022-11-27 RX ADMIN — GABAPENTIN 300 MG: 300 CAPSULE ORAL at 17:19

## 2022-11-27 RX ADMIN — SODIUM BICARBONATE 50 MEQ: 84 INJECTION, SOLUTION INTRAVENOUS at 01:46

## 2022-11-27 RX ADMIN — CEFTRIAXONE SODIUM 1000 MG: 10 INJECTION, POWDER, FOR SOLUTION INTRAVENOUS at 02:25

## 2022-11-27 ASSESSMENT — FIBROSIS 4 INDEX: FIB4 SCORE: 1.34

## 2022-11-27 ASSESSMENT — COGNITIVE AND FUNCTIONAL STATUS - GENERAL
WALKING IN HOSPITAL ROOM: TOTAL
STANDING UP FROM CHAIR USING ARMS: TOTAL
TURNING FROM BACK TO SIDE WHILE IN FLAT BAD: UNABLE
CLIMB 3 TO 5 STEPS WITH RAILING: TOTAL
SUGGESTED CMS G CODE MODIFIER MOBILITY: CN
MOVING FROM LYING ON BACK TO SITTING ON SIDE OF FLAT BED: UNABLE
MOBILITY SCORE: 6
MOVING TO AND FROM BED TO CHAIR: UNABLE

## 2022-11-27 ASSESSMENT — PAIN DESCRIPTION - PAIN TYPE
TYPE: ACUTE PAIN
TYPE: ACUTE PAIN

## 2022-11-27 ASSESSMENT — GAIT ASSESSMENTS: GAIT LEVEL OF ASSIST: UNABLE TO PARTICIPATE

## 2022-11-27 NOTE — ASSESSMENT & PLAN NOTE
-Will obtain procalcitonin to guide decision on possible CAP  -NPO until bedside swallow evaluation

## 2022-11-27 NOTE — CONSULTS
Cardiology Initial Consultation    Date of Service  11/27/2022    Referring Physician  Paulo Wilson M.D.    Reason for Consultation  Elevated troponin level.     History of Presenting Illness  Liberty Bolton is a 67 y.o. female with a past medical history of diabetes mellitus, end stage renal disease on dialysis therapy who presented 11/26/2022 with altered mental status. Elevated troponin level were incidentally noted.     Review of Systems  Review of Systems   Unable to perform ROS: Mental status change     Past Medical History   has a past medical history of Diabetes (HCC) and Renal disorder.    Surgical History   has a past surgical history that includes thrombectomy (Right, 5/23/2022); av fistula creation (Right, 5/23/2022); cath placement (Left, 5/23/2022); and angioplasty upper (Right, 5/23/2022).    Family History  family history is not on file.    Social History   reports that she has never smoked. She has never used smokeless tobacco. She reports that she does not currently use alcohol. She reports that she does not currently use drugs.    Medications  Prior to Admission Medications   Prescriptions Last Dose Informant Patient Reported? Taking?   acetaminophen (TYLENOL) 325 MG Tab 11/17/2022 at 0236 MAR from Other Facility Yes No   Sig: Take 650 mg by mouth every four hours as needed for Mild Pain.   amLODIPine (NORVASC) 10 MG Tab 11/26/2022 at 1907 MAR from Other Facility Yes Yes   Sig: Take 10 mg by mouth at bedtime.   atorvastatin (LIPITOR) 40 MG Tab 11/26/2022 at 1907 MAR from Other Facility No No   Sig: Take 1 Tablet by mouth every evening.   escitalopram (LEXAPRO) 5 MG tablet 11/26/2022 at 0718 MAR from Other Facility Yes Yes   Sig: Take 5 mg by mouth every day.   gabapentin (NEURONTIN) 300 MG Cap 11/26/2022 at 1907 MAR from Other Facility Yes No   Sig: Take 300 mg by mouth every evening.   hydrALAZINE (APRESOLINE) 50 MG Tab 11/26/2022 at 1907 MAR from Other Facility Yes Yes   Sig: Take 50 mg  by mouth 3 times a day.   metoprolol tartrate (LOPRESSOR) 25 MG Tab 11/26/2022 at 1907 MAR from Other Facility Yes Yes   Sig: Take 25 mg by mouth 2 times a day.   mirtazapine (REMERON) 15 MG Tab 11/26/2022 at 1907 MAR from Other Facility Yes Yes   Sig: Take 15 mg by mouth every evening.   polyethylene glycol/lytes (MIRALAX) 17 g Pack PRN at PRN MAR from Other Facility Yes No   Sig: Take 17 g by mouth 1 time a day as needed. Indications: Constipation   sevelamer carbonate (RENVELA) 800 MG Tab tablet 11/26/2022 at 1526 MAR from Other Facility Yes Yes   Sig: Take 1,600 mg by mouth 3 times a day before meals.   vitamin D3 (CHOLECALCIFEROL) 1000 Unit (25 mcg) Tab 11/26/2022 at 1907 MAR from Other Facility Yes Yes   Sig: Take 1,000 Units by mouth at bedtime.      Facility-Administered Medications: None       Allergies  Allergies   Allergen Reactions    Bactrim [Sulfamethoxazole-Trimethoprim] Swelling     angioedema    Dilaudid [Hydromorphone] Unspecified     Per [facility]    Enalapril Anaphylaxis and Swelling     angioedema    Vicodin Hp [Hydrocodone-Acetaminophen] Unspecified     Per [facility]       Vital signs in last 24 hours  Temp:  [36.8 °C (98.2 °F)-37.5 °C (99.5 °F)] 37.5 °C (99.5 °F)  Pulse:  [93-99] 93  Resp:  [18] 18  BP: ()/() 142/104  SpO2:  [90 %-95 %] 94 %    Physical Exam  Physical Exam  Constitutional:       Interventions: She is sedated.   HENT:      Head: Normocephalic and atraumatic.   Eyes:      Comments: Closed.   Cardiovascular:      Rate and Rhythm: Normal rate and regular rhythm.   Pulmonary:      Breath sounds: Normal breath sounds.   Abdominal:      General: Bowel sounds are normal.      Palpations: Abdomen is soft.   Skin:     General: Skin is warm and dry.       Lab Review  Lab Results   Component Value Date/Time    WBC 5.5 11/27/2022 12:06 AM    RBC 3.34 (L) 11/27/2022 12:06 AM    HEMOGLOBIN 9.0 (L) 11/27/2022 12:06 AM    HEMATOCRIT 29.7 (L) 11/27/2022 12:06 AM     MCV 88.9 11/27/2022 12:06 AM    MCH 26.9 (L) 11/27/2022 12:06 AM    MCHC 30.3 (L) 11/27/2022 12:06 AM    MPV 10.3 11/27/2022 12:06 AM      Lab Results   Component Value Date/Time    SODIUM 137 11/27/2022 09:33 AM    POTASSIUM 5.7 (H) 11/27/2022 09:33 AM    CHLORIDE 94 (L) 11/27/2022 09:33 AM    CO2 24 11/27/2022 09:33 AM    GLUCOSE 120 (H) 11/27/2022 09:33 AM     (H) 11/27/2022 09:33 AM    CREATININE 9.81 (HH) 11/27/2022 09:33 AM      Lab Results   Component Value Date/Time    ASTSGOT 13 11/27/2022 12:06 AM    ALTSGPT 9 11/27/2022 12:06 AM     Lab Results   Component Value Date/Time    TROPONINT 303 (H) 11/27/2022 02:30 AM       Recent Labs     11/27/22  0230   NTPROBNP 9280*       Cardiac Imaging and Procedures Review  CARDIAC STUDIES/PROCEDURES:    ECHOCARDIOGRAM CONCLUSIONS (05/05/22)  Normal left ventricular systolic function.  The left ventricular ejection fraction is visually estimated to be 55-60%.  Mild concentric left ventricular hypertrophy.  Unable to assess diastolic function secondary to mitral valve disease.  Mildly dilated right ventricle.  Normal right ventricular systolic function.  Mild calcific mitral stenosis with a mean gradient of 5mmHg.  Moderate mitral regurgitation.  Moderate tricuspid regurgitation.  Normal estimated right atrial pressure.   Moderate pulmonary hypertension, estimated PASP of 52mmHg.   No prior study is available for comparison.   (study result reviewed)    EKG performed on (11/27/22) was reviewed: EKG personally interpreted shows sinus rhythm with left ventricular hypertrophy.    Assessment/Plan  Elevated troponin level: She  is a 67 y.o. female with a past medical history of diabetes mellitus, end stage renal disease on dialysis therapy who presented with altered mental status. Elevated troponin level were incidentally noted. We will continue with medical therapy only. Please do not order further troponin, EKG or any other cardiac studies unless the patient  clinically changes.     Thank you for allowing me to participate in the care of this patient.    Cardiology will sign off on this patient    Please contact me with any questions.    Hardeep Lara M.D.   Cardiologist, [facility]  [phone]

## 2022-11-27 NOTE — ASSESSMENT & PLAN NOTE
*K 6.0  *EKG with NSR, peaked T waves  *2/2 ESRD  *S/p dextrose 10% bolus 25G, 3 units insulin regular, started bicarbonate 50 mEq    -BMP every 4 hours  -Due to corrected calcium of 11.2 with anuris, will hold off on calcium gluconate  -Dextrose plus insulin regular for elevated potassium

## 2022-11-27 NOTE — CONSULTS
[facility] -  CONSULTATION NOTE               Author: Nghia Rivas M.D. Date & Time: 11/27/2022  8:42 AM       REASON FOR CONSULTATION:   Inpatient hemodialysis management.    CHIEF COMPLAINT:   Unable to obtain    HISTORY OF PRESENT ILLNESS:    67 y.o. female with ESRD on HD TTS at [facility] who resides at [facility] presented wth encephalopathy. Recent  admission for similar presentation after refusing HD for nearly 2 weeks. She is currently unable to participate in interview but reports jane tshe has again refused dialysis for ~10 days. In the ED found to have a Bun 124 and cr 10. Seen on HD -tolerating procedure. Infectious work-up underway, on abx.     REVIEW OF SYSTEMS:    Unable to obtain    PAST MEDICAL HISTORY:   Past Medical History:   Diagnosis Date    Diabetes (HCC)     Renal disorder        PAST SURGICAL HISTORY:   Past Surgical History:   Procedure Laterality Date    THROMBECTOMY Right 5/23/2022    Procedure: THROMBECTOMY WITH ARTERIOVENOUS GRAFT;  Surgeon: Royal Mercer M.D.;  Location: SURGERY [facility];  Service: Vascular    AV FISTULA CREATION Right 5/23/2022    Procedure: CREATION, AV FISTULA;  Surgeon: Royal Mercer M.D.;  Location: SURGERY [facility];  Service: Vascular    CATH PLACEMENT Left 5/23/2022    Procedure: INSERTION, CATHETER, WITH FLUORO;  Surgeon: Royal Mercer M.D.;  Location: [facility];  Service: Vascular    ANGIOPLASTY UPPER Right 5/23/2022    Procedure: ANGIOPLASTY, UPPER EXTREMITY; subclavian;  Surgeon: Royal Mercer M.D.;  Location: [facility];  Service: Vascular       FAMILY HISTORY:   Unable to obtain    SOCIAL HISTORY:   Unable to obtain    HOME MEDICATIONS:   Reviewed and documented in chart    ALLERGIES:  Bactrim [sulfamethoxazole-trimethoprim], Dilaudid [hydromorphone], Enalapril, and Vicodin hp [hydrocodone-acetaminophen]    PHYSICAL EXAM:  VS:  BP (!) 142/104   Pulse 93   Temp 37.5 °C (99.5  "°F) (Temporal)   Resp 18   Ht 1.702 m (5' 7\")   Wt 57 kg (125 lb 10.6 oz)   SpO2 94%   BMI 19.68 kg/m²   GENERAL: frail  CV: RRR, pitting edema at hips  RESP: Clear anterior  GI: Soft  MSK: No joint deformities   SKIN: dry skin  NEURO: No tremor  PSYCH: unable to assess    LABS:  Recent Results (from the past 24 hour(s))   CBC WITH DIFFERENTIAL    Collection Time: 11/27/22 12:06 AM   Result Value Ref Range    WBC 5.5 4.8 - 10.8 K/uL    RBC 3.34 (L) 4.20 - 5.40 M/uL    Hemoglobin 9.0 (L) 12.0 - 16.0 g/dL    Hematocrit 29.7 (L) 37.0 - 47.0 %    MCV 88.9 81.4 - 97.8 fL    MCH 26.9 (L) 27.0 - 33.0 pg    MCHC 30.3 (L) 33.6 - 35.0 g/dL    RDW 51.7 (H) 35.9 - 50.0 fL    Platelet Count 217 164 - 446 K/uL    MPV 10.3 9.0 - 12.9 fL    Neutrophils-Polys 81.00 (H) 44.00 - 72.00 %    Lymphocytes 13.20 (L) 22.00 - 41.00 %    Monocytes 3.80 0.00 - 13.40 %    Eosinophils 0.70 0.00 - 6.90 %    Basophils 0.90 0.00 - 1.80 %    Immature Granulocytes 0.40 0.00 - 0.90 %    Nucleated RBC 0.00 /100 WBC    Neutrophils (Absolute) 4.46 2.00 - 7.15 K/uL    Lymphs (Absolute) 0.73 (L) 1.00 - 4.80 K/uL    Monos (Absolute) 0.21 0.00 - 0.85 K/uL    Eos (Absolute) 0.04 0.00 - 0.51 K/uL    Baso (Absolute) 0.05 0.00 - 0.12 K/uL    Immature Granulocytes (abs) 0.02 0.00 - 0.11 K/uL    NRBC (Absolute) 0.00 K/uL   CMP    Collection Time: 11/27/22 12:06 AM   Result Value Ref Range    Sodium 138 135 - 145 mmol/L    Potassium 6.0 (H) 3.6 - 5.5 mmol/L    Chloride 94 (L) 96 - 112 mmol/L    Co2 24 20 - 33 mmol/L    Anion Gap 20.0 (H) 7.0 - 16.0    Glucose 124 (H) 65 - 99 mg/dL    Bun 128 (H) 8 - 22 mg/dL    Creatinine 10.09 (HH) 0.50 - 1.40 mg/dL    Calcium 11.0 (H) 8.5 - 10.5 mg/dL    AST(SGOT) 13 12 - 45 U/L    ALT(SGPT) 9 2 - 50 U/L    Alkaline Phosphatase 60 30 - 99 U/L    Total Bilirubin 0.4 0.1 - 1.5 mg/dL    Albumin 3.7 3.2 - 4.9 g/dL    Total Protein 8.3 (H) 6.0 - 8.2 g/dL    Globulin 4.6 (H) 1.9 - 3.5 g/dL    A-G Ratio 0.8 g/dL   LACTIC ACID    " Collection Time: 22 12:06 AM   Result Value Ref Range    Lactic Acid 1.0 0.5 - 2.0 mmol/L   TROPONIN    Collection Time: 22 12:06 AM   Result Value Ref Range    Troponin T 221 (H) 6 - 19 ng/L   Diagnostic Alcohol    Collection Time: 22 12:06 AM   Result Value Ref Range    Diagnostic Alcohol <10.1 <10.1 mg/dL   ESTIMATED GFR    Collection Time: 22 12:06 AM   Result Value Ref Range    GFR (CKD-EPI) 4 (A) >60 mL/min/1.73 m 2   EKG (NOW)    Collection Time: 22 12:14 AM   Result Value Ref Range    Report       [facility]    Test Date:  2022  Pt Name:    GIANLUCA ZALDIVAR               Department: ER  MRN:        [id number]                      Room:       [facility]  Gender:     Female                       Technician: [id number]  :        [id number]                   Requested By:DOMONIQUE PIMENTEL  Order #:    [id number]                    Reading MD:    Measurements  Intervals                                Axis  Rate:       95                           P:          92  WA:         165                          QRS:        -32  QRSD:       95                           T:          91  QT:         358  QTc:        450    Interpretive Statements  Sinus rhythm  Left ventricular hypertrophy  Nonspecific T abnrm, anterolateral leads  ST elevation, consider anterior injury  Compared to ECG 2022 15:36:51  First degree AV block no longer present  ST (T wave) deviation still present  Myocardial infarct finding still present     URINALYSIS    Collection Time: 22  1:00 AM    Specimen: Urine   Result Value Ref Range    Color Yellow     Character Turbid (A)     Specific Gravity 1.013 <1.035    Ph 7.5 5.0 - 8.0    Glucose Negative Negative mg/dL    Ketones Trace (A) Negative mg/dL    Protein 300 (A) Negative mg/dL    Bilirubin Negative Negative    Urobilinogen, Urine 0.2 Negative    Nitrite Negative Negative    Leukocyte Esterase Large (A) Negative    Occult Blood  Moderate (A) Negative    Micro Urine Req Microscopic    Urine Drug Screen (Triage)    Collection Time: 11/27/22  1:00 AM   Result Value Ref Range    Amphetamines Urine Negative Negative    Barbiturates Negative Negative    Benzodiazepines Negative Negative    Cocaine Metabolite Negative Negative    Methadone Negative Negative    Opiates Negative Negative    Oxycodone Negative Negative    Phencyclidine -Pcp Negative Negative    Propoxyphene Negative Negative    Cannabinoid Metab Negative Negative   URINE MICROSCOPIC (W/UA)    Collection Time: 11/27/22  1:00 AM   Result Value Ref Range    WBC  (A) /hpf    RBC 5-10 (A) /hpf    Bacteria Many (A) None /hpf    Epithelial Cells Few /hpf    Hyaline Cast 0-2 /lpf   POCT glucose device results    Collection Time: 11/27/22  1:44 AM   Result Value Ref Range    POC Glucose, Blood 117 (H) 65 - 99 mg/dL   MAGNESIUM    Collection Time: 11/27/22  2:30 AM   Result Value Ref Range    Magnesium 2.6 (H) 1.5 - 2.5 mg/dL   PHOSPHORUS    Collection Time: 11/27/22  2:30 AM   Result Value Ref Range    Phosphorus 6.4 (H) 2.5 - 4.5 mg/dL   PROCALCITONIN    Collection Time: 11/27/22  2:30 AM   Result Value Ref Range    Procalcitonin 0.99 (H) <0.25 ng/mL   proBrain Natriuretic Peptide, NT    Collection Time: 11/27/22  2:30 AM   Result Value Ref Range    NT-proBNP 9280 (H) 0 - 125 pg/mL   TROPONIN    Collection Time: 11/27/22  2:30 AM   Result Value Ref Range    Troponin T 303 (H) 6 - 19 ng/L   AMMONIA    Collection Time: 11/27/22  3:30 AM   Result Value Ref Range    Ammonia 11 11 - 45 umol/L   POCT glucose device results    Collection Time: 11/27/22  3:36 AM   Result Value Ref Range    POC Glucose, Blood 185 (H) 65 - 99 mg/dL       (click the triangle to expand results)    IMAGING:  DX-CHEST-PORTABLE (1 VIEW)   Final Result      New mild right infrahilar opacity most likely indicating atelectasis favored over aspiration or pneumonia          ASSESSMENT:  # ESRD, dependent on HD              - iHD q TThS and PRN             - recent non-compliance          # HTN,             - Goal BP < 140/90  # Anemia of CKD, below target             - non-complaint  # CKD-MBD  # DMII  # Encephalopathy: suspect uremic     PLAN:  - Urgent HD today then tentative back to TTS schedule  - UF as tolerated  - Dose all meds per ESRD  - JUDITH with HD  - GOC discussions  - Infectious work-up per primary    Thank you    Nghia Rivas MD

## 2022-11-27 NOTE — ASSESSMENT & PLAN NOTE
-Consult nephrology [facility] nephrology in a.m.  -Continue home sevelamer carbonate 1600 mg 3 times daily  -Renal diet when able to swallow

## 2022-11-27 NOTE — ED PROVIDER NOTES
ED Provider Note    CHIEF COMPLAINT  Chief Complaint   Patient presents with    Altered Mental Status       HPI  Liberty Bolton is a 67 y.o. female who presents with altered mental status.  History is limited, patient awake in bed moaning but not answering question.  Per report from EMS she was sent from her nursing facility as she has been 'unconsolable' and moaning,  worsening today.  She reportedly has been refusing all her dialysis and medicines for the past 2 weeks. She has not been complaining of any pain though does answer 'yes' when I ask about pain in her arm.  No known history of falls.  Remainder of history is significantly limited.     I reviewed the patient's medical record and she was admitted from 6 14-7 12 for altered mental status due to not receiving dialysis for 10 days.     REVIEW OF SYSTEMS  Unable to obtain    PAST MEDICAL HISTORY  Past Medical History:   Diagnosis Date    Diabetes (HCC)     Renal disorder        SOCIAL HISTORY  Social History     Tobacco Use    Smoking status: Never    Smokeless tobacco: Never   Vaping Use    Vaping Use: Never used   Substance Use Topics    Alcohol use: Not Currently    Drug use: Not Currently       SURGICAL HISTORY  Past Surgical History:   Procedure Laterality Date    THROMBECTOMY Right 5/23/2022    Procedure: THROMBECTOMY WITH ARTERIOVENOUS GRAFT;  Surgeon: Royal Mercer M.D.;  Location: [facility];  Service: Vascular    AV FISTULA CREATION Right 5/23/2022    Procedure: CREATION, AV FISTULA;  Surgeon: Royal Mercer M.D.;  Location: [facility];  Service: Vascular    CATH PLACEMENT Left 5/23/2022    Procedure: INSERTION, CATHETER, WITH FLUORO;  Surgeon: Royal Mercer M.D.;  Location: [facility];  Service: Vascular    ANGIOPLASTY UPPER Right 5/23/2022    Procedure: ANGIOPLASTY, UPPER EXTREMITY; subclavian;  Surgeon: Royal Mercer M.D.;  Location: [facility];  Service: Vascular       CURRENT  "MEDICATIONS  Home Medications       Reviewed by Kaylynn Kenney (Pharmacy Tech) on 11/27/22 at 0141  Med List Status: Complete     Medication Last Dose Status   acetaminophen (TYLENOL) 325 MG Tab 11/17/2022 Active   amLODIPine (NORVASC) 10 MG Tab 11/26/2022 Active   atorvastatin (LIPITOR) 40 MG Tab 11/26/2022 Active   escitalopram (LEXAPRO) 5 MG tablet 11/26/2022 Active   gabapentin (NEURONTIN) 300 MG Cap 11/26/2022 Active   hydrALAZINE (APRESOLINE) 50 MG Tab 11/26/2022 Active   metoprolol tartrate (LOPRESSOR) 25 MG Tab 11/26/2022 Active   mirtazapine (REMERON) 15 MG Tab 11/26/2022 Active   polyethylene glycol/lytes (MIRALAX) 17 g Pack PRN Active   sevelamer carbonate (RENVELA) 800 MG Tab tablet 11/26/2022 Active   vitamin D3 (CHOLECALCIFEROL) 1000 Unit (25 mcg) Tab 11/26/2022 Active                    ALLERGIES  Allergies   Allergen Reactions    Bactrim [Sulfamethoxazole-Trimethoprim] Swelling     angioedema    Dilaudid [Hydromorphone] Unspecified     Per [facility]    Enalapril Anaphylaxis and Swelling     angioedema    Vicodin Hp [Hydrocodone-Acetaminophen] Unspecified     Per [facility]       PHYSICAL EXAM  VITAL SIGNS: Pulse 99   Temp 36.8 °C (98.2 °F) (Temporal)   Resp 18   Ht 1.702 m (5' 7\")   Wt 57 kg (125 lb 10.6 oz)   SpO2 95%   BMI 19.68 kg/m²    Constitutional: Awake, moaning. Ill appearing   HENT: Normal inspection  . Dry mucous membranes  Eyes: Normal inspection  Neck: Grossly normal range of motion.  Cardiovascular: Normal heart rate, Normal rhythm.  Symmetric peripheral pulses.   Thorax & Lungs: No respiratory distress, No wheezing, No rales, No rhonchi, No chest tenderness.   Abdomen: Soft, non-distended, nontender, no mass  Skin: No obvious rash.   Back: No tenderness, No CVA tenderness.   Extremities: Warm, well perfused. No clubbing, cyanosis, edema,   Neurologic: She is alert & moaning.  Moving all extremities spontaneously. She does not follow commands and does not " answer questions.   Psychiatric: Unable to assess    RADIOLOGY/PROCEDURES  DX-CHEST-PORTABLE (1 VIEW)   Final Result      New mild right infrahilar opacity most likely indicating atelectasis favored over aspiration or pneumonia           Imaging is interpreted by radiologist    Labs:  Results for orders placed or performed during the hospital encounter of 11/26/22   CBC WITH DIFFERENTIAL   Result Value Ref Range    WBC 5.5 4.8 - 10.8 K/uL    RBC 3.34 (L) 4.20 - 5.40 M/uL    Hemoglobin 9.0 (L) 12.0 - 16.0 g/dL    Hematocrit 29.7 (L) 37.0 - 47.0 %    MCV 88.9 81.4 - 97.8 fL    MCH 26.9 (L) 27.0 - 33.0 pg    MCHC 30.3 (L) 33.6 - 35.0 g/dL    RDW 51.7 (H) 35.9 - 50.0 fL    Platelet Count 217 164 - 446 K/uL    MPV 10.3 9.0 - 12.9 fL    Neutrophils-Polys 81.00 (H) 44.00 - 72.00 %    Lymphocytes 13.20 (L) 22.00 - 41.00 %    Monocytes 3.80 0.00 - 13.40 %    Eosinophils 0.70 0.00 - 6.90 %    Basophils 0.90 0.00 - 1.80 %    Immature Granulocytes 0.40 0.00 - 0.90 %    Nucleated RBC 0.00 /100 WBC    Neutrophils (Absolute) 4.46 2.00 - 7.15 K/uL    Lymphs (Absolute) 0.73 (L) 1.00 - 4.80 K/uL    Monos (Absolute) 0.21 0.00 - 0.85 K/uL    Eos (Absolute) 0.04 0.00 - 0.51 K/uL    Baso (Absolute) 0.05 0.00 - 0.12 K/uL    Immature Granulocytes (abs) 0.02 0.00 - 0.11 K/uL    NRBC (Absolute) 0.00 K/uL   CMP   Result Value Ref Range    Sodium 138 135 - 145 mmol/L    Potassium 6.0 (H) 3.6 - 5.5 mmol/L    Chloride 94 (L) 96 - 112 mmol/L    Co2 24 20 - 33 mmol/L    Anion Gap 20.0 (H) 7.0 - 16.0    Glucose 124 (H) 65 - 99 mg/dL    Bun 128 (H) 8 - 22 mg/dL    Creatinine 10.09 (HH) 0.50 - 1.40 mg/dL    Calcium 11.0 (H) 8.5 - 10.5 mg/dL    AST(SGOT) 13 12 - 45 U/L    ALT(SGPT) 9 2 - 50 U/L    Alkaline Phosphatase 60 30 - 99 U/L    Total Bilirubin 0.4 0.1 - 1.5 mg/dL    Albumin 3.7 3.2 - 4.9 g/dL    Total Protein 8.3 (H) 6.0 - 8.2 g/dL    Globulin 4.6 (H) 1.9 - 3.5 g/dL    A-G Ratio 0.8 g/dL   URINALYSIS    Specimen: Urine   Result Value Ref Range     Color Yellow     Character Turbid (A)     Specific Gravity 1.013 <1.035    Ph 7.5 5.0 - 8.0    Glucose Negative Negative mg/dL    Ketones Trace (A) Negative mg/dL    Protein 300 (A) Negative mg/dL    Bilirubin Negative Negative    Urobilinogen, Urine 0.2 Negative    Nitrite Negative Negative    Leukocyte Esterase Large (A) Negative    Occult Blood Moderate (A) Negative    Micro Urine Req Microscopic    LACTIC ACID   Result Value Ref Range    Lactic Acid 1.0 0.5 - 2.0 mmol/L   TROPONIN   Result Value Ref Range    Troponin T 221 (H) 6 - 19 ng/L   Urine Drug Screen (Triage)   Result Value Ref Range    Amphetamines Urine Negative Negative    Barbiturates Negative Negative    Benzodiazepines Negative Negative    Cocaine Metabolite Negative Negative    Methadone Negative Negative    Opiates Negative Negative    Oxycodone Negative Negative    Phencyclidine -Pcp Negative Negative    Propoxyphene Negative Negative    Cannabinoid Metab Negative Negative   ESTIMATED GFR   Result Value Ref Range    GFR (CKD-EPI) 4 (A) >60 mL/min/1.73 m 2   URINE MICROSCOPIC (W/UA)   Result Value Ref Range    WBC  (A) /hpf    RBC 5-10 (A) /hpf    Bacteria Many (A) None /hpf    Epithelial Cells Few /hpf    Hyaline Cast 0-2 /lpf   MAGNESIUM   Result Value Ref Range    Magnesium 2.6 (H) 1.5 - 2.5 mg/dL   PHOSPHORUS   Result Value Ref Range    Phosphorus 6.4 (H) 2.5 - 4.5 mg/dL   PROCALCITONIN   Result Value Ref Range    Procalcitonin 0.99 (H) <0.25 ng/mL   proBrain Natriuretic Peptide, NT   Result Value Ref Range    NT-proBNP 9280 (H) 0 - 125 pg/mL   TROPONIN   Result Value Ref Range    Troponin T 303 (H) 6 - 19 ng/L   EKG (NOW)   Result Value Ref Range    Report       [facility] Emergency Dept[facility]    Test Date:  2022  Pt Name:    GIANLUCA ZALDIVAR               Department: ER  MRN:        [id number]                      Room:        24  Gender:     Female                       Technician: [id number]  :        [id number]                    Requested By:DOMONIQUE PIMENTEL  Order #:    [id number]                    Reading MD:    Measurements  Intervals                                Axis  Rate:       95                           P:          92  VA:         165                          QRS:        -32  QRSD:       95                           T:          91  QT:         358  QTc:        450    Interpretive Statements  Sinus rhythm  Left ventricular hypertrophy  Nonspecific T abnrm, anterolateral leads  ST elevation, consider anterior injury  Compared to ECG 05/21/2022 15:36:51  First degree AV block no longer present  ST (T wave) deviation still present  Myocardial infarct finding still present     POCT glucose device results   Result Value Ref Range    POC Glucose, Blood 117 (H) 65 - 99 mg/dL     EKG notable for normal rate, normal rhythm, left axis she does have subtle ST depressions in the inferior leads.  She has left ventricular hypertrophy and peaked T waves.  I am unable to appreciate ST elevations as described above.  Appears unchanged from EKG in May 2022.      Medications   aspirin (ASA) tablet 325 mg (0 mg Oral Held 11/27/22 0130)   senna-docusate (PERICOLACE or SENOKOT S) 8.6-50 MG per tablet 2 Tablet (has no administration in time range)     And   polyethylene glycol/lytes (MIRALAX) PACKET 1 Packet (has no administration in time range)     And   magnesium hydroxide (MILK OF MAGNESIA) suspension 30 mL (has no administration in time range)     And   bisacodyl (DULCOLAX) suppository 10 mg (has no administration in time range)   heparin injection 5,000 Units (has no administration in time range)   amLODIPine (NORVASC) tablet 10 mg (has no administration in time range)   atorvastatin (LIPITOR) tablet 40 mg (has no administration in time range)   escitalopram (Lexapro) tablet 5 mg (has no administration in time range)   gabapentin (NEURONTIN) capsule 300 mg (has no administration in time range)   hydrALAZINE (APRESOLINE) tablet 50 mg  (has no administration in time range)   metoprolol tartrate (LOPRESSOR) tablet 25 mg (has no administration in time range)   mirtazapine (Remeron) tablet 15 mg (has no administration in time range)   sevelamer carbonate (RENVELA) tablet 1,600 mg (has no administration in time range)   vitamin D3 (cholecalciferol) tablet 1,000 Units (has no administration in time range)   dextrose 10 % BOLUS 25 g (0 g Intravenous Stopped 11/27/22 0225)     And   insulin regular (HumuLIN R,NovoLIN R) injection (3 Units Intravenous Given 11/27/22 0144)   sodium bicarbonate 8.4 % injection 50 mEq (50 mEq Intravenous Given 11/27/22 0146)   cefTRIAXone (Rocephin) syringe 1,000 mg (1,000 mg Intravenous Given 11/27/22 0225)         COURSE & MEDICAL DECISION MAKING    This is a 67-year-old with ESRD on hemodialysis who presents from her nursing facility for altered mental status.  On arrival she is awake but altered and does not answer questions or follow commands.  She does not have any focal neurological deficit to suggest an acute neurological process including stroke or intracranial bleed. I have higher suspicion for metabolic encephalopathy in the setting of missing her dialysis.  Initial labs are notable for anemia with a hemoglobin of 9, potassium of 6, and anion gap of 20 and a BUN of 128 with a creatinine of 10.  UA obtained via catheterization which does appear to be infected.  She is not having any respiratory distress and is not requiring any oxygen.  I suspect that the patient is altered of note the patient's troponin is also elevated at 221.  Her EKG does appear unchanged from previous without any acute ST deviations.  She is unable per to provide a history regarding any chest pain though my suspicion is this is more likely demand in the setting of underlying illness.   I do not see an indication for urgent catheterization at this time. I considered the possibility of pericarditis or effusion the setting of uremia though  fortunately she is not having any evidence of hemodynamic compromise.  She was ordered for bicarb, calcium and insulin infusion for her potassium of 6.  She was also started on ceftriaxone for possible urinary tract infection which might be contributing.  She does have a normal lactate and does not appear to be septic.  Given that she is hypovolemic in the setting of missing dialysis, fluid resuscitation is not indicated.     1:50 AM  I reevaluated the patient.  She remains awake and alert but is still not following commands. Moaning in bed.  Hospitalist called for admission.     Critical care time : Liberty presents with an acute critical illness and in my judgement had significant potential for imminent deterioration. I provided 35 minutes of Critical Care time to this patient, exclusive of any separately billable procedures. This included bedside direction of care, frequent re-evaluations, time spent coordinating ongoing consultant care and time of medical documentation.          FINAL IMPRESSION  1. Altered mental status, unspecified altered mental status type Acute       2. Cystitis Acute       3. Hyperkalemia Acute       4. Elevated troponin Acute               This dictation was created using voice recognition software. The accuracy of the dictation is limited to the abilities of the software.  The nursing notes were reviewed and certain aspects of this information were incorporated into this note.      Electronically signed by: Sonia Sheffield M.D., 11/27/2022 12:13 AM

## 2022-11-27 NOTE — ASSESSMENT & PLAN NOTE
*Corrected CA 11.2  *Likely secondary versus tertiary hyperparathyroidism    -Management as above for ESRD

## 2022-11-27 NOTE — ED TRIAGE NOTES
Pt brought from nursing facility due to being inconsolable and failure to thrive. Pt constantly moaning and denies pain. Pt has been refusing dialysis and any meds for aprox. 2 weeks.

## 2022-11-27 NOTE — PROGRESS NOTES
[facility] Services Progress Note      Urgent HD today x 3 hours per Dr. Rivas.   Initiated at 1053 and ended at 1423.   Assessment:    Patient drowsy, confused. Unable to assess orientation. Opens eyes but not looking not communicating to RN. This is patient's baseline assessment at floors. On room air, O2 sat 97% monitored. Consent signed by Dr. Rivas for urgent dialysis due to hyperkalemia. Tried to call all numbers in patient's chart but none of them working.     Access used: SWATI AVF with bruit and thrill pre dialysis   Needle gauge used: 15g    Pre HD vitals  BP: 147/73 mmHg   HR: 81 bpm   RR: 19 bpm   Temp: 96.5F      Net Fluid Removed: 1,300 mL      Procedure Events/ Complications:   Patient tolerated treatment. Repositioned and warm blanket provided for comfort. UF goal lowered when patient's BP dropped to 90's systolic.      Post HD vitals:  BP: 148/60 mmHg   HR: 92 bpm   RR: 17 bpm   Temp: 96.5F       Post Access Care:   Blood returned. Gauze applied and held for  10 minutes.   Bruit and thrill present   Taped secured. Dressing intact      Report given to Primary ALICIA Waite RN.

## 2022-11-27 NOTE — H&P
History & Physical Note    Date of Admission: 11/27/2022  Admission Status: Emergency  [facility] Team: UNSOM  Attending: TONY Diego Dr.  Senior Resident: Dr. Rosario  Contact Number: [phone]    Chief Complaint:   Chief Complaint   Patient presents with    Altered Mental Status         History of Present Illness (HPI):     Patient is a 67 y.o. female from [facility] with a PMHx of ESRD 2/2 diabetes on TTS schedule for the past 4 years followed by [facility], type 2 diabetes mellitus (A1c (6/14/2020): 5.4), hypertension, hyperlipidemia, anemia of chronic kidney disease (Hgb 9.0), hx of RUE AVF s/p thrombectomy + Revision in 2022, recent admission from 6/14-7/12 for AMS due to not receiving dialysis for 10 days who presented to the ED on 11/26/2022 due to altered mental status.    Patient is encephalopathic, unable to obtain history.  History seems similar to last admission where patient refused dialysis, reported patient refused dialysis for 2 weeks.      In the ED, pertinent vitals include hypertensive at 169/66  WBC 5.5 with no left shift, Hgb 9.0, MCV 88.9 (at baseline)  CMP with K 6.0, AG 20, BUN/creatinine 128/10.09 (baseline BUN 40s), corrected CA 11.2  UDS negative, troponin 221  UA positive for moderate occult blood, proteinuria, large LE, nitrite negative,  pyuria, many bacteria, no hyaline casts  CXR concerning for new mild right infrahilar opacity most likely indicating atelectasis favored over aspiration or pneumonia  EKG with NSR, peaked T waves  S/p dextrose 10% bolus 25G, 3 units insulin regular, started bicarbonate 50 mEq    Patient will be admitted for management of acute encephalopathy, end-stage renal disease, hyperkalemia with EKG changes      Review of Systems:   Review of Systems   Unable to perform ROS: Acuity of condition   All other systems reviewed and are negative  except mentioned in the HPI.    Past Medical History:   Past Medical  History was reviewed with patient.   has a past medical history of Diabetes (HCC) and Renal disorder.    Past Surgical History: Past Surgical History was reviewed with patient.   has a past surgical history that includes thrombectomy (Right, 5/23/2022); av fistula creation (Right, 5/23/2022); cath placement (Left, 5/23/2022); and angioplasty upper (Right, 5/23/2022).    Medications: Medications have been reviewed with patient.  Prior to Admission Medications   Prescriptions Last Dose Informant Patient Reported? Taking?   acetaminophen (TYLENOL) 325 MG Tab 11/17/2022 at 0236 MAR from Other Facility Yes No   Sig: Take 650 mg by mouth every four hours as needed for Mild Pain.   amLODIPine (NORVASC) 10 MG Tab 11/26/2022 at 1907 MAR from Other Facility Yes Yes   Sig: Take 10 mg by mouth at bedtime.   atorvastatin (LIPITOR) 40 MG Tab 11/26/2022 at 1907 MAR from Other Facility No No   Sig: Take 1 Tablet by mouth every evening.   escitalopram (LEXAPRO) 5 MG tablet 11/26/2022 at 0718 MAR from Other Facility Yes Yes   Sig: Take 5 mg by mouth every day.   gabapentin (NEURONTIN) 300 MG Cap 11/26/2022 at 1907 MAR from Other Facility Yes No   Sig: Take 300 mg by mouth every evening.   hydrALAZINE (APRESOLINE) 50 MG Tab 11/26/2022 at 1907 MAR from Other Facility Yes Yes   Sig: Take 50 mg by mouth 3 times a day.   metoprolol tartrate (LOPRESSOR) 25 MG Tab 11/26/2022 at 1907 MAR from Other Facility Yes Yes   Sig: Take 25 mg by mouth 2 times a day.   mirtazapine (REMERON) 15 MG Tab 11/26/2022 at 1907 MAR from Other Facility Yes Yes   Sig: Take 15 mg by mouth every evening.   polyethylene glycol/lytes (MIRALAX) 17 g Pack PRN at PRN MAR from Other Facility Yes No   Sig: Take 17 g by mouth 1 time a day as needed. Indications: Constipation   sevelamer carbonate (RENVELA) 800 MG Tab tablet 11/26/2022 at 1526 MAR from Other Facility Yes Yes   Sig: Take 1,600 mg by mouth 3 times a day before meals.   vitamin D3 (CHOLECALCIFEROL) 1000 Unit  (25 mcg) Tab 11/26/2022 at 1907 MAR from Other Facility Yes Yes   Sig: Take 1,000 Units by mouth at bedtime.      Facility-Administered Medications: None        Allergies: Allergies have been reviewed with patient.  Allergies   Allergen Reactions    Bactrim [Sulfamethoxazole-Trimethoprim] Swelling     angioedema    Dilaudid [Hydromorphone] Unspecified     Per [facility]    Enalapril Anaphylaxis and Swelling     angioedema    Vicodin Hp [Hydrocodone-Acetaminophen] Unspecified     Per [facility]       Family History:   family history is not on file.     Social History:   Social History     Socioeconomic History    Marital status:      Spouse name: Not on file    Number of children: Not on file    Years of education: Not on file    Highest education level: Not on file   Occupational History    Not on file   Tobacco Use    Smoking status: Never    Smokeless tobacco: Never   Vaping Use    Vaping Use: Never used   Substance and Sexual Activity    Alcohol use: Not Currently    Drug use: Not Currently    Sexual activity: Not on file   Other Topics Concern    Not on file   Social History Narrative    Not on file     Social Determinants of Health     Financial Resource Strain: Not on file   Food Insecurity: Not on file   Transportation Needs: Not on file   Physical Activity: Not on file   Stress: Not on file   Social Connections: Not on file   Intimate Partner Violence: Not on file   Housing Stability: Not on file     Tobacco: Please see HPI  Alcohol: Please see HPI  Recreational drugs (illegal and prescription): Please see HPI  Employment: Please see HPI  Activity Level: Please see HPI  Living situation:  Please see HPI  Recent travel:  Please see HPI  Primary Care Provider: reviewed Pcp Pt States None    Physical Exam:   Vitals:  Temp:  [36.8 °C (98.2 °F)] 36.8 °C (98.2 °F)  Pulse:  [99] 99  Resp:  [18] 18  SpO2:  [95 %] 95 %    Physical Exam  Vitals and nursing note reviewed.   Constitutional:        Appearance: She is ill-appearing.   HENT:      Head: Normocephalic and atraumatic.      Right Ear: External ear normal.      Left Ear: External ear normal.      Nose: Nose normal.      Mouth/Throat:      Mouth: Mucous membranes are moist.      Pharynx: Oropharynx is clear.   Eyes:      Extraocular Movements: Extraocular movements intact.      Pupils: Pupils are equal, round, and reactive to light.   Cardiovascular:      Rate and Rhythm: Normal rate and regular rhythm.      Pulses: Normal pulses.      Heart sounds: Normal heart sounds. No murmur heard.    No friction rub. No gallop.   Pulmonary:      Effort: Pulmonary effort is normal. No respiratory distress.      Breath sounds: Normal breath sounds. No stridor. No wheezing, rhonchi or rales.   Abdominal:      General: There is no distension.      Palpations: Abdomen is soft.      Tenderness: There is no abdominal tenderness. There is no guarding or rebound.   Musculoskeletal:         General: Normal range of motion.      Cervical back: Normal range of motion.      Right lower leg: No edema.      Left lower leg: No edema.   Skin:     General: Skin is warm.      Capillary Refill: Capillary refill takes less than 2 seconds.      Coloration: Skin is not jaundiced.      Comments: R AVF - B/T +   Neurological:      General: No focal deficit present.      Mental Status: She is disoriented.      Cranial Nerves: No cranial nerve deficit.       Labs:   Results for orders placed or performed during the hospital encounter of 11/26/22   CBC WITH DIFFERENTIAL   Result Value Ref Range    WBC 5.5 4.8 - 10.8 K/uL    RBC 3.34 (L) 4.20 - 5.40 M/uL    Hemoglobin 9.0 (L) 12.0 - 16.0 g/dL    Hematocrit 29.7 (L) 37.0 - 47.0 %    MCV 88.9 81.4 - 97.8 fL    MCH 26.9 (L) 27.0 - 33.0 pg    MCHC 30.3 (L) 33.6 - 35.0 g/dL    RDW 51.7 (H) 35.9 - 50.0 fL    Platelet Count 217 164 - 446 K/uL    MPV 10.3 9.0 - 12.9 fL    Neutrophils-Polys 81.00 (H) 44.00 - 72.00 %    Lymphocytes 13.20 (L) 22.00 -  41.00 %    Monocytes 3.80 0.00 - 13.40 %    Eosinophils 0.70 0.00 - 6.90 %    Basophils 0.90 0.00 - 1.80 %    Immature Granulocytes 0.40 0.00 - 0.90 %    Nucleated RBC 0.00 /100 WBC    Neutrophils (Absolute) 4.46 2.00 - 7.15 K/uL    Lymphs (Absolute) 0.73 (L) 1.00 - 4.80 K/uL    Monos (Absolute) 0.21 0.00 - 0.85 K/uL    Eos (Absolute) 0.04 0.00 - 0.51 K/uL    Baso (Absolute) 0.05 0.00 - 0.12 K/uL    Immature Granulocytes (abs) 0.02 0.00 - 0.11 K/uL    NRBC (Absolute) 0.00 K/uL   CMP   Result Value Ref Range    Sodium 138 135 - 145 mmol/L    Potassium 6.0 (H) 3.6 - 5.5 mmol/L    Chloride 94 (L) 96 - 112 mmol/L    Co2 24 20 - 33 mmol/L    Anion Gap 20.0 (H) 7.0 - 16.0    Glucose 124 (H) 65 - 99 mg/dL    Bun 128 (H) 8 - 22 mg/dL    Creatinine 10.09 (HH) 0.50 - 1.40 mg/dL    Calcium 11.0 (H) 8.5 - 10.5 mg/dL    AST(SGOT) 13 12 - 45 U/L    ALT(SGPT) 9 2 - 50 U/L    Alkaline Phosphatase 60 30 - 99 U/L    Total Bilirubin 0.4 0.1 - 1.5 mg/dL    Albumin 3.7 3.2 - 4.9 g/dL    Total Protein 8.3 (H) 6.0 - 8.2 g/dL    Globulin 4.6 (H) 1.9 - 3.5 g/dL    A-G Ratio 0.8 g/dL   URINALYSIS    Specimen: Urine   Result Value Ref Range    Color Yellow     Character Turbid (A)     Specific Gravity 1.013 <1.035    Ph 7.5 5.0 - 8.0    Glucose Negative Negative mg/dL    Ketones Trace (A) Negative mg/dL    Protein 300 (A) Negative mg/dL    Bilirubin Negative Negative    Urobilinogen, Urine 0.2 Negative    Nitrite Negative Negative    Leukocyte Esterase Large (A) Negative    Occult Blood Moderate (A) Negative    Micro Urine Req Microscopic    LACTIC ACID   Result Value Ref Range    Lactic Acid 1.0 0.5 - 2.0 mmol/L   TROPONIN   Result Value Ref Range    Troponin T 221 (H) 6 - 19 ng/L   Urine Drug Screen (Triage)   Result Value Ref Range    Amphetamines Urine Negative Negative    Barbiturates Negative Negative    Benzodiazepines Negative Negative    Cocaine Metabolite Negative Negative    Methadone Negative Negative    Opiates Negative Negative     Oxycodone Negative Negative    Phencyclidine -Pcp Negative Negative    Propoxyphene Negative Negative    Cannabinoid Metab Negative Negative   ESTIMATED GFR   Result Value Ref Range    GFR (CKD-EPI) 4 (A) >60 mL/min/1.73 m 2   URINE MICROSCOPIC (W/UA)   Result Value Ref Range    WBC  (A) /hpf    RBC 5-10 (A) /hpf    Bacteria Many (A) None /hpf    Epithelial Cells Few /hpf    Hyaline Cast 0-2 /lpf   MAGNESIUM   Result Value Ref Range    Magnesium 2.6 (H) 1.5 - 2.5 mg/dL   PHOSPHORUS   Result Value Ref Range    Phosphorus 6.4 (H) 2.5 - 4.5 mg/dL   PROCALCITONIN   Result Value Ref Range    Procalcitonin 0.99 (H) <0.25 ng/mL   proBrain Natriuretic Peptide, NT   Result Value Ref Range    NT-proBNP 9280 (H) 0 - 125 pg/mL   TROPONIN   Result Value Ref Range    Troponin T 303 (H) 6 - 19 ng/L   EKG (NOW)   Result Value Ref Range    Report       [facility] Emergency Dept[facility]    Test Date:  2022  Pt Name:    GIANLUCA ZALDIVAR               Department: ER  MRN:        [id number]                      Room:       [facility]  Gender:     Female                       Technician: [id number]  :        [id number]                   Requested By:DOMONIQUE PIMENTEL  Order #:    [id number]                    Reading MD:    Measurements  Intervals                                Axis  Rate:       95                           P:          92  IN:         165                          QRS:        -32  QRSD:       95                           T:          91  QT:         358  QTc:        450    Interpretive Statements  Sinus rhythm  Left ventricular hypertrophy  Nonspecific T abnrm, anterolateral leads  ST elevation, consider anterior injury  Compared to ECG 2022 15:36:51  First degree AV block no longer present  ST (T wave) deviation still present  Myocardial infarct finding still present     POCT glucose device results   Result Value Ref Range    POC Glucose, Blood 117 (H) 65 - 99 mg/dL        Imaging:   DX-CHEST-PORTABLE (1  VIEW)   Final Result      New mild right infrahilar opacity most likely indicating atelectasis favored over aspiration or pneumonia           Previous Data Review: reviewed    Assessment and Plan:    Patient is a 67 y.o. female from [facility] with a PMHx of ESRD 2/2 diabetes on TTS schedule for the past 4 years followed by [facility], type 2 diabetes mellitus (A1c (6/14/2020): 5.4), hypertension, hyperlipidemia, anemia of chronic kidney disease (Hgb 9.0), hx of RUE AVF s/p thrombectomy + Revision in 2022, recent admission from 6/14-7/12 for AMS due to not receiving dialysis for 10 days who presented to the ED on 11/26/2022 due to altered mental status and was admitted for management of acute encephalopathy, end-stage renal disease, hyperkalemia with EKG changes    Problem Representation:     * Acute encephalopathy- (present on admission)  Assessment & Plan    Plan:   -Telemetry monitoring   -Aspiration precautions, fall precautions  -Nephrology consult in a.m. for dialysis due to uremic encephalopathy  -Medications reviewed, no sedating medications  -Ordered TSH, ammonia, B12 to rule out metabolic/endocrine disease  -Corrected Ca 11.2, may be contributing to acute encephalopathy  -Treat presumed UTI with ceftriaxone 1 g X 5 days which could be contributing to acute encephalopathy  -Consider CT head without contrast for any worsening neurological condition    Hyperkalemia- (present on admission)  Assessment & Plan  *K 6.0  *EKG with NSR, peaked T waves  *2/2 ESRD  *S/p dextrose 10% bolus 25G, 3 units insulin regular, started bicarbonate 50 mEq    -BMP every 4 hours  -Due to corrected calcium of 11.2 with anuris, will hold off on calcium gluconate  -Dextrose plus insulin regular for elevated potassium    Abnormal CXR  Assessment & Plan    -Will obtain procalcitonin to guide decision on possible CAP  -NPO until bedside swallow evaluation    Urinary tract infection  Assessment &  Plan    -C3 1g x 5 days    High anion gap metabolic acidosis  Assessment & Plan  *AG 20, CO2 24  *Likely secondary to metabolites from ESRD    -Management as above for end-stage renal disease    Hypercalcemia associated with chronic dialysis (HCC)  Assessment & Plan  *Corrected CA 11.2  *Likely secondary versus tertiary hyperparathyroidism    -Management as above for ESRD    Peripheral neuropathy  Assessment & Plan    -Continue gabapentin 300 mg every evening    Elevated troponin  Assessment & Plan  *Troponin 221, likely secondary to ESRD    Depression- (present on admission)  Assessment & Plan    -continue home mirtazapine 50 mg and Lexapro 5 mg every day    Hyperlipidemia- (present on admission)  Assessment & Plan    -Continue home atorvastatin 40 mg daily    Hypertension  Assessment & Plan    -Resume home amlodipine 10 mg at bedtime, hydralazine 50 mg 3 times daily, metoprolol 25 mg twice daily    ESRD (end stage renal disease) on dialysis (HCC)  Assessment & Plan    -Consult nephrology [facility] nephrology in a.m.  -Continue home sevelamer carbonate 1600 mg 3 times daily  -Renal diet when able to swallow    Normocytic anemia  Assessment & Plan  *Hgb at baseline at 9  *Secondary to anemia of chronic kidney disease    -Consider erythropoietin treatment with hemodialysis       Core Measures:    Code Status: Full Code  Diet: NPO  IVF: None  [place] Catheter: None  IV Lines: pIV  Antibiotics: C3  GI Prophylaxis: Bowel Protocol  DVT Prophylaxis: Heparin 5000 units TID  Disposition: Inpatient    Please note that this dictation was created using voice recognition software. I have made every reasonable attempt to correct obvious errors, but there may be errors of grammar and possibly content that I did not discover before finalizing the note. If the error changes the accuracy of the document, I would appreciate it being brought to my attention.

## 2022-11-27 NOTE — ASSESSMENT & PLAN NOTE
*Hgb at baseline at 9  *Secondary to anemia of chronic kidney disease    -Consider erythropoietin treatment with hemodialysis

## 2022-11-27 NOTE — ASSESSMENT & PLAN NOTE
*AG 20, CO2 24  *Likely secondary to metabolites from ESRD    -Management as above for end-stage renal disease

## 2022-11-27 NOTE — ED NOTES
Pt transferred out of ED at this time with tele RN via myra. Pt is A&Ox4, with stable vital signs and no apparent distress upon transfer. Pt transferred on tele monitor. All paperwork and personal belongings sent with pt to [id number].

## 2022-11-27 NOTE — ASSESSMENT & PLAN NOTE
Plan:   -Telemetry monitoring   -Aspiration precautions, fall precautions  -Nephrology consult in a.m. for dialysis due to uremic encephalopathy  -Medications reviewed, no sedating medications  -Ordered TSH, ammonia, B12 to rule out metabolic/endocrine disease  -Corrected Ca 11.2, may be contributing to acute encephalopathy  -Treat presumed UTI with ceftriaxone 1 g X 5 days which could be contributing to acute encephalopathy  -Consider CT head without contrast for any worsening neurological condition

## 2022-11-27 NOTE — THERAPY
"Speech Language Pathology   Clinical Swallow Evaluation     Patient Name: Liberty Bolton  AGE:  67 y.o., SEX:  female  Medical Record #: [id number]  Today's Date: 11/27/2022     Precautions  Precautions: (P) Fall Risk, Swallow Precautions ( See Comments)    HPI:66 YO female admitted from SNF 11/27 2/2 AMS. Recent admission 6/14-7/12 for AMS 2/2 not receiving dialysis.     PMHx: ESRD, DM, renal disorder.     CMHx: acute encephalopathy, hyperkalemia, abnormal CXR, high anion gap metabolic acidosis, hypercalcemia associated with chronic dialysis, peripheral neuropathy, elevated troponin, depression, HLD, HTN, ESRD on dialysis, normocytic anemia. Seen by SLP June 2022 with recs for SB/TN and ground meats.     CXR 11/27 \"New mild right infrahilar opacity most likely indicating atelectasis favored over aspiration or pneumonia\"    Level of Consciousness: Awake  Affect/Behavior: Calm  Follows Directives: Inconsistent  Orientation: Self  Hearing: Functional hearing  Vision: Functional vision    Prior Living Situation & Level of Function:  Pt unable to provide PLOF. Per EMR, admitted from SNF and seen by SLP during previous admission with recs for SB/TN and ground meats.     Oral Mechanism Evaluation  Facial Symmetry: Equal  Facial Sensation: Pt did not follow commands to assess  Labial Observations: Pt did not follow commands to assess  Lingual Observations: Pt did not follow commands for assessment  Dentition: Edentulous  Comments: Pt reports upper and lower dentures, unable to find in pt's room.     Voice  Quality: WFL  Resonance: WFL  Intensity: Appropriate  Cough: WFL  Comments:    Current Method of Nutrition   NPO until cleared by speech pathology    Subjective  Pt alert, did not follow directions, delayed and intermittent responses to questions, flat affect.      Assessment  Positioning: Reese's (60-90 degrees)  Bolus Administration: SLP  Oxygen Requirements: Room Air  Factor(s) Affecting Performance: Impaired mental " status, Impaired command following    Swallowing Trials  Ice: WFL  Thin Liquid (TN0): WFL  Liquidised (LQ3): WFL  Pureed (PU4): WFL  Minced & Moist (MM5): Impaired    Comments:  Pt required 1:1 feeding assistance due to poor initiation, confusion and weakness. Intermittently delayed swallow initiation, presumed complete hyolaryngeal elevation to palpation, and clear vocal quality appreciated. Pt demonstrated prolonged but functional mastication of minced and moist textures. No s/sx of aspiration appreciated with any consistencies consumed.      Clinical Impressions  Clinical signs of oral dysphagia include prolonged/inefficient mastication. Dysphagia is likely chronic related to confusion and encephalopathy. Instrumental swallow study is not indicated to objectively assess swallow function and further direct POC. Diet modification is indicated.     Recommendations  MINCED AND MOIST/ THINS DIET  Instrumentation: None indicated at this time  Swallowing Instructions & Precautions:   Supervision: 1:1 feeding with constant supervision  Positioning: Fully upright and midline during oral intake  Medication: Whole with puree, Crush with applesauce or puree, as appropriate  Strategies: Small bites/sips, Slow rate of intake  Oral Care: BID        SLP following.       Plan    Recommend Speech Therapy 3 times per week until therapy goals are met for the following treatments:  Dysphagia Training and Patient / Family / Caregiver Education.    Discharge Recommendations: (P) Anticipate that the patient will have no further speech therapy needs after discharge from the hospital    Objective       11/27/22 [facility] [place]   Charge Group   SLP Oral Pharyngeal Evaluation Oral Pharyngeal Evaluation   Total Treatment Time   SLP Time Spent Yes   SLP Oral Pharyngeal Evaluation (Mins) 11   Initial Contact Note    Initial Contact Note  Order Received and Verified, Speech Therapy Evaluation in Progress with Full Report to Follow.   Precautions    Precautions Fall Risk;Swallow Precautions ( See Comments)   Vitals   O2 Delivery Device None - Room Air   Pain 0 - 10 Group   Therapist Pain Assessment Post Activity Pain Same as Prior to Activity;0;Nurse Notified   Prior Living Situation   Prior Services Continuous (24 Hour) Care Giving Per Service   Housing / Facility Skilled Nursing Facility  (admitted from SNF)   Lives with - Patient's Self Care Capacity Attendant / Paid Care Giver   Prior Level Of Function   Communication Impaired   Swallow Impaired   Dentition Edentulous   Dentures None   Hearing Within Functional Limits for Evaluation   Hearing Aid None   Vision Within Functional Limits for Evaluation   Patient's Primary Language English   Dysphagia Rating   Nutritional Liquid Intake Rating Scale Non thickened beverages   Nutritional Food Intake Rating Scale Total oral diet with multiple consistencies but requiring special preparation or compensations   Patient / Family Goals   Patient / Family Goal #1 none stated   Short Term Goals   Short Term Goal # 1 Pt will consume a diet of MM/TN with no s/sx of aspiration and min cues.   Education Group   Education Provided Dysphagia   Dysphagia Patient Response Patient;Explanation;Reinforcement Needed;No Learning Evidence;Acceptance   Problem List   Problem List Dysphagia   Anticipated Discharge Needs   Discharge Recommendations Anticipate that the patient will have no further speech therapy needs after discharge from the hospital   Therapy Recommendations Upon DC Not Indicated   Interdisciplinary Plan of Care Collaboration   IDT Collaboration with  Nursing;Physician   Patient Position at End of Therapy Seated;In Bed;Bed Alarm On;Call Light within Reach;Tray Table within Reach   Collaboration Comments RN and MD aware of results/recs

## 2022-11-27 NOTE — ASSESSMENT & PLAN NOTE
-Resume home amlodipine 10 mg at bedtime, hydralazine 50 mg 3 times daily, metoprolol 25 mg twice daily

## 2022-11-27 NOTE — THERAPY
Physical Therapy   Initial Evaluation     Patient Name: Liberty Bolton  Age:  67 y.o., Sex:  female  Medical Record #: [id number]  Today's Date: 11/27/2022     Precautions  Precautions: Fall Risk;Swallow Precautions ( See Comments)    Assessment  Patient is 67 y.o. female admitted from SNF with AMS. PMH includes DM, ESRD on HD, HTN, RUE AVF s/p thrombectomy, and recent admission for AMS after not receiving HD for 10 days. Pt found to have acute encephalopathy, UTI, and hyperkalemia. Pt oriented to self only at time of evaluation and unclear if pt has been a long term care resident at [facility] since admission in May of this year. Total assist required for bed mobility and sitting balance EOB. Increased tone in B LE with pain reported with PROM into terminal knee extension. PT will trial 3x/wk while pt is in acute care setting to address strength, ROM, balance, activity tolerance, sequencing, and mobility as well as assessing rehab potential.     Plan    Recommend Physical Therapy 3 times per week until therapy goals are met for the following treatments:  Bed Mobility, Neuro Re-Education / Balance, Self Care/Home Evaluation, Therapeutic Activities, and Therapeutic Exercises    DC Equipment Recommendations: Unable to determine at this time  Discharge Recommendations: Recommend post-acute placement for additional physical therapy services prior to discharge home          11/27/22 1006   Prior Living Situation   Prior Services Continuous (24 Hour) Care Giving Per Service   Housing / Facility Skilled Nursing Facility   Lives with - Patient's Self Care Capacity Attendant / Paid Care Giver   Comments pt was admitted from [facility]. Unclear if pt has been a long term care resident there since admission in May 2022. Pt unable to provide information   Prior Level of Functional Mobility   Bed Mobility Unable To Determine At This Time   Comments pt unable to state   Cognition    Cognition / Consciousness X   Speech/  Communication Delayed Responses   Orientation Level Not Oriented to Reason;Not Oriented to Place;Not Oriented to Time;Not Oriented to Day;Not Oriented to Month;Not Oriented to Year;Not Oriented to Address;Not Oriented to Age   Level of Consciousness Alert   Ability To Follow Commands 1 Step   Safety Awareness Impaired   New Learning Impaired   Comments unclear baseline   Active ROM Lower Body    Active ROM Lower Body  X   Comments very limited AROM noted in B LE   Strength Lower Body   Lower Body Strength  X   Gross Strength Generalized Weakness, Equal Bilaterally   Comments grossly 1/5 throughout LE   Sensation Lower Body   Lower Extremity Sensation   Not Tested   Lower Body Muscle Tone   Lower Body Muscle Tone  X   Rt Lower Extremity Muscle Tone Hypertonic   Lt Lower Extremity Muscle Tone Hypertonic   Balance Assessment   Sitting Balance (Static) Trace +   Sitting Balance (Dynamic) Trace   Weight Shift Sitting Absent   Comments EOB only   Gait Analysis   Gait Level Of Assist Unable to Participate   Bed Mobility    Supine to Sit Total Assist   Sit to Supine Total Assist   Scooting Total Assist   Functional Mobility   Sit to Stand Unable to Participate   Bed, Chair, Wheelchair Transfer Unable to Participate   Mobility EOB only   Short Term Goals    Short Term Goal # 1 pt will be able to complete supine<>Sitting with mod assist in 6tx in order to decrease caregiver burden   Short Term Goal # 2 pt will be able to sit EOB with fair- balance in 6tx in order to decreased fall risk   Education Group   Education Provided Role of Physical Therapist   Role of Physical Therapist Patient Response Patient;Nonacceptance;Explanation;No Learning Evidence   Anticipated Discharge Equipment and Recommendations   DC Equipment Recommendations Unable to determine at this time   Discharge Recommendations Recommend post-acute placement for additional physical therapy services prior to discharge home

## 2022-11-27 NOTE — PROGRESS NOTES
Handoff report received from night shift nurse. Pt care assumed. Pt is currently resting in bed. POC discussed with Pt and Pt verbalizes no questions at this time. Pt is AAOx4, on Ra, on Tele monitoring, and VSS. Call light and belongings within reach, bed in lowest and locked position, and Pt educated on use of call light.

## 2022-11-28 ENCOUNTER — APPOINTMENT (OUTPATIENT)
Dept: RADIOLOGY | Facility: MEDICAL CENTER | Age: 67
DRG: 070 | End: 2022-11-28
Attending: STUDENT IN AN ORGANIZED HEALTH CARE EDUCATION/TRAINING PROGRAM
Payer: MEDICARE

## 2022-11-28 LAB
ALBUMIN SERPL BCP-MCNC: 3.1 G/DL (ref 3.2–4.9)
BACTERIA BLD CULT: ABNORMAL
BACTERIA BLD CULT: ABNORMAL
BASOPHILS # BLD AUTO: 0.6 % (ref 0–1.8)
BASOPHILS # BLD: 0.03 K/UL (ref 0–0.12)
BUN SERPL-MCNC: 49 MG/DL (ref 8–22)
CALCIUM SERPL-MCNC: 10.1 MG/DL (ref 8.5–10.5)
CHLORIDE SERPL-SCNC: 93 MMOL/L (ref 96–112)
CO2 SERPL-SCNC: 27 MMOL/L (ref 20–33)
CREAT SERPL-MCNC: 4.92 MG/DL (ref 0.5–1.4)
EOSINOPHIL # BLD AUTO: 0.06 K/UL (ref 0–0.51)
EOSINOPHIL NFR BLD: 1.1 % (ref 0–6.9)
ERYTHROCYTE [DISTWIDTH] IN BLOOD BY AUTOMATED COUNT: 52.4 FL (ref 35.9–50)
FERRITIN SERPL-MCNC: 1541 NG/ML (ref 10–291)
GFR SERPLBLD CREATININE-BSD FMLA CKD-EPI: 9 ML/MIN/1.73 M 2
GLUCOSE SERPL-MCNC: 92 MG/DL (ref 65–99)
HCT VFR BLD AUTO: 30.7 % (ref 37–47)
HGB BLD-MCNC: 9.5 G/DL (ref 12–16)
IMM GRANULOCYTES # BLD AUTO: 0.02 K/UL (ref 0–0.11)
IMM GRANULOCYTES NFR BLD AUTO: 0.4 % (ref 0–0.9)
IRON SATN MFR SERPL: 45 % (ref 15–55)
IRON SERPL-MCNC: 55 UG/DL (ref 40–170)
LYMPHOCYTES # BLD AUTO: 0.89 K/UL (ref 1–4.8)
LYMPHOCYTES NFR BLD: 16.9 % (ref 22–41)
MAGNESIUM SERPL-MCNC: 2.3 MG/DL (ref 1.5–2.5)
MCH RBC QN AUTO: 28 PG (ref 27–33)
MCHC RBC AUTO-ENTMCNC: 30.9 G/DL (ref 33.6–35)
MCV RBC AUTO: 90.6 FL (ref 81.4–97.8)
MONOCYTES # BLD AUTO: 0.35 K/UL (ref 0–0.85)
MONOCYTES NFR BLD AUTO: 6.6 % (ref 0–13.4)
NEUTROPHILS # BLD AUTO: 3.92 K/UL (ref 2–7.15)
NEUTROPHILS NFR BLD: 74.4 % (ref 44–72)
NRBC # BLD AUTO: 0 K/UL
NRBC BLD-RTO: 0 /100 WBC
PHOSPHATE SERPL-MCNC: 4.8 MG/DL (ref 2.5–4.5)
PHOSPHATE SERPL-MCNC: 5.9 MG/DL (ref 2.5–4.5)
PLATELET # BLD AUTO: 214 K/UL (ref 164–446)
PMV BLD AUTO: 10.4 FL (ref 9–12.9)
POTASSIUM SERPL-SCNC: 4.5 MMOL/L (ref 3.6–5.5)
RBC # BLD AUTO: 3.39 M/UL (ref 4.2–5.4)
SIGNIFICANT IND 70042: ABNORMAL
SITE SITE: ABNORMAL
SODIUM SERPL-SCNC: 136 MMOL/L (ref 135–145)
SOURCE SOURCE: ABNORMAL
TIBC SERPL-MCNC: 122 UG/DL (ref 250–450)
UIBC SERPL-MCNC: 67 UG/DL (ref 110–370)
URATE SERPL-MCNC: 4.1 MG/DL (ref 1.9–8.2)
WBC # BLD AUTO: 5.3 K/UL (ref 4.8–10.8)

## 2022-11-28 PROCEDURE — 700102 HCHG RX REV CODE 250 W/ 637 OVERRIDE(OP): Performed by: STUDENT IN AN ORGANIZED HEALTH CARE EDUCATION/TRAINING PROGRAM

## 2022-11-28 PROCEDURE — 85025 COMPLETE CBC W/AUTO DIFF WBC: CPT

## 2022-11-28 PROCEDURE — 700111 HCHG RX REV CODE 636 W/ 250 OVERRIDE (IP)

## 2022-11-28 PROCEDURE — 36415 COLL VENOUS BLD VENIPUNCTURE: CPT

## 2022-11-28 PROCEDURE — A9270 NON-COVERED ITEM OR SERVICE: HCPCS | Performed by: STUDENT IN AN ORGANIZED HEALTH CARE EDUCATION/TRAINING PROGRAM

## 2022-11-28 PROCEDURE — 83550 IRON BINDING TEST: CPT

## 2022-11-28 PROCEDURE — 84100 ASSAY OF PHOSPHORUS: CPT

## 2022-11-28 PROCEDURE — 82728 ASSAY OF FERRITIN: CPT

## 2022-11-28 PROCEDURE — 700105 HCHG RX REV CODE 258

## 2022-11-28 PROCEDURE — 80069 RENAL FUNCTION PANEL: CPT

## 2022-11-28 PROCEDURE — 99233 SBSQ HOSP IP/OBS HIGH 50: CPT | Performed by: STUDENT IN AN ORGANIZED HEALTH CARE EDUCATION/TRAINING PROGRAM

## 2022-11-28 PROCEDURE — 700111 HCHG RX REV CODE 636 W/ 250 OVERRIDE (IP): Performed by: STUDENT IN AN ORGANIZED HEALTH CARE EDUCATION/TRAINING PROGRAM

## 2022-11-28 PROCEDURE — 84550 ASSAY OF BLOOD/URIC ACID: CPT

## 2022-11-28 PROCEDURE — 83540 ASSAY OF IRON: CPT

## 2022-11-28 PROCEDURE — 83735 ASSAY OF MAGNESIUM: CPT

## 2022-11-28 PROCEDURE — 770020 HCHG ROOM/CARE - TELE (206)

## 2022-11-28 PROCEDURE — 70450 CT HEAD/BRAIN W/O DYE: CPT

## 2022-11-28 RX ADMIN — METOPROLOL TARTRATE 25 MG: 25 TABLET, FILM COATED ORAL at 21:16

## 2022-11-28 RX ADMIN — HYDRALAZINE HYDROCHLORIDE 50 MG: 50 TABLET, FILM COATED ORAL at 21:15

## 2022-11-28 RX ADMIN — SEVELAMER CARBONATE 1600 MG: 800 TABLET, FILM COATED ORAL at 14:04

## 2022-11-28 RX ADMIN — MIRTAZAPINE 15 MG: 15 TABLET, FILM COATED ORAL at 21:17

## 2022-11-28 RX ADMIN — HYDRALAZINE HYDROCHLORIDE 50 MG: 50 TABLET, FILM COATED ORAL at 14:05

## 2022-11-28 RX ADMIN — VANCOMYCIN HYDROCHLORIDE 1500 MG: 500 INJECTION, POWDER, LYOPHILIZED, FOR SOLUTION INTRAVENOUS at 01:27

## 2022-11-28 RX ADMIN — HEPARIN SODIUM 5000 UNITS: 5000 INJECTION, SOLUTION INTRAVENOUS; SUBCUTANEOUS at 14:04

## 2022-11-28 RX ADMIN — CEFTRIAXONE SODIUM 1000 MG: 10 INJECTION, POWDER, FOR SOLUTION INTRAVENOUS at 01:22

## 2022-11-28 RX ADMIN — AMLODIPINE BESYLATE 10 MG: 10 TABLET ORAL at 21:17

## 2022-11-28 RX ADMIN — HEPARIN SODIUM 5000 UNITS: 5000 INJECTION, SOLUTION INTRAVENOUS; SUBCUTANEOUS at 06:49

## 2022-11-28 RX ADMIN — HEPARIN SODIUM 5000 UNITS: 5000 INJECTION, SOLUTION INTRAVENOUS; SUBCUTANEOUS at 21:14

## 2022-11-28 RX ADMIN — GABAPENTIN 300 MG: 300 CAPSULE ORAL at 21:18

## 2022-11-28 RX ADMIN — Medication 1000 UNITS: at 21:17

## 2022-11-28 ASSESSMENT — COGNITIVE AND FUNCTIONAL STATUS - GENERAL
DAILY ACTIVITIY SCORE: 8
PERSONAL GROOMING: A LOT
EATING MEALS: A LOT
TURNING FROM BACK TO SIDE WHILE IN FLAT BAD: UNABLE
HELP NEEDED FOR BATHING: TOTAL
DRESSING REGULAR LOWER BODY CLOTHING: TOTAL
TOILETING: TOTAL
DRESSING REGULAR UPPER BODY CLOTHING: TOTAL
SUGGESTED CMS G CODE MODIFIER MOBILITY: CN
STANDING UP FROM CHAIR USING ARMS: TOTAL
CLIMB 3 TO 5 STEPS WITH RAILING: TOTAL
SUGGESTED CMS G CODE MODIFIER DAILY ACTIVITY: CM
MOBILITY SCORE: 6
MOVING FROM LYING ON BACK TO SITTING ON SIDE OF FLAT BED: UNABLE
WALKING IN HOSPITAL ROOM: TOTAL
MOVING TO AND FROM BED TO CHAIR: UNABLE

## 2022-11-28 ASSESSMENT — PAIN DESCRIPTION - PAIN TYPE
TYPE: ACUTE PAIN
TYPE: CHRONIC PAIN
TYPE: ACUTE PAIN

## 2022-11-28 NOTE — CARE PLAN
Problem: Fall Risk  Goal: Patient will remain free from falls  Outcome: Progressing     Problem: Skin Integrity  Goal: Skin integrity is maintained or improved  Outcome: Progressing   The patient is Watcher - Medium risk of patient condition declining or worsening    Shift Goals  Clinical Goals: Closely monitor neurological status, rest/comfort  Patient Goals: CARIE.  Patient not verbally responding to this RN's  Family Goals: CARIE.  No family present    Progress made toward(s) clinical / shift goals:  Patient has been sleeping comfortably throughout the night.  Patient has remained free from falls/injury and has shown no signs or symptoms of being in pain.    Patient is not progressing towards the following goals: Patient's neurological status has remained unchanged throughout the night.

## 2022-11-28 NOTE — PROGRESS NOTES
Appreciate nephrology and cardiology recommendations.  Patient likely has demand and persistently elevated troponins given end-stage renal disease.  No further cardiac intervention indicated.  Continue with dialysis as indicated by nephrology.  Otherwise no acute events overnight.  Reevaluate patient morning of 11/20/2022.

## 2022-11-28 NOTE — PROGRESS NOTES
Pharmacy Vancomycin Kinetics Note for 11/28/2022     67 y.o. female on Vancomycin day # 1     Vancomycin Indication (Two level/Trough based Dosing): Bacteremia (goal trough 15-20)    Provider specified end date: 12/05/22    Active Antibiotics (From admission, onward)      Ordered     Ordering Provider       Mon Nov 28, 2022 12:40 AM    11/28/22 0040  vancomycin (VANCOCIN) 1,500 mg in  mL IVPB  (vancomycin (VANCOCIN) IV (LD + Maintenance))  ONCE        Note to Pharmacy: Dose per Pharmacokinetic Protocol    Bossman Wilcox P.A.-C.       Mon Nov 28, 2022 12:14 AM    11/28/22 0014  MD Alert...Vancomycin per Pharmacy  PHARMACY TO DOSE        Question:  Indication(s) for vancomycin?  Answer:  Staphylococcus aureus bacteremia    Bossman Wilcox P.A.-C.       Sun Nov 27, 2022 10:08 AM    11/27/22 1008  cefTRIAXone (Rocephin) syringe 1,000 mg  EVERY 24 HOURS         Paulo Wilson M.D.          Dosing Weight: 63.1 kg (139 lb 1.8 oz)    Admission History: Admitted on 11/26/2022 for Altered mental status [R41.82]  Pertinent history: Pt with history of ESRD on dialysis admitted from skilled nursing for altered mental status. Blood culture grew GPC 1/1. Vancomycin empirically initiated for rule out S.aureus bacteremia.    Allergies:     Bactrim [sulfamethoxazole-trimethoprim], Dilaudid [hydromorphone], Enalapril, and Vicodin hp [hydrocodone-acetaminophen]     Pertinent cultures to date:     Results       Procedure Component Value Units Date/Time    BLOOD CULTURE [[id number]]  (Abnormal) Collected: 11/27/22 0100    Order Status: Completed Specimen: Blood from Peripheral Updated: 11/27/22 2332     Significant Indicator POS     Source BLD     Site PERIPHERAL     Culture Result Growth detected by Bactec instrument. 11/27/2022  23:30  Gram Stain: Gram positive cocci.      Narrative:      CALL  [place]  171 tel. [phone],  CALLED  171 tel. [phone] 11/27/2022, 23:32, RB PERF. RESULTS CALLED TO: RN  [id number]  Per Hospital  "Policy: Only change Specimen Src: to \"Line\" if  specified by physician order.  Left Forearm/Arm    URINALYSIS [[id number]]  (Abnormal) Collected: 22    Order Status: Completed Specimen: Urine Updated: 22     Color Yellow     Character Turbid     Specific Gravity 1.013     Ph 7.5     Glucose Negative mg/dL      Ketones Trace mg/dL      Protein 300 mg/dL      Bilirubin Negative     Urobilinogen, Urine 0.2     Nitrite Negative     Leukocyte Esterase Large     Occult Blood Moderate     Micro Urine Req Microscopic    BLOOD CULTURE [[id number]] Collected: 22    Order Status: Sent Specimen: Blood from Peripheral Updated: 22    Narrative:      Per Hospital Policy: Only change Specimen Src: to \"Line\" if  specified by physician order.    URINALYSIS [[id number]]     Order Status: Canceled Specimen: Urine             Labs:     Estimated Creatinine Clearance: 12 mL/min (A) (by C-G formula based on SCr of 4.41 mg/dL (HH)).  Recent Labs     22   WBC 5.5   NEUTSPOLYS 81.00*     Recent Labs     22  0006 22  0933 22  1626   * 126* 43*   CREATININE 10.09* 9.81* 4.41*   ALBUMIN 3.7  --   --        Intake/Output Summary (Last 24 hours) at 2022 0048  Last data filed at 2022 1430  Gross per 24 hour   Intake 750 ml   Output 1800 ml   Net -1050 ml      /79   Pulse 85   Temp 36.7 °C (98.1 °F) (Temporal)   Resp 16   Ht 1.702 m (5' 7\")   Wt 63.1 kg (139 lb 1.8 oz)   SpO2 96%  Temp (24hrs), Av.8 °C (98.3 °F), Min:35.8 °C (96.5 °F), Max:37.5 °C (99.5 °F)      List concerns for Vancomycin clearance:     ESRD    Pharmacokinetics:   Trough kinetics:   No results for input(s): VANCOTROUGH, VANCOPEAK, VANCORANDOM in the last 72 hours.    A/P:     -  Vancomycin dose: 1500 mg loading dose.  Pulse dose thereafter    -  Next vancomycin level(s): Trough ordered for ~48 hours ordered for 22 at 0300 with AM labs.    -  Comments: Significant " concern for accumulation due to history of ESRD on dialysis. Will check vancomycin level in ~2 days to assess clearance and determine future dosing    Amilcar Xavier, PharmD

## 2022-11-28 NOTE — PROGRESS NOTES
"[facility] -  PROGRESS NOTE               Author: Nikhil Peterson D.O. Date & Time: 11/28/2022  2:35 PM     HPI:  67 y.o. female with ESRD on HD TTS at [facility] who resides at [facility] presented wth encephalopathy. Recent  admission for similar presentation after refusing HD for nearly 2 weeks. She is currently unable to participate in interview but reports jane tshe has again refused dialysis for ~10 days. In the ED found to have a Bun 124 and cr 10. Seen on HD -tolerating procedure. Infectious work-up underway, on abx.    DAILY NEPHROLOGY SUMMARY:   11/27 - Consult done.  Urgent dialysis provided.   11/28 - Mental status unchanged.  Head CT pending.  Getting swallow eval.     Voices no complaint or pain.  No dyspnea at rest.     REVIEW OF SYSTEMS:    10 point ROS reviewed and is as per HPI/daily summary or otherwise negative    PMH/PSH/SH/FH: Reviewed and unchanged since admission note  CURRENT MEDICATIONS: Reviewed from admission to present day    VS:  BP (!) 156/68   Pulse 76   Temp 36.7 °C (98.1 °F) (Temporal)   Resp 16   Ht 1.702 m (5' 7\")   Wt 63.1 kg (139 lb 1.8 oz) Comment: Waffle is inflated   SpO2 95%   BMI 21.79 kg/m²   Physical Exam  Nursing note reviewed.   Constitutional:       General: She is not in acute distress.     Appearance: Normal appearance.   HENT:      Head: Normocephalic and atraumatic.      Mouth/Throat:      Mouth: Mucous membranes are dry.   Eyes:      General: No scleral icterus.     Extraocular Movements: Extraocular movements intact.      Pupils: Pupils are equal, round, and reactive to light.   Cardiovascular:      Rate and Rhythm: Normal rate and regular rhythm.      Heart sounds: Normal heart sounds.     No friction rub.   Pulmonary:      Effort: Pulmonary effort is normal. No respiratory distress.      Breath sounds: No wheezing.   Abdominal:      General: Abdomen is flat. There is no distension.      Palpations: Abdomen is soft.      " Tenderness: There is no abdominal tenderness.   Musculoskeletal:         General: No swelling, tenderness or deformity.      Cervical back: Normal range of motion and neck supple.      Comments: Right arm AVF   Skin:     General: Skin is warm and dry.      Findings: No rash.   Neurological:      Mental Status: She is alert and oriented to person, place, and time.   Psychiatric:         Behavior: Behavior normal.       Fluids:  In: 500 [Dialysis:500]  Out: 1800     LABS:  Recent Labs     11/27/22  0933 11/27/22  1626 11/28/22  0203   SODIUM 137 135 136   POTASSIUM 5.7* 4.2 4.5   CHLORIDE 94* 93* 93*   CO2 24 25 27   GLUCOSE 120* 152* 92   * 43* 49*   CREATININE 9.81* 4.41* 4.92*   CALCIUM 10.6* 9.5 10.1       ASSESSMENTS:   # ESRD    No need for dialysis today (MON)    Maintenance dialysis qTTS and as needed    Right arm AVF    Has been non-adherent to outpt dialysis schedule   # ALOC    Uremia corrected but no significant neurologic improvement    Head CT Planned    Swallow eval underway   # Hypertension    Continue current medications    Volume off with dialysis as BP tolerates   # Anemia    Check iron stores    JUDITH with HD to goal Hgb 10-11    Transfuse PRN   # CKD-MBD    Check PTH and Vit D    Check Phos   # Low Serum Albumin    No dietary protein restrictions    Goal: 1.5g Protein/kg/day    SUGGESTIONS:   No need for dialysis today (MON)   Maintenance dialysis qTTS and PRN   Volume off with dialysis as BP tolerates   Continue usual anti-hypertensive medications   Check iron stores   JUDITH with HD to goal Hgb 10-11   Transfuse PRN   Check PTH and Vit D   Check Phos   No dietary protein restrctions    Goal: 1.5g Protein/kg/day   Follow labs with further recommendations to follow    Thank you

## 2022-11-28 NOTE — PROGRESS NOTES
Lab called stating patient's anaerobic blood culture came back positive for gram positive cocci at 2331. Critical lab result read back to lab.   Esdras Wilcox notified of critical lab result at 2339.  Critical lab result read back by Esdras Wilcox.

## 2022-11-28 NOTE — DISCHARGE PLANNING
Agency/Facility Name: [facility]   Spoke To: Mary Ellen   Outcome: DPA called to verify Pt is an RTA at [facility], Mary Ellen verified Pt is and also a LTC Pt.

## 2022-11-28 NOTE — PROGRESS NOTES
NOC HOSPITALIST CROSS COVER      Notified by RN regarding blood cultures positive for gram-positive cocci.  We will start patient on vancomycin until further speciation and sensitivity of blood cultures results.    A/P:  #Gram-positive cocci bacteremia  -Vancomycin per pharmacy dosing        -----------------------------------------------------------------------------------------------------------    Electronically signed by:  EMAMNUEL Wilcox PA-C  Hospitalist Services

## 2022-11-28 NOTE — DISCHARGE PLANNING
Case Management Discharge Planning    Admission Date: 11/26/2022  GMLOS: 3.4  ALOS: 1    6-Clicks ADL Score: 8  6-Clicks Mobility Score: 6    PT and/or OT Eval ordered: Yes  Post-acute Referrals Ordered: No  Post-acute Choice Obtained: No  Has referral(s) been sent to post-acute provider:  No      Anticipated Discharge Dispo: Discharge Disposition: D/T to SNF with Medicare cert in anticipation of skilled care (03) ([facility])   HCM to verify RTA?    Medically Clear: No    Next Steps: f/u with pt and medical team to discuss dc needs and barriers.    Barriers to Discharge: Medical clearance, Pending Placement, and Dialysis

## 2022-11-29 LAB
ALBUMIN SERPL BCP-MCNC: 3.1 G/DL (ref 3.2–4.9)
ALBUMIN/GLOB SERPL: 0.8 G/DL
ALP SERPL-CCNC: 56 U/L (ref 30–99)
ALT SERPL-CCNC: 10 U/L (ref 2–50)
ANION GAP SERPL CALC-SCNC: 17 MMOL/L (ref 7–16)
AST SERPL-CCNC: 23 U/L (ref 12–45)
BILIRUB SERPL-MCNC: 0.3 MG/DL (ref 0.1–1.5)
BUN SERPL-MCNC: 63 MG/DL (ref 8–22)
CALCIUM SERPL-MCNC: 10.1 MG/DL (ref 8.5–10.5)
CHLORIDE SERPL-SCNC: 92 MMOL/L (ref 96–112)
CO2 SERPL-SCNC: 23 MMOL/L (ref 20–33)
CREAT SERPL-MCNC: 5.8 MG/DL (ref 0.5–1.4)
ERYTHROCYTE [DISTWIDTH] IN BLOOD BY AUTOMATED COUNT: 50.4 FL (ref 35.9–50)
GFR SERPLBLD CREATININE-BSD FMLA CKD-EPI: 7 ML/MIN/1.73 M 2
GLOBULIN SER CALC-MCNC: 4.1 G/DL (ref 1.9–3.5)
GLUCOSE SERPL-MCNC: 103 MG/DL (ref 65–99)
HCT VFR BLD AUTO: 28.6 % (ref 37–47)
HGB BLD-MCNC: 8.6 G/DL (ref 12–16)
HGB RETIC QN AUTO: 34.7 PG/CELL (ref 29–35)
IMM RETICS NFR: 18.1 % (ref 9.3–17.4)
MCH RBC QN AUTO: 27.2 PG (ref 27–33)
MCHC RBC AUTO-ENTMCNC: 30.1 G/DL (ref 33.6–35)
MCV RBC AUTO: 90.5 FL (ref 81.4–97.8)
PLATELET # BLD AUTO: 221 K/UL (ref 164–446)
PMV BLD AUTO: 10.7 FL (ref 9–12.9)
POTASSIUM SERPL-SCNC: 5.3 MMOL/L (ref 3.6–5.5)
PROT SERPL-MCNC: 7.2 G/DL (ref 6–8.2)
RBC # BLD AUTO: 3.16 M/UL (ref 4.2–5.4)
RETICS # AUTO: 0.03 M/UL (ref 0.04–0.06)
RETICS/RBC NFR: 0.9 % (ref 0.8–2.1)
SODIUM SERPL-SCNC: 132 MMOL/L (ref 135–145)
TSH SERPL DL<=0.005 MIU/L-ACNC: 1.42 UIU/ML (ref 0.38–5.33)
WBC # BLD AUTO: 5.9 K/UL (ref 4.8–10.8)

## 2022-11-29 PROCEDURE — 85046 RETICYTE/HGB CONCENTRATE: CPT

## 2022-11-29 PROCEDURE — 770020 HCHG ROOM/CARE - TELE (206)

## 2022-11-29 PROCEDURE — 99232 SBSQ HOSP IP/OBS MODERATE 35: CPT | Performed by: INTERNAL MEDICINE

## 2022-11-29 PROCEDURE — 700105 HCHG RX REV CODE 258: Performed by: INTERNAL MEDICINE

## 2022-11-29 PROCEDURE — 700111 HCHG RX REV CODE 636 W/ 250 OVERRIDE (IP): Performed by: STUDENT IN AN ORGANIZED HEALTH CARE EDUCATION/TRAINING PROGRAM

## 2022-11-29 PROCEDURE — 84443 ASSAY THYROID STIM HORMONE: CPT

## 2022-11-29 PROCEDURE — 97166 OT EVAL MOD COMPLEX 45 MIN: CPT

## 2022-11-29 PROCEDURE — 90935 HEMODIALYSIS ONE EVALUATION: CPT

## 2022-11-29 PROCEDURE — 700111 HCHG RX REV CODE 636 W/ 250 OVERRIDE (IP)

## 2022-11-29 PROCEDURE — 80053 COMPREHEN METABOLIC PANEL: CPT

## 2022-11-29 PROCEDURE — 700111 HCHG RX REV CODE 636 W/ 250 OVERRIDE (IP): Performed by: INTERNAL MEDICINE

## 2022-11-29 PROCEDURE — 85027 COMPLETE CBC AUTOMATED: CPT

## 2022-11-29 PROCEDURE — 700102 HCHG RX REV CODE 250 W/ 637 OVERRIDE(OP): Performed by: STUDENT IN AN ORGANIZED HEALTH CARE EDUCATION/TRAINING PROGRAM

## 2022-11-29 PROCEDURE — 36415 COLL VENOUS BLD VENIPUNCTURE: CPT

## 2022-11-29 PROCEDURE — A9270 NON-COVERED ITEM OR SERVICE: HCPCS | Performed by: STUDENT IN AN ORGANIZED HEALTH CARE EDUCATION/TRAINING PROGRAM

## 2022-11-29 RX ORDER — HEPARIN SODIUM 1000 [USP'U]/ML
INJECTION, SOLUTION INTRAVENOUS; SUBCUTANEOUS
Status: COMPLETED
Start: 2022-11-29 | End: 2022-11-29

## 2022-11-29 RX ADMIN — HEPARIN SODIUM 5000 UNITS: 5000 INJECTION, SOLUTION INTRAVENOUS; SUBCUTANEOUS at 15:16

## 2022-11-29 RX ADMIN — HEPARIN SODIUM 1800 UNITS: 1000 INJECTION, SOLUTION INTRAVENOUS; SUBCUTANEOUS at 09:25

## 2022-11-29 RX ADMIN — CEFTRIAXONE SODIUM 1000 MG: 10 INJECTION, POWDER, FOR SOLUTION INTRAVENOUS at 06:00

## 2022-11-29 RX ADMIN — AMPICILLIN AND SULBACTAM 3 G: 1; 2 INJECTION, POWDER, FOR SOLUTION INTRAMUSCULAR; INTRAVENOUS at 15:16

## 2022-11-29 RX ADMIN — HEPARIN SODIUM 5000 UNITS: 5000 INJECTION, SOLUTION INTRAVENOUS; SUBCUTANEOUS at 05:16

## 2022-11-29 RX ADMIN — ATORVASTATIN CALCIUM 40 MG: 40 TABLET, FILM COATED ORAL at 20:54

## 2022-11-29 RX ADMIN — MIRTAZAPINE 15 MG: 15 TABLET, FILM COATED ORAL at 20:53

## 2022-11-29 RX ADMIN — HEPARIN SODIUM 5000 UNITS: 5000 INJECTION, SOLUTION INTRAVENOUS; SUBCUTANEOUS at 22:39

## 2022-11-29 RX ADMIN — GABAPENTIN 300 MG: 300 CAPSULE ORAL at 20:53

## 2022-11-29 RX ADMIN — METOPROLOL TARTRATE 25 MG: 25 TABLET, FILM COATED ORAL at 20:53

## 2022-11-29 RX ADMIN — EPOETIN ALFA-EPBX 6000 UNITS: 3000 INJECTION, SOLUTION INTRAVENOUS; SUBCUTANEOUS at 09:55

## 2022-11-29 RX ADMIN — AMLODIPINE BESYLATE 10 MG: 10 TABLET ORAL at 20:53

## 2022-11-29 RX ADMIN — HYDRALAZINE HYDROCHLORIDE 50 MG: 50 TABLET, FILM COATED ORAL at 05:16

## 2022-11-29 RX ADMIN — DOCUSATE SODIUM 50 MG AND SENNOSIDES 8.6 MG 2 TABLET: 8.6; 5 TABLET, FILM COATED ORAL at 05:16

## 2022-11-29 RX ADMIN — METOPROLOL TARTRATE 25 MG: 25 TABLET, FILM COATED ORAL at 05:16

## 2022-11-29 RX ADMIN — Medication 1000 UNITS: at 20:53

## 2022-11-29 RX ADMIN — ESCITALOPRAM OXALATE 5 MG: 10 TABLET ORAL at 05:16

## 2022-11-29 ASSESSMENT — COGNITIVE AND FUNCTIONAL STATUS - GENERAL
TOILETING: TOTAL
PERSONAL GROOMING: A LOT
DAILY ACTIVITIY SCORE: 9
DRESSING REGULAR LOWER BODY CLOTHING: TOTAL
DRESSING REGULAR UPPER BODY CLOTHING: A LOT
SUGGESTED CMS G CODE MODIFIER DAILY ACTIVITY: CL
HELP NEEDED FOR BATHING: TOTAL
EATING MEALS: A LOT

## 2022-11-29 NOTE — PROGRESS NOTES
[facility] Services Progress Note     Hemodialysis treatment ordered today per Dr. Peterson x 3.5 hours.   Treatment initiated at 0928 ended at 1258.      See paper flow sheet for details.      Net UF 1000 mL.  Limited by Low BP at 80-90's during tx      [place] removed from access site. Dressings applied and sites held x 10 minutes; verified no bleeding. Positive bruit/thrill post tx.   Staff RN to monitor AVF/G for breakthrough bleeding. Should breakthrough bleeding occur, staff RN to apply pressure to access sites until bleeding resolved.   Notify Dialysis and Nephrologist for follow-up.     Report given to Primary RN GENE Mckinney.

## 2022-11-29 NOTE — THERAPY
Occupational Therapy   Initial Evaluation     Patient Name: Liberty Bolton  Age:  67 y.o., Sex:  female  Medical Record #: [id number]  Today's Date: 11/29/2022     Precautions  Precautions: Fall Risk, Swallow Precautions ( See Comments)    Assessment  Patient is 67 y.o. female with a diagnosis of AMS.  Pt currently limited by decreased functional mobility, activity tolerance, cognition, strength, AROM, balance,  which are affecting pt's ability to complete ADLs/IADLs at baseline. Pt would benefit from OT services in the acute care setting to maximize functional recovery.      Plan    Recommend Occupational Therapy 3 times per week until therapy goals are met for the following treatments:  Self Care/Activities of Daily Living and Therapeutic Activities.       Discharge Recommendations: (P) Recommend post-acute placement for additional occupational therapy services prior to discharge home (back to SNF)        11/29/22 0854   Prior Living Situation   Prior Services Continuous (24 Hour) Care Giving Per Service   Housing / Facility Skilled Nursing Facility  (not sure if pt is long term resident at [facility])   Equipment Owned Unable to Determine At This Time   Lives with - Patient's Self Care Capacity Attendant / Paid Care Giver   Prior Level of ADL Function   Self Feeding Unable To Determine At This Time   Dressing Unable To Determine At This Time   ADL Assessment   Grooming Maximal Assist   Upper Body Dressing Maximal Assist   Lower Body Dressing Total Assist   Toileting Total Assist   Functional Mobility   Sit to Stand Unable to Participate   Bed, Chair, Wheelchair Transfer Unable to Participate   Short Term Goals   Short Term Goal # 1 min A with G/H activities sitting EOB   Short Term Goal # 2 min A with UB dressing   Anticipated Discharge Equipment and Recommendations   Discharge Recommendations Recommend post-acute placement for additional occupational therapy services prior to discharge home  (back to SNF)

## 2022-11-29 NOTE — PROGRESS NOTES
"[facility] -  PROGRESS NOTE               Author: ASHLY Holguin Date & Time: 11/29/2022  10:04 AM     HPI:  67 y.o. female with ESRD on HD TTS at [facility] who resides at [facility] presented wth encephalopathy. Recent  admission for similar presentation after refusing HD for nearly 2 weeks. She is currently unable to participate in interview but reports jane tshe has again refused dialysis for ~10 days. In the ED found to have a Bun 124 and cr 10. Seen on HD -tolerating procedure. Infectious work-up underway, on abx.    DAILY NEPHROLOGY SUMMARY:  11/27 - Consult done.  Urgent dialysis provided.  11/28 - Mental status unchanged.  Head CT pending.  Getting swallow eval. Voices no complaint or pain.  No dyspnea at rest.   11/29 - Pt seen on HD, BP stable, remains confused, verbalizes minimally, labs reviewed, CT  head yesterday no acute changes    REVIEW OF SYSTEMS:    10 point ROS reviewed and is as per HPI/daily summary or otherwise negative    PMH/PSH/SH/FH: Reviewed and unchanged since admission note  CURRENT MEDICATIONS: Reviewed from admission to present day    VS:  BP (!) 151/64   Pulse 81   Temp 35.9 °C (96.6 °F) (Temporal)   Resp 16   Ht 1.702 m (5' 7\")   Wt 63.1 kg (139 lb 1.8 oz) Comment: Waffle is inflated   SpO2 96%   BMI 21.79 kg/m²   Physical Exam  Nursing note reviewed.   Constitutional:       General: She is not in acute distress.     Appearance: Normal appearance.   HENT:      Head: Normocephalic and atraumatic.      Mouth/Throat:      Mouth: Mucous membranes are moist.   Eyes:      General: No scleral icterus.     Extraocular Movements: Extraocular movements intact.      Pupils: Pupils are equal, round, and reactive to light.   Cardiovascular:      Rate and Rhythm: Normal rate and regular rhythm.      Heart sounds: Normal heart sounds.     No friction rub.      Comments: RUE AVF in use  Pulmonary:      Effort: Pulmonary effort is normal. No " respiratory distress.      Breath sounds: No wheezing.   Abdominal:      General: Abdomen is flat. There is no distension.      Palpations: Abdomen is soft.      Tenderness: There is no abdominal tenderness.   Musculoskeletal:         General: No swelling, tenderness or deformity.      Cervical back: Normal range of motion and neck supple.   Skin:     General: Skin is warm and dry.      Findings: No rash.   Neurological:      Mental Status: She is alert. She is disoriented.   Psychiatric:         Attention and Perception: She is inattentive.       Fluids:  In: 240 [P.O.:240]  Out: -     LABS:  Recent Labs     11/27/22  1626 11/28/22  0203 11/29/22  0050   SODIUM 135 136 132*   POTASSIUM 4.2 4.5 5.3   CHLORIDE 93* 93* 92*   CO2 25 27 23   GLUCOSE 152* 92 103*   BUN 43* 49* 63*   CREATININE 4.41* 4.92* 5.80*   CALCIUM 9.5 10.1 10.1         ASSESSMENTS:   # ESRD    Maintenance dialysis qTTS and as needed    Right arm AVF    Has been non-adherent to outpt dialysis schedule   # ALOC    Uremia corrected but no significant neurologic improvement    Head CT no acute changes 11/28; + atrophy and chronic small vessel ischemic change    SLP eval   # Hypertension, fair control     Continue current medications    Volume off with dialysis as BP tolerates   # Anemia, below target     %sat 45, ferritin 1541     JUDITH with HD to goal Hgb 10-11    Transfuse PRN   # CKD-MBD    Check PTH and Vit D    Phos 5.9    # Low Serum Albumin    No dietary protein restrictions    Goal: 1.5g Protein/kg/day    SUGGESTIONS:   iHD today (TUES)    Maintenance dialysis qTTS and PRN   Volume off with dialysis as BP tolerates   Continue usual anti-hypertensive medications, goal BP <140/90    Ferritin precludes IV Fe    JUDITH with HD to goal Hgb 10-11   Transfuse PRN Hgb <7    Check PTH and Vit D   Trend phos    No dietary protein restrctions    Goal: 1.5g Protein/kg/day   Follow labs with further recommendations to follow    Thank you,

## 2022-11-29 NOTE — HOSPITAL COURSE
Patient is a 67 y.o. female from [facility] with a PMHx of ESRD 2/2 diabetes on TTS schedule for the past 4 years followed by [facility], type 2 diabetes mellitus (A1c (6/14/2020): 5.4), hypertension, hyperlipidemia, anemia of chronic kidney disease (Hgb 9.0), hx of RUE AVF s/p thrombectomy + Revision in 2022, recent admission from 6/14-7/12 for AMS due to not receiving dialysis for 10 days who presented to the ED on 11/26/2022 due to altered mental status.

## 2022-11-29 NOTE — PROGRESS NOTES
Hospital Medicine Daily Progress Note    Date of Service  11/28/2022    Chief Complaint  Liberty Bolton is a 67 y.o. female admitted 11/26/2022 with encephalopathy    Hospital Course  Patient is a 67 y.o. female from [facility] with a PMHx of ESRD 2/2 diabetes on TTS schedule for the past 4 years followed by [facility], type 2 diabetes mellitus (A1c (6/14/2020): 5.4), hypertension, hyperlipidemia, anemia of chronic kidney disease (Hgb 9.0), hx of RUE AVF s/p thrombectomy + Revision in 2022, recent admission from 6/14-7/12 for AMS due to not receiving dialysis for 10 days who presented to the ED on 11/26/2022 due to altered mental status.    Interval Problem Update  11/28/2022:  Patient remains mildly encephalopathic unclear as this is her baseline or she requires more dialysis.  Patient did have CT scan of the head today without contrast which was negative.  Maintain the idea of MRI without contrast 11/29/2022 patient's symptoms do not improve.  Otherwise continue present medical management.  Patient being empirically treated for cystitis.  I have discussed this patient's plan of care and discharge plan at IDT rounds today with Case Management, Nursing, Nursing leadership, and other members of the IDT team.    Consultants/Specialty  cardiology and nephrology    Code Status  Full Code    Disposition  Patient is not medically cleared for discharge.   Anticipate discharge to to skilled nursing facility.  I have placed the appropriate orders for post-discharge needs.    Review of Systems  Review of Systems   Unable to perform ROS: Mental acuity      Physical Exam  Temp:  [36.5 °C (97.7 °F)-37.2 °C (99 °F)] 36.8 °C (98.2 °F)  Pulse:  [73-85] 79  Resp:  [14-16] 16  BP: (108-169)/(40-79) 169/74  SpO2:  [94 %-96 %] 96 %    Physical Exam  Vitals and nursing note reviewed.   Constitutional:       Appearance: She is ill-appearing.   HENT:      Head: Normocephalic and atraumatic.       Right Ear: External ear normal.      Left Ear: External ear normal.      Nose: Nose normal.      Mouth/Throat:      Mouth: Mucous membranes are moist.      Pharynx: Oropharynx is clear.   Eyes:      General:         Right eye: No discharge.         Left eye: No discharge.      Extraocular Movements: Extraocular movements intact.      Pupils: Pupils are equal, round, and reactive to light.   Cardiovascular:      Rate and Rhythm: Normal rate and regular rhythm.      Pulses: Normal pulses.      Heart sounds: Normal heart sounds. No murmur heard.    No friction rub. No gallop.   Pulmonary:      Effort: Pulmonary effort is normal. No respiratory distress.      Breath sounds: Normal breath sounds. No stridor. No wheezing, rhonchi or rales.   Abdominal:      General: There is no distension.      Palpations: Abdomen is soft.      Tenderness: There is no abdominal tenderness. There is no guarding or rebound.   Musculoskeletal:         General: Normal range of motion.      Cervical back: Normal range of motion.      Right lower leg: No edema.      Left lower leg: No edema.   Skin:     General: Skin is warm.      Capillary Refill: Capillary refill takes less than 2 seconds.      Coloration: Skin is not jaundiced.      Comments: R AVF - B/T +   Neurological:      General: No focal deficit present.      Mental Status: She is disoriented.      Cranial Nerves: No cranial nerve deficit.       Fluids    Intake/Output Summary (Last 24 hours) at 11/28/2022 1740  Last data filed at 11/28/2022 0900  Gross per 24 hour   Intake 240 ml   Output --   Net 240 ml       Laboratory  Recent Labs     11/27/22  0006 11/28/22  0203   WBC 5.5 5.3   RBC 3.34* 3.39*   HEMOGLOBIN 9.0* 9.5*   HEMATOCRIT 29.7* 30.7*   MCV 88.9 90.6   MCH 26.9* 28.0   MCHC 30.3* 30.9*   RDW 51.7* 52.4*   PLATELETCT 217 214   MPV 10.3 10.4     Recent Labs     11/27/22  0933 11/27/22  1626 11/28/22  0203   SODIUM 137 135 136   POTASSIUM 5.7* 4.2 4.5   CHLORIDE 94* 93* 93*    CO2 24 25 27   GLUCOSE 120* 152* 92   * 43* 49*   CREATININE 9.81* 4.41* 4.92*   CALCIUM 10.6* 9.5 10.1                   Imaging  CT-HEAD W/O   Final Result      1.  No evidence of acute intracranial process.      2.  Cerebral atrophy as well as periventricular chronic small vessel ischemic change.         DX-CHEST-PORTABLE (1 VIEW)   Final Result      New mild right infrahilar opacity most likely indicating atelectasis favored over aspiration or pneumonia           Assessment/Plan  * Acute encephalopathy- (present on admission)  Assessment & Plan    Plan:   -Telemetry monitoring   -Aspiration precautions, fall precautions  -Nephrology consult in a.m. for dialysis due to uremic encephalopathy  -Medications reviewed, no sedating medications  -Ordered TSH, ammonia, B12 to rule out metabolic/endocrine disease  -Corrected Ca 11.2, may be contributing to acute encephalopathy  -Treat presumed UTI with ceftriaxone 1 g X 5 days which could be contributing to acute encephalopathy  -Consider CT head without contrast for any worsening neurological condition    Abnormal CXR  Assessment & Plan    -Will obtain procalcitonin to guide decision on possible CAP  -NPO until bedside swallow evaluation    Urinary tract infection  Assessment & Plan    -C3 1g x 5 days    Peripheral neuropathy  Assessment & Plan    -Continue gabapentin 300 mg every evening    Elevated troponin  Assessment & Plan  *Troponin 221, likely secondary to ESRD    High anion gap metabolic acidosis  Assessment & Plan  *AG 20, CO2 24  *Likely secondary to metabolites from ESRD    -Management as above for end-stage renal disease    Hypercalcemia associated with chronic dialysis (HCC)  Assessment & Plan  *Corrected CA 11.2  *Likely secondary versus tertiary hyperparathyroidism    -Management as above for ESRD    Depression- (present on admission)  Assessment & Plan    -continue home mirtazapine 50 mg and Lexapro 5 mg every day    Hyperlipidemia- (present on  admission)  Assessment & Plan    -Continue home atorvastatin 40 mg daily    Hyperkalemia- (present on admission)  Assessment & Plan  *K 6.0  *EKG with NSR, peaked T waves  *2/2 ESRD  *S/p dextrose 10% bolus 25G, 3 units insulin regular, started bicarbonate 50 mEq    -BMP every 4 hours  -Due to corrected calcium of 11.2 with anuris, will hold off on calcium gluconate  -Dextrose plus insulin regular for elevated potassium    Hypertension  Assessment & Plan    -Resume home amlodipine 10 mg at bedtime, hydralazine 50 mg 3 times daily, metoprolol 25 mg twice daily    ESRD (end stage renal disease) on dialysis ([facility], located within [facility])  Assessment & Plan    -Consult nephrology [facility] nephrology in a.m.  -Continue home sevelamer carbonate 1600 mg 3 times daily  -Renal diet when able to swallow    Normocytic anemia  Assessment & Plan  *Hgb at baseline at 9  *Secondary to anemia of chronic kidney disease    -Consider erythropoietin treatment with hemodialysis       Please note that this dictation was created using voice recognition software. I have made every reasonable attempt to correct obvious errors, but I expect that there are errors of grammar and possibly context that I did not discover before finalizing the note.    VTE prophylaxis: SCDs/TEDs and heparin ppx    I have performed a physical exam and reviewed and updated ROS and Plan today (11/28/2022). In review of yesterday's note (11/27/2022), there are no changes except as documented above.

## 2022-11-29 NOTE — DISCHARGE PLANNING
"Currently pt is documented as A&O CARIE, RNCM PC to pt spouse and pt son via numbers listed in Epic: all 3 numbers stated disconnected and/or no longer in service.  RNCM PC to [facility] to obtain baseline information. Mary Ellen from [facility] stated that pt is normally A&Ox3, uses her W/C for mobility, but has a tendency to refuse treatment. Per Mary Ellen, they have a number for pt son  Steve [phone].  RNCM PC to new number provided: call went to  after 2 rings; left detailed msg for callback.    In attempt to locate [facility]K, [facility]  performed NOK search: RNCM to call numbers provided..      RNCM PC to [phone] and reached pt's Mother in Law, Virginia, who stated she will get ahold of her son, Ruperto, (pt's ). Virginia stated she is willing to make medical decisions until pt spouse calls as \"his phone is shut off till payday\".    1400: RNCHIQUITA received callback from pt's , Ruperto, who gave phone number [phone] to call for medical decisions.  "

## 2022-11-29 NOTE — CARE PLAN
The patient is Stable - Low risk of patient condition declining or worsening    Shift Goals  Clinical Goals: neuro monitoring  Patient Goals: elisabeth  Family Goals: elisabeth    Progress made toward(s) clinical / shift goals:    Problem: Knowledge Deficit - Standard  Goal: Patient and family/care givers will demonstrate understanding of plan of care, disease process/condition, diagnostic tests and medications  Outcome: Progressing     Problem: Fall Risk  Goal: Patient will remain free from falls  Outcome: Progressing     Problem: Skin Integrity  Goal: Skin integrity is maintained or improved  Outcome: Progressing     Problem: Pain - Standard  Goal: Alleviation of pain or a reduction in pain to the patient’s comfort goal  Outcome: Progressing       Patient is progressing towards the following goals:

## 2022-11-29 NOTE — DISCHARGE PLANNING
Outpatient Dialysis Note     Confirmed patient is active at:     [facility]   [address] [place] [id number]     Schedule: Tues, Thurs, Sat   Time: 6:15 AM     Patient is seen by Dr. Key in HD clinic.     Spoke with Ching at facility who confirmed patient information.   Forwarded records for review.     Xitlaly Reyes   Dialysis Coordinator / [facility]  Ph: [phone]

## 2022-11-29 NOTE — PROGRESS NOTES
Hospital Medicine Daily Progress Note    Date of Service  11/29/2022    Chief Complaint  Liberty Bolton is a 67 y.o. female admitted 11/26/2022 with encephalopathy    Hospital Course  Patient is a 67 y.o. female from [facility] with a PMHx of ESRD 2/2 diabetes on TTS schedule for the past 4 years followed by [facility], type 2 diabetes mellitus (A1c (6/14/2020): 5.4), hypertension, hyperlipidemia, anemia of chronic kidney disease (Hgb 9.0), hx of RUE AVF s/p thrombectomy + Revision in 2022, recent admission from 6/14-7/12 for AMS due to not receiving dialysis for 10 days who presented to the ED on 11/26/2022 due to altered mental status.    Interval Problem Update  11/28/2022:  Patient remains mildly encephalopathic unclear as this is her baseline or she requires more dialysis.  Patient did have CT scan of the head today without contrast which was negative.  Maintain the idea of MRI without contrast 11/29/2022 patient's symptoms do not improve.  Otherwise continue present medical management.  Patient being empirically treated for cystitis.  11/29: Patient seen and examined, afebrile, sill confused, concern for aspiration starting empirically on unasyn, seen by speech appreciate rec.  Cont on dialysis for her ESRD, nephrology following appreciate rec.  I have discussed this patient's plan of care and discharge plan at IDT rounds today with Case Management, Nursing, Nursing leadership, and other members of the IDT team.    Consultants/Specialty  cardiology and nephrology    Code Status  Full Code    Disposition  Patient is not medically cleared for discharge.   Anticipate discharge to to skilled nursing facility.  I have placed the appropriate orders for post-discharge needs.    Review of Systems  Review of Systems   Unable to perform ROS: Mental acuity      Physical Exam  Temp:  [35.8 °C (96.5 °F)-36.8 °C (98.2 °F)] 35.8 °C (96.5 °F)  Pulse:  [78-94] 81  Resp:  [15-20] 16  BP:  (129-179)/(54-95) 133/56  SpO2:  [95 %-96 %] 96 %    Physical Exam  Vitals and nursing note reviewed.   Constitutional:       Appearance: She is ill-appearing.   HENT:      Head: Normocephalic and atraumatic.      Right Ear: External ear normal.      Left Ear: External ear normal.      Nose: Nose normal.      Mouth/Throat:      Mouth: Mucous membranes are moist.      Pharynx: Oropharynx is clear.   Eyes:      General:         Right eye: No discharge.         Left eye: No discharge.      Extraocular Movements: Extraocular movements intact.      Pupils: Pupils are equal, round, and reactive to light.   Cardiovascular:      Rate and Rhythm: Normal rate and regular rhythm.      Pulses: Normal pulses.      Heart sounds: Normal heart sounds. No murmur heard.    No friction rub. No gallop.   Pulmonary:      Effort: Pulmonary effort is normal. No respiratory distress.      Breath sounds: Normal breath sounds. No stridor. No wheezing, rhonchi or rales.   Abdominal:      General: There is no distension.      Palpations: Abdomen is soft.      Tenderness: There is no abdominal tenderness. There is no guarding.   Musculoskeletal:         General: Normal range of motion.      Cervical back: Normal range of motion.      Right lower leg: No edema.      Left lower leg: No edema.   Skin:     General: Skin is warm.      Capillary Refill: Capillary refill takes less than 2 seconds.      Coloration: Skin is not jaundiced.      Comments: R AVF - B/T +   Neurological:      General: No focal deficit present.      Mental Status: She is disoriented.      Cranial Nerves: No cranial nerve deficit.       Fluids  No intake or output data in the 24 hours ending 11/29/22 1412      Laboratory  Recent Labs     11/27/22  0006 11/28/22  0203 11/29/22  0318   WBC 5.5 5.3 5.9   RBC 3.34* 3.39* 3.16*   HEMOGLOBIN 9.0* 9.5* 8.6*   HEMATOCRIT 29.7* 30.7* 28.6*   MCV 88.9 90.6 90.5   MCH 26.9* 28.0 27.2   MCHC 30.3* 30.9* 30.1*   RDW 51.7* 52.4* 50.4*    PLATELETCT 217 214 221   MPV 10.3 10.4 10.7       Recent Labs     11/27/22  1626 11/28/22  0203 11/29/22  0050   SODIUM 135 136 132*   POTASSIUM 4.2 4.5 5.3   CHLORIDE 93* 93* 92*   CO2 25 27 23   GLUCOSE 152* 92 103*   BUN 43* 49* 63*   CREATININE 4.41* 4.92* 5.80*   CALCIUM 9.5 10.1 10.1                     Imaging  CT-HEAD W/O   Final Result      1.  No evidence of acute intracranial process.      2.  Cerebral atrophy as well as periventricular chronic small vessel ischemic change.         DX-CHEST-PORTABLE (1 VIEW)   Final Result      New mild right infrahilar opacity most likely indicating atelectasis favored over aspiration or pneumonia             Assessment/Plan  * Acute encephalopathy- (present on admission)  Assessment & Plan    Plan:   -Telemetry monitoring   -Aspiration precautions, fall precautions  -Nephrology consult in a.m. for dialysis due to uremic encephalopathy  -Medications reviewed, no sedating medications  -Ordered TSH, ammonia, B12 to rule out metabolic/endocrine disease  -Corrected Ca 11.2, may be contributing to acute encephalopathy  -Treat presumed UTI with ceftriaxone 1 g X 5 days which could be contributing to acute encephalopathy  -Consider CT head without contrast for any worsening neurological condition    Abnormal CXR  Assessment & Plan    -Will obtain procalcitonin to guide decision on possible CAP  -NPO until bedside swallow evaluation    Urinary tract infection  Assessment & Plan    -C3 1g x 5 days    Peripheral neuropathy  Assessment & Plan    -Continue gabapentin 300 mg every evening    Elevated troponin  Assessment & Plan  *Troponin 221, likely secondary to ESRD    High anion gap metabolic acidosis  Assessment & Plan  *AG 20, CO2 24  *Likely secondary to metabolites from ESRD    -Management as above for end-stage renal disease    Hypercalcemia associated with chronic dialysis (HCC)  Assessment & Plan  *Corrected CA 11.2  *Likely secondary versus tertiary  hyperparathyroidism    -Management as above for ESRD    Depression- (present on admission)  Assessment & Plan    -continue home mirtazapine 50 mg and Lexapro 5 mg every day    Hyperlipidemia- (present on admission)  Assessment & Plan    -Continue home atorvastatin 40 mg daily    Hyperkalemia- (present on admission)  Assessment & Plan  *K 6.0  *EKG with NSR, peaked T waves  *2/2 ESRD  *S/p dextrose 10% bolus 25G, 3 units insulin regular, started bicarbonate 50 mEq    -BMP every 4 hours  -Due to corrected calcium of 11.2 with anuris, will hold off on calcium gluconate  -Dextrose plus insulin regular for elevated potassium    Hypertension  Assessment & Plan    -Resume home amlodipine 10 mg at bedtime, hydralazine 50 mg 3 times daily, metoprolol 25 mg twice daily    ESRD (end stage renal disease) on dialysis (HCC)  Assessment & Plan    -Consult nephrology [facility] nephrology in a.m.  -Continue home sevelamer carbonate 1600 mg 3 times daily  -Renal diet when able to swallow    Normocytic anemia  Assessment & Plan  *Hgb at baseline at 9  *Secondary to anemia of chronic kidney disease    -Consider erythropoietin treatment with hemodialysis       Please note that this dictation was created using voice recognition software. I have made every reasonable attempt to correct obvious errors, but I expect that there are errors of grammar and possibly context that I did not discover before finalizing the note.    VTE prophylaxis: SCDs/TEDs and heparin ppx    I have performed a physical exam and reviewed and updated ROS and Plan today (11/29/2022). In review of yesterday's note (11/28/2022), there are no changes except as documented above.

## 2022-11-30 LAB
25(OH)D3 SERPL-MCNC: 36 NG/ML (ref 30–100)
ALBUMIN SERPL BCP-MCNC: 3.4 G/DL (ref 3.2–4.9)
ANION GAP SERPL CALC-SCNC: 15 MMOL/L (ref 7–16)
BUN SERPL-MCNC: 28 MG/DL (ref 8–22)
BUN SERPL-MCNC: 33 MG/DL (ref 8–22)
CALCIUM SERPL-MCNC: 10.1 MG/DL (ref 8.5–10.5)
CALCIUM SERPL-MCNC: 10.1 MG/DL (ref 8.5–10.5)
CHLORIDE SERPL-SCNC: 95 MMOL/L (ref 96–112)
CHLORIDE SERPL-SCNC: 95 MMOL/L (ref 96–112)
CO2 SERPL-SCNC: 28 MMOL/L (ref 20–33)
CO2 SERPL-SCNC: 29 MMOL/L (ref 20–33)
CREAT SERPL-MCNC: 3.18 MG/DL (ref 0.5–1.4)
CREAT SERPL-MCNC: 3.99 MG/DL (ref 0.5–1.4)
ERYTHROCYTE [DISTWIDTH] IN BLOOD BY AUTOMATED COUNT: 48.5 FL (ref 35.9–50)
GFR SERPLBLD CREATININE-BSD FMLA CKD-EPI: 12 ML/MIN/1.73 M 2
GFR SERPLBLD CREATININE-BSD FMLA CKD-EPI: 15 ML/MIN/1.73 M 2
GLUCOSE SERPL-MCNC: 105 MG/DL (ref 65–99)
GLUCOSE SERPL-MCNC: 179 MG/DL (ref 65–99)
HCT VFR BLD AUTO: 25.5 % (ref 37–47)
HGB BLD-MCNC: 7.9 G/DL (ref 12–16)
MCH RBC QN AUTO: 27.3 PG (ref 27–33)
MCHC RBC AUTO-ENTMCNC: 31 G/DL (ref 33.6–35)
MCV RBC AUTO: 88.2 FL (ref 81.4–97.8)
PHOSPHATE SERPL-MCNC: 3.4 MG/DL (ref 2.5–4.5)
PLATELET # BLD AUTO: 266 K/UL (ref 164–446)
PMV BLD AUTO: 10.8 FL (ref 9–12.9)
POTASSIUM SERPL-SCNC: 3.7 MMOL/L (ref 3.6–5.5)
POTASSIUM SERPL-SCNC: 4.1 MMOL/L (ref 3.6–5.5)
PTH-INTACT SERPL-MCNC: 44.1 PG/ML (ref 14–72)
RBC # BLD AUTO: 2.89 M/UL (ref 4.2–5.4)
SODIUM SERPL-SCNC: 138 MMOL/L (ref 135–145)
SODIUM SERPL-SCNC: 138 MMOL/L (ref 135–145)
VANCOMYCIN TROUGH SERPL-MCNC: 13.8 UG/ML (ref 10–20)
WBC # BLD AUTO: 5.4 K/UL (ref 4.8–10.8)

## 2022-11-30 PROCEDURE — 80048 BASIC METABOLIC PNL TOTAL CA: CPT

## 2022-11-30 PROCEDURE — 83970 ASSAY OF PARATHORMONE: CPT

## 2022-11-30 PROCEDURE — 700105 HCHG RX REV CODE 258: Performed by: INTERNAL MEDICINE

## 2022-11-30 PROCEDURE — 80069 RENAL FUNCTION PANEL: CPT

## 2022-11-30 PROCEDURE — 92526 ORAL FUNCTION THERAPY: CPT

## 2022-11-30 PROCEDURE — A9270 NON-COVERED ITEM OR SERVICE: HCPCS | Performed by: STUDENT IN AN ORGANIZED HEALTH CARE EDUCATION/TRAINING PROGRAM

## 2022-11-30 PROCEDURE — 85027 COMPLETE CBC AUTOMATED: CPT

## 2022-11-30 PROCEDURE — 84155 ASSAY OF PROTEIN SERUM: CPT

## 2022-11-30 PROCEDURE — 83521 IG LIGHT CHAINS FREE EACH: CPT | Mod: 91

## 2022-11-30 PROCEDURE — 80202 ASSAY OF VANCOMYCIN: CPT

## 2022-11-30 PROCEDURE — 700111 HCHG RX REV CODE 636 W/ 250 OVERRIDE (IP): Performed by: INTERNAL MEDICINE

## 2022-11-30 PROCEDURE — 84165 PROTEIN E-PHORESIS SERUM: CPT

## 2022-11-30 PROCEDURE — 700111 HCHG RX REV CODE 636 W/ 250 OVERRIDE (IP): Performed by: STUDENT IN AN ORGANIZED HEALTH CARE EDUCATION/TRAINING PROGRAM

## 2022-11-30 PROCEDURE — A9270 NON-COVERED ITEM OR SERVICE: HCPCS | Performed by: INTERNAL MEDICINE

## 2022-11-30 PROCEDURE — 770020 HCHG ROOM/CARE - TELE (206)

## 2022-11-30 PROCEDURE — 82306 VITAMIN D 25 HYDROXY: CPT

## 2022-11-30 PROCEDURE — 99232 SBSQ HOSP IP/OBS MODERATE 35: CPT | Performed by: INTERNAL MEDICINE

## 2022-11-30 PROCEDURE — 700102 HCHG RX REV CODE 250 W/ 637 OVERRIDE(OP): Performed by: INTERNAL MEDICINE

## 2022-11-30 PROCEDURE — 36415 COLL VENOUS BLD VENIPUNCTURE: CPT

## 2022-11-30 PROCEDURE — 700102 HCHG RX REV CODE 250 W/ 637 OVERRIDE(OP): Performed by: STUDENT IN AN ORGANIZED HEALTH CARE EDUCATION/TRAINING PROGRAM

## 2022-11-30 PROCEDURE — 97530 THERAPEUTIC ACTIVITIES: CPT

## 2022-11-30 RX ORDER — AMOXICILLIN AND CLAVULANATE POTASSIUM 500; 125 MG/1; MG/1
1 TABLET, FILM COATED ORAL EVERY 24 HOURS
Status: DISCONTINUED | OUTPATIENT
Start: 2022-12-01 | End: 2022-12-01 | Stop reason: HOSPADM

## 2022-11-30 RX ORDER — LANTHANUM CARBONATE 500 MG/1
750 TABLET, CHEWABLE ORAL
Status: DISCONTINUED | OUTPATIENT
Start: 2022-11-30 | End: 2022-12-01 | Stop reason: HOSPADM

## 2022-11-30 RX ADMIN — LANTHANUM CARBONATE 750 MG: 500 TABLET, CHEWABLE ORAL at 12:58

## 2022-11-30 RX ADMIN — METOPROLOL TARTRATE 25 MG: 25 TABLET, FILM COATED ORAL at 16:43

## 2022-11-30 RX ADMIN — HEPARIN SODIUM 5000 UNITS: 5000 INJECTION, SOLUTION INTRAVENOUS; SUBCUTANEOUS at 05:29

## 2022-11-30 RX ADMIN — ESCITALOPRAM OXALATE 5 MG: 10 TABLET ORAL at 05:29

## 2022-11-30 RX ADMIN — MIRTAZAPINE 15 MG: 15 TABLET, FILM COATED ORAL at 21:30

## 2022-11-30 RX ADMIN — HYDRALAZINE HYDROCHLORIDE 50 MG: 50 TABLET, FILM COATED ORAL at 16:43

## 2022-11-30 RX ADMIN — METOPROLOL TARTRATE 25 MG: 25 TABLET, FILM COATED ORAL at 05:29

## 2022-11-30 RX ADMIN — HYDRALAZINE HYDROCHLORIDE 50 MG: 50 TABLET, FILM COATED ORAL at 12:58

## 2022-11-30 RX ADMIN — GABAPENTIN 300 MG: 300 CAPSULE ORAL at 16:43

## 2022-11-30 RX ADMIN — Medication 1000 UNITS: at 21:30

## 2022-11-30 RX ADMIN — HYDRALAZINE HYDROCHLORIDE 50 MG: 50 TABLET, FILM COATED ORAL at 05:29

## 2022-11-30 RX ADMIN — SEVELAMER CARBONATE 1600 MG: 800 TABLET, FILM COATED ORAL at 05:29

## 2022-11-30 RX ADMIN — AMPICILLIN AND SULBACTAM 3 G: 1; 2 INJECTION, POWDER, FOR SOLUTION INTRAMUSCULAR; INTRAVENOUS at 05:51

## 2022-11-30 RX ADMIN — AMLODIPINE BESYLATE 10 MG: 10 TABLET ORAL at 21:29

## 2022-11-30 RX ADMIN — HEPARIN SODIUM 5000 UNITS: 5000 INJECTION, SOLUTION INTRAVENOUS; SUBCUTANEOUS at 12:59

## 2022-11-30 RX ADMIN — ATORVASTATIN CALCIUM 40 MG: 40 TABLET, FILM COATED ORAL at 16:43

## 2022-11-30 RX ADMIN — LANTHANUM CARBONATE 750 MG: 500 TABLET, CHEWABLE ORAL at 16:43

## 2022-11-30 RX ADMIN — HEPARIN SODIUM 5000 UNITS: 5000 INJECTION, SOLUTION INTRAVENOUS; SUBCUTANEOUS at 21:31

## 2022-11-30 ASSESSMENT — COGNITIVE AND FUNCTIONAL STATUS - GENERAL
MOVING FROM LYING ON BACK TO SITTING ON SIDE OF FLAT BED: UNABLE
STANDING UP FROM CHAIR USING ARMS: TOTAL
WALKING IN HOSPITAL ROOM: TOTAL
TURNING FROM BACK TO SIDE WHILE IN FLAT BAD: UNABLE
MOVING TO AND FROM BED TO CHAIR: UNABLE
CLIMB 3 TO 5 STEPS WITH RAILING: TOTAL
MOBILITY SCORE: 6
SUGGESTED CMS G CODE MODIFIER MOBILITY: CN

## 2022-11-30 ASSESSMENT — FIBROSIS 4 INDEX: FIB4 SCORE: 1.83

## 2022-11-30 ASSESSMENT — PAIN DESCRIPTION - PAIN TYPE
TYPE: ACUTE PAIN;CHRONIC PAIN
TYPE: ACUTE PAIN

## 2022-11-30 ASSESSMENT — GAIT ASSESSMENTS: GAIT LEVEL OF ASSIST: UNABLE TO PARTICIPATE

## 2022-11-30 NOTE — THERAPY
Physical Therapy   Daily Treatment     Patient Name: Liberty Bolton  Age:  67 y.o., Sex:  female  Medical Record #: [id number]  Today's Date: 11/30/2022     Precautions  Precautions: Fall Risk;Swallow Precautions ( See Comments)    Assessment    Pt seen for follow up PT tx. Pt still disoriented but much more engaged in PT session. Per notes, pt typically gets to  at [place], unclear level of assist. Total assist required for bed mobility and once EOB, pt instructed in seated balance exercises and LE ROM exercises. Once returned to supine, therapist provided PROM to B knees with pain noted from pt. Unable to achieve terminal knee extension and pt is at risk for further contractures. PT will cont 3x/wk    Plan    Continue current treatment plan.    DC Equipment Recommendations: Unable to determine at this time  Discharge Recommendations: Recommend post-acute placement for additional physical therapy services prior to discharge home (return to SNF)       11/30/22 1050   Cognition    Cognition / Consciousness X   Speech/ Communication Delayed Responses   Orientation Level Not Oriented to Day;Not Oriented to Time;Not Oriented to Place;Not Oriented to Reason   Level of Consciousness Alert   Ability To Follow Commands 1 Step   Safety Awareness Impaired   New Learning Impaired   Comments per notes baseline is A&O x3. Pt still disoriented but very engaged in session   Sitting Lower Body Exercises   Sitting Lower Body Exercises Yes   Ankle Pumps 1 set of 10;Bilateral   Long Arc Quad 1 set of 10;Bilateral   Neuro-Muscular Treatments   Neuro-Muscular Treatments Weight Shift Left;Weight Shift Right;Tactile Cuing;Anterior weight shift;Compensatory Strategies   Comments EOB sitting balance   Other Treatments   Other Treatments Provided assisted with linen change post BM   Balance   Sitting Balance (Static) Poor -   Sitting Balance (Dynamic) Trace +   Standing Balance (Static) Dependent   Weight Shift Sitting Poor   Skilled  Intervention Verbal Cuing;Compensatory Strategies   Comments EOB only   Gait Analysis   Gait Level Of Assist Unable to Participate   Bed Mobility    Supine to Sit Total Assist   Sit to Supine Total Assist   Scooting Total Assist   Rolling Total Assist to Rt.;Total Assist to Lt.   Skilled Intervention Verbal Cuing;Compensatory Strategies   Comments cues provided to improve participation   Functional Mobility   Sit to Stand Unable to Participate   Bed, Chair, Wheelchair Transfer Unable to Participate   Mobility EOB only   Patient / Family Goals    Patient / Family Goal #1 none stated   Short Term Goals    Short Term Goal # 1 pt will be able to complete supine<>Sitting with mod assist in 6tx in order to decrease caregiver burden   Goal Outcome # 1 goal not met   Short Term Goal # 2 pt will be able to sit EOB with fair- balance in 6tx in order to decreased fall risk   Goal Outcome # 2 Goal not met   Anticipated Discharge Equipment and Recommendations   DC Equipment Recommendations Unable to determine at this time   Discharge Recommendations Recommend post-acute placement for additional physical therapy services prior to discharge home

## 2022-11-30 NOTE — PROGRESS NOTES
Monitor Summary:   Rhythm: SR, ST  Rate:   Measurement: .18/.07/.38  Ectopy: R PVC                  Spoke with Caryl from [facility]. Informed her that the referral was initially approved to another location but that someone is contacting [facility] today to have that fixed. Writer to call mom with an update after the referral has been approved.

## 2022-11-30 NOTE — PROGRESS NOTES
Handoff report received from night shift nurse. Pt care assumed. Pt is currently resting in bed. POC discussed with Pt and Pt verbalizes no questions at this time. Pt is AAOx1, on Ra, on Tele monitoring, and VSS. Call light and belongings within reach, bed in lowest and locked position, and Pt educated on use of call light.

## 2022-11-30 NOTE — THERAPY
Speech Language Pathology  Daily Treatment     Patient Name: Liberty Bolton  Age:  67 y.o., Sex:  female  Medical Record #: [id number]  Today's Date: 11/30/2022     Precautions  Precautions: (P) Fall Risk, Swallow Precautions ( See Comments)    HPI:66 YO female admitted from SNF 11/27 2/2 AMS. Recent admission 6/14-7/12 for AMS 2/2 not receiving dialysis.     Assessment    Patient seen this date for dysphagia management. Limited PO intake per RN; however, he reports improved mentation (though still confused). Patient alert and positioned to upright in bed.     PO presentations of TN0, PU4, and MM5 from breakfast tray as well as SB6 brought in by clinician. Adequate oral bolus acceptance/containment, complete AP transfer; mildly prolonged mastication of both MM and SB, though functional with no increase in WOB. Single cough throughout entirety of breakfast with no other clinical indicators of aspiration appreciated. Vocal quality remained stable throughout assessment. One-two swallows completed per bolus. Patient appears appropriate for diet upgrade to SB6. Patient continues to benefit from 1:1 FA r/t BUE weakness and mentation. Encourage self-feeding as able, particularly with drinks using straws.     Recommendations  Soft and bite size solids with thin liquids  Instrumentation: None indicated at this time  Swallowing Instructions & Precautions:   Supervision: Direct supervision, assist with meal tray set up; encourage self feeding as able  Positioning: Fully upright and midline during oral intake  Medication: Whole with puree  Strategies: Small bites/sips, Slow rate of intake  Oral Care: BID        SLP following.     Plan    Continue current treatment plan.    Discharge Recommendations: (P) Anticipate that the patient will have no further speech therapy needs after discharge from the hospital     Objective       11/30/22 1031   Charge Group   SLP Swallowing Dysfunction Treatment Swallowing Dysfunction Treatment  "  Total Treatment Time   SLP Time Spent Yes   SLP Swallowing Dysfunction Treatment (Mins) 22   Precautions   Precautions Fall Risk;Swallow Precautions ( See Comments)   Vitals   O2 Delivery Device None - Room Air   Pain 0 - 10 Group   Therapist Pain Assessment Post Activity Pain Same as Prior to Activity;0   Dysphagia    Dysphagia X   Diet / Liquid Recommendation Thin (0);Soft & Bite-Sized (6) - (Dysphagia III)   Nutritional Liquid Intake Rating Scale Non thickened beverages   Nutritional Food Intake Rating Scale Total oral diet with multiple consistencies but requiring special preparation or compensations   Nursing Communication Swallow Precaution Sign Posted at Head of Bed   Skilled Intervention Verbal Cueing   Recommended Route of Medication Administration   Medication Administration  Float Whole with Puree   Patient / Family Goals   Patient / Family Goal #1 \"I think so\" to diet upgrade   Short Term Goals   Short Term Goal # 1 Pt will consume a diet of MM/TN with no s/sx of aspiration and min cues.   Goal Outcome # 1 Goal met, new goal added   Short Term Goal # 1 B  Patient will consume a diet of SB6/TN0 without clinical indicators of asipration or decline in pulmonary status.   Education Group   Education Provided Dysphagia   Dysphagia Patient Response Patient;Acceptance;Explanation;Reinforcement Needed   Anticipated Discharge Needs   Discharge Recommendations Anticipate that the patient will have no further speech therapy needs after discharge from the hospital   Therapy Recommendations Upon DC Not Indicated   Interdisciplinary Plan of Care Collaboration   IDT Collaboration with  Nursing   Patient Position at End of Therapy In Bed;Bed Alarm On;Call Light within Reach;Tray Table within Reach   Collaboration Comments RN updated     "

## 2022-11-30 NOTE — PROGRESS NOTES
Hospital Medicine Daily Progress Note    Date of Service  11/30/2022    Chief Complaint  Liberty Bolton is a 67 y.o. female admitted 11/26/2022 with encephalopathy    Hospital Course  Patient is a 67 y.o. female from [facility] with a PMHx of ESRD 2/2 diabetes on TTS schedule for the past 4 years followed by [facility], type 2 diabetes mellitus (A1c (6/14/2020): 5.4), hypertension, hyperlipidemia, anemia of chronic kidney disease (Hgb 9.0), hx of RUE AVF s/p thrombectomy + Revision in 2022, recent admission from 6/14-7/12 for AMS due to not receiving dialysis for 10 days who presented to the ED on 11/26/2022 due to altered mental status.    Interval Problem Update  11/28/2022:  Patient remains mildly encephalopathic unclear as this is her baseline or she requires more dialysis.  Patient did have CT scan of the head today without contrast which was negative.  Maintain the idea of MRI without contrast 11/29/2022 patient's symptoms do not improve.  Otherwise continue present medical management.  Patient being empirically treated for cystitis.  11/29: Patient seen and examined, afebrile, sill confused, concern for aspiration starting empirically on unasyn, seen by speech appreciate rec.  Cont on dialysis for her ESRD, nephrology following appreciate rec.  11/30: Patient resting in bed cont on abx for possible aspiration, cont dialysis for her ESRD  Nephrology following appreciate rec   I have discussed this patient's plan of care and discharge plan at IDT rounds today with Case Management, Nursing, Nursing leadership, and other members of the IDT team.    Consultants/Specialty  cardiology and nephrology    Code Status  Full Code    Disposition  Patient is not medically cleared for discharge.   Anticipate discharge to to skilled nursing facility.  I have placed the appropriate orders for post-discharge needs.    Review of Systems  Review of Systems   Unable to perform ROS: Mental  acuity      Physical Exam  Temp:  [35.8 °C (96.5 °F)-37.2 °C (99 °F)] 36.4 °C (97.5 °F)  Pulse:  [] 74  Resp:  [15-18] 18  BP: (112-163)/(46-83) 124/47  SpO2:  [95 %-98 %] 97 %    Physical Exam  Vitals and nursing note reviewed.   Constitutional:       Appearance: She is ill-appearing.   HENT:      Head: Normocephalic and atraumatic.      Right Ear: External ear normal.      Left Ear: External ear normal.      Nose: Nose normal.      Mouth/Throat:      Mouth: Mucous membranes are moist.      Pharynx: Oropharynx is clear.   Eyes:      General:         Right eye: No discharge.         Left eye: No discharge.      Extraocular Movements: Extraocular movements intact.      Pupils: Pupils are equal, round, and reactive to light.   Cardiovascular:      Rate and Rhythm: Normal rate and regular rhythm.      Pulses: Normal pulses.      Heart sounds: Normal heart sounds. No murmur heard.    No friction rub. No gallop.   Pulmonary:      Effort: Pulmonary effort is normal. No respiratory distress.      Breath sounds: Normal breath sounds. No stridor. No wheezing, rhonchi or rales.   Abdominal:      General: There is no distension.      Palpations: Abdomen is soft.      Tenderness: There is no abdominal tenderness. There is no guarding.   Musculoskeletal:         General: Normal range of motion.      Cervical back: Normal range of motion.      Right lower leg: No edema.      Left lower leg: No edema.   Skin:     General: Skin is warm.      Capillary Refill: Capillary refill takes less than 2 seconds.      Coloration: Skin is not jaundiced.      Comments: R AVF - B/T +   Neurological:      General: No focal deficit present.      Mental Status: She is disoriented.      Cranial Nerves: No cranial nerve deficit.       Fluids    Intake/Output Summary (Last 24 hours) at 11/30/2022 1246  Last data filed at 11/30/2022 1035  Gross per 24 hour   Intake 620 ml   Output 1500 ml   Net -880 ml       Laboratory  Recent Labs      11/28/22  0203 11/29/22  0318 11/30/22  0304   WBC 5.3 5.9 5.4   RBC 3.39* 3.16* 2.89*   HEMOGLOBIN 9.5* 8.6* 7.9*   HEMATOCRIT 30.7* 28.6* 25.5*   MCV 90.6 90.5 88.2   MCH 28.0 27.2 27.3   MCHC 30.9* 30.1* 31.0*   RDW 52.4* 50.4* 48.5   PLATELETCT 214 221 266   MPV 10.4 10.7 10.8     Recent Labs     11/28/22  0203 11/29/22  0050 11/30/22  0304   SODIUM 136 132* 138   POTASSIUM 4.5 5.3 3.7   CHLORIDE 93* 92* 95*   CO2 27 23 28   GLUCOSE 92 103* 105*   BUN 49* 63* 28*   CREATININE 4.92* 5.80* 3.18*   CALCIUM 10.1 10.1 10.1                   Imaging  CT-HEAD W/O   Final Result      1.  No evidence of acute intracranial process.      2.  Cerebral atrophy as well as periventricular chronic small vessel ischemic change.         DX-CHEST-PORTABLE (1 VIEW)   Final Result      New mild right infrahilar opacity most likely indicating atelectasis favored over aspiration or pneumonia             Assessment/Plan  * Acute encephalopathy- (present on admission)  Assessment & Plan    Plan:   -Telemetry monitoring   -Aspiration precautions, fall precautions  -Nephrology consult in a.m. for dialysis due to uremic encephalopathy  -Medications reviewed, no sedating medications  -Ordered TSH, ammonia, B12 to rule out metabolic/endocrine disease  -Corrected Ca 11.2, may be contributing to acute encephalopathy  -Treat presumed UTI with ceftriaxone 1 g X 5 days which could be contributing to acute encephalopathy  -Consider CT head without contrast for any worsening neurological condition    Abnormal CXR  Assessment & Plan    -Will obtain procalcitonin to guide decision on possible CAP  -NPO until bedside swallow evaluation    Urinary tract infection  Assessment & Plan    -C3 1g x 5 days    Peripheral neuropathy  Assessment & Plan    -Continue gabapentin 300 mg every evening    Elevated troponin  Assessment & Plan  *Troponin 221, likely secondary to ESRD    High anion gap metabolic acidosis  Assessment & Plan  *AG 20, CO2 24  *Likely  secondary to metabolites from ESRD    -Management as above for end-stage renal disease    Hypercalcemia associated with chronic dialysis (HCC)  Assessment & Plan  *Corrected CA 11.2  *Likely secondary versus tertiary hyperparathyroidism    -Management as above for ESRD    Depression- (present on admission)  Assessment & Plan    -continue home mirtazapine 50 mg and Lexapro 5 mg every day    Hyperlipidemia- (present on admission)  Assessment & Plan    -Continue home atorvastatin 40 mg daily    Hyperkalemia- (present on admission)  Assessment & Plan  *K 6.0  *EKG with NSR, peaked T waves  *2/2 ESRD  *S/p dextrose 10% bolus 25G, 3 units insulin regular, started bicarbonate 50 mEq    -BMP every 4 hours  -Due to corrected calcium of 11.2 with anuris, will hold off on calcium gluconate  -Dextrose plus insulin regular for elevated potassium    Hypertension  Assessment & Plan    -Resume home amlodipine 10 mg at bedtime, hydralazine 50 mg 3 times daily, metoprolol 25 mg twice daily    ESRD (end stage renal disease) on dialysis (HCC)  Assessment & Plan    -Consult nephrology [facility] nephrology in a.m.  -Continue home sevelamer carbonate 1600 mg 3 times daily  -Renal diet when able to swallow    Normocytic anemia  Assessment & Plan  *Hgb at baseline at 9  *Secondary to anemia of chronic kidney disease    -Consider erythropoietin treatment with hemodialysis     Please note that this dictation was created using voice recognition software. I have made every reasonable attempt to correct obvious errors, but I expect that there are errors of grammar and possibly context that I did not discover before finalizing the note.    VTE prophylaxis: SCDs/TEDs and heparin ppx    I have performed a physical exam and reviewed and updated ROS and Plan today (11/30/2022). In review of yesterday's note (11/29/2022), there are no changes except as documented above.

## 2022-11-30 NOTE — PROGRESS NOTES
"[facility] -  PROGRESS NOTE               Author: Nikhil Peterson D.O. Date & Time: 11/30/2022  9:57 AM     HPI:  67 y.o. female with ESRD on HD TTS at [facility] who resides at [facility] presented wth encephalopathy. Recent  admission for similar presentation after refusing HD for nearly 2 weeks. She is currently unable to participate in interview but reports jane tshe has again refused dialysis for ~10 days. In the ED found to have a Bun 124 and cr 10. Seen on HD -tolerating procedure. Infectious work-up underway, on abx.    DAILY NEPHROLOGY SUMMARY:  11/27 - Consult done.  Urgent dialysis provided.  11/28 - Mental status unchanged.  Head CT pending.  Getting swallow eval. Voices no complaint or pain.    No dyspnea at rest.   11/29 - Pt seen on HD, BP stable, remains confused, verbalizes minimally, labs reviewed, CT  head   yesterday no acute changes  11/30 - HD yesterday and tolerated well.  No overnight events or changes.  Feels \"okay\".    Tells me she isn't eating.  +Weakness       REVIEW OF SYSTEMS:    10 point ROS reviewed and is as per HPI/daily summary or otherwise negative    PMH/PSH/SH/FH: Reviewed and unchanged since admission note  CURRENT MEDICATIONS: Reviewed from admission to present day    VS:  /59   Pulse 83   Temp 36.2 °C (97.2 °F) (Temporal)   Resp 15   Ht 1.702 m (5' 7\")   Wt 63.1 kg (139 lb 1.8 oz) Comment: Waffle is inflated   SpO2 96%   BMI 21.79 kg/m²   Physical Exam  Nursing note reviewed.   Constitutional:       General: She is not in acute distress.     Appearance: Normal appearance.   HENT:      Head: Normocephalic and atraumatic.      Mouth/Throat:      Mouth: Mucous membranes are moist.   Eyes:      General: No scleral icterus.     Extraocular Movements: Extraocular movements intact.      Pupils: Pupils are equal, round, and reactive to light.   Cardiovascular:      Rate and Rhythm: Normal rate and regular rhythm.      Heart sounds: Normal " heart sounds.     No friction rub.      Comments: RUE AVF  Pulmonary:      Effort: Pulmonary effort is normal. No respiratory distress.      Breath sounds: No wheezing.   Abdominal:      General: Abdomen is flat. There is no distension.      Palpations: Abdomen is soft.      Tenderness: There is no abdominal tenderness.   Musculoskeletal:         General: No swelling, tenderness or deformity.      Cervical back: Normal range of motion and neck supple.   Skin:     General: Skin is warm and dry.      Findings: No rash.   Neurological:      Mental Status: She is alert. She is disoriented.   Psychiatric:         Attention and Perception: She is inattentive.       Fluids:  In: 500 [Dialysis:500]  Out: 1500     LABS:  Recent Labs     11/28/22  0203 11/29/22  0050 11/30/22  0304   SODIUM 136 132* 138   POTASSIUM 4.5 5.3 3.7   CHLORIDE 93* 92* 95*   CO2 27 23 28   GLUCOSE 92 103* 105*   BUN 49* 63* 28*   CREATININE 4.92* 5.80* 3.18*   CALCIUM 10.1 10.1 10.1         ASSESSMENTS:   # ESRD    No need for dialysis today (WED)  Maintenance dialysis qTTS and as needed    Right arm AVF    Has been non-adherent to outpt dialysis schedule   # ALOC    Uremia corrected but still confused    Head CT no acute changes 11/28; + atrophy and chronic small vessel ischemic change    ?Baseline    SLP eval   # Hypertension, fair control     Continue current medications    Volume off with dialysis as BP tolerates   # Anemia, below target     %sat 45, ferritin 1541     JUDITH with HD to goal Hgb 10-11    Transfuse PRN   # CKD-MBD    Check PTH = 44 (Suppressed by high Ca)    Ca = 10.1  Vit D = 36    Phos 5.9    # Hypercalcemia    Not PTH driven    DDX: Malignancy, ingestion    Check UPEP, SPEP, FLC    Consider age appropriate malignancy screening    Can control on HD with low Ca bath   # Low Serum Albumin    No dietary protein restrictions    Goal: 1.5g Protein/kg/day    SUGGESTIONS:   No need for dialysis (WED)    Maintenance dialysis qTTS and  PRN   Volume off with dialysis as BP tolerates   Continue usual anti-hypertensive medications, goal BP <140/90    Ferritin too high for use of IV iron   JUDITH with HD to goal Hgb 10-11   Transfuse PRN Hgb <7    Control Ca on HD   Check UPEP, SPEP, FLC   Consider age appropriate malignancy screening   Limit dietary Ca   No calcium containing phos binders   Continue sevelemer with meals   No dietary protein restrctions    Goal: 1.5g Protein/kg/day   Follow labs with further recommendations to follow    Thank you,

## 2022-12-01 VITALS
BODY MASS INDEX: 22.98 KG/M2 | OXYGEN SATURATION: 87 % | DIASTOLIC BLOOD PRESSURE: 73 MMHG | RESPIRATION RATE: 18 BRPM | HEART RATE: 109 BPM | TEMPERATURE: 97 F | WEIGHT: 146.39 LBS | SYSTOLIC BLOOD PRESSURE: 167 MMHG | HEIGHT: 67 IN

## 2022-12-01 LAB
ANION GAP SERPL CALC-SCNC: 12 MMOL/L (ref 7–16)
BUN SERPL-MCNC: 31 MG/DL (ref 8–22)
CALCIUM SERPL-MCNC: 8.8 MG/DL (ref 8.5–10.5)
CHLORIDE SERPL-SCNC: 99 MMOL/L (ref 96–112)
CO2 SERPL-SCNC: 26 MMOL/L (ref 20–33)
CREAT SERPL-MCNC: 3.77 MG/DL (ref 0.5–1.4)
ERYTHROCYTE [DISTWIDTH] IN BLOOD BY AUTOMATED COUNT: 48.8 FL (ref 35.9–50)
GFR SERPLBLD CREATININE-BSD FMLA CKD-EPI: 12 ML/MIN/1.73 M 2
GLUCOSE SERPL-MCNC: 94 MG/DL (ref 65–99)
HCT VFR BLD AUTO: 17.6 % (ref 37–47)
HCT VFR BLD AUTO: 27.6 % (ref 37–47)
HGB BLD-MCNC: 5.3 G/DL (ref 12–16)
HGB BLD-MCNC: 8.5 G/DL (ref 12–16)
MCH RBC QN AUTO: 27 PG (ref 27–33)
MCHC RBC AUTO-ENTMCNC: 30.1 G/DL (ref 33.6–35)
MCV RBC AUTO: 89.8 FL (ref 81.4–97.8)
PLATELET # BLD AUTO: 263 K/UL (ref 164–446)
PMV BLD AUTO: 10.5 FL (ref 9–12.9)
POTASSIUM SERPL-SCNC: 3.7 MMOL/L (ref 3.6–5.5)
RBC # BLD AUTO: 1.96 M/UL (ref 4.2–5.4)
SARS-COV+SARS-COV-2 AG RESP QL IA.RAPID: NOTDETECTED
SODIUM SERPL-SCNC: 137 MMOL/L (ref 135–145)
SPECIMEN SOURCE: NORMAL
WBC # BLD AUTO: 5 K/UL (ref 4.8–10.8)

## 2022-12-01 PROCEDURE — 700111 HCHG RX REV CODE 636 W/ 250 OVERRIDE (IP): Performed by: STUDENT IN AN ORGANIZED HEALTH CARE EDUCATION/TRAINING PROGRAM

## 2022-12-01 PROCEDURE — 700102 HCHG RX REV CODE 250 W/ 637 OVERRIDE(OP): Performed by: STUDENT IN AN ORGANIZED HEALTH CARE EDUCATION/TRAINING PROGRAM

## 2022-12-01 PROCEDURE — A9270 NON-COVERED ITEM OR SERVICE: HCPCS | Performed by: STUDENT IN AN ORGANIZED HEALTH CARE EDUCATION/TRAINING PROGRAM

## 2022-12-01 PROCEDURE — 80048 BASIC METABOLIC PNL TOTAL CA: CPT

## 2022-12-01 PROCEDURE — 700102 HCHG RX REV CODE 250 W/ 637 OVERRIDE(OP): Performed by: INTERNAL MEDICINE

## 2022-12-01 PROCEDURE — 36415 COLL VENOUS BLD VENIPUNCTURE: CPT

## 2022-12-01 PROCEDURE — 85014 HEMATOCRIT: CPT

## 2022-12-01 PROCEDURE — 90935 HEMODIALYSIS ONE EVALUATION: CPT

## 2022-12-01 PROCEDURE — 99239 HOSP IP/OBS DSCHRG MGMT >30: CPT | Performed by: INTERNAL MEDICINE

## 2022-12-01 PROCEDURE — 85018 HEMOGLOBIN: CPT

## 2022-12-01 PROCEDURE — 85027 COMPLETE CBC AUTOMATED: CPT

## 2022-12-01 PROCEDURE — A9270 NON-COVERED ITEM OR SERVICE: HCPCS | Performed by: INTERNAL MEDICINE

## 2022-12-01 PROCEDURE — 700111 HCHG RX REV CODE 636 W/ 250 OVERRIDE (IP)

## 2022-12-01 PROCEDURE — 87426 SARSCOV CORONAVIRUS AG IA: CPT

## 2022-12-01 RX ORDER — HEPARIN SODIUM 1000 [USP'U]/ML
INJECTION, SOLUTION INTRAVENOUS; SUBCUTANEOUS
Status: COMPLETED
Start: 2022-12-01 | End: 2022-12-01

## 2022-12-01 RX ORDER — AMOXICILLIN AND CLAVULANATE POTASSIUM 500; 125 MG/1; MG/1
1 TABLET, FILM COATED ORAL EVERY 24 HOURS
Qty: 3 TABLET | Refills: 0 | Status: SHIPPED
Start: 2022-12-02 | End: 2022-12-05

## 2022-12-01 RX ORDER — HEPARIN SODIUM 1000 [USP'U]/ML
1700 INJECTION, SOLUTION INTRAVENOUS; SUBCUTANEOUS
Status: DISCONTINUED | OUTPATIENT
Start: 2022-12-01 | End: 2022-12-01 | Stop reason: HOSPADM

## 2022-12-01 RX ADMIN — METOPROLOL TARTRATE 25 MG: 25 TABLET, FILM COATED ORAL at 06:40

## 2022-12-01 RX ADMIN — HYDRALAZINE HYDROCHLORIDE 50 MG: 50 TABLET, FILM COATED ORAL at 06:40

## 2022-12-01 RX ADMIN — HEPARIN SODIUM 1700 UNITS: 1000 INJECTION, SOLUTION INTRAVENOUS; SUBCUTANEOUS at 15:40

## 2022-12-01 RX ADMIN — AMOXICILLIN AND CLAVULANATE POTASSIUM 1 TABLET: 500; 125 TABLET, FILM COATED ORAL at 06:41

## 2022-12-01 RX ADMIN — ESCITALOPRAM OXALATE 5 MG: 10 TABLET ORAL at 06:41

## 2022-12-01 RX ADMIN — EPOETIN ALFA-EPBX 6000 UNITS: 3000 INJECTION, SOLUTION INTRAVENOUS; SUBCUTANEOUS at 15:37

## 2022-12-01 RX ADMIN — HEPARIN SODIUM 5000 UNITS: 5000 INJECTION, SOLUTION INTRAVENOUS; SUBCUTANEOUS at 06:40

## 2022-12-01 RX ADMIN — LANTHANUM CARBONATE 750 MG: 500 TABLET, CHEWABLE ORAL at 06:41

## 2022-12-01 RX ADMIN — DOCUSATE SODIUM 50 MG AND SENNOSIDES 8.6 MG 2 TABLET: 8.6; 5 TABLET, FILM COATED ORAL at 06:41

## 2022-12-01 NOTE — DISCHARGE SUMMARY
Discharge Summary    CHIEF COMPLAINT ON ADMISSION  Chief Complaint   Patient presents with    Altered Mental Status       Reason for Admission  EMS    Admission Date  11/26/2022     CODE STATUS  Full Code    HPI & HOSPITAL COURSE  Patient is a 67 y.o. female from [facility] with a PMHx of ESRD 2/2 diabetes on TTS schedule for the past 4 years followed by [facility], type 2 diabetes mellitus (A1c (6/14/2020): 5.4), hypertension, hyperlipidemia, anemia of chronic kidney disease (Hgb 9.0), hx of RUE AVF s/p thrombectomy + Revision in 2022, recent admission from 6/14-7/12 for AMS due to not receiving dialysis for 10 days who presented to the ED on 11/26/2022 due to altered mental status.  Patient had missed the dialysis for 2 weeks, nephrology was consulted and patient has been started on dialysis. Also there was concern for aspiration and started on abx seen by speech and passed swallow evaluation.  She also had hyper calcemia and high anion gap metabolic acidosis which improved after patient was started on dialysis   Patient now doing better.   She will be discharged back to SNF today     The patient met 2-midnight criteria for an inpatient stay at the time of discharge.      FOLLOW UP ITEMS POST DISCHARGE  Nephrology     DISCHARGE DIAGNOSES  Principal Problem:    Acute encephalopathy POA: Yes  Active Problems:    Normocytic anemia POA: Unknown    ESRD (end stage renal disease) on dialysis (HCC) POA: Unknown    Hypertension POA: Unknown    Hyperkalemia POA: Yes    Hyperlipidemia POA: Yes    Depression POA: Yes    Hypercalcemia associated with chronic dialysis (HCC) POA: Unknown    High anion gap metabolic acidosis POA: Unknown    Elevated troponin POA: Unknown    Peripheral neuropathy POA: Unknown    Urinary tract infection POA: Unknown    Abnormal CXR POA: Unknown  Resolved Problems:    Altered mental status POA: Yes      FOLLOW UP  No future appointments.  No follow-up  provider specified.    MEDICATIONS ON DISCHARGE     Medication List        START taking these medications        Instructions   amoxicillin-clavulanate 500-125 MG Tabs  Start taking on: December 2, 2022  Commonly known as: AUGMENTIN   Take 1 Tablet by mouth every 24 hours for 3 days.  Dose: 1 Tablet            CONTINUE taking these medications        Instructions   acetaminophen 325 MG Tabs  Commonly known as: Tylenol   Take 650 mg by mouth every four hours as needed for Mild Pain.  Dose: 650 mg     amLODIPine 10 MG Tabs  Commonly known as: NORVASC   Take 10 mg by mouth at bedtime.  Dose: 10 mg     atorvastatin 40 MG Tabs  Commonly known as: LIPITOR   Take 1 Tablet by mouth every evening.  Dose: 40 mg     escitalopram 5 MG tablet  Commonly known as: Lexapro   Take 5 mg by mouth every day.  Dose: 5 mg     gabapentin 300 MG Caps  Commonly known as: NEURONTIN   Take 300 mg by mouth every evening.  Dose: 300 mg     hydrALAZINE 50 MG Tabs  Commonly known as: APRESOLINE   Take 50 mg by mouth 3 times a day.  Dose: 50 mg     metoprolol tartrate 25 MG Tabs  Commonly known as: LOPRESSOR   Take 25 mg by mouth 2 times a day.  Dose: 25 mg     mirtazapine 15 MG Tabs  Commonly known as: Remeron   Take 15 mg by mouth every evening.  Dose: 15 mg     polyethylene glycol/lytes 17 g Pack  Commonly known as: MIRALAX   Take 17 g by mouth 1 time a day as needed. Indications: Constipation  Dose: 17 g     sevelamer carbonate 800 MG Tabs tablet  Commonly known as: RENVELA   Take 1,600 mg by mouth 3 times a day before meals.  Dose: 1,600 mg     vitamin D3 1000 Unit (25 mcg) Tabs  Commonly known as: cholecalciferol   Take 1,000 Units by mouth at bedtime.  Dose: 1,000 Units              Allergies  Allergies   Allergen Reactions    Bactrim [Sulfamethoxazole-Trimethoprim] Swelling     angioedema    Dilaudid [Hydromorphone] Unspecified     Per [facility]    Enalapril Anaphylaxis and Swelling     angioedema    Vicodin Hp  [Hydrocodone-Acetaminophen] Unspecified     Per [facility]       DIET  Orders Placed This Encounter   Procedures    Diet Order Diet: Level 6 - Soft and Bite Sized (crush or float meds in puree); Liquid level: Level 0 - Thin; Second Modifier: (optional): Renal; Tray Modifications (optional): SLP - 1:1 Supervision by Nursing, SLP - Deliver to Nursing Station     Standing Status:   Standing     Number of Occurrences:   1     Order Specific Question:   Diet:     Answer:   Level 6 - Soft and Bite Sized [23]     Comments:   crush or float meds in puree     Order Specific Question:   Liquid level     Answer:   Level 0 - Thin     Order Specific Question:   Second Modifier: (optional)     Answer:   Renal [8]     Order Specific Question:   Tray Modifications (optional)     Answer:   SLP - 1:1 Supervision by Nursing     Order Specific Question:   Tray Modifications (optional)     Answer:   SLP - Deliver to Nursing Station       ACTIVITY  As tolerated.  Weight bearing as tolerated    LINES, DRAINS, AND WOUNDS  This is an automated list. Peripheral IVs will be removed prior to discharge.       Moisture Associated Skin Damage 05/21/22 (Active)       Wound 05/23/22 Incision Arm Right DERMABOND WITH ACE (Active)       Wound 05/23/22 Incision Chest Left steristrips, 4x4, tegaderm (Active)       Hemodialysis AV Graft/Fistula Right AV fistula Upper arm (Active)       Hemodialysis AV Graft/Fistula Right AV fistula Upper arm (Active)   AV Fistula Status Bruit present;Thrill present 11/30/22 2030   Site Assessment Clean;Dry;Intact 11/30/22 2030   Status Deaccessed 11/30/22 0800   Local Anesthetic None 11/29/22 1330   Site Prep Alcohol 11/29/22 0920   Needle Size 15 G 11/29/22 0920   Dressing Type Gauze 11/29/22 1330   Dressing Status Clean;Dry;Intact 11/29/22 1330   Dressing Intervention Dressing reinforced 11/29/22 1330               MENTAL STATUS ON TRANSFER             CONSULTATIONS  Nephrology     PROCEDURES  None      LABORATORY  Lab Results   Component Value Date    SODIUM 137 12/01/2022    POTASSIUM 3.7 12/01/2022    CHLORIDE 99 12/01/2022    CO2 26 12/01/2022    GLUCOSE 94 12/01/2022    BUN 31 (H) 12/01/2022    CREATININE 3.77 (H) 12/01/2022        Lab Results   Component Value Date    WBC 5.0 12/01/2022    HEMOGLOBIN 8.5 (L) 12/01/2022    HEMATOCRIT 27.6 (L) 12/01/2022    PLATELETCT 263 12/01/2022        Total time of the discharge process exceeds 41 minutes.

## 2022-12-01 NOTE — PROGRESS NOTES
Monitor Summary:     Rhythm: SR  Rate: 66-85  Ectopy: (R) PVC's  Measurements: .18/.06/.48           12 Hour Chart Check

## 2022-12-01 NOTE — PROGRESS NOTES
Handoff report received from day shift nurse. Pt care assumed. Pt is currently resting in bed. Pt is AAOx1, on Ra, on Tele monitoring, and VSS. Call light and belongings within reach, bed in lowest and locked position, and Pt educated on use of call light.

## 2022-12-01 NOTE — PROGRESS NOTES
"[facility] -  PROGRESS NOTE               Author: Nikhil Peterson D.O. Date & Time: 12/1/2022  9:57 AM     HPI:  67 y.o. female with ESRD on HD TTS at [facility] who resides at [facility] presented wth encephalopathy. Recent  admission for similar presentation after refusing HD for nearly 2 weeks. She is currently unable to participate in interview but reports jane tshe has again refused dialysis for ~10 days. In the ED found to have a Bun 124 and cr 10. Seen on HD -tolerating procedure. Infectious work-up underway, on abx.    DAILY NEPHROLOGY SUMMARY:  11/27 - Consult done.  Urgent dialysis provided.  11/28 - Mental status unchanged.  Head CT pending.  Getting swallow eval. Voices no complaint or pain.    No dyspnea at rest.   11/29 - Pt seen on HD, BP stable, remains confused, verbalizes minimally, labs reviewed, CT  head   yesterday no acute changes  11/30 - HD yesterday and tolerated well.  No overnight events or changes.  Feels \"okay\".    Tells me she isn't eating.  +Weakness   12/01 - No overnight events or complaints.  Not eating much.  +weakness.  No dyspnea at rest.      REVIEW OF SYSTEMS:    10 point ROS reviewed and is as per HPI/daily summary or otherwise negative    PMH/PSH/SH/FH: Reviewed and unchanged since admission note  CURRENT MEDICATIONS: Reviewed from admission to present day    VS:  /49   Pulse 78   Temp 36.5 °C (97.7 °F) (Temporal)   Resp 15   Ht 1.702 m (5' 7\")   Wt 66.4 kg (146 lb 6.2 oz)   SpO2 94%   BMI 22.93 kg/m²   Physical Exam  Nursing note reviewed.   Constitutional:       General: She is not in acute distress.     Appearance: Normal appearance.   HENT:      Head: Normocephalic and atraumatic.      Mouth/Throat:      Mouth: Mucous membranes are moist.   Eyes:      General: No scleral icterus.     Extraocular Movements: Extraocular movements intact.      Pupils: Pupils are equal, round, and reactive to light.   Cardiovascular:      Rate and " Rhythm: Normal rate and regular rhythm.      Heart sounds: Normal heart sounds.     No friction rub.      Comments: RUE AVF  Pulmonary:      Effort: Pulmonary effort is normal. No respiratory distress.      Breath sounds: No wheezing.   Abdominal:      General: Abdomen is flat. There is no distension.      Palpations: Abdomen is soft.      Tenderness: There is no abdominal tenderness.   Musculoskeletal:         General: No swelling, tenderness or deformity.      Cervical back: Normal range of motion and neck supple.   Skin:     General: Skin is warm and dry.      Findings: No rash.   Neurological:      Mental Status: She is alert. She is disoriented.   Psychiatric:         Attention and Perception: She is inattentive.       Fluids:  In: 170 [P.O.:170]  Out: -     LABS:  Recent Labs     11/30/22  0304 11/30/22  1905 12/01/22  0500   SODIUM 138 138 137   POTASSIUM 3.7 4.1 3.7   CHLORIDE 95* 95* 99   CO2 28 29 26   GLUCOSE 105* 179* 94   BUN 28* 33* 31*   CREATININE 3.18* 3.99* 3.77*   CALCIUM 10.1 10.1 8.8         ASSESSMENTS:   # ESRD    Dialysis today (THURS)  Maintenance dialysis qTTS and as needed    Right arm AVF    Has been non-adherent to outpt dialysis schedule   # ALOC    Uremia corrected but still confused    Head CT no acute changes 11/28; + atrophy and chronic small vessel ischemic change    ?Baseline    SLP eval   # Hypertension, fair control     Continue current medications    Volume off with dialysis as BP tolerates   # Anemia, below target     %sat 45, ferritin 1541     JUDITH with HD to goal Hgb 10-11    Transfusion, 12/01   # CKD-MBD    PTH = 44 (Suppressed by high Ca)    Ca = 10.1  Vit D = 36    Phos 5.9    # Hypercalcemia    Not PTH driven    DDX: Malignancy, ingestion    UPEP, SPEP, FLC = pending    Consider age appropriate malignancy screening    Calcium low today (THURS) due to control with HD   # Low Serum Albumin    No dietary protein restrictions    Goal: 1.5g  Protein/kg/day    SUGGESTIONS:   Dialysis today (THURS)   Maintenance dialysis qTTS and PRN   Volume off with dialysis as BP tolerates   Continue usual anti-hypertensive medications, goal BP <140/90    Ferritin too high for use of IV iron   JUDITH with HD to goal Hgb 10-11   Transfuse PRN Hgb <7    Control Ca on HD   UPEP, SPEP, FLC = pending   Consider age appropriate malignancy screening   Limit dietary Ca   No calcium containing phos binders   Continue sevelemer with meals   No dietary protein restrctions    Goal: 1.5g Protein/kg/day   Follow labs with further recommendations to follow    Thank you,

## 2022-12-01 NOTE — CARE PLAN
The patient is Stable - Low risk of patient condition declining or worsening    Shift Goals  Clinical Goals: Monitor Hgb, Q2 Turns  Patient Goals: CARIE  Family Goals: carie    Progress made toward(s) clinical / shift goals:        Problem: Fall Risk  Goal: Patient will remain free from falls  Outcome: Progressing     Problem: Skin Integrity  Goal: Skin integrity is maintained or improved  Outcome: Progressing     Problem: Pain - Standard  Goal: Alleviation of pain or a reduction in pain to the patient’s comfort goal  Outcome: Progressing       Patient is not progressing towards the following goals: NA

## 2022-12-01 NOTE — DISCHARGE PLANNING
RNCM attempted call to pt spouse to update [facility] transportation time; no answer, mailbox full.

## 2022-12-01 NOTE — DISCHARGE PLANNING
DC Transport Scheduled    Received request at: 11/30/2022 1540    Transport Company Scheduled:  [employer]  Spoke with Jhoana at [facility] to schedule transport.  MTM Trip #: No MTM Benefits Insurance Card    Scheduled Date: 12/1/2022  Scheduled Time: 1700    Destination: [facility]  [address] [place]    Notified care team of scheduled transport via Voalte.     If there are any changes needed to the DC transportation scheduled, please contact [facility] at ext. [phone] between the hours of 1856-0303 Mon-Fri. If outside those hours, contact the ED Case Manager at ext. [phone].

## 2022-12-01 NOTE — PROGRESS NOTES
Lab called with critical hgb of 5.3. JENNA Wiley notified. STAT redraw of hgb ordered. Waiting for results and orders for transfusion.

## 2022-12-01 NOTE — DISCHARGE PLANNING
Agency/Facility Name: [facility]   Spoke To: Sangeetha   Outcome: MADHU called confirm transport time at 1700 to to back to [facility].     1204  Agency/Facility Name: [facility]   Spoke To: Mary Ellen   Outcome: Mary Ellen called to request if transport can be pushed back for 1830 or later.     1343  Agency/Facility Name: [facility]   Spoke To: Mary Ellen   Outcome: MADHU left Vmail regarding transport confirmed for 1830.

## 2022-12-01 NOTE — DISCHARGE PLANNING
Per care team, DC transport moved today from 1700 to 1830. Spoke with Corie at [facility] to confirm.   SLC

## 2022-12-02 LAB
BACTERIA BLD CULT: NORMAL
KAPPA LC FREE SER-MCNC: 419.03 MG/L (ref 3.3–19.4)
KAPPA LC FREE/LAMBDA FREE SER NEPH: 1.82 {RATIO} (ref 0.26–1.65)
LAMBDA LC FREE SERPL-MCNC: 230.14 MG/L (ref 5.71–26.3)
SIGNIFICANT IND 70042: NORMAL
SITE SITE: NORMAL
SOURCE SOURCE: NORMAL

## 2022-12-02 NOTE — PROGRESS NOTES
Hemodialysis ordered by Dr. Peterson. Treatment started at 1211 and ended at 1541. Pt stable, vss, no c/o post tx. Net  ml d/t hypotension during tx. UF off. Dr. Peterson notified and aware. Report to DESMOND Laura RN.

## 2022-12-04 LAB
ALBUMIN SERPL ELPH-MCNC: 2.99 G/DL (ref 3.75–5.01)
ALPHA1 GLOB SERPL ELPH-MCNC: 0.63 G/DL (ref 0.19–0.46)
ALPHA2 GLOB SERPL ELPH-MCNC: 0.88 G/DL (ref 0.48–1.05)
B-GLOBULIN SERPL ELPH-MCNC: 0.61 G/DL (ref 0.48–1.1)
GAMMA GLOB SERPL ELPH-MCNC: 1.7 G/DL (ref 0.62–1.51)
INTERPRETATION SERPL IFE-IMP: ABNORMAL
MONOCLON BAND OBS SERPL: ABNORMAL
MONOCLONAL PROTEIN NL11656: ABNORMAL G/DL
PATHOLOGY STUDY: ABNORMAL
PROT SERPL-MCNC: 6.8 G/DL (ref 6.3–8.2)

## 2022-12-14 NOTE — DOCUMENTATION QUERY
[facility]                                                                       Query Response Note      PATIENT:               GIANLUCA ZALDIVAR  ACCT #:                  [id number]  MRN:                     [id number]  :                      1955  ADMIT DATE:       2022 11:44 PM  DISCH DATE:        2022 9:47 PM  RESPONDING  PROVIDER #:        [id number]           QUERY TEXT:    Acute encephalopathy is documented in the Medical Record. Please specify type.    The patient's Clinical Indicators include:  Acute encephalopathy POA:  neph consult d/t uremic encephalopathy, treat presumed UTI which could be contributing to acute encephalopathy   Risk Factors: UTI, ESRD- refused HD x 10 days, uremia, hypercalcemia  Treatment: ceftriaxone, HD    Thank you,  Rupali Cassidy RN, BSN  Clinical   Connect via Dis[employer] CK  Options provided:   -- Metabolic encephalopathy   -- Toxic metabolic encephalopathy   -- Other type of encephalopathy, please specify   -- Other explanation, please specify   -- Unable to determine      Query created by: Rupali Cassidy on 2022 10:31 AM    RESPONSE TEXT:    Metabolic encephalopathy          Electronically signed by:  JACK BOCANEGRA MD 2022 7:36 AM

## 2023-06-01 NOTE — PROGRESS NOTES
Received Bedside report. Assumed care at 1900. This pt's orientation status is unable to be assessed due to patient not responding to questions.  Patient shows no non-verbal descriptors of being in pain. Patient and RN discussed plan of care: questions answered. Labs noted, assessment complete, patient tolerating level 5-- minced and moist diet. Tele box in place. Pt is on room air. Call light in place, fall precautions in place, patient educated on importance of calling for assistance. No additional needs at this time. VSS     Detail Level: Zone Patient is scheduled with Alexandra Collier MD 7/14/23 in [place] for CO2 scar revision

## (undated) DEVICE — GOWN WARMING STANDARD FLEX - (30/CA)

## (undated) DEVICE — SLEEVE, VASO, THIGH, MED

## (undated) DEVICE — BLADE SURGICAL #11 - (50/BX)

## (undated) DEVICE — SYRINGE NON SAFETY 5 CC 20 GA X 1-1/2 IN (100/BX 4BX/CA)

## (undated) DEVICE — SUTURE 3-0 VICRYL PLUS SH - 8X 18 INCH (12/BX)

## (undated) DEVICE — HEAD HOLDER JUNIOR/ADULT

## (undated) DEVICE — SUTURE 2-0 ETHILON FS - (36/BX) 18 INCH

## (undated) DEVICE — TUBING CLEARLINK DUO-VENT - C-FLO (48EA/CA)

## (undated) DEVICE — GOWN SURGEONS LARGE - (32/CA)

## (undated) DEVICE — DRAPE SURGICAL U 77X120 - (10/CA)

## (undated) DEVICE — SYRINGE NON SAFETY 3 CC 21 GA X 1 1/2 IN (100/BX 8BX/CA)

## (undated) DEVICE — SYRINGE NON SAFETY TB 1 CC 27 GA X 1/2 IN (100/BX 8BX/CA)

## (undated) DEVICE — SET LEADWIRE 5 LEAD BEDSIDE DISPOSABLE ECG (1SET OF 5/EA)

## (undated) DEVICE — DRAPE LOWER EXTREMETY - (6/CA)

## (undated) DEVICE — CHLORAPREP 26 ML APPLICATOR - ORANGE TINT(25/CA)

## (undated) DEVICE — ELECTRODE DUAL RETURN W/ CORD - (50/PK)

## (undated) DEVICE — DECANTER FLD BLS - (50/CA)

## (undated) DEVICE — SLEEVE VASO CALF MED - (10PR/CA)

## (undated) DEVICE — STAPLER SKIN DISP - (6/BX 10BX/CA) VISISTAT

## (undated) DEVICE — SYRINGE NON SAFETY 10 CC 20 GA X 1-1/2 IN (100/BX 4BX/CA)

## (undated) DEVICE — GLOVE BIOGEL SZ 8 SURGICAL PF LTX - (50PR/BX 4BX/CA)

## (undated) DEVICE — SUTURE 4-0 SILK 12 X 18 INCH - (36/BX)

## (undated) DEVICE — DRAPE LARGE 3 QUARTER - (20/CA)

## (undated) DEVICE — SODIUM CHL IRRIGATION 0.9% 1000ML (12EA/CA)

## (undated) DEVICE — SET INTRO MIRCROPUNCTURE - MPIS-501-SST

## (undated) DEVICE — TOWELS CLOTH SURGICAL - (4/PK 20PK/CA)

## (undated) DEVICE — MEDICINE CUP STERILE 2 OZ - (100/CA)

## (undated) DEVICE — COVER PROBE STERILE CONE (12EA/CA)

## (undated) DEVICE — DEVICE INFLATION DIGITAL BLUE DIAMOND (5EA/BX)

## (undated) DEVICE — CATHETER EMBOLECTOMY 6FR (1EA)

## (undated) DEVICE — SUTURE 4-0 30CM STRATAFIX SPIRAL PS-2 (12EA/BX)

## (undated) DEVICE — KIT SURGIFLO W/OUT THROMBIN - (6EA/CA)

## (undated) DEVICE — PAD PREP 24 X 48 CUFFED - (100/CA)

## (undated) DEVICE — CLIP SM INTNL HRZN TI ESCP LGT - (24EA/PK 25PK/BX)

## (undated) DEVICE — SOD. CHL. INJ. 0.9% 250 ML - (36/CA 50CA/PF)

## (undated) DEVICE — PACK AV FISTULA (4EA/CA)

## (undated) DEVICE — GELAQUASONIC 100 ULTRASOUND - 48/BX 20GM STERILE FOIL POUCH

## (undated) DEVICE — SUTURE GENERAL

## (undated) DEVICE — DRAPE C-ARM LARGE 41IN X 74 IN - (10/BX 2BX/CA)

## (undated) DEVICE — SUTURE 2-0 VICRYL PLUS SH - 27 INCH (36/BX)

## (undated) DEVICE — CATHETER EMBOLECTOMY 3FR (1EA)

## (undated) DEVICE — PAD LAP STERILE 18 X 18 - (5/PK 40PK/CA)

## (undated) DEVICE — SYRINGE 30 ML LL (56/BX)

## (undated) DEVICE — PACK MINOR BASIN - (2EA/CA)

## (undated) DEVICE — BOVIE BLADE COATED - (50/PK)

## (undated) DEVICE — SUTURE 6-0 PROLENE BV-1 D/A 24 (36PK/BX)"

## (undated) DEVICE — KIT ANESTHESIA W/CIRCUIT & 3/LT BAG W/FILTER (20EA/CA)

## (undated) DEVICE — SET EXTENSION 31IN APPX 3.2ML WITH CLAMP (50/CA)WAS 4610-03

## (undated) DEVICE — SUTURE 5-0 PROLENE C-1 D/A 24 (36PK/BX)"

## (undated) DEVICE — GUIDEWIRES STARTER (PTFE COATED) J CURVED FIXED CORE 0.035 180CM 10 3 MM J FS

## (undated) DEVICE — CATHETER EMBOLECTOMY 4FR (5EA/CA)

## (undated) DEVICE — MASK ANESTHESIA ADULT  - (100/CA)

## (undated) DEVICE — SUTURE CV

## (undated) DEVICE — SYRINGE 20 ML LL (50EA/BX 4BX/CA)

## (undated) DEVICE — ELECTRODE 850 FOAM ADHESIVE - HYDROGEL RADIOTRNSPRNT (50/PK)

## (undated) DEVICE — SET EXTENSION WITH 2 PORTS (48EA/CA) ***PART #2C8610 IS A SUBSTITUTE*****

## (undated) DEVICE — SUCTION INSTRUMENT YANKAUER BULBOUS TIP W/O VENT (50EA/CA)

## (undated) DEVICE — CANISTER SUCTION 3000ML MECHANICAL FILTER AUTO SHUTOFF MEDI-VAC NONSTERILE LF DISP  (40EA/CA)

## (undated) DEVICE — PACK MAJOR BASIN - (2EA/CA)

## (undated) DEVICE — SUTURE 4-0 MONOCRYL PLUS PS-2 - 27 INCH (36/BX)

## (undated) DEVICE — DRAPE IOBAN II 23 IN X 33 IN - (10/BX)

## (undated) DEVICE — SHEATH RO 6F 10CM (10EA/BX)

## (undated) DEVICE — NEPTUNE 4 PORT MANIFOLD - (20/PK)

## (undated) DEVICE — Device